# Patient Record
Sex: MALE | Race: WHITE | Employment: OTHER | URBAN - METROPOLITAN AREA
[De-identification: names, ages, dates, MRNs, and addresses within clinical notes are randomized per-mention and may not be internally consistent; named-entity substitution may affect disease eponyms.]

---

## 2017-01-04 RX ORDER — TAMSULOSIN HYDROCHLORIDE 0.4 MG/1
CAPSULE ORAL
Qty: 90 CAPSULE | Refills: 1 | Status: SHIPPED | OUTPATIENT
Start: 2017-01-04 | End: 2017-06-28 | Stop reason: SDUPTHER

## 2017-01-17 DIAGNOSIS — I10 ESSENTIAL HYPERTENSION: ICD-10-CM

## 2017-01-18 ENCOUNTER — OFFICE VISIT (OUTPATIENT)
Dept: FAMILY MEDICINE CLINIC | Age: 82
End: 2017-01-18

## 2017-01-18 VITALS
DIASTOLIC BLOOD PRESSURE: 70 MMHG | HEIGHT: 67 IN | RESPIRATION RATE: 14 BRPM | WEIGHT: 150 LBS | HEART RATE: 68 BPM | BODY MASS INDEX: 23.54 KG/M2 | SYSTOLIC BLOOD PRESSURE: 136 MMHG

## 2017-01-18 DIAGNOSIS — I10 ESSENTIAL HYPERTENSION: Primary | ICD-10-CM

## 2017-01-18 DIAGNOSIS — E78.00 PURE HYPERCHOLESTEROLEMIA: ICD-10-CM

## 2017-01-18 DIAGNOSIS — M25.512 CHRONIC LEFT SHOULDER PAIN: ICD-10-CM

## 2017-01-18 DIAGNOSIS — G89.29 CHRONIC LEFT SHOULDER PAIN: ICD-10-CM

## 2017-01-18 DIAGNOSIS — L30.9 ECZEMA, UNSPECIFIED TYPE: ICD-10-CM

## 2017-01-18 DIAGNOSIS — I25.810 CORONARY ARTERY DISEASE INVOLVING AUTOLOGOUS VEIN CORONARY BYPASS GRAFT WITHOUT ANGINA PECTORIS: ICD-10-CM

## 2017-01-18 DIAGNOSIS — N40.1 BENIGN NON-NODULAR PROSTATIC HYPERPLASIA WITH LOWER URINARY TRACT SYMPTOMS: ICD-10-CM

## 2017-01-18 PROCEDURE — 99213 OFFICE O/P EST LOW 20 MIN: CPT | Performed by: FAMILY MEDICINE

## 2017-01-18 RX ORDER — TRIAMCINOLONE ACETONIDE 1 MG/G
CREAM TOPICAL
Qty: 45 G | Refills: 2 | Status: SHIPPED | OUTPATIENT
Start: 2017-01-18 | End: 2017-11-21 | Stop reason: ALTCHOICE

## 2017-01-18 RX ORDER — LISINOPRIL AND HYDROCHLOROTHIAZIDE 20; 12.5 MG/1; MG/1
1 TABLET ORAL DAILY
Qty: 30 TABLET | Refills: 3 | Status: SHIPPED | OUTPATIENT
Start: 2017-01-18 | End: 2017-05-19 | Stop reason: SDUPTHER

## 2017-01-18 ASSESSMENT — ENCOUNTER SYMPTOMS
BLOOD IN STOOL: 0
COUGH: 0
ABDOMINAL PAIN: 0
TROUBLE SWALLOWING: 0
NAUSEA: 0
ORTHOPNEA: 0
EYE PAIN: 0
BACK PAIN: 0
CONSTIPATION: 0
CHEST TIGHTNESS: 0
SHORTNESS OF BREATH: 0
SORE THROAT: 0

## 2017-01-26 ENCOUNTER — TELEPHONE (OUTPATIENT)
Dept: FAMILY MEDICINE CLINIC | Age: 82
End: 2017-01-26

## 2017-02-21 RX ORDER — SIMVASTATIN 40 MG
TABLET ORAL
Qty: 90 TABLET | Refills: 1 | Status: SHIPPED | OUTPATIENT
Start: 2017-02-21 | End: 2017-08-16 | Stop reason: SDUPTHER

## 2017-03-03 DIAGNOSIS — I10 ESSENTIAL HYPERTENSION: ICD-10-CM

## 2017-03-03 DIAGNOSIS — E78.00 PURE HYPERCHOLESTEROLEMIA: ICD-10-CM

## 2017-03-03 LAB
CHOLESTEROL, TOTAL: NORMAL MG/DL
CHOLESTEROL/HDL RATIO: NORMAL
HDLC SERPL-MCNC: NORMAL MG/DL (ref 35–70)
LDL CHOLESTEROL CALCULATED: 33 MG/DL (ref 0–160)
TRIGL SERPL-MCNC: NORMAL MG/DL
VLDLC SERPL CALC-MCNC: NORMAL MG/DL

## 2017-03-06 ENCOUNTER — TELEPHONE (OUTPATIENT)
Dept: FAMILY MEDICINE CLINIC | Age: 82
End: 2017-03-06

## 2017-03-07 ENCOUNTER — NURSE ONLY (OUTPATIENT)
Dept: FAMILY MEDICINE CLINIC | Age: 82
End: 2017-03-07

## 2017-03-07 DIAGNOSIS — R73.9 HYPERGLYCEMIA: Primary | ICD-10-CM

## 2017-03-07 LAB
GLUCOSE BLD-MCNC: 138 MG/DL
HBA1C MFR BLD: 5.9 %

## 2017-03-07 PROCEDURE — 83036 HEMOGLOBIN GLYCOSYLATED A1C: CPT | Performed by: FAMILY MEDICINE

## 2017-03-07 PROCEDURE — 82962 GLUCOSE BLOOD TEST: CPT | Performed by: FAMILY MEDICINE

## 2017-03-07 RX ORDER — CARVEDILOL 12.5 MG/1
TABLET ORAL
Qty: 180 TABLET | Refills: 1 | Status: SHIPPED | OUTPATIENT
Start: 2017-03-07 | End: 2017-09-01 | Stop reason: SDUPTHER

## 2017-05-19 DIAGNOSIS — I10 ESSENTIAL HYPERTENSION: ICD-10-CM

## 2017-05-19 RX ORDER — LISINOPRIL AND HYDROCHLOROTHIAZIDE 20; 12.5 MG/1; MG/1
1 TABLET ORAL DAILY
Qty: 90 TABLET | Refills: 1 | Status: SHIPPED | OUTPATIENT
Start: 2017-05-19 | End: 2017-11-16 | Stop reason: SDUPTHER

## 2017-06-28 ENCOUNTER — OFFICE VISIT (OUTPATIENT)
Dept: FAMILY MEDICINE CLINIC | Age: 82
End: 2017-06-28

## 2017-06-28 VITALS
SYSTOLIC BLOOD PRESSURE: 120 MMHG | DIASTOLIC BLOOD PRESSURE: 60 MMHG | BODY MASS INDEX: 23.07 KG/M2 | RESPIRATION RATE: 12 BRPM | WEIGHT: 147 LBS | HEART RATE: 64 BPM | HEIGHT: 67 IN

## 2017-06-28 DIAGNOSIS — L57.0 ACTINIC KERATOSIS: ICD-10-CM

## 2017-06-28 DIAGNOSIS — I10 ESSENTIAL HYPERTENSION: Primary | ICD-10-CM

## 2017-06-28 DIAGNOSIS — I25.810 CORONARY ARTERY DISEASE INVOLVING AUTOLOGOUS VEIN CORONARY BYPASS GRAFT WITHOUT ANGINA PECTORIS: ICD-10-CM

## 2017-06-28 DIAGNOSIS — E78.00 PURE HYPERCHOLESTEROLEMIA: ICD-10-CM

## 2017-06-28 DIAGNOSIS — I34.0 NON-RHEUMATIC MITRAL REGURGITATION: ICD-10-CM

## 2017-06-28 DIAGNOSIS — N40.1 BENIGN NON-NODULAR PROSTATIC HYPERPLASIA WITH LOWER URINARY TRACT SYMPTOMS: ICD-10-CM

## 2017-06-28 PROCEDURE — 99213 OFFICE O/P EST LOW 20 MIN: CPT | Performed by: FAMILY MEDICINE

## 2017-06-28 PROCEDURE — 17003 DESTRUCT PREMALG LES 2-14: CPT | Performed by: FAMILY MEDICINE

## 2017-06-28 PROCEDURE — 17000 DESTRUCT PREMALG LESION: CPT | Performed by: FAMILY MEDICINE

## 2017-06-28 RX ORDER — TAMSULOSIN HYDROCHLORIDE 0.4 MG/1
CAPSULE ORAL
Qty: 90 CAPSULE | Refills: 1 | Status: SHIPPED | OUTPATIENT
Start: 2017-06-28 | End: 2017-12-26 | Stop reason: SDUPTHER

## 2017-06-28 ASSESSMENT — ENCOUNTER SYMPTOMS
TROUBLE SWALLOWING: 0
EYE PAIN: 0
BACK PAIN: 0
NAUSEA: 0
CHEST TIGHTNESS: 0
ABDOMINAL PAIN: 0
SHORTNESS OF BREATH: 0
SORE THROAT: 0
COUGH: 0
ORTHOPNEA: 0
CONSTIPATION: 0
BLOOD IN STOOL: 0

## 2017-06-28 ASSESSMENT — PATIENT HEALTH QUESTIONNAIRE - PHQ9
1. LITTLE INTEREST OR PLEASURE IN DOING THINGS: 0
2. FEELING DOWN, DEPRESSED OR HOPELESS: 0
SUM OF ALL RESPONSES TO PHQ9 QUESTIONS 1 & 2: 0
SUM OF ALL RESPONSES TO PHQ QUESTIONS 1-9: 0

## 2017-08-17 RX ORDER — SIMVASTATIN 40 MG
TABLET ORAL
Qty: 90 TABLET | Refills: 1 | Status: SHIPPED | OUTPATIENT
Start: 2017-08-17 | End: 2018-02-13 | Stop reason: SDUPTHER

## 2017-09-02 RX ORDER — CARVEDILOL 12.5 MG/1
TABLET ORAL
Qty: 180 TABLET | Refills: 0 | Status: SHIPPED | OUTPATIENT
Start: 2017-09-02 | End: 2017-12-04 | Stop reason: SDUPTHER

## 2017-11-16 DIAGNOSIS — I10 ESSENTIAL HYPERTENSION: ICD-10-CM

## 2017-11-17 RX ORDER — LISINOPRIL AND HYDROCHLOROTHIAZIDE 20; 12.5 MG/1; MG/1
1 TABLET ORAL DAILY
Qty: 90 TABLET | Refills: 0 | Status: SHIPPED | OUTPATIENT
Start: 2017-11-17 | End: 2018-02-08 | Stop reason: SDUPTHER

## 2017-11-21 ENCOUNTER — OFFICE VISIT (OUTPATIENT)
Dept: FAMILY MEDICINE CLINIC | Age: 82
End: 2017-11-21

## 2017-11-21 VITALS
DIASTOLIC BLOOD PRESSURE: 80 MMHG | HEIGHT: 67 IN | HEART RATE: 48 BPM | RESPIRATION RATE: 16 BRPM | SYSTOLIC BLOOD PRESSURE: 130 MMHG | BODY MASS INDEX: 23.01 KG/M2 | WEIGHT: 146.6 LBS

## 2017-11-21 DIAGNOSIS — I10 ESSENTIAL HYPERTENSION: ICD-10-CM

## 2017-11-21 DIAGNOSIS — I25.810 CORONARY ARTERY DISEASE INVOLVING AUTOLOGOUS VEIN CORONARY BYPASS GRAFT WITHOUT ANGINA PECTORIS: ICD-10-CM

## 2017-11-21 DIAGNOSIS — I34.0 MITRAL VALVE INSUFFICIENCY, UNSPECIFIED ETIOLOGY: Primary | ICD-10-CM

## 2017-11-21 DIAGNOSIS — I49.3 ASYMPTOMATIC PVCS: ICD-10-CM

## 2017-11-21 DIAGNOSIS — E78.00 PURE HYPERCHOLESTEROLEMIA: ICD-10-CM

## 2017-11-21 PROCEDURE — 93000 ELECTROCARDIOGRAM COMPLETE: CPT | Performed by: FAMILY MEDICINE

## 2017-11-21 PROCEDURE — 99214 OFFICE O/P EST MOD 30 MIN: CPT | Performed by: FAMILY MEDICINE

## 2017-11-21 ASSESSMENT — ENCOUNTER SYMPTOMS
CHEST TIGHTNESS: 0
ORTHOPNEA: 0
TROUBLE SWALLOWING: 0
COUGH: 0
ABDOMINAL PAIN: 0
SHORTNESS OF BREATH: 0
BACK PAIN: 0
BLOOD IN STOOL: 0
CONSTIPATION: 0
SORE THROAT: 0
EYE PAIN: 0
NAUSEA: 0

## 2017-11-21 NOTE — PROGRESS NOTES
Review of Systems   Constitutional: Negative for fatigue and fever. HENT: Negative for congestion, ear pain, postnasal drip, sore throat and trouble swallowing. Eyes: Negative for pain. Respiratory: Negative for cough, chest tightness and shortness of breath. Cardiovascular: Negative for chest pain, palpitations, orthopnea and leg swelling. Gastrointestinal: Negative for abdominal pain, blood in stool, constipation and nausea. Genitourinary: Negative for difficulty urinating, frequency and urgency. Musculoskeletal: Negative for arthralgias, back pain, joint swelling and neck stiffness. Skin: Negative for rash. Neurological: Negative for dizziness, weakness and headaches. Hematological: Negative for adenopathy. Does not bruise/bleed easily. Psychiatric/Behavioral: Negative for behavioral problems, dysphoric mood and sleep disturbance. /80 (Site: Left Arm, Position: Sitting, Cuff Size: Medium Adult)   Pulse (!) 48   Resp 16   Ht 5' 7\" (1.702 m)   Wt 146 lb 9.6 oz (66.5 kg)   BMI 22.96 kg/m²   Objective:   Physical Exam   Constitutional: He is oriented to person, place, and time. He appears well-developed and well-nourished. HENT:   Head: Normocephalic and atraumatic. Right Ear: External ear normal.   Left Ear: External ear normal.   Nose: Nose normal.   Mouth/Throat: Oropharynx is clear and moist.   Eyes: Conjunctivae and EOM are normal. Pupils are equal, round, and reactive to light. Fundi nl   Neck: Normal range of motion. Neck supple. No thyromegaly present. Cardiovascular: Normal rate, S1 normal, S2 normal and intact distal pulses. A regularly irregular rhythm present. Murmur heard. Systolic murmur is present with a grade of 2/6   Pulmonary/Chest: Effort normal and breath sounds normal. He has no wheezes. He has no rales. Abdominal: Soft. Bowel sounds are normal. He exhibits no mass. There is no tenderness. Musculoskeletal: Normal range of motion. Lymphadenopathy:     He has no cervical adenopathy. Neurological: He is alert and oriented to person, place, and time. He has normal reflexes. No cranial nerve deficit. Skin: Skin is warm and dry. No rash noted. Psychiatric: He has a normal mood and affect. Nursing note and vitals reviewed. ekg  With  pvc and  Otherwise  wnl     Assessment:      1. Mitral valve insufficiency, unspecified etiology  ECHO Complete 2D W Doppler W Color   2. Essential hypertension  EKG 12 Lead    CBC with Differential    Basic Metabolic Panel    Magnesium    TSH with Reflex   3. 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     4. Pure hypercholesterolemia     5. Asymptomatic PVCs  Holter Monitor 24 Hour    TSH with Reflex         Plan:      Current Outpatient Prescriptions   Medication Sig Dispense Refill    lisinopril-hydrochlorothiazide (PRINZIDE;ZESTORETIC) 20-12.5 MG per tablet Take 1 tablet by mouth daily 90 tablet 0    carvedilol (COREG) 12.5 MG tablet TAKE 1 TABLET BY MOUTH TWICE DAILY 180 tablet 0    simvastatin (ZOCOR) 40 MG tablet TAKE 1 TABLET BY MOUTH EVERY NIGHT AT BEDTIME 90 tablet 1    tamsulosin (FLOMAX) 0.4 MG capsule TAKE ONE CAPSULE BY MOUTH DAILY 90 capsule 1    aspirin 81 MG EC tablet Take 81 mg by mouth daily.  acetaminophen (TYLENOL) 500 MG tablet Take 500 mg by mouth every 6 hours as needed.  multivitamin (THERAGRAN) per tablet Take 1 tablet by mouth daily. No current facility-administered medications for this visit.           Orders Placed This Encounter   Procedures    CBC with Differential     Standing Status:   Future     Standing Expiration Date:   11/21/2018    Basic Metabolic Panel     Standing Status:   Future     Standing Expiration Date:   11/21/2018    Magnesium     Standing Status:   Future     Standing Expiration Date:   11/21/2018    TSH with Reflex     Standing Status:   Future     Standing Expiration Date: 11/21/2018    Holter Monitor 24 Hour     Standing Status:   Future     Standing Expiration Date:   11/21/2018     Order Specific Question:   Reason for Exam?     Answer:   Irregular heart rate    EKG 12 Lead     Order Specific Question:   Reason for Exam?     Answer:   Irregular heart rate    ECHO Complete 2D W Doppler W Color     Standing Status:   Future     Standing Expiration Date:   11/21/2018     Order Specific Question:   Reason for exam:     Answer:   mitral  regurgitation   No results found for this visit on 11/21/17.            See in one   Months

## 2017-11-22 NOTE — PROGRESS NOTES
ECHO and Holter Monitor @ [facility] on Thursday 12-7-2017. Patient to arrive at 8:15am to second floor heart center. ECHO at 8:45am and Holter @ 9:45am. Sherryle Crochet informed by Phone.

## 2017-11-23 LAB
HCT VFR BLD CALC: 41.1 % (ref 41–53)
HEMOGLOBIN: 13.5 G/DL (ref 13.5–17.5)
PLATELET # BLD: 194 K/ΜL
WBC # BLD: 7.2 10^3/ML

## 2017-11-24 DIAGNOSIS — I10 ESSENTIAL HYPERTENSION: ICD-10-CM

## 2017-11-24 DIAGNOSIS — I49.3 ASYMPTOMATIC PVCS: ICD-10-CM

## 2017-11-27 ENCOUNTER — TELEPHONE (OUTPATIENT)
Dept: FAMILY MEDICINE CLINIC | Age: 82
End: 2017-11-27

## 2017-12-04 NOTE — TELEPHONE ENCOUNTER
Date of last visit:  11/21/2017  Date of next visit:  12/22/2017    Requested Prescriptions     Pending Prescriptions Disp Refills    carvedilol (COREG) 12.5 MG tablet 180 tablet 1     Sig: TAKE 1 TABLET BY MOUTH TWICE DAILY

## 2017-12-05 ENCOUNTER — NURSE ONLY (OUTPATIENT)
Dept: FAMILY MEDICINE CLINIC | Age: 82
End: 2017-12-05

## 2017-12-05 DIAGNOSIS — R73.9 HYPERGLYCEMIA: Primary | ICD-10-CM

## 2017-12-05 LAB
GLUCOSE BLD-MCNC: 112 MG/DL
HBA1C MFR BLD: 5.5 %

## 2017-12-05 PROCEDURE — 83036 HEMOGLOBIN GLYCOSYLATED A1C: CPT | Performed by: FAMILY MEDICINE

## 2017-12-05 PROCEDURE — 82962 GLUCOSE BLOOD TEST: CPT | Performed by: FAMILY MEDICINE

## 2017-12-05 RX ORDER — CARVEDILOL 12.5 MG/1
TABLET ORAL
Qty: 180 TABLET | Refills: 1 | Status: SHIPPED | OUTPATIENT
Start: 2017-12-05 | End: 2018-05-29 | Stop reason: SDUPTHER

## 2017-12-06 ENCOUNTER — TELEPHONE (OUTPATIENT)
Dept: FAMILY MEDICINE CLINIC | Age: 82
End: 2017-12-06

## 2017-12-07 ENCOUNTER — HOSPITAL ENCOUNTER (OUTPATIENT)
Dept: NON INVASIVE DIAGNOSTICS | Age: 82
Discharge: HOME | End: 2017-12-07
Payer: MEDICARE

## 2017-12-07 DIAGNOSIS — I34.0 MITRAL VALVE INSUFFICIENCY, UNSPECIFIED ETIOLOGY: ICD-10-CM

## 2017-12-07 DIAGNOSIS — I49.3 ASYMPTOMATIC PVCS: ICD-10-CM

## 2017-12-07 LAB
LV EF: 55 %
LVEF MODALITY: NORMAL

## 2017-12-07 PROCEDURE — 93306 TTE W/DOPPLER COMPLETE: CPT

## 2017-12-07 PROCEDURE — 93226 XTRNL ECG REC<48 HR SCAN A/R: CPT

## 2017-12-07 PROCEDURE — 93225 XTRNL ECG REC<48 HRS REC: CPT

## 2017-12-07 NOTE — PROCEDURES
24 hr holter applied with monitor [id number]. On 12/07/17 at 09:10. Instructions given. Patient understands.   Frannie, CCT

## 2017-12-08 ENCOUNTER — TELEPHONE (OUTPATIENT)
Dept: FAMILY MEDICINE CLINIC | Age: 82
End: 2017-12-08

## 2017-12-08 NOTE — TELEPHONE ENCOUNTER
----- Message from Altaf He MD sent at 12/8/2017  6:44 AM EST -----  Keep appt  Echo  Good  lv  fcn  And  Moderate mitral regurg and rx  is  Medically on current meds .   Still awaiting  holter report please  call

## 2017-12-12 NOTE — PROCEDURES
[address] [place] [address]                                  HOLTER MONITOR    PATIENT NAME: Tracy Baker                       :        1933  MED REC NO:   [id number]                           ROOM:  ACCOUNT NO:   [de-identified]                           ADMIT DATE: 2017  PROVIDER:     Leonela Clemons M.D.    Dov Rollinsmayra STUDY:  17    CLINICAL HISTORY AND INDICATION:  This is an 80-year-old patient with  palpitation. HOLTER MONITOR DESCRIPTION:  Holter monitor was attached to the patient for  a total of 24 hours 3 minutes. Total number of beats was 103,898. HOLTER MONITOR ANALYSIS:  Minimum heart rate was 56 beats per minute. Maximum heart rate was 90 beats per minute. Average heart rate was 72  beats per minute. Predominant rhythm was sinus rhythm. There were  frequent number of PVCs, total of 40,795; rare PACs. There was no V-tach,  SVT, or pauses. CONCLUSION:  1. Very frequent number of premature ventricular beats noted on this  Holter monitor, which is worth further evaluating. 2.  Abnormal Holter monitor.         Sherry Glaser M.D.    D: 2017 16:44:14       T: 2017 17:33:21     [id number]  Job#: [id number]     Doc#: [id number]    CC:  Leonela Clemons M.D.

## 2017-12-22 ENCOUNTER — OFFICE VISIT (OUTPATIENT)
Dept: FAMILY MEDICINE CLINIC | Age: 82
End: 2017-12-22

## 2017-12-22 VITALS
HEIGHT: 67 IN | BODY MASS INDEX: 22.98 KG/M2 | DIASTOLIC BLOOD PRESSURE: 68 MMHG | RESPIRATION RATE: 16 BRPM | SYSTOLIC BLOOD PRESSURE: 134 MMHG | WEIGHT: 146.38 LBS | HEART RATE: 68 BPM

## 2017-12-22 DIAGNOSIS — I25.810 CORONARY ARTERY DISEASE INVOLVING AUTOLOGOUS VEIN CORONARY BYPASS GRAFT WITHOUT ANGINA PECTORIS: ICD-10-CM

## 2017-12-22 DIAGNOSIS — I49.3 FREQUENT PVCS: Primary | ICD-10-CM

## 2017-12-22 DIAGNOSIS — I34.0 MITRAL VALVE INSUFFICIENCY, UNSPECIFIED ETIOLOGY: ICD-10-CM

## 2017-12-22 PROCEDURE — 99213 OFFICE O/P EST LOW 20 MIN: CPT | Performed by: FAMILY MEDICINE

## 2017-12-22 ASSESSMENT — ENCOUNTER SYMPTOMS
BLOOD IN STOOL: 0
SORE THROAT: 0
BACK PAIN: 0
CONSTIPATION: 0
EYE PAIN: 0
CHEST TIGHTNESS: 0
ABDOMINAL PAIN: 0
TROUBLE SWALLOWING: 0
NAUSEA: 0
SHORTNESS OF BREATH: 0
COUGH: 0

## 2017-12-22 NOTE — PROGRESS NOTES
Subjective:      Patient ID: Sammi Stephen is a 80 y.o. male. pvc  mnoted        Echo  With  Mitral  Valve   Disease and  regurg   Noted and    holter  withn  pvc 's   Past Medical History:   Diagnosis Date    BPH (benign prostatic hypertrophy)     CAD (coronary artery disease)     Cerebrovascular disease     Hyperlipidemia     Hypertension     Mitral regurgitation     TIA (transient ischemic attack) 2009    post surgery resolved     Review of Systems   Constitutional: Negative for fatigue and fever. HENT: Negative for congestion, ear pain, postnasal drip, sore throat and trouble swallowing. Eyes: Negative for pain. Respiratory: Negative for cough, chest tightness and shortness of breath. Cardiovascular: Negative for chest pain, palpitations and leg swelling. Gastrointestinal: Negative for abdominal pain, blood in stool, constipation and nausea. Genitourinary: Negative for difficulty urinating, frequency and urgency. Musculoskeletal: Negative for arthralgias, back pain, joint swelling and neck stiffness. Skin: Negative for rash. Neurological: Negative for dizziness, weakness and headaches. Hematological: Negative for adenopathy. Does not bruise/bleed easily. Psychiatric/Behavioral: Negative for behavioral problems, dysphoric mood and sleep disturbance. /68 (Site: Right Arm, Position: Sitting, Cuff Size: Medium Adult)   Pulse 68   Resp 16   Ht 5' 7\" (1.702 m)   Wt 146 lb 6 oz (66.4 kg)   BMI 22.93 kg/m²   Objective:   Physical Exam   Constitutional: He is oriented to person, place, and time. He appears well-developed and well-nourished. HENT:   Head: Normocephalic and atraumatic. Right Ear: External ear normal.   Left Ear: External ear normal.   Nose: Nose normal.   Mouth/Throat: Oropharynx is clear and moist.   Eyes: Conjunctivae and EOM are normal. Pupils are equal, round, and reactive to light. Fundi nl   Neck: Normal range of motion. Neck supple.  No

## 2017-12-26 DIAGNOSIS — N40.1 BENIGN NON-NODULAR PROSTATIC HYPERPLASIA WITH LOWER URINARY TRACT SYMPTOMS: ICD-10-CM

## 2017-12-26 NOTE — TELEPHONE ENCOUNTER
[facility] called requesting a refill of the below medication which has been pended for you:     Requested Prescriptions     Pending Prescriptions Disp Refills    tamsulosin (FLOMAX) 0.4 MG capsule 90 capsule 1     Sig: TAKE ONE CAPSULE BY MOUTH DAILY       Last Appointment Date: 12/22/2017  Next Appointment Date: Visit date not found    Allergies   Allergen Reactions    Pcn [Penicillins] Anaphylaxis    Ibuprofen     Percocet [Oxycodone-Acetaminophen] Nausea And Vomiting

## 2017-12-27 RX ORDER — TAMSULOSIN HYDROCHLORIDE 0.4 MG/1
CAPSULE ORAL
Qty: 90 CAPSULE | Refills: 1 | Status: SHIPPED | OUTPATIENT
Start: 2017-12-27 | End: 2018-06-28 | Stop reason: SDUPTHER

## 2018-01-16 ENCOUNTER — OFFICE VISIT (OUTPATIENT)
Dept: CARDIOLOGY CLINIC | Age: 83
End: 2018-01-16
Payer: MEDICARE

## 2018-01-16 VITALS
HEIGHT: 67 IN | BODY MASS INDEX: 23.54 KG/M2 | WEIGHT: 150 LBS | HEART RATE: 80 BPM | SYSTOLIC BLOOD PRESSURE: 178 MMHG | DIASTOLIC BLOOD PRESSURE: 96 MMHG

## 2018-01-16 DIAGNOSIS — I49.3 PVC (PREMATURE VENTRICULAR CONTRACTION): Primary | ICD-10-CM

## 2018-01-16 PROCEDURE — G8420 CALC BMI NORM PARAMETERS: HCPCS | Performed by: INTERNAL MEDICINE

## 2018-01-16 PROCEDURE — 1123F ACP DISCUSS/DSCN MKR DOCD: CPT | Performed by: INTERNAL MEDICINE

## 2018-01-16 PROCEDURE — 1036F TOBACCO NON-USER: CPT | Performed by: INTERNAL MEDICINE

## 2018-01-16 PROCEDURE — G8427 DOCREV CUR MEDS BY ELIG CLIN: HCPCS | Performed by: INTERNAL MEDICINE

## 2018-01-16 PROCEDURE — G8484 FLU IMMUNIZE NO ADMIN: HCPCS | Performed by: INTERNAL MEDICINE

## 2018-01-16 PROCEDURE — 4040F PNEUMOC VAC/ADMIN/RCVD: CPT | Performed by: INTERNAL MEDICINE

## 2018-01-16 PROCEDURE — 93000 ELECTROCARDIOGRAM COMPLETE: CPT | Performed by: INTERNAL MEDICINE

## 2018-01-16 PROCEDURE — G8598 ASA/ANTIPLAT THER USED: HCPCS | Performed by: INTERNAL MEDICINE

## 2018-01-16 PROCEDURE — 99203 OFFICE O/P NEW LOW 30 MIN: CPT | Performed by: INTERNAL MEDICINE

## 2018-01-16 ASSESSMENT — ENCOUNTER SYMPTOMS
DOUBLE VISION: 0
CONSTIPATION: 0
BLURRED VISION: 0
BACK PAIN: 0
SORE THROAT: 0
ABDOMINAL PAIN: 0
RIGHT EYE: 0
NAUSEA: 0
LEFT EYE: 0
SHORTNESS OF BREATH: 0
VOMITING: 0
WHEEZING: 0
DIARRHEA: 0
COUGH: 0

## 2018-01-16 NOTE — PROGRESS NOTES
[facility] [address] [facility], [facility] Bernard Hernandez  Dept: [phone]  Dept Fax: [phone]      CARDIAC ELECTROPHYSIOLOGY  CONSULTATION    1/16/2018      REFERRING PROVIDER:  Dr. James Pal    CHIEF COMPLAINT:  I have an irregular heart beat. 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 He is s/p 5V CABG at the [facility] March 2009. The patient has done very well from a cardiac standpoint since his surgery. He is followed by Dr. James Pal on a regular basis. He was in to see Dr. James Pal for a regular f/u exam in November, when he was found on his vital signs to be bradycardic. The patient had an ECG performed and was found to have ventricular bigeminy. The patient did not have any complaints of palpitations, dizziness, or shortness of breath with exertion. The patient had a 24 hour Holter monitor which showed a significant PVC burden of 40%. The patient had an ECHO performed which fortunately showed a normal LVEF of 55%. He was already on Coreg for his h/o coronary artery disease. The patient was unaware that his heart beat was irregular. The patient is being referred for further evaluation and management of frequent PVC's. PMH:  History obtained from chart review and the patient.   Past Medical History:   Diagnosis Date    BPH (benign prostatic hypertrophy)     CAD (coronary artery disease)     Cerebrovascular disease     Hyperlipidemia     Hypertension     Mitral regurgitation     TIA (transient ischemic attack) 2009    post surgery resolved       PSH:  Past Surgical History:   Procedure Laterality Date    CATARACT REMOVAL Right 7-2015    Dr Almita Graff  2009       FAMILY HISTORY:  Family History   Problem Relation Age of Onset    Cancer Father        SOCIAL HISTORY:  Social History     Social History    Marital He exhibits normal range of motion and no swelling. Right knee: Normal. He exhibits normal range of motion and no swelling. Left knee: Normal. He exhibits normal range of motion and no swelling. Right upper arm: Normal.        Left upper arm: Normal.        Right upper leg: Normal.        Left upper leg: Normal.        Right lower leg: Normal.        Left lower leg: Normal.   Lymphadenopathy:        Head (right side): No submandibular adenopathy present. Head (left side): No submandibular adenopathy present. Neurological: He is alert and oriented to person, place, and time. He has normal strength. He displays normal reflexes. No cranial nerve deficit or sensory deficit. Coordination and gait normal.   Skin: Skin is warm and dry. No lesion and no rash noted. He is not diaphoretic. No cyanosis. Nails show no clubbing. Psychiatric: His speech is normal and behavior is normal. Judgment and thought content normal. His mood appears not anxious. His affect is not angry. Cognition and memory are normal. He does not exhibit a depressed mood. Nursing note and vitals reviewed. DIAGNOSTIC STUDIES & LABORATORY DATA:    I have personally reviewed and interpreted the results of the following diagnostic testing  CARDIAC HISTORY:    ECHO: 12/07/2017  Summary   Normal left ventricle size and systolic function. Ejection fraction was   estimated at 55 %. There were no regional left ventricular wall motion   abnormalities and wall thickness was within normal limits.   The left atrium is Moderately dilated.   Moderate mitral regurgitation is present.   Aortic valve appears tricuspid.  Aortic valve leaflets are Moderately   calcified.   Leaflets exhibited moderately increased thickness and mildly reduced   cuspal separation of the aortic valve.   Mild aortic regurgitation is noted.   Mild aortic stenosis is present.   The maximum aortic valve gradient is 16 mmHg, the mean gradient is 10   mmHg, and develop a PVC induced cardiomyopathy. I reviewed the patient's ECG from November and the most likely location is located in the inferolateral wall of the LV or along the mitral annulus. The QRS is a LBBB morphology in V1 and has a transition in V3/ V4. The axis is superior in the limb leads. The patient understood the information and agrees with the plan of care. PLAN:  1. Continue current medications. 2. Repeat ECHO in one year. 3. I will f/u with the patient in one year or earlier if needed.            Electronically signed by Dinora Ling MD on 1/16/2018 at 2:00 PM

## 2018-02-08 DIAGNOSIS — I10 ESSENTIAL HYPERTENSION: ICD-10-CM

## 2018-02-08 NOTE — TELEPHONE ENCOUNTER
Date of last visit:  12/22/2017  Date of next visit:  Visit date not found    Requested Prescriptions     Pending Prescriptions Disp Refills    lisinopril-hydrochlorothiazide (PRINZIDE;ZESTORETIC) 20-12.5 MG per tablet 90 tablet 1     Sig: Take 1 tablet by mouth daily

## 2018-02-09 RX ORDER — LISINOPRIL AND HYDROCHLOROTHIAZIDE 20; 12.5 MG/1; MG/1
1 TABLET ORAL DAILY
Qty: 90 TABLET | Refills: 0 | Status: SHIPPED | OUTPATIENT
Start: 2018-02-09 | End: 2018-05-14 | Stop reason: SDUPTHER

## 2018-02-14 RX ORDER — SIMVASTATIN 40 MG
TABLET ORAL
Qty: 90 TABLET | Refills: 1 | Status: SHIPPED | OUTPATIENT
Start: 2018-02-14 | End: 2018-08-08 | Stop reason: SDUPTHER

## 2018-04-30 ENCOUNTER — OFFICE VISIT (OUTPATIENT)
Dept: FAMILY MEDICINE CLINIC | Age: 83
End: 2018-04-30

## 2018-04-30 VITALS
WEIGHT: 148.5 LBS | HEIGHT: 67 IN | SYSTOLIC BLOOD PRESSURE: 124 MMHG | HEART RATE: 64 BPM | BODY MASS INDEX: 23.31 KG/M2 | RESPIRATION RATE: 16 BRPM | DIASTOLIC BLOOD PRESSURE: 68 MMHG

## 2018-04-30 DIAGNOSIS — I25.810 CORONARY ARTERY DISEASE INVOLVING AUTOLOGOUS VEIN CORONARY BYPASS GRAFT WITHOUT ANGINA PECTORIS: ICD-10-CM

## 2018-04-30 DIAGNOSIS — I10 ESSENTIAL HYPERTENSION: Primary | ICD-10-CM

## 2018-04-30 DIAGNOSIS — I34.0 MITRAL VALVE INSUFFICIENCY, UNSPECIFIED ETIOLOGY: ICD-10-CM

## 2018-04-30 DIAGNOSIS — E78.00 PURE HYPERCHOLESTEROLEMIA: ICD-10-CM

## 2018-04-30 PROCEDURE — 99213 OFFICE O/P EST LOW 20 MIN: CPT | Performed by: FAMILY MEDICINE

## 2018-05-14 DIAGNOSIS — I10 ESSENTIAL HYPERTENSION: ICD-10-CM

## 2018-05-15 RX ORDER — LISINOPRIL AND HYDROCHLOROTHIAZIDE 20; 12.5 MG/1; MG/1
1 TABLET ORAL DAILY
Qty: 90 TABLET | Refills: 0 | Status: SHIPPED | OUTPATIENT
Start: 2018-05-15 | End: 2018-08-08 | Stop reason: SDUPTHER

## 2018-05-30 RX ORDER — CARVEDILOL 12.5 MG/1
TABLET ORAL
Qty: 180 TABLET | Refills: 1 | Status: SHIPPED | OUTPATIENT
Start: 2018-05-30 | End: 2019-11-22

## 2018-06-28 DIAGNOSIS — N40.1 BENIGN NON-NODULAR PROSTATIC HYPERPLASIA WITH LOWER URINARY TRACT SYMPTOMS: ICD-10-CM

## 2018-06-29 RX ORDER — TAMSULOSIN HYDROCHLORIDE 0.4 MG/1
CAPSULE ORAL
Qty: 90 CAPSULE | Refills: 1 | Status: SHIPPED | OUTPATIENT
Start: 2018-06-29 | End: 2018-12-28 | Stop reason: SDUPTHER

## 2018-08-08 DIAGNOSIS — I10 ESSENTIAL HYPERTENSION: ICD-10-CM

## 2018-08-09 RX ORDER — SIMVASTATIN 40 MG
TABLET ORAL
Qty: 90 TABLET | Refills: 1 | Status: SHIPPED | OUTPATIENT
Start: 2018-08-09 | End: 2019-02-05 | Stop reason: SDUPTHER

## 2018-08-09 RX ORDER — LISINOPRIL AND HYDROCHLOROTHIAZIDE 20; 12.5 MG/1; MG/1
1 TABLET ORAL DAILY
Qty: 90 TABLET | Refills: 1 | Status: SHIPPED | OUTPATIENT
Start: 2018-08-09 | End: 2019-02-05 | Stop reason: SDUPTHER

## 2018-10-15 ENCOUNTER — OFFICE VISIT (OUTPATIENT)
Dept: FAMILY MEDICINE CLINIC | Age: 83
End: 2018-10-15

## 2018-10-15 VITALS
DIASTOLIC BLOOD PRESSURE: 64 MMHG | BODY MASS INDEX: 23.56 KG/M2 | HEART RATE: 60 BPM | RESPIRATION RATE: 12 BRPM | WEIGHT: 150.13 LBS | HEIGHT: 67 IN | SYSTOLIC BLOOD PRESSURE: 110 MMHG

## 2018-10-15 DIAGNOSIS — I34.0 MITRAL VALVE INSUFFICIENCY, UNSPECIFIED ETIOLOGY: ICD-10-CM

## 2018-10-15 DIAGNOSIS — E78.00 PURE HYPERCHOLESTEROLEMIA: ICD-10-CM

## 2018-10-15 DIAGNOSIS — Z91.89 STREPTOCOCCUS PNEUMONIAE VACCINATION INDICATED: ICD-10-CM

## 2018-10-15 DIAGNOSIS — I10 ESSENTIAL HYPERTENSION: ICD-10-CM

## 2018-10-15 DIAGNOSIS — I25.810 CORONARY ARTERY DISEASE INVOLVING AUTOLOGOUS VEIN CORONARY BYPASS GRAFT WITHOUT ANGINA PECTORIS: Primary | ICD-10-CM

## 2018-10-15 DIAGNOSIS — Z23 NEED FOR INFLUENZA VACCINATION: ICD-10-CM

## 2018-10-15 PROCEDURE — 90732 PPSV23 VACC 2 YRS+ SUBQ/IM: CPT | Performed by: FAMILY MEDICINE

## 2018-10-15 PROCEDURE — G0008 ADMIN INFLUENZA VIRUS VAC: HCPCS | Performed by: FAMILY MEDICINE

## 2018-10-15 PROCEDURE — G0009 ADMIN PNEUMOCOCCAL VACCINE: HCPCS | Performed by: FAMILY MEDICINE

## 2018-10-15 PROCEDURE — 99213 OFFICE O/P EST LOW 20 MIN: CPT | Performed by: FAMILY MEDICINE

## 2018-10-15 PROCEDURE — 1101F PT FALLS ASSESS-DOCD LE1/YR: CPT | Performed by: FAMILY MEDICINE

## 2018-10-15 PROCEDURE — 90688 IIV4 VACCINE SPLT 0.5 ML IM: CPT | Performed by: FAMILY MEDICINE

## 2018-10-15 ASSESSMENT — ENCOUNTER SYMPTOMS
BLOOD IN STOOL: 0
EYE PAIN: 0
SORE THROAT: 0
CHEST TIGHTNESS: 0
NAUSEA: 0
TROUBLE SWALLOWING: 0
BACK PAIN: 0
CONSTIPATION: 0
SHORTNESS OF BREATH: 0
COUGH: 0
ABDOMINAL PAIN: 0

## 2018-10-15 ASSESSMENT — PATIENT HEALTH QUESTIONNAIRE - PHQ9
2. FEELING DOWN, DEPRESSED OR HOPELESS: 0
1. LITTLE INTEREST OR PLEASURE IN DOING THINGS: 0
SUM OF ALL RESPONSES TO PHQ QUESTIONS 1-9: 0
SUM OF ALL RESPONSES TO PHQ QUESTIONS 1-9: 0
SUM OF ALL RESPONSES TO PHQ9 QUESTIONS 1 & 2: 0

## 2018-10-15 NOTE — PROGRESS NOTES
Immunizations     Name Date Dose Route    Influenza, Sonny Soliman, 6 mo and older, IM (Flulaval) 10/15/2018 0.5 mL Intramuscular    Site: Deltoid- Right    Lot: [id number]    NDC: 85609-695-25    Pneumococcal Polysaccharide (Mardmstqa87) 10/15/2018 0.5 mL Intramuscular    Site: Deltoid- Left    Lot: [id number]    NDC: 8210-6396-21

## 2018-12-04 RX ORDER — CARVEDILOL 12.5 MG/1
TABLET ORAL
Qty: 180 TABLET | Refills: 1 | Status: SHIPPED | OUTPATIENT
Start: 2018-12-04 | End: 2019-01-22 | Stop reason: SDUPTHER

## 2018-12-28 DIAGNOSIS — N40.1 BENIGN NON-NODULAR PROSTATIC HYPERPLASIA WITH LOWER URINARY TRACT SYMPTOMS: ICD-10-CM

## 2018-12-28 NOTE — TELEPHONE ENCOUNTER
Date of last visit:  10/15/2018  Date of next visit:  Visit date not found    Requested Prescriptions     Pending Prescriptions Disp Refills    tamsulosin (FLOMAX) 0.4 MG capsule 90 capsule 1     Sig: TAKE ONE CAPSULE BY MOUTH DAILY

## 2018-12-28 NOTE — TELEPHONE ENCOUNTER
Pt called for a refill of:    tamsulosin (FLOMAX) 0.4 MG capsule qd    Send 90 day supply to [facility] on [address].

## 2018-12-29 RX ORDER — TAMSULOSIN HYDROCHLORIDE 0.4 MG/1
CAPSULE ORAL
Qty: 90 CAPSULE | Refills: 1 | Status: SHIPPED | OUTPATIENT
Start: 2018-12-29 | End: 2019-06-26 | Stop reason: SDUPTHER

## 2019-01-15 ENCOUNTER — HOSPITAL ENCOUNTER (OUTPATIENT)
Dept: NON INVASIVE DIAGNOSTICS | Age: 84
Discharge: HOME | End: 2019-01-15
Payer: MEDICARE

## 2019-01-15 DIAGNOSIS — I49.3 PVC (PREMATURE VENTRICULAR CONTRACTION): ICD-10-CM

## 2019-01-15 LAB
LV EF: 55 %
LVEF MODALITY: NORMAL

## 2019-01-15 PROCEDURE — 93306 TTE W/DOPPLER COMPLETE: CPT

## 2019-01-16 ENCOUNTER — TELEPHONE (OUTPATIENT)
Dept: CARDIOLOGY CLINIC | Age: 84
End: 2019-01-16

## 2019-01-22 ENCOUNTER — OFFICE VISIT (OUTPATIENT)
Dept: CARDIOLOGY CLINIC | Age: 84
End: 2019-01-22
Payer: MEDICARE

## 2019-01-22 VITALS
BODY MASS INDEX: 23.54 KG/M2 | WEIGHT: 150 LBS | SYSTOLIC BLOOD PRESSURE: 162 MMHG | HEIGHT: 67 IN | DIASTOLIC BLOOD PRESSURE: 84 MMHG

## 2019-01-22 DIAGNOSIS — I49.3 PVC (PREMATURE VENTRICULAR CONTRACTION): ICD-10-CM

## 2019-01-22 PROBLEM — I49.9 ARRHYTHMIA: Status: ACTIVE | Noted: 2019-01-22

## 2019-01-22 PROCEDURE — 1036F TOBACCO NON-USER: CPT | Performed by: INTERNAL MEDICINE

## 2019-01-22 PROCEDURE — 99213 OFFICE O/P EST LOW 20 MIN: CPT | Performed by: INTERNAL MEDICINE

## 2019-01-22 PROCEDURE — 1101F PT FALLS ASSESS-DOCD LE1/YR: CPT | Performed by: INTERNAL MEDICINE

## 2019-01-22 PROCEDURE — 1123F ACP DISCUSS/DSCN MKR DOCD: CPT | Performed by: INTERNAL MEDICINE

## 2019-01-22 PROCEDURE — G8420 CALC BMI NORM PARAMETERS: HCPCS | Performed by: INTERNAL MEDICINE

## 2019-01-22 PROCEDURE — 93000 ELECTROCARDIOGRAM COMPLETE: CPT | Performed by: INTERNAL MEDICINE

## 2019-01-22 PROCEDURE — G8427 DOCREV CUR MEDS BY ELIG CLIN: HCPCS | Performed by: INTERNAL MEDICINE

## 2019-01-22 PROCEDURE — G8598 ASA/ANTIPLAT THER USED: HCPCS | Performed by: INTERNAL MEDICINE

## 2019-01-22 PROCEDURE — G8482 FLU IMMUNIZE ORDER/ADMIN: HCPCS | Performed by: INTERNAL MEDICINE

## 2019-01-22 PROCEDURE — 4040F PNEUMOC VAC/ADMIN/RCVD: CPT | Performed by: INTERNAL MEDICINE

## 2019-01-22 ASSESSMENT — ENCOUNTER SYMPTOMS
CONSTIPATION: 0
DIARRHEA: 0
BLURRED VISION: 0
WHEEZING: 0
VOMITING: 0
SORE THROAT: 0
BACK PAIN: 0
DOUBLE VISION: 0
ABDOMINAL PAIN: 0
RIGHT EYE: 0
COUGH: 0
SHORTNESS OF BREATH: 0
NAUSEA: 0
LEFT EYE: 0

## 2019-02-05 DIAGNOSIS — I10 ESSENTIAL HYPERTENSION: ICD-10-CM

## 2019-02-06 RX ORDER — SIMVASTATIN 40 MG
TABLET ORAL
Qty: 90 TABLET | Refills: 1 | Status: SHIPPED | OUTPATIENT
Start: 2019-02-06 | End: 2019-08-05 | Stop reason: SDUPTHER

## 2019-02-06 RX ORDER — LISINOPRIL AND HYDROCHLOROTHIAZIDE 20; 12.5 MG/1; MG/1
1 TABLET ORAL DAILY
Qty: 90 TABLET | Refills: 1 | Status: SHIPPED | OUTPATIENT
Start: 2019-02-06 | End: 2019-08-05 | Stop reason: SDUPTHER

## 2019-05-06 ENCOUNTER — OFFICE VISIT (OUTPATIENT)
Dept: FAMILY MEDICINE CLINIC | Age: 84
End: 2019-05-06

## 2019-05-06 VITALS
WEIGHT: 148.25 LBS | DIASTOLIC BLOOD PRESSURE: 78 MMHG | RESPIRATION RATE: 14 BRPM | HEART RATE: 74 BPM | BODY MASS INDEX: 23.27 KG/M2 | SYSTOLIC BLOOD PRESSURE: 138 MMHG | HEIGHT: 67 IN

## 2019-05-06 DIAGNOSIS — E78.00 PURE HYPERCHOLESTEROLEMIA: ICD-10-CM

## 2019-05-06 DIAGNOSIS — I49.3 PVC (PREMATURE VENTRICULAR CONTRACTION): ICD-10-CM

## 2019-05-06 DIAGNOSIS — I34.0 MITRAL VALVE INSUFFICIENCY, UNSPECIFIED ETIOLOGY: ICD-10-CM

## 2019-05-06 DIAGNOSIS — I25.810 CORONARY ARTERY DISEASE INVOLVING AUTOLOGOUS VEIN CORONARY BYPASS GRAFT WITHOUT ANGINA PECTORIS: ICD-10-CM

## 2019-05-06 DIAGNOSIS — I10 ESSENTIAL HYPERTENSION: Primary | ICD-10-CM

## 2019-05-06 PROCEDURE — 1123F ACP DISCUSS/DSCN MKR DOCD: CPT | Performed by: FAMILY MEDICINE

## 2019-05-06 PROCEDURE — 4040F PNEUMOC VAC/ADMIN/RCVD: CPT | Performed by: FAMILY MEDICINE

## 2019-05-06 PROCEDURE — G8427 DOCREV CUR MEDS BY ELIG CLIN: HCPCS | Performed by: FAMILY MEDICINE

## 2019-05-06 PROCEDURE — G8420 CALC BMI NORM PARAMETERS: HCPCS | Performed by: FAMILY MEDICINE

## 2019-05-06 PROCEDURE — 99213 OFFICE O/P EST LOW 20 MIN: CPT | Performed by: FAMILY MEDICINE

## 2019-05-06 PROCEDURE — 1036F TOBACCO NON-USER: CPT | Performed by: FAMILY MEDICINE

## 2019-05-06 PROCEDURE — G8598 ASA/ANTIPLAT THER USED: HCPCS | Performed by: FAMILY MEDICINE

## 2019-05-06 ASSESSMENT — ENCOUNTER SYMPTOMS
CONSTIPATION: 0
TROUBLE SWALLOWING: 0
BACK PAIN: 0
SHORTNESS OF BREATH: 0
ORTHOPNEA: 0
SORE THROAT: 0
EYE PAIN: 0
COUGH: 0
BLOOD IN STOOL: 0
ABDOMINAL PAIN: 0
NAUSEA: 0
CHEST TIGHTNESS: 0

## 2019-05-09 DIAGNOSIS — E78.00 PURE HYPERCHOLESTEROLEMIA: ICD-10-CM

## 2019-05-09 DIAGNOSIS — I10 ESSENTIAL HYPERTENSION: ICD-10-CM

## 2019-05-10 ENCOUNTER — TELEPHONE (OUTPATIENT)
Dept: FAMILY MEDICINE CLINIC | Age: 84
End: 2019-05-10

## 2019-05-10 NOTE — TELEPHONE ENCOUNTER
----- Message from Edouard Gomez MD sent at 5/10/2019  5:43 AM EDT -----  Call labs  Khushboo Garay  But glu up slight  But was ok on hgba1c in December so next visit do hgba1c again

## 2019-05-31 NOTE — TELEPHONE ENCOUNTER
Date of last visit:  5/6/2019  Date of next visit:  Visit date not found    Requested Prescriptions     Pending Prescriptions Disp Refills    carvedilol (COREG) 12.5 MG tablet [Pharmacy Med Name: CARVEDILOL 12.5MG TABLETS] 180 tablet 0     Sig: TAKE 1 TABLET BY MOUTH TWICE DAILY

## 2019-06-01 RX ORDER — CARVEDILOL 12.5 MG/1
TABLET ORAL
Qty: 180 TABLET | Refills: 0 | Status: SHIPPED | OUTPATIENT
Start: 2019-06-01 | End: 2019-08-28 | Stop reason: SDUPTHER

## 2019-06-26 DIAGNOSIS — N40.1 BENIGN NON-NODULAR PROSTATIC HYPERPLASIA WITH LOWER URINARY TRACT SYMPTOMS: ICD-10-CM

## 2019-06-26 NOTE — TELEPHONE ENCOUNTER
Date of last visit:  5/6/2019  Date of next visit:  Visit date not found    Requested Prescriptions     Pending Prescriptions Disp Refills    tamsulosin (FLOMAX) 0.4 MG capsule [Pharmacy Med Name: TAMSULOSIN 0.4MG CAPSULES] 90 capsule 0     Sig: TAKE 1 CAPSULE BY MOUTH DAILY

## 2019-06-27 RX ORDER — TAMSULOSIN HYDROCHLORIDE 0.4 MG/1
CAPSULE ORAL
Qty: 90 CAPSULE | Refills: 0 | Status: SHIPPED | OUTPATIENT
Start: 2019-06-27 | End: 2019-09-24 | Stop reason: SDUPTHER

## 2019-08-05 DIAGNOSIS — I10 ESSENTIAL HYPERTENSION: ICD-10-CM

## 2019-08-06 RX ORDER — SIMVASTATIN 40 MG
TABLET ORAL
Qty: 90 TABLET | Refills: 0 | Status: SHIPPED | OUTPATIENT
Start: 2019-08-06 | End: 2019-11-06 | Stop reason: SDUPTHER

## 2019-08-06 RX ORDER — LISINOPRIL AND HYDROCHLOROTHIAZIDE 20; 12.5 MG/1; MG/1
1 TABLET ORAL DAILY
Qty: 90 TABLET | Refills: 0 | Status: SHIPPED | OUTPATIENT
Start: 2019-08-06 | End: 2019-11-06 | Stop reason: SDUPTHER

## 2019-08-29 RX ORDER — CARVEDILOL 12.5 MG/1
TABLET ORAL
Qty: 180 TABLET | Refills: 1 | Status: SHIPPED | OUTPATIENT
Start: 2019-08-29 | End: 2020-02-28

## 2019-09-24 DIAGNOSIS — N40.1 BENIGN NON-NODULAR PROSTATIC HYPERPLASIA WITH LOWER URINARY TRACT SYMPTOMS: ICD-10-CM

## 2019-09-25 RX ORDER — TAMSULOSIN HYDROCHLORIDE 0.4 MG/1
CAPSULE ORAL
Qty: 90 CAPSULE | Refills: 0 | Status: SHIPPED | OUTPATIENT
Start: 2019-09-25 | End: 2019-12-27

## 2019-11-06 DIAGNOSIS — I10 ESSENTIAL HYPERTENSION: ICD-10-CM

## 2019-11-06 RX ORDER — SIMVASTATIN 40 MG
TABLET ORAL
Qty: 90 TABLET | Refills: 0 | Status: SHIPPED | OUTPATIENT
Start: 2019-11-06 | End: 2020-02-04

## 2019-11-06 RX ORDER — LISINOPRIL AND HYDROCHLOROTHIAZIDE 20; 12.5 MG/1; MG/1
1 TABLET ORAL DAILY
Qty: 90 TABLET | Refills: 0 | Status: SHIPPED | OUTPATIENT
Start: 2019-11-06 | End: 2020-02-04

## 2019-11-22 ENCOUNTER — OFFICE VISIT (OUTPATIENT)
Dept: FAMILY MEDICINE CLINIC | Age: 84
End: 2019-11-22

## 2019-11-22 VITALS
DIASTOLIC BLOOD PRESSURE: 66 MMHG | BODY MASS INDEX: 23.31 KG/M2 | RESPIRATION RATE: 14 BRPM | SYSTOLIC BLOOD PRESSURE: 124 MMHG | HEIGHT: 67 IN | HEART RATE: 64 BPM | WEIGHT: 148.5 LBS

## 2019-11-22 DIAGNOSIS — I10 ESSENTIAL HYPERTENSION: Primary | ICD-10-CM

## 2019-11-22 DIAGNOSIS — E78.00 PURE HYPERCHOLESTEROLEMIA: ICD-10-CM

## 2019-11-22 DIAGNOSIS — I34.0 MITRAL VALVE INSUFFICIENCY, UNSPECIFIED ETIOLOGY: ICD-10-CM

## 2019-11-22 DIAGNOSIS — I25.810 CORONARY ARTERY DISEASE INVOLVING AUTOLOGOUS VEIN CORONARY BYPASS GRAFT WITHOUT ANGINA PECTORIS: ICD-10-CM

## 2019-11-22 DIAGNOSIS — Z23 NEED FOR INFLUENZA VACCINATION: ICD-10-CM

## 2019-11-22 PROCEDURE — G8427 DOCREV CUR MEDS BY ELIG CLIN: HCPCS | Performed by: FAMILY MEDICINE

## 2019-11-22 PROCEDURE — 99213 OFFICE O/P EST LOW 20 MIN: CPT | Performed by: FAMILY MEDICINE

## 2019-11-22 PROCEDURE — 90756 CCIIV4 VACC ABX FREE IM: CPT | Performed by: FAMILY MEDICINE

## 2019-11-22 PROCEDURE — G8420 CALC BMI NORM PARAMETERS: HCPCS | Performed by: FAMILY MEDICINE

## 2019-11-22 PROCEDURE — 1123F ACP DISCUSS/DSCN MKR DOCD: CPT | Performed by: FAMILY MEDICINE

## 2019-11-22 PROCEDURE — 4040F PNEUMOC VAC/ADMIN/RCVD: CPT | Performed by: FAMILY MEDICINE

## 2019-11-22 PROCEDURE — G8598 ASA/ANTIPLAT THER USED: HCPCS | Performed by: FAMILY MEDICINE

## 2019-11-22 PROCEDURE — 1036F TOBACCO NON-USER: CPT | Performed by: FAMILY MEDICINE

## 2019-11-22 PROCEDURE — G0008 ADMIN INFLUENZA VIRUS VAC: HCPCS | Performed by: FAMILY MEDICINE

## 2019-11-22 ASSESSMENT — ENCOUNTER SYMPTOMS
BACK PAIN: 0
NAUSEA: 0
BLOOD IN STOOL: 0
ABDOMINAL PAIN: 0
EYE PAIN: 0
SORE THROAT: 0
CHEST TIGHTNESS: 0
SHORTNESS OF BREATH: 0
COUGH: 0
TROUBLE SWALLOWING: 0
CONSTIPATION: 0

## 2019-11-22 ASSESSMENT — PATIENT HEALTH QUESTIONNAIRE - PHQ9
SUM OF ALL RESPONSES TO PHQ QUESTIONS 1-9: 0
SUM OF ALL RESPONSES TO PHQ9 QUESTIONS 1 & 2: 0
2. FEELING DOWN, DEPRESSED OR HOPELESS: 0
1. LITTLE INTEREST OR PLEASURE IN DOING THINGS: 0
SUM OF ALL RESPONSES TO PHQ QUESTIONS 1-9: 0

## 2019-12-26 DIAGNOSIS — N40.1 BENIGN NON-NODULAR PROSTATIC HYPERPLASIA WITH LOWER URINARY TRACT SYMPTOMS: ICD-10-CM

## 2019-12-27 RX ORDER — TAMSULOSIN HYDROCHLORIDE 0.4 MG/1
CAPSULE ORAL
Qty: 90 CAPSULE | Refills: 0 | Status: SHIPPED | OUTPATIENT
Start: 2019-12-27 | End: 2020-03-24

## 2020-01-15 ENCOUNTER — HOSPITAL ENCOUNTER (OUTPATIENT)
Dept: NON INVASIVE DIAGNOSTICS | Age: 85
Discharge: HOME | End: 2020-01-15
Payer: MEDICARE

## 2020-01-15 LAB
LV EF: 60 %
LVEF MODALITY: NORMAL

## 2020-01-15 PROCEDURE — 93306 TTE W/DOPPLER COMPLETE: CPT

## 2020-01-22 ENCOUNTER — OFFICE VISIT (OUTPATIENT)
Dept: CARDIOLOGY CLINIC | Age: 85
End: 2020-01-22
Payer: MEDICARE

## 2020-01-22 VITALS
HEIGHT: 67 IN | SYSTOLIC BLOOD PRESSURE: 188 MMHG | DIASTOLIC BLOOD PRESSURE: 90 MMHG | BODY MASS INDEX: 22.73 KG/M2 | WEIGHT: 144.8 LBS

## 2020-01-22 PROCEDURE — G8482 FLU IMMUNIZE ORDER/ADMIN: HCPCS | Performed by: INTERNAL MEDICINE

## 2020-01-22 PROCEDURE — 93000 ELECTROCARDIOGRAM COMPLETE: CPT | Performed by: INTERNAL MEDICINE

## 2020-01-22 PROCEDURE — 99213 OFFICE O/P EST LOW 20 MIN: CPT | Performed by: INTERNAL MEDICINE

## 2020-01-22 PROCEDURE — 1036F TOBACCO NON-USER: CPT | Performed by: INTERNAL MEDICINE

## 2020-01-22 PROCEDURE — 1123F ACP DISCUSS/DSCN MKR DOCD: CPT | Performed by: INTERNAL MEDICINE

## 2020-01-22 PROCEDURE — 4040F PNEUMOC VAC/ADMIN/RCVD: CPT | Performed by: INTERNAL MEDICINE

## 2020-01-22 PROCEDURE — G8420 CALC BMI NORM PARAMETERS: HCPCS | Performed by: INTERNAL MEDICINE

## 2020-01-22 PROCEDURE — G8427 DOCREV CUR MEDS BY ELIG CLIN: HCPCS | Performed by: INTERNAL MEDICINE

## 2020-01-22 ASSESSMENT — ENCOUNTER SYMPTOMS
CONSTIPATION: 0
SHORTNESS OF BREATH: 0
VOMITING: 0
NAUSEA: 0
SORE THROAT: 0
ABDOMINAL PAIN: 0
BACK PAIN: 0
LEFT EYE: 0
COUGH: 0
DOUBLE VISION: 0
DIARRHEA: 0
RIGHT EYE: 0
BLURRED VISION: 0
WHEEZING: 0

## 2020-01-22 NOTE — PROGRESS NOTES
CARDIOLOGY - ELECTROPHYSIOLOGY  PROGRESS NOTE    Date:   1/22/2020  Patient name: Gogo De  Date of admission:   No admission date for patient encounter. MRN:   [id number]  YOB: 1933  PCP: Bonilla Del Angel MD    Subjective:  I feel good. 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 Clemetine Fails is s/p 5V CABG at the [facility] March 2009.  The patient has done very well from a cardiac standpoint since his surgery. Devendra Parul is followed by Dr. Mervat Montanez on a regular basis. Saiblas Parul was in to see Dr. Mervat Montanez for a regular f/u exam in November, when he was found on his vital signs to be bradycardic.  The patient had an ECG performed and was found to have ventricular bigeminy.  The patient did not have any complaints of palpitations, dizziness, or shortness of breath with exertion.  The patient had a 24 hour Holter monitor which showed a significant PVC burden of 40%.  The patient had an ECHO performed which fortunately showed a normal LVEF of 55%.  He was already on Coreg for his h/o coronary artery disease.  The patient was unaware that his heart beat was irregular.  The patient is being referred for further evaluation and management of frequent PVC's.     01/22/2019:  The patient has been doing very well.    01/22/2020: The patient states he is doing very well. He does report having sinus congestion and suffers from allergies. He also reports being allergic to the new cat that they adopted.       Past Medical History:      Diagnosis Date    Arrhythmia 1/22/2019    BPH (benign prostatic hypertrophy)     CAD (coronary artery disease)     Cerebrovascular disease     Hyperlipidemia     Hypertension     Mitral regurgitation     PVC (premature ventricular contraction) 1/22/2019    TIA (transient ischemic attack) 2009    post surgery resolved     Past Surgical History:      Procedure Laterality Date    CATARACT REMOVAL Right     Dr Anton Dean       Past Family History:       Problem Relation Age of Onset    Cancer Father       Past Social History:     Social History     Socioeconomic History    Marital status:      Spouse name: Not on file    Number of children: Not on file    Years of education: Not on file    Highest education level: Not on file   Occupational History    Not on file   Social Needs    Financial resource strain: Not on file    Food insecurity:     Worry: Not on file     Inability: Not on file    Transportation needs:     Medical: Not on file     Non-medical: Not on file   Tobacco Use    Smoking status: Former Smoker     Packs/day: 3.00     Years: 20.00     Pack years: 60.00     Types: Cigarettes     Last attempt to quit: 1970     Years since quittin.5    Smokeless tobacco: Never Used   Substance and Sexual Activity    Alcohol use: Yes     Comment: occas beer    Drug use: No    Sexual activity: Yes     Partners: Female   Lifestyle    Physical activity:     Days per week: Not on file     Minutes per session: Not on file    Stress: Not on file   Relationships    Social connections:     Talks on phone: Not on file     Gets together: Not on file     Attends Jewish service: Not on file     Active member of club or organization: Not on file     Attends meetings of clubs or organizations: Not on file     Relationship status: Not on file    Intimate partner violence:     Fear of current or ex partner: Not on file     Emotionally abused: Not on file     Physically abused: Not on file     Forced sexual activity: Not on file   Other Topics Concern    Not on file   Social History Narrative    Not on file       Medications:   Scheduled Meds:  Continuous Infusions:   CBC: No results for input(s): WBC, HGB, PLT in the last 72 hours. BMP:  No results for input(s): NA, K, CL, CO2, BUN, CREATININE, GLUCOSE in the last 72 hours.   Hepatic: No results for Findings: No lesion or rash. Nails: There is no clubbing. Neurological:      Mental Status: He is alert and oriented to person, place, and time. Cranial Nerves: No cranial nerve deficit. Sensory: No sensory deficit. Coordination: Coordination normal.      Gait: Gait normal.      Deep Tendon Reflexes: Reflexes normal.   Psychiatric:         Mood and Affect: Mood is not anxious or depressed. Affect is not angry. Speech: Speech normal.         Behavior: Behavior normal.         Thought Content: Thought content normal.         Judgment: Judgment normal.         DIAGNOSTIC STUDIES & LABORATORY DATA:     I have personally reviewed and interpreted the results of the following diagnostic testing  CARDIAC HISTORY:     ECHO:   01/22/2020   Summary   Normal left ventricle size and systolic function. Ejection fraction was   estimated at 60 %. There were no regional left ventricular wall motion   abnormalities and wall thickness was within normal limits. The left atrium is Mildly dilated. There is mild aortic stenosis with valve area of 1.6 sq cm.    01/15/2019  Summary   Technically difficult examination.   Normal left ventricle size and systolic function. Ejection fraction was   estimated at 55 %. There were no regional left ventricular wall motion   abnormalities and wall thickness was within normal limits.   Doppler parameters were consistent with abnormal left ventricular   relaxation (grade 1 diastolic dysfunction).   The left atrium is Mildly dilated.   There is mild aortic stenosis with valve area of 1.3 sq cm        12/07/2017  Summary   Normal left ventricle size and systolic function. Ejection fraction was   estimated at 55 %. There were no regional left ventricular wall motion   abnormalities and wall thickness was within normal limits.   The left atrium is Moderately dilated.   Moderate mitral regurgitation is present.   Aortic valve appears tricuspid.  Aortic valve leaflets are Moderately   calcified.   Leaflets exhibited moderately increased thickness and mildly reduced   cuspal separation of the aortic valve.   Mild aortic regurgitation is noted.   Mild aortic stenosis is present.   The maximum aortic valve gradient is 16 mmHg, the mean gradient is 10   mmHg, and the peak velocity is 2 m/s.    EC2020 NSR   2019  NSR  2018  NSR, PVC's     CARDIAC MONITOR: 2017  CLINICAL HISTORY AND INDICATION:  This is an 80-year-old patient with  palpitation.     HOLTER MONITOR DESCRIPTION:  Holter monitor was attached to the patient for  a total of 24 hours 3 minutes.  Total number of beats was 103,898.     HOLTER MONITOR ANALYSIS:  Minimum heart rate was 56 beats per minute. Maximum heart rate was 90 beats per minute.  Average heart rate was 72  beats per minute.  Predominant rhythm was sinus rhythm.  There were  frequent number of PVCs, total of 40,795; rare PACs.  There was no V-tach,  SVT, or pauses.     CONCLUSION:  1.  Very frequent number of premature ventricular beats noted on this  Holter monitor, which is worth further evaluating. 2.  Abnormal Holter monitor.     Assessment / Acute Cardiac Problems:   The patient is an 79 y/o male with a h/o CAD and is s/p 5V CABG in 2009.  The patient does not have a h/o MI. Krissy Blair was found during a scheduled exam in November to have ventricular bigeminy.  The patient had a 24 hour Holter monitor which showed a very significant PVC burden of 40%.  The patient does not have any symptoms secondary to the palpitations.  Equally important, the patient's LVEF is normal.  I explained to the patient that there are two indications to intervene when a patient has this many PVC's.  The first is the presence of symptoms.  The second is the development of PVC induced cardiomyopathy that can occur even if the patient is asymptomatic.  I explained to the patient that he does not have either.  Therefore the patient has benign

## 2020-02-04 RX ORDER — LISINOPRIL AND HYDROCHLOROTHIAZIDE 20; 12.5 MG/1; MG/1
1 TABLET ORAL DAILY
Qty: 90 TABLET | Refills: 0 | Status: SHIPPED | OUTPATIENT
Start: 2020-02-04 | End: 2020-05-01

## 2020-02-04 RX ORDER — SIMVASTATIN 40 MG
TABLET ORAL
Qty: 90 TABLET | Refills: 0 | Status: SHIPPED | OUTPATIENT
Start: 2020-02-04 | End: 2020-05-01

## 2020-02-27 NOTE — TELEPHONE ENCOUNTER
Date of last visit:  11/22/2019  Date of next visit:  Visit date not found    Requested Prescriptions     Pending Prescriptions Disp Refills    carvedilol (COREG) 12.5 MG tablet [Pharmacy Med Name: CARVEDILOL 12.5MG TABLETS] 180 tablet 1     Sig: TAKE 1 TABLET BY MOUTH TWICE DAILY

## 2020-02-28 RX ORDER — CARVEDILOL 12.5 MG/1
TABLET ORAL
Qty: 180 TABLET | Refills: 1 | Status: SHIPPED | OUTPATIENT
Start: 2020-02-28 | End: 2020-08-28

## 2020-03-23 NOTE — TELEPHONE ENCOUNTER
Date of last visit:  11/22/2019  Date of next visit:  Visit date not found    Requested Prescriptions     Pending Prescriptions Disp Refills    tamsulosin (FLOMAX) 0.4 MG capsule [Pharmacy Med Name: TAMSULOSIN 0.4MG CAPSULES] 90 capsule 0     Sig: TAKE 1 CAPSULE BY MOUTH DAILY

## 2020-03-24 RX ORDER — TAMSULOSIN HYDROCHLORIDE 0.4 MG/1
CAPSULE ORAL
Qty: 90 CAPSULE | Refills: 0 | Status: SHIPPED | OUTPATIENT
Start: 2020-03-24 | End: 2020-06-22

## 2020-03-25 PROBLEM — I34.0 MITRAL REGURGITATION: Status: RESOLVED | Noted: 2020-03-25 | Resolved: 2020-03-24

## 2020-04-30 NOTE — TELEPHONE ENCOUNTER
Date of last visit:  11/22/2019  Date of next visit:  Visit date not found    Requested Prescriptions     Pending Prescriptions Disp Refills    simvastatin (ZOCOR) 40 MG tablet [Pharmacy Med Name: SIMVASTATIN 40MG TABLETS] 90 tablet 0     Sig: TAKE 1 TABLET BY MOUTH EVERY NIGHT AT BEDTIME    lisinopril-hydroCHLOROthiazide (PRINZIDE;ZESTORETIC) 20-12.5 MG per tablet [Pharmacy Med Name: LISINOPRIL-HCTZ 20/12.5MG TABLETS] 90 tablet 0     Sig: TAKE 1 TABLET BY MOUTH DAILY

## 2020-05-01 RX ORDER — LISINOPRIL AND HYDROCHLOROTHIAZIDE 20; 12.5 MG/1; MG/1
1 TABLET ORAL DAILY
Qty: 90 TABLET | Refills: 0 | Status: SHIPPED | OUTPATIENT
Start: 2020-05-01 | End: 2020-07-29

## 2020-05-01 RX ORDER — SIMVASTATIN 40 MG
TABLET ORAL
Qty: 90 TABLET | Refills: 0 | Status: SHIPPED | OUTPATIENT
Start: 2020-05-01 | End: 2020-07-29

## 2020-06-10 ENCOUNTER — VIRTUAL VISIT (OUTPATIENT)
Dept: FAMILY MEDICINE CLINIC | Age: 85
End: 2020-06-10

## 2020-06-10 PROCEDURE — 1123F ACP DISCUSS/DSCN MKR DOCD: CPT | Performed by: FAMILY MEDICINE

## 2020-06-10 PROCEDURE — G8420 CALC BMI NORM PARAMETERS: HCPCS | Performed by: FAMILY MEDICINE

## 2020-06-10 PROCEDURE — 4040F PNEUMOC VAC/ADMIN/RCVD: CPT | Performed by: FAMILY MEDICINE

## 2020-06-10 PROCEDURE — G8427 DOCREV CUR MEDS BY ELIG CLIN: HCPCS | Performed by: FAMILY MEDICINE

## 2020-06-10 PROCEDURE — 99213 OFFICE O/P EST LOW 20 MIN: CPT | Performed by: FAMILY MEDICINE

## 2020-06-10 PROCEDURE — 1036F TOBACCO NON-USER: CPT | Performed by: FAMILY MEDICINE

## 2020-06-10 ASSESSMENT — ENCOUNTER SYMPTOMS
CHEST TIGHTNESS: 0
ABDOMINAL PAIN: 0
BACK PAIN: 0
CONSTIPATION: 0
NAUSEA: 0
EYE PAIN: 0
TROUBLE SWALLOWING: 0
COUGH: 0
BLOOD IN STOOL: 0
SHORTNESS OF BREATH: 0
SORE THROAT: 0

## 2020-06-10 ASSESSMENT — PATIENT HEALTH QUESTIONNAIRE - PHQ9
SUM OF ALL RESPONSES TO PHQ QUESTIONS 1-9: 0
1. LITTLE INTEREST OR PLEASURE IN DOING THINGS: 0
SUM OF ALL RESPONSES TO PHQ9 QUESTIONS 1 & 2: 0
SUM OF ALL RESPONSES TO PHQ QUESTIONS 1-9: 0
2. FEELING DOWN, DEPRESSED OR HOPELESS: 0

## 2020-06-10 NOTE — PROGRESS NOTES
Rosalia Padron agreed to Video Chat/Exam in presence of Dr Russell Schumacher and myself. Verified who was present in room with Rosalia Padron. Rosalia Padron informed the e-mail address used to Face Time cannot be used to contact the Provider, if they are any questions or concerns they need to call the office directly. Rosalia Padron stated understanding.

## 2020-06-10 NOTE — PROGRESS NOTES
Subjective:      Patient ID: Maritza Guaman is a 80 y.o. male. Patient verified Video Chat was not encrypted, and agrees to the Video Exam in presence of nurse. ashd  Stable     Hypertension   This is a chronic problem. The current episode started more than 1 year ago. The problem has been resolved since onset. The problem is controlled. Pertinent negatives include no chest pain, headaches, palpitations, peripheral edema, PND or shortness of breath. The current treatment provides significant improvement. There are no compliance problems. Hyperlipidemia   This is a chronic problem. The current episode started more than 1 year ago. The problem is controlled. Pertinent negatives include no chest pain or shortness of breath. Current antihyperlipidemic treatment includes statins. The current treatment provides significant improvement of lipids. There are no compliance problems. Past Medical History:   Diagnosis Date    Arrhythmia 1/22/2019    BPH (benign prostatic hypertrophy)     CAD (coronary artery disease)     Cerebrovascular disease     Hyperlipidemia     Hypertension     Mitral regurgitation     PVC (premature ventricular contraction) 1/22/2019    TIA (transient ischemic attack) 2009    post surgery resolved       Review of Systems   Constitutional: Negative for fatigue and fever. HENT: Negative for congestion, ear pain, postnasal drip, sore throat and trouble swallowing. Eyes: Negative for pain. Respiratory: Negative for cough, chest tightness and shortness of breath. Cardiovascular: Negative for chest pain, palpitations, leg swelling and PND. Gastrointestinal: Negative for abdominal pain, blood in stool, constipation and nausea. Genitourinary: Negative for difficulty urinating, frequency and urgency. Musculoskeletal: Negative for arthralgias, back pain, joint swelling and neck stiffness. Skin: Negative for rash.    Neurological: Negative for dizziness, weakness and headaches. Hematological: Negative for adenopathy. Does not bruise/bleed easily. Psychiatric/Behavioral: Negative for behavioral problems, dysphoric mood and sleep disturbance. 132/61  -  66  Objective:   Physical Exam  Vitals signs and nursing note reviewed. Constitutional:       Appearance: Normal appearance. He is well-developed. Eyes:      Comments: Fundi nl   Neck:      Musculoskeletal: Normal range of motion and neck supple. Thyroid: No thyromegaly. Musculoskeletal: Normal range of motion. Right lower leg: No edema. Left lower leg: No edema. Lymphadenopathy:      Cervical: No cervical adenopathy. Skin:     Findings: No rash. Neurological:      General: No focal deficit present. Mental Status: He is alert and oriented to person, place, and time. Cranial Nerves: No cranial nerve deficit. Sensory: No sensory deficit. Motor: No weakness. Deep Tendon Reflexes: Reflexes are normal and symmetric. Assessment:             Diagnosis Orders   1. Essential hypertension  Comprehensive Metabolic Panel    TSH with Reflex    CBC Auto Differential   2. Benign non-nodular prostatic hyperplasia with lower urinary tract symptoms     3. Mitral valve insufficiency, unspecified etiology     4. 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     5. Pure hypercholesterolemia  Lipid Panel      Plan:      Current Outpatient Medications   Medication Sig Dispense Refill    simvastatin (ZOCOR) 40 MG tablet TAKE 1 TABLET BY MOUTH EVERY NIGHT AT BEDTIME 90 tablet 0    lisinopril-hydroCHLOROthiazide (PRINZIDE;ZESTORETIC) 20-12.5 MG per tablet TAKE 1 TABLET BY MOUTH DAILY 90 tablet 0    tamsulosin (FLOMAX) 0.4 MG capsule TAKE 1 CAPSULE BY MOUTH DAILY 90 capsule 0    carvedilol (COREG) 12.5 MG tablet TAKE 1 TABLET BY MOUTH TWICE DAILY 180 tablet 1    aspirin 81 MG EC tablet Take 81 mg by mouth daily.         acetaminophen (TYLENOL) 500 MG tablet Take 500 mg by mouth every 6 hours as needed.  multivitamin (THERAGRAN) per tablet Take 1 tablet by mouth daily. No current facility-administered medications for this visit. Orders Placed This Encounter   Procedures    Lipid Panel     Standing Status:   Future     Standing Expiration Date:   6/10/2021     Order Specific Question:   Is Patient Fasting?/# of Hours     Answer:   YES 12 HOURS    Comprehensive Metabolic Panel     Standing Status:   Future     Standing Expiration Date:   6/10/2021    TSH with Reflex     Standing Status:   Future     Standing Expiration Date:   6/10/2021    CBC Auto Differential     Standing Status:   Future     Standing Expiration Date:   6/10/2021   .    covid  Precautions       Video  15  mins     See in  Nov   Estevan Wheeler MD

## 2020-07-22 NOTE — TELEPHONE ENCOUNTER
Date of last visit:  11/22/2019  Date of next visit:  Visit date not found    Requested Prescriptions     Pending Prescriptions Disp Refills    tamsulosin (FLOMAX) 0.4 MG capsule [Pharmacy Med Name: TAMSULOSIN 0.4MG CAPSULES] 30 capsule 0     Sig: TAKE 1 CAPSULE BY MOUTH EVERY DAY

## 2020-07-24 RX ORDER — TAMSULOSIN HYDROCHLORIDE 0.4 MG/1
CAPSULE ORAL
Qty: 30 CAPSULE | Refills: 3 | Status: SHIPPED | OUTPATIENT
Start: 2020-07-24 | End: 2020-12-04

## 2020-07-29 NOTE — TELEPHONE ENCOUNTER
Date of last visit:  11/22/2019        Virtual visit 6-10-20      Date of next visit:  Visit date not found    Requested Prescriptions     Pending Prescriptions Disp Refills    lisinopril-hydroCHLOROthiazide (PRINZIDE;ZESTORETIC) 20-12.5 MG per tablet [Pharmacy Med Name: LISINOPRIL-HCTZ 20/12.5MG TABLETS] 90 tablet 1     Sig: TAKE 1 TABLET BY MOUTH ONCE DAILY    simvastatin (ZOCOR) 40 MG tablet [Pharmacy Med Name: SIMVASTATIN 40MG TABLETS] 90 tablet 1     Sig: TAKE 1 TABLET BY MOUTH EVERY NIGHT AT BEDTIME

## 2020-07-30 RX ORDER — SIMVASTATIN 40 MG
TABLET ORAL
Qty: 90 TABLET | Refills: 1 | Status: SHIPPED | OUTPATIENT
Start: 2020-07-30 | End: 2021-02-15 | Stop reason: SDUPTHER

## 2020-07-30 RX ORDER — LISINOPRIL AND HYDROCHLOROTHIAZIDE 20; 12.5 MG/1; MG/1
TABLET ORAL
Qty: 90 TABLET | Refills: 0 | Status: SHIPPED | OUTPATIENT
Start: 2020-07-30 | End: 2020-11-13

## 2020-08-06 ENCOUNTER — APPOINTMENT (OUTPATIENT)
Dept: CT IMAGING | Age: 85
End: 2020-08-06
Payer: MEDICARE

## 2020-08-06 ENCOUNTER — HOSPITAL ENCOUNTER (OUTPATIENT)
Age: 85
Setting detail: OBSERVATION
Discharge: HOME | End: 2020-08-07
Attending: EMERGENCY MEDICINE | Admitting: FAMILY MEDICINE
Payer: MEDICARE

## 2020-08-06 PROBLEM — R11.10 EMESIS: Status: ACTIVE | Noted: 2020-08-06

## 2020-08-06 LAB
ALBUMIN SERPL-MCNC: 4.4 G/DL (ref 3.5–5.1)
ALP BLD-CCNC: 88 U/L (ref 38–126)
ALT SERPL-CCNC: 21 U/L (ref 11–66)
ANION GAP SERPL CALCULATED.3IONS-SCNC: 12 MEQ/L (ref 8–16)
AST SERPL-CCNC: 27 U/L (ref 5–40)
BASOPHILS # BLD: 0.3 %
BASOPHILS ABSOLUTE: 0 THOU/MM3 (ref 0–0.1)
BILIRUB SERPL-MCNC: 0.6 MG/DL (ref 0.3–1.2)
BILIRUBIN DIRECT: < 0.2 MG/DL (ref 0–0.3)
BUN BLDV-MCNC: 30 MG/DL (ref 7–22)
CALCIUM SERPL-MCNC: 9.1 MG/DL (ref 8.5–10.5)
CHLORIDE BLD-SCNC: 100 MEQ/L (ref 98–111)
CO2: 23 MEQ/L (ref 23–33)
CREAT SERPL-MCNC: 1.3 MG/DL (ref 0.4–1.2)
EKG ATRIAL RATE: 72 BPM
EKG P AXIS: 56 DEGREES
EKG P-R INTERVAL: 204 MS
EKG Q-T INTERVAL: 418 MS
EKG QRS DURATION: 90 MS
EKG QTC CALCULATION (BAZETT): 457 MS
EKG R AXIS: 53 DEGREES
EKG T AXIS: 25 DEGREES
EKG VENTRICULAR RATE: 72 BPM
EOSINOPHIL # BLD: 4.7 %
EOSINOPHILS ABSOLUTE: 0.6 THOU/MM3 (ref 0–0.4)
ERYTHROCYTE [DISTWIDTH] IN BLOOD BY AUTOMATED COUNT: 13 % (ref 11.5–14.5)
ERYTHROCYTE [DISTWIDTH] IN BLOOD BY AUTOMATED COUNT: 44.2 FL (ref 35–45)
GFR SERPL CREATININE-BSD FRML MDRD: 52 ML/MIN/1.73M2
GLUCOSE BLD-MCNC: 134 MG/DL (ref 70–108)
HCT VFR BLD CALC: 42.6 % (ref 42–52)
HEMOGLOBIN: 14 GM/DL (ref 14–18)
IMMATURE GRANS (ABS): 0.09 THOU/MM3 (ref 0–0.07)
IMMATURE GRANULOCYTES: 0.8 %
LIPASE: 111.9 U/L (ref 5.6–51.3)
LYMPHOCYTES # BLD: 4.6 %
LYMPHOCYTES ABSOLUTE: 0.5 THOU/MM3 (ref 1–4.8)
MCH RBC QN AUTO: 30.6 PG (ref 26–33)
MCHC RBC AUTO-ENTMCNC: 32.9 GM/DL (ref 32.2–35.5)
MCV RBC AUTO: 93.2 FL (ref 80–94)
MONOCYTES # BLD: 3.8 %
MONOCYTES ABSOLUTE: 0.4 THOU/MM3 (ref 0.4–1.3)
NUCLEATED RED BLOOD CELLS: 0 /100 WBC
OSMOLALITY CALCULATION: 278.3 MOSMOL/KG (ref 275–300)
PLATELET # BLD: 204 THOU/MM3 (ref 130–400)
PMV BLD AUTO: 10.7 FL (ref 9.4–12.4)
POTASSIUM SERPL-SCNC: 4.5 MEQ/L (ref 3.5–5.2)
RBC # BLD: 4.57 MILL/MM3 (ref 4.7–6.1)
SEG NEUTROPHILS: 85.8 %
SEGMENTED NEUTROPHILS ABSOLUTE COUNT: 10.1 THOU/MM3 (ref 1.8–7.7)
SODIUM BLD-SCNC: 135 MEQ/L (ref 135–145)
TOTAL PROTEIN: 7.6 G/DL (ref 6.1–8)
TROPONIN T: < 0.01 NG/ML
WBC # BLD: 11.8 THOU/MM3 (ref 4.8–10.8)

## 2020-08-06 PROCEDURE — 6360000002 HC RX W HCPCS: Performed by: EMERGENCY MEDICINE

## 2020-08-06 PROCEDURE — G0378 HOSPITAL OBSERVATION PER HR: HCPCS

## 2020-08-06 PROCEDURE — 2500000003 HC RX 250 WO HCPCS: Performed by: FAMILY MEDICINE

## 2020-08-06 PROCEDURE — 74177 CT ABD & PELVIS W/CONTRAST: CPT

## 2020-08-06 PROCEDURE — 84484 ASSAY OF TROPONIN QUANT: CPT

## 2020-08-06 PROCEDURE — 93005 ELECTROCARDIOGRAM TRACING: CPT | Performed by: EMERGENCY MEDICINE

## 2020-08-06 PROCEDURE — 85025 COMPLETE CBC W/AUTO DIFF WBC: CPT

## 2020-08-06 PROCEDURE — 99283 EMERGENCY DEPT VISIT LOW MDM: CPT

## 2020-08-06 PROCEDURE — 6360000004 HC RX CONTRAST MEDICATION: Performed by: EMERGENCY MEDICINE

## 2020-08-06 PROCEDURE — 96374 THER/PROPH/DIAG INJ IV PUSH: CPT

## 2020-08-06 PROCEDURE — 80053 COMPREHEN METABOLIC PANEL: CPT

## 2020-08-06 PROCEDURE — 83690 ASSAY OF LIPASE: CPT

## 2020-08-06 PROCEDURE — 99284 EMERGENCY DEPT VISIT MOD MDM: CPT

## 2020-08-06 PROCEDURE — 82248 BILIRUBIN DIRECT: CPT

## 2020-08-06 PROCEDURE — 36415 COLL VENOUS BLD VENIPUNCTURE: CPT

## 2020-08-06 PROCEDURE — 93010 ELECTROCARDIOGRAM REPORT: CPT | Performed by: INTERNAL MEDICINE

## 2020-08-06 RX ORDER — DEXTROSE, SODIUM CHLORIDE, AND POTASSIUM CHLORIDE 5; .45; .15 G/100ML; G/100ML; G/100ML
INJECTION INTRAVENOUS CONTINUOUS
Status: DISCONTINUED | OUTPATIENT
Start: 2020-08-06 | End: 2020-08-07 | Stop reason: HOSPADM

## 2020-08-06 RX ORDER — ONDANSETRON 2 MG/ML
4 INJECTION INTRAMUSCULAR; INTRAVENOUS EVERY 6 HOURS PRN
Status: DISCONTINUED | OUTPATIENT
Start: 2020-08-06 | End: 2020-08-07 | Stop reason: HOSPADM

## 2020-08-06 RX ORDER — ONDANSETRON 2 MG/ML
4 INJECTION INTRAMUSCULAR; INTRAVENOUS ONCE
Status: COMPLETED | OUTPATIENT
Start: 2020-08-06 | End: 2020-08-06

## 2020-08-06 RX ORDER — ACETAMINOPHEN 325 MG/1
650 TABLET ORAL EVERY 4 HOURS PRN
Status: DISCONTINUED | OUTPATIENT
Start: 2020-08-06 | End: 2020-08-07 | Stop reason: HOSPADM

## 2020-08-06 RX ADMIN — IOPAMIDOL 80 ML: 755 INJECTION, SOLUTION INTRAVENOUS at 19:52

## 2020-08-06 RX ADMIN — POTASSIUM CHLORIDE, DEXTROSE MONOHYDRATE AND SODIUM CHLORIDE: 150; 5; 450 INJECTION, SOLUTION INTRAVENOUS at 23:55

## 2020-08-06 RX ADMIN — ONDANSETRON 4 MG: 2 INJECTION INTRAMUSCULAR; INTRAVENOUS at 19:18

## 2020-08-06 ASSESSMENT — PAIN DESCRIPTION - LOCATION
LOCATION: ABDOMEN
LOCATION: ABDOMEN

## 2020-08-06 ASSESSMENT — PAIN DESCRIPTION - FREQUENCY: FREQUENCY: CONTINUOUS

## 2020-08-06 ASSESSMENT — PAIN DESCRIPTION - PAIN TYPE
TYPE: ACUTE PAIN
TYPE: ACUTE PAIN

## 2020-08-06 ASSESSMENT — PAIN SCALES - GENERAL
PAINLEVEL_OUTOF10: 0
PAINLEVEL_OUTOF10: 0

## 2020-08-06 ASSESSMENT — PAIN DESCRIPTION - ORIENTATION
ORIENTATION: UPPER
ORIENTATION: UPPER

## 2020-08-06 NOTE — ED NOTES
First presentation to pt. Report received from [facility]. Pt had another episode of emesis with a small amount of vomit in bag. He was medicated per order.       Mamadou Kat RN  08/06/20 5523

## 2020-08-06 NOTE — ED NOTES
ED nurse-to-nurse bedside report    Chief Complaint   Patient presents with    Emesis    Abdominal Pain      LOC: alert and orientated to name, place, date  Vital signs   Vitals:    08/06/20 1816 08/06/20 1818   BP: (!) 176/73    Pulse: 70    Resp: 18    Temp:  97.8 °F (36.6 °C)   TempSrc:  Oral   SpO2: 96%    Weight: 140 lb (63.5 kg)    Height: 5' 7\" (1.702 m)       Pain:    Pain Interventions: none at this time. Pain Goal:   Oxygen: No    Current needs required zofran   Telemetry: Yes  LDAs:    Continuous Infusions:   Mobility: Requires assistance * 1  Painting Fall Risk Score: Fall Risk 11/22/2019 10/15/2018 6/28/2017   2 or more falls in past year? no no no   Fall with injury in past year? no no no     Fall Interventions: call light within reach, wife at bedside, bed rails up. Report given to:  TRINIDAD De Los Santos     Isaak TRINIDAD Aguilar  08/06/20 0091

## 2020-08-06 NOTE — ED NOTES
Patient presents to ED from home for emesis and abdominal pain that began a couple hours ago. Denies any chest pain. States he has had 4-5 episodes of emesis. Patient reports while at home his systolic BP was 990'V. Rates pain 5/10. Shows no signs of distress at this time. Skin warm and dry. Respirations easy and unlabored.        Pamela Hill RN  08/06/20 3490

## 2020-08-07 VITALS
OXYGEN SATURATION: 97 % | HEART RATE: 58 BPM | WEIGHT: 142.2 LBS | TEMPERATURE: 98 F | SYSTOLIC BLOOD PRESSURE: 154 MMHG | DIASTOLIC BLOOD PRESSURE: 72 MMHG | BODY MASS INDEX: 22.32 KG/M2 | HEIGHT: 67 IN | RESPIRATION RATE: 16 BRPM

## 2020-08-07 LAB
ANION GAP SERPL CALCULATED.3IONS-SCNC: 7 MEQ/L (ref 8–16)
BASOPHILS # BLD: 0.4 %
BASOPHILS ABSOLUTE: 0 THOU/MM3 (ref 0–0.1)
BILIRUBIN URINE: NEGATIVE
BLOOD, URINE: NEGATIVE
BUN BLDV-MCNC: 27 MG/DL (ref 7–22)
CALCIUM SERPL-MCNC: 8.3 MG/DL (ref 8.5–10.5)
CHARACTER, URINE: CLEAR
CHLORIDE BLD-SCNC: 106 MEQ/L (ref 98–111)
CO2: 25 MEQ/L (ref 23–33)
COLOR: YELLOW
CREAT SERPL-MCNC: 1.3 MG/DL (ref 0.4–1.2)
EOSINOPHIL # BLD: 4.6 %
EOSINOPHILS ABSOLUTE: 0.3 THOU/MM3 (ref 0–0.4)
ERYTHROCYTE [DISTWIDTH] IN BLOOD BY AUTOMATED COUNT: 12.9 % (ref 11.5–14.5)
ERYTHROCYTE [DISTWIDTH] IN BLOOD BY AUTOMATED COUNT: 44.2 FL (ref 35–45)
GFR SERPL CREATININE-BSD FRML MDRD: 52 ML/MIN/1.73M2
GLUCOSE BLD-MCNC: 125 MG/DL (ref 70–108)
GLUCOSE URINE: NEGATIVE MG/DL
HCT VFR BLD CALC: 35.3 % (ref 42–52)
HEMOGLOBIN: 11.6 GM/DL (ref 14–18)
IMMATURE GRANS (ABS): 0.03 THOU/MM3 (ref 0–0.07)
IMMATURE GRANULOCYTES: 0.4 %
KETONES, URINE: NEGATIVE
LEUKOCYTE ESTERASE, URINE: NEGATIVE
LIPASE: 17.5 U/L (ref 5.6–51.3)
LYMPHOCYTES # BLD: 8.3 %
LYMPHOCYTES ABSOLUTE: 0.6 THOU/MM3 (ref 1–4.8)
MCH RBC QN AUTO: 30.8 PG (ref 26–33)
MCHC RBC AUTO-ENTMCNC: 32.9 GM/DL (ref 32.2–35.5)
MCV RBC AUTO: 93.6 FL (ref 80–94)
MONOCYTES # BLD: 6.3 %
MONOCYTES ABSOLUTE: 0.4 THOU/MM3 (ref 0.4–1.3)
NITRITE, URINE: NEGATIVE
NUCLEATED RED BLOOD CELLS: 0 /100 WBC
PH UA: 5 (ref 5–9)
PLATELET # BLD: 148 THOU/MM3 (ref 130–400)
PMV BLD AUTO: 10.4 FL (ref 9.4–12.4)
POTASSIUM SERPL-SCNC: 4.1 MEQ/L (ref 3.5–5.2)
PROTEIN UA: NEGATIVE
RBC # BLD: 3.77 MILL/MM3 (ref 4.7–6.1)
SARS-COV-2, NAAT: NOT DETECTED
SEG NEUTROPHILS: 80 %
SEGMENTED NEUTROPHILS ABSOLUTE COUNT: 5.6 THOU/MM3 (ref 1.8–7.7)
SODIUM BLD-SCNC: 138 MEQ/L (ref 135–145)
SPECIFIC GRAVITY, URINE: > 1.03 (ref 1–1.03)
UROBILINOGEN, URINE: 1 EU/DL (ref 0–1)
WBC # BLD: 7 THOU/MM3 (ref 4.8–10.8)

## 2020-08-07 PROCEDURE — 83690 ASSAY OF LIPASE: CPT

## 2020-08-07 PROCEDURE — 81003 URINALYSIS AUTO W/O SCOPE: CPT

## 2020-08-07 PROCEDURE — 36415 COLL VENOUS BLD VENIPUNCTURE: CPT

## 2020-08-07 PROCEDURE — 2500000003 HC RX 250 WO HCPCS: Performed by: FAMILY MEDICINE

## 2020-08-07 PROCEDURE — G0378 HOSPITAL OBSERVATION PER HR: HCPCS

## 2020-08-07 PROCEDURE — 80048 BASIC METABOLIC PNL TOTAL CA: CPT

## 2020-08-07 PROCEDURE — U0002 COVID-19 LAB TEST NON-CDC: HCPCS

## 2020-08-07 PROCEDURE — 85025 COMPLETE CBC W/AUTO DIFF WBC: CPT

## 2020-08-07 PROCEDURE — 6370000000 HC RX 637 (ALT 250 FOR IP): Performed by: FAMILY MEDICINE

## 2020-08-07 RX ORDER — LISINOPRIL AND HYDROCHLOROTHIAZIDE 20; 12.5 MG/1; MG/1
1 TABLET ORAL DAILY
Status: DISCONTINUED | OUTPATIENT
Start: 2020-08-07 | End: 2020-08-07

## 2020-08-07 RX ORDER — ONDANSETRON 4 MG/1
4 TABLET, FILM COATED ORAL 3 TIMES DAILY PRN
Qty: 15 TABLET | Refills: 0 | Status: SHIPPED | OUTPATIENT
Start: 2020-08-07 | End: 2020-09-17 | Stop reason: SDUPTHER

## 2020-08-07 RX ORDER — HYDROCHLOROTHIAZIDE 25 MG/1
12.5 TABLET ORAL DAILY
Status: DISCONTINUED | OUTPATIENT
Start: 2020-08-07 | End: 2020-08-07 | Stop reason: HOSPADM

## 2020-08-07 RX ORDER — ATORVASTATIN CALCIUM 20 MG/1
20 TABLET, FILM COATED ORAL DAILY
Status: DISCONTINUED | OUTPATIENT
Start: 2020-08-07 | End: 2020-08-07 | Stop reason: HOSPADM

## 2020-08-07 RX ORDER — MULTIVITAMIN WITH IRON
1 TABLET ORAL DAILY
Status: DISCONTINUED | OUTPATIENT
Start: 2020-08-07 | End: 2020-08-07 | Stop reason: HOSPADM

## 2020-08-07 RX ORDER — ACETAMINOPHEN 500 MG
500 TABLET ORAL EVERY 6 HOURS PRN
Status: DISCONTINUED | OUTPATIENT
Start: 2020-08-07 | End: 2020-08-07 | Stop reason: SDUPTHER

## 2020-08-07 RX ORDER — LISINOPRIL 20 MG/1
20 TABLET ORAL DAILY
Status: DISCONTINUED | OUTPATIENT
Start: 2020-08-07 | End: 2020-08-07 | Stop reason: HOSPADM

## 2020-08-07 RX ORDER — TAMSULOSIN HYDROCHLORIDE 0.4 MG/1
0.4 CAPSULE ORAL NIGHTLY
Status: DISCONTINUED | OUTPATIENT
Start: 2020-08-07 | End: 2020-08-07 | Stop reason: HOSPADM

## 2020-08-07 RX ORDER — CARVEDILOL 6.25 MG/1
12.5 TABLET ORAL 2 TIMES DAILY
Status: DISCONTINUED | OUTPATIENT
Start: 2020-08-07 | End: 2020-08-07 | Stop reason: HOSPADM

## 2020-08-07 RX ADMIN — CARVEDILOL 12.5 MG: 6.25 TABLET, FILM COATED ORAL at 11:56

## 2020-08-07 RX ADMIN — CARVEDILOL 12.5 MG: 6.25 TABLET, FILM COATED ORAL at 01:55

## 2020-08-07 RX ADMIN — ATORVASTATIN CALCIUM 20 MG: 20 TABLET, FILM COATED ORAL at 01:55

## 2020-08-07 RX ADMIN — THERA TABS 1 TABLET: TAB at 11:56

## 2020-08-07 RX ADMIN — LISINOPRIL 20 MG: 20 TABLET ORAL at 11:56

## 2020-08-07 RX ADMIN — TAMSULOSIN HYDROCHLORIDE 0.4 MG: 0.4 CAPSULE ORAL at 01:56

## 2020-08-07 RX ADMIN — HYDROCHLOROTHIAZIDE 12.5 MG: 25 TABLET ORAL at 11:56

## 2020-08-07 RX ADMIN — POTASSIUM CHLORIDE, DEXTROSE MONOHYDRATE AND SODIUM CHLORIDE: 150; 5; 450 INJECTION, SOLUTION INTRAVENOUS at 09:35

## 2020-08-07 ASSESSMENT — PAIN SCALES - GENERAL: PAINLEVEL_OUTOF10: 0

## 2020-08-07 NOTE — PROGRESS NOTES
please see  h and p     impression     nausea and vomiting     acute gastroenteritis and ileus and doubt  Partial small bowel obstruction     ashd      htn    PLAN   Stable and  Advance  diet      surgery to see and if they  Feel stable and diet  Advanced home later  today

## 2020-08-07 NOTE — ED NOTES
Pt states nausea is much improved and he has not vomited since the dose of zofran. Updated on status.       Crow Gong, TRINIDAD  08/06/20 2100

## 2020-08-07 NOTE — PLAN OF CARE
Problem: SAFETY  Goal: Free from accidental physical injury  Outcome: Ongoing  Note: Pt up with staff. Call light, non skid footwear, and bed alarm in use. Hourly rounding provided. Problem: PAIN  Goal: Patient's pain/discomfort is manageable  Outcome: Ongoing  Note: Pt denies pain needs at this time. Will continue ongoing assessment     Problem: Discharge Planning:  Goal: Discharged to appropriate level of care  Description: Discharged to appropriate level of care  Outcome: Ongoing  Note: Discharge plans to home with wife. Discussed with pt. Problem: Skin Integrity:  Goal: Absence of new skin breakdown  Description: Absence of new skin breakdown  Outcome: Ongoing  Note: Two nurse skin assessment done. No signs of skin breakdown. Repositioning Q2H provided to prevent new skin breakdown. Problem: Nausea/Vomiting:  Goal: Absence of nausea/vomiting  Description: Absence of nausea/vomiting  Outcome: Ongoing  Note: No nausea or vomiting noted this shift. Will continue ongoing assessment. Problem: Bowel/Gastric:  Goal: Control of bowel function will improve  Description: Control of bowel function will improve  Outcome: Ongoing  Note: Bowel sounds active in all four quadrants. No pain or tenderness expressed by pt. Care plan reviewed with patient. Patient verbalize understanding of the plan of care and contribute to goal setting.

## 2020-08-07 NOTE — CARE COORDINATION
8/7/20, 9:31 AM EDT  DISCHARGE PLANNING EVALUATION:    Pedro Pablo Dsouza       Admitted from: ER, patient presented with abdominal pain accompanied by nausea and vomiting. 8/6/2020/ 3131 [facility] day: 0   Location: 5K-03/003-A Reason for admit: Emesis [R11.10] Status: Obs. Admit order signed?: yes  PMH:  has a past medical history of Arrhythmia, BPH (benign prostatic hypertrophy), CAD (coronary artery disease), Cerebrovascular disease, Hyperlipidemia, Hypertension, Mitral regurgitation, PVC (premature ventricular contraction), and TIA (transient ischemic attack). Procedure: N/A  Pertinent abnormal Imaging:CT of abdomen: Worsened small bowel obstruction though a site is not definitely visualized. Medications:  Scheduled Meds:   carvedilol  12.5 mg Oral BID    multivitamin  1 tablet Oral Daily    atorvastatin  20 mg Oral Daily    tamsulosin  0.4 mg Oral Nightly    lisinopril  20 mg Oral Daily    And    hydrochlorothiazide  12.5 mg Oral Daily     Continuous Infusions:   dextrose 5% and 0.45% NaCl with KCl 20 mEq Stopped (08/07/20 0926)      Pertinent Info/Orders/Treatment Plan:  General Surgery consult, IV fluids (D5.45% with 20 meq Kcl), prn Tylenol and Zofran, Covid test, full liquid diet, telemetry, activity as tolerated. Diet: DIET FULL LIQUID;   Smoking status:  reports that he quit smoking about 50 years ago. His smoking use included cigarettes. He has a 60.00 pack-year smoking history.  He has never used smokeless tobacco.   PCP: Roswell Bence, MD  Readmission 30 days or less: No   %    Discharge Planning Evaluation  Current Residence:  Private Residence  Living Arrangements:  Spouse/Significant Other   Support Systems:  Spouse/Significant Other, Children, Family Members, Friends/Neighbors  Current Services PTA:     Potential Assistance Needed:  N/A  Potential Assistance Purchasing Medications:  No  Does patient want to participate in local refill/ meds to beds program?  No  Type of Home Care Services:  None  Patient expects to be discharged to:  home  Expected Discharge date:  08/07/20  Follow Up Appointment: Best Day/ Time: Monday AM(anyday)    Patient Goals/Plan/Treatment Preferences: Met with Leonila Pradhan. He is from home with his wife. Leonila Pradhan verifies he has insurance, a PCP, can afford his medications, will have transportation to home, and his ADL's and nutritional needs are met. Leonila Pradhan denies a need for DME and declines HH. Leonila Pradhan states he has six children with 4 residing in [place]. His youngest son is here from [facility] and will be staying with him for awhile at discharge. Transportation/Food Security/Housekeeping Addressed:  No issues identified.     Evaluation: no

## 2020-08-07 NOTE — ED NOTES
ED to inpatient nurses report    Chief Complaint   Patient presents with    Emesis    Abdominal Pain      Present to ED from home  LOC: alert and orientated to name, place, date  Vital signs   Vitals:    08/06/20 1816 08/06/20 1818 08/06/20 1915 08/06/20 2059   BP: (!) 176/73  (!) 181/66 (!) 145/66   Pulse: 70  71 69   Resp: 18  21 19   Temp:  97.8 °F (36.6 °C)     TempSrc:  Oral     SpO2: 96%  96% 97%   Weight: 140 lb (63.5 kg)      Height: 5' 7\" (1.702 m)         Oxygen Baseline room air    Current needs required room air   LDAs:  18 g left forearm  Mobility: Requires assistance * 1  Pending ED orders: none  Present condition: Stable. Nausea has improved.      Electronically signed by Vianney Wilson RN on 8/6/2020 at 10:57 PM       Vianney Wilson RN  08/06/20 0139

## 2020-08-07 NOTE — ED PROVIDER NOTES
[facility] COMPLAINT       Chief Complaint   Patient presents with    Emesis    Abdominal Pain       Nurses Notes reviewed and I agree except as noted in the HPI. HISTORY OF PRESENT ILLNESS    Sahara Nice is a 80 y.o. male. This patient presents the emergency department with abdominal pain going on for the last 2 days or so. He reportedly had a lot of vomiting earlier and trouble with eating. He did not have a fever. He has had no previous abdominal surgeries. He did not report any constipation or diarrhea. REVIEW OF SYSTEMS         No fever, no chest pain, no shortness of breath. Remainder of review of systems is otherwise reviewed as negative. PAST MEDICAL HISTORY    has a past medical history of Arrhythmia, BPH (benign prostatic hypertrophy), CAD (coronary artery disease), Cerebrovascular disease, Hyperlipidemia, Hypertension, Mitral regurgitation, PVC (premature ventricular contraction), and TIA (transient ischemic attack). SURGICAL HISTORY      has a past surgical history that includes Coronary artery bypass graft (2009) and Cataract removal (Right, 7-2015).     CURRENT MEDICATIONS       Current Discharge Medication List      CONTINUE these medications which have NOT CHANGED    Details   lisinopril-hydroCHLOROthiazide (PRINZIDE;ZESTORETIC) 20-12.5 MG per tablet TAKE 1 TABLET BY MOUTH ONCE DAILY  Qty: 90 tablet, Refills: 0    Associated Diagnoses: Essential hypertension      simvastatin (ZOCOR) 40 MG tablet TAKE 1 TABLET BY MOUTH EVERY NIGHT AT BEDTIME  Qty: 90 tablet, Refills: 1      tamsulosin (FLOMAX) 0.4 MG capsule TAKE 1 CAPSULE BY MOUTH EVERY DAY  Qty: 30 capsule, Refills: 3    Associated Diagnoses: Benign non-nodular prostatic hyperplasia with lower urinary tract symptoms      carvedilol (COREG) 12.5 MG tablet TAKE 1 TABLET BY MOUTH TWICE DAILY  Qty: 180 tablet, Refills: 1      aspirin 81 MG EC tablet Take 81 mg by mouth daily. acetaminophen (TYLENOL) 500 MG tablet Take 500 mg by mouth every 6 hours as needed. multivitamin (THERAGRAN) per tablet Take 1 tablet by mouth daily. ALLERGIES     is allergic to pcn [penicillins]; ibuprofen; and percocet [oxycodone-acetaminophen]. FAMILY HISTORY     He indicated that his mother is . He indicated that his father is . family history includes Cancer in his father. SOCIAL HISTORY      reports that he quit smoking about 50 years ago. His smoking use included cigarettes. He has a 60.00 pack-year smoking history. He has never used smokeless tobacco. He reports current alcohol use. He reports that he does not use drugs. PHYSICAL EXAM     INITIAL VITALS:  height is 5' 7\" (1.702 m) and weight is 142 lb 3.2 oz (64.5 kg). His oral temperature is 98.1 °F (36.7 °C). His blood pressure is 190/86 (abnormal) and his pulse is 77. His respiration is 17 and oxygen saturation is 97%. Constitutional:  he is relatively well-appearing for his age. Eyes:  Pupils are equal and reactive, extraocular muscles intact   HENT:  Atraumatic appearing  oropharynx moist  Neck- normal range of motion, supple   Respiratory:  No wheezing, rhonchi or rales  Cardiovascular: regular  GI:  no rigidity, rebound or guarding  Musculoskeletal:  2/4 distal pulses, no pitting edema  Integument: warm and dry  Neurologic:  Alert & oriented x 3  Psychiatric:  Speech and behavior appropriate       DIAGNOSTIC RESULTS     EKG: All EKG's are interpreted by the Emergency Department Physician who either signs or Co-signs this chart in the absence of a cardiologist.  EKG interpreted by me showing sinus rhythm at a rate of 72, QRS of 90, QTc of 4-57, axis of 53. PVCs noted.     RADIOLOGY: non-plain film images(s) such as CT, Ultrasound and MRI are read by the radiologist.  CT the abdomen pelvis was interpreted by the radiologist with possible evidence of a small bowel obstruction but no definite transition point reported. LABS:   Labs Reviewed   CBC WITH AUTO DIFFERENTIAL - Abnormal; Notable for the following components:       Result Value    WBC 11.8 (*)     RBC 4.57 (*)     Segs Absolute 10.1 (*)     Lymphocytes Absolute 0.5 (*)     Eosinophils Absolute 0.6 (*)     Immature Grans (Abs) 0.09 (*)     All other components within normal limits   BASIC METABOLIC PANEL - Abnormal; Notable for the following components:    Glucose 134 (*)     BUN 30 (*)     CREATININE 1.3 (*)     All other components within normal limits   LIPASE - Abnormal; Notable for the following components:    Lipase 111.9 (*)     All other components within normal limits   GLOMERULAR FILTRATION RATE, ESTIMATED - Abnormal; Notable for the following components:    Est, Glom Filt Rate 52 (*)     All other components within normal limits   TROPONIN   HEPATIC FUNCTION PANEL   ANION GAP   OSMOLALITY   URINE RT REFLEX TO CULTURE   BASIC METABOLIC PANEL   CBC WITH AUTO DIFFERENTIAL   LIPASE       EMERGENCY DEPARTMENT COURSE:   Vitals:    Vitals:    08/06/20 2245 08/06/20 2300 08/06/20 2305 08/06/20 2309   BP: (!) 151/69 (!) 197/88 (!) 190/86 (!) 190/86   Pulse: 74 77  77   Resp: 17 17     Temp:  98.1 °F (36.7 °C)     TempSrc:  Oral     SpO2: 97% 97%     Weight:  142 lb 3.2 oz (64.5 kg)     Height:  5' 7\" (1.702 m)       The radiologist is reporting that there is evidence of a small bowel obstruction on the CT scan but this patient has no previous abdominal surgeries to cause adhesions. There is no history or evidence of a mass in his abdomen. Seem to have any obvious reason to cause an obstruction. But he did have a lot of vomiting earlier. Strangely seems to be feeling quite a bit better now. I discussed the case with Dr. Jeanette Scott from general surgery. He feels that is somewhat less likely to be a bowel obstruction at this point in light especially in light that his symptoms seem to be doing better.   He is requesting that we admit the patient to the hospitalist service. However turns out that this is actually a Dr Montalvo Render patient so I discussed the case with Dr. Flora Chua told to arrange for admission.       CRITICAL CARE:   none    CONSULTS:  Dr Meir Turner, Dr Danika Dorsey:  None    FINAL IMPRESSION      Abdominal pain, rule out small bowel obstruction    DISPOSITION/PLAN   Admitted    DISCHARGE MEDICATIONS:  Current Discharge Medication List          (Please note that portions of this note were completed with a voice recognition program.  Efforts were made to edit the dictations but occasionally words are mis-transcribed.)    Marla Morris, [address] [place] [address],   08/07/20 8201

## 2020-08-07 NOTE — DISCHARGE INSTR - DIET

## 2020-08-07 NOTE — PROGRESS NOTES
Pt admitted to  5 via from ED. Complaints: emesis. IV none infusing into the forearm left, condition patent and no redness. IV site free of s/s of infection or infiltration. Vital signs obtained. Assessment and data collection initiated. Two nurse skin assessment performed by Renata Rincon RN and Kenan Devine. Oriented to room. Policies and procedures for  explained. All questions answered with no further questions at this time. Fall prevention and safety brochure discussed with patient. Bed alarm on. Call light in reach. Oriented to room. Angelo Gaviria RN 8/6/2020 11:52 PM     Explained patients right to have family, representative or physician notified of their admission. Patient has Declined for physician to be notified. Patient has Declined for family/representative to be notified.

## 2020-08-08 ENCOUNTER — SURGICAL CONSULT (OUTPATIENT)
Dept: SURGERY | Age: 85
End: 2020-08-08

## 2020-08-08 NOTE — H&P
[address] [place] [address]                              HISTORY AND PHYSICAL    PATIENT NAME: Reece Espinal                       :        1933  MED REC NO:   [id number]                           ROOM:       0003  ACCOUNT NO:   [de-identified]                           ADMIT DATE: 2020  PROVIDER:     Glynn Rey M.D. The patient was admitted to observation on 2020. HISTORY OF PRESENT ILLNESS:  This is an 66-year-old male overall in  fairly good health with underlying history of atherosclerotic heart  disease along with BPH, hypertension, hyperlipidemia who apparently  yesterday developed some increased symptoms of abdominal bloating and  vomiting. He states that he ate his normal smaller amount for lunch. When he awoke in the morning, he did not have any symptoms of any nausea  or abdominal discomfort being present at all. He states that he did  have a new box of some ice cream that he ate around 4 o'clock in the  afternoon. About two hours later, he started to develop significant  symptoms of bloating and discomfort being present. He had a little bit  for dinner, but felt increased nausea and emesis being present. He then  had some increased vomiting being present and he became somewhat cold  and clammy. He did have a blood pressure cuff at home and noted that  his top blood pressure number is 170 to 180. His bottom number was in  the 90s. He states that he felt, as stated, cold and clammy, but did  not have any type of chest pressure or discomfort. He continued to have  abdominal discomfort being present. Because of the above, he presented  to the emergency room. Once in the emergency room, CT scan of the  abdomen was obtained. There were concerns of possibility of a partial  small bowel obstruction being present.   Actually, he had some further  emesis in the emergency room along with IV fluids and Zofran, and the  patient actually felt somewhat improved at that time; however, because  of the amount of vomiting and because of the change on the CT scan, it  was felt best to admit for further evaluation with consultation with  General Surgery. Concerns of partial small bowel obstruction were  present. PAST MEDICAL HISTORY:  Notes:  MEDICATIONS:  Prior to admission include lisinopril 20/12.5, Zocor,  Flomax, Coreg, aspirin, Tylenol, and a multivitamin. ALLERGIES:  He has problems with PENICILLIN, IBUPROFEN, and PERCOCET. SURGERIES:  Note he had coronary artery bypass graft in 2009, cataracts  removed in 2015. HOSPITALIZATIONS:  He has had no recent hospitalizations being present. His last hospitalization besides his bypass surgery, he has had no  recent other hospitalization being present. ILLNESSES:  He has history of some PVCs, mild mitral regurgitation,  hypertension, hyperlipidemia. Did have a small TIA at one time, remote  past, 2009, that was postsurgical and resolved. Has not had any further  problems. He has some BPH and some mild underlying history of some PVCs  being present. SOCIAL HISTORY:  Notes that he is . He is a nonsmoker. Does not  use alcohol products. Otherwise notes he has been retired for some  time. FAMILY HISTORY:  Notes there is some cancer in his father. Unsure the  cause of death of his mother. REVIEW OF SYSTEMS:  CONSTITUTIONAL:  He has had no significant fever or chills being  present. He felt increased nausea and clamminess with this all sudden  coming on yesterday afternoon. EYES:  He has had no eye symptoms of any double vision being present. EARS, NOSE, AND THROAT:  Has had some slight congestion. CARDIAC:  No chest pressure, discomfort, or palpitation being present. PULMONARY:  No shortness of breath or cough. GI:  With increased abdominal bloating, discomfort.   He states now he  feels better without significant discomfort or pain being present. He  has had some passage of stool along with some flatus also being present. RENAL:  Denies any voiding problems or discomfort being present. MUSCULOSKELETAL:  Some generalized muscle aches, pains, and tiredness  being present. SKIN:  Without lesions noted. NEUROLOGIC:  No other numbness, dizziness, lightheadedness, or other  symptoms being noted this time. PHYSICAL EXAMINATION:  Notes:  VITAL SIGNS:  At this time note, temperature is 97.8, pulse 77,  respiratory rate 17, blood pressure 140/65, pulse oximetry 98%. GENERAL:  Notes elderly male in no apparent distress. HEENT:  Head:  Atraumatic, normocephalic. Eyes:  PERRL with normal  sclerae. Ears:  Normal TMs. Nose:  Clear. Throat: Without exudate. NECK:  Supple without adenopathy or increased thyroid. No JVD noted. LUNGS:  Completely clear with normal respiratory rate and effort. CARDIAC:  Regular rate and rhythm. Normal S1, S2.  Grade 1 to 2 over 6  systolic murmur present. No carotid bruits. No peripheral edema. ABDOMEN:  Soft. Positive bowel sounds. It is not distended. It is  nontender. No hepatosplenomegaly. No other mass noted. EXTREMITIES:  Full range of motion. No CVA tenderness noted. NEUROLOGIC:  He is alert and oriented x3. Cranial nerves II through XII  intact. Motor and sensory intact. SKIN:  Without lesions. LABORATORY DATA:  Notes the initial sodium 135, potassium 4.5, chloride  100, CO2 23, BUN 30, creatinine of 1.3. Protein was 7.6. Troponin  level less than 0.010. Liver panel normal.  Glucose 134. White count  11,800. Hemoglobin 14, hematocrit of 42.6.  204,000 platelets. Normal  differential.  Urinalysis was noted to be normal.    CT abdomen and pelvis notes some ileus and questionable small bowel  obstruction being present, but otherwise, there are no significant  abnormalities being present.   Lab data this morning noted stable  hemoglobin of 11.6, so slight drop with hydration from 14.  White count  was normal at 7000. Electrolytes remain stable at this time with BUN  dropping from 30 to 27 with the potassium remaining stable at 4.1. IMPRESSION:  1. Acute nausea and vomiting. Questionable ileus versus  gastroenteritis versus partial small bowel obstruction. CT scan  discussed possibly a partial small bowel obstruction. 2.  Atherosclerotic heart disease, status post bypass. 3.  Acute nausea, vomiting, now resolved. 4.  Hypertension. PLAN:  At this time, appeared to be somewhat viral.  He seems much  better this a.m. with no further vomiting. We will start him on some  full liquids and advance diet. We will wait for General Surgery to see  the patient because of questionable bowel obstruction to make sure that  this was not present. On examination, he appears not to have such  symptoms. If General Surgery feels he is stable, we will increase diet  and have the patient discharged later today. We will ambulate. Repeat  blood work is pending at this time in addition. Again feels, that it is  more gastroenteritis viral versus bowel obstruction as he felt much  better following emesis. We will do a COVID test in addition because of  presentation of some of the COVID symptoms being with nausea and  vomiting. Tony Sharpe M.D.    D: 08/07/2020 17:42:16       T: 08/07/2020 19:14:39     [email or web address]  Job#: [id number]     Doc#: [id number]    CC:  Woodrow Lindsey.  Meir Turner MD

## 2020-08-09 NOTE — CONSULTS
[address] [place] [address]                                  CONSULTATION    PATIENT NAME: Dipesh Lambert                       :        1933  MED REC NO:   [id number]                           ROOM:       0003  ACCOUNT NO:   [de-identified]                           ADMIT DATE: 2020  PROVIDER:     Román Murphy. Sonny Smith MD    CONSULT DATE:  2020    CHIEF COMPLAINT:  Possible small bowel obstruction. HISTORY OF PRESENT ILLNESS:  The patient is an 80-year-old white male  who appears much younger than his stated age, does have a history of  atherosclerotic heart disease. He has never had any prior abdominal  surgeries, it started a day before admission with abdominal bloating and  vomiting. He denies any prior symptoms such as this. The pain, however  increased in severity and he presented to the emergency room for  evaluation. He felt clammy at home. He has had no recent weight loss  and he noted that his blood pressure was 170 to 180 at home. He  continued to have abdominal discomfort. He presented to the emergency  room where a CT scan of the abdomen was obtained. It was read as a  possible small bowel obstruction, although no definitive transition  point was seen. The patient was admitted to Dr. Eder Ramos and general  surgical consultation was requested. He was admitted to the hospital  last evening and was being seen on Saturday the . He states he is  feeling much better, in fact he ate some food. PAST MEDICAL HISTORY:  Positive for again coronary artery disease,  hypertension, hypercholesterolemia, benign prostatic hypertrophy. PAST SURGICAL HISTORY:  Includes coronary bypass grafting in , he  has had cataracts. ALLERGIES:  PENICILLIN, IBUPROFEN, and PERCOCET. MEDICATIONS:  Lisinopril, Zocor, Flomax, Coreg, aspirin. SOCIAL HISTORY:  He is , wife at 76 years.   He is a

## 2020-08-10 NOTE — CARE COORDINATION
Late entry, patient discharged to home with no services added/requested. 8/10/20, 7:40 AM EDT    Patient goals/plan/ treatment preferences discussed by  and . Patient goals/plan/ treatment preferences reviewed with patient/ family. Patient/ family verbalize understanding of discharge plan and are in agreement with goal/plan/treatment preferences. Understanding was demonstrated using the teach back method. AVS provided by RN at time of discharge, which includes all necessary medical information pertaining to the patients current course of illness, treatment, post-discharge goals of care, and treatment preferences.         IMM Letter  Observation Status Letter date given[de-identified] 08/06/20  Observation Status Letter time given[de-identified] 0892  Observation Status Letter given to Patient/Family/Significant other/Guardian/POA/by[de-identified] staff

## 2020-08-17 ENCOUNTER — VIRTUAL VISIT (OUTPATIENT)
Dept: FAMILY MEDICINE CLINIC | Age: 85
End: 2020-08-17

## 2020-08-17 PROCEDURE — 99214 OFFICE O/P EST MOD 30 MIN: CPT | Performed by: FAMILY MEDICINE

## 2020-08-17 PROCEDURE — 1111F DSCHRG MED/CURRENT MED MERGE: CPT | Performed by: FAMILY MEDICINE

## 2020-08-17 PROCEDURE — G8427 DOCREV CUR MEDS BY ELIG CLIN: HCPCS | Performed by: FAMILY MEDICINE

## 2020-08-17 PROCEDURE — 1036F TOBACCO NON-USER: CPT | Performed by: FAMILY MEDICINE

## 2020-08-17 PROCEDURE — 4040F PNEUMOC VAC/ADMIN/RCVD: CPT | Performed by: FAMILY MEDICINE

## 2020-08-17 PROCEDURE — 1123F ACP DISCUSS/DSCN MKR DOCD: CPT | Performed by: FAMILY MEDICINE

## 2020-08-17 PROCEDURE — G8420 CALC BMI NORM PARAMETERS: HCPCS | Performed by: FAMILY MEDICINE

## 2020-08-17 NOTE — PROGRESS NOTES
Valeri Nuñez agreed to Video Chat/Exam in presence of Dr Bailey Head and myself. Verified who was present in room with Valeri Nuñez. Valeri Nuñez informed the e-mail address used to Face Time cannot be used to contact the Provider, if they are any questions or concerns they need to call the office directly. Valeri Nuñez stated understanding.

## 2020-08-28 RX ORDER — CARVEDILOL 12.5 MG/1
TABLET ORAL
Qty: 180 TABLET | Refills: 1 | Status: SHIPPED | OUTPATIENT
Start: 2020-08-28 | End: 2021-03-17

## 2020-08-30 ENCOUNTER — APPOINTMENT (OUTPATIENT)
Dept: CT IMAGING | Age: 85
DRG: 329 | End: 2020-08-30
Payer: MEDICARE

## 2020-08-30 ENCOUNTER — APPOINTMENT (OUTPATIENT)
Dept: GENERAL RADIOLOGY | Age: 85
DRG: 329 | End: 2020-08-30
Payer: MEDICARE

## 2020-08-30 ENCOUNTER — HOSPITAL ENCOUNTER (INPATIENT)
Age: 85
LOS: 11 days | Discharge: HOME | DRG: 329 | End: 2020-09-10
Attending: STUDENT IN AN ORGANIZED HEALTH CARE EDUCATION/TRAINING PROGRAM | Admitting: FAMILY MEDICINE
Payer: MEDICARE

## 2020-08-30 PROBLEM — K56.600 SMALL BOWEL OBSTRUCTION, PARTIAL (HCC): Status: ACTIVE | Noted: 2020-08-30

## 2020-08-30 PROBLEM — K56.609 SMALL BOWEL OBSTRUCTION (HCC): Status: ACTIVE | Noted: 2020-08-30

## 2020-08-30 LAB
ALBUMIN SERPL-MCNC: 4.4 G/DL (ref 3.5–5.1)
ALP BLD-CCNC: 97 U/L (ref 38–126)
ALT SERPL-CCNC: 16 U/L (ref 11–66)
ANION GAP SERPL CALCULATED.3IONS-SCNC: 14 MEQ/L (ref 8–16)
AST SERPL-CCNC: 21 U/L (ref 5–40)
BASOPHILS # BLD: 0.2 %
BASOPHILS ABSOLUTE: 0 THOU/MM3 (ref 0–0.1)
BILIRUB SERPL-MCNC: 0.9 MG/DL (ref 0.3–1.2)
BUN BLDV-MCNC: 44 MG/DL (ref 7–22)
CALCIUM SERPL-MCNC: 9.7 MG/DL (ref 8.5–10.5)
CHLORIDE BLD-SCNC: 96 MEQ/L (ref 98–111)
CO2: 26 MEQ/L (ref 23–33)
CREAT SERPL-MCNC: 2.3 MG/DL (ref 0.4–1.2)
EOSINOPHIL # BLD: 0.7 %
EOSINOPHILS ABSOLUTE: 0.1 THOU/MM3 (ref 0–0.4)
ERYTHROCYTE [DISTWIDTH] IN BLOOD BY AUTOMATED COUNT: 12.7 % (ref 11.5–14.5)
ERYTHROCYTE [DISTWIDTH] IN BLOOD BY AUTOMATED COUNT: 41.9 FL (ref 35–45)
GFR SERPL CREATININE-BSD FRML MDRD: 27 ML/MIN/1.73M2
GLUCOSE BLD-MCNC: 159 MG/DL (ref 70–108)
HCT VFR BLD CALC: 41.3 % (ref 42–52)
HEMOGLOBIN: 13.8 GM/DL (ref 14–18)
IMMATURE GRANS (ABS): 0.05 THOU/MM3 (ref 0–0.07)
IMMATURE GRANULOCYTES: 0.5 %
LIPASE: 23.1 U/L (ref 5.6–51.3)
LYMPHOCYTES # BLD: 5.2 %
LYMPHOCYTES ABSOLUTE: 0.5 THOU/MM3 (ref 1–4.8)
MAGNESIUM: 1.9 MG/DL (ref 1.6–2.4)
MCH RBC QN AUTO: 30.7 PG (ref 26–33)
MCHC RBC AUTO-ENTMCNC: 33.4 GM/DL (ref 32.2–35.5)
MCV RBC AUTO: 91.8 FL (ref 80–94)
MONOCYTES # BLD: 5.1 %
MONOCYTES ABSOLUTE: 0.5 THOU/MM3 (ref 0.4–1.3)
NUCLEATED RED BLOOD CELLS: 0 /100 WBC
OSMOLALITY CALCULATION: 286.5 MOSMOL/KG (ref 275–300)
PHOSPHORUS: 4.5 MG/DL (ref 2.4–4.7)
PLATELET # BLD: 298 THOU/MM3 (ref 130–400)
PMV BLD AUTO: 10.7 FL (ref 9.4–12.4)
POTASSIUM SERPL-SCNC: 4.2 MEQ/L (ref 3.5–5.2)
RBC # BLD: 4.5 MILL/MM3 (ref 4.7–6.1)
SEG NEUTROPHILS: 88.3 %
SEGMENTED NEUTROPHILS ABSOLUTE COUNT: 8.8 THOU/MM3 (ref 1.8–7.7)
SODIUM BLD-SCNC: 136 MEQ/L (ref 135–145)
TOTAL PROTEIN: 8.1 G/DL (ref 6.1–8)
WBC # BLD: 10 THOU/MM3 (ref 4.8–10.8)

## 2020-08-30 PROCEDURE — 84100 ASSAY OF PHOSPHORUS: CPT

## 2020-08-30 PROCEDURE — 1200000003 HC TELEMETRY R&B

## 2020-08-30 PROCEDURE — 2580000003 HC RX 258: Performed by: STUDENT IN AN ORGANIZED HEALTH CARE EDUCATION/TRAINING PROGRAM

## 2020-08-30 PROCEDURE — 85025 COMPLETE CBC W/AUTO DIFF WBC: CPT

## 2020-08-30 PROCEDURE — 83690 ASSAY OF LIPASE: CPT

## 2020-08-30 PROCEDURE — 6370000000 HC RX 637 (ALT 250 FOR IP): Performed by: STUDENT IN AN ORGANIZED HEALTH CARE EDUCATION/TRAINING PROGRAM

## 2020-08-30 PROCEDURE — 74176 CT ABD & PELVIS W/O CONTRAST: CPT

## 2020-08-30 PROCEDURE — 94760 N-INVAS EAR/PLS OXIMETRY 1: CPT

## 2020-08-30 PROCEDURE — 6360000002 HC RX W HCPCS: Performed by: STUDENT IN AN ORGANIZED HEALTH CARE EDUCATION/TRAINING PROGRAM

## 2020-08-30 PROCEDURE — 36415 COLL VENOUS BLD VENIPUNCTURE: CPT

## 2020-08-30 PROCEDURE — 96374 THER/PROPH/DIAG INJ IV PUSH: CPT

## 2020-08-30 PROCEDURE — 99222 1ST HOSP IP/OBS MODERATE 55: CPT | Performed by: SURGERY

## 2020-08-30 PROCEDURE — 99283 EMERGENCY DEPT VISIT LOW MDM: CPT

## 2020-08-30 PROCEDURE — 6370000000 HC RX 637 (ALT 250 FOR IP): Performed by: PHYSICIAN ASSISTANT

## 2020-08-30 PROCEDURE — 80053 COMPREHEN METABOLIC PANEL: CPT

## 2020-08-30 PROCEDURE — APPSS180 APP SPLIT SHARED TIME > 60 MINUTES: Performed by: PHYSICIAN ASSISTANT

## 2020-08-30 PROCEDURE — 99223 1ST HOSP IP/OBS HIGH 75: CPT | Performed by: FAMILY MEDICINE

## 2020-08-30 PROCEDURE — 83735 ASSAY OF MAGNESIUM: CPT

## 2020-08-30 PROCEDURE — 74018 RADEX ABDOMEN 1 VIEW: CPT

## 2020-08-30 RX ORDER — ATORVASTATIN CALCIUM 20 MG/1
20 TABLET, FILM COATED ORAL DAILY
Status: DISCONTINUED | OUTPATIENT
Start: 2020-08-30 | End: 2020-09-10 | Stop reason: HOSPADM

## 2020-08-30 RX ORDER — ACETAMINOPHEN 325 MG/1
650 TABLET ORAL EVERY 6 HOURS PRN
Status: DISCONTINUED | OUTPATIENT
Start: 2020-08-30 | End: 2020-09-10 | Stop reason: HOSPADM

## 2020-08-30 RX ORDER — POLYETHYLENE GLYCOL 3350 17 G/17G
17 POWDER, FOR SOLUTION ORAL DAILY PRN
Status: DISCONTINUED | OUTPATIENT
Start: 2020-08-30 | End: 2020-09-02

## 2020-08-30 RX ORDER — ONDANSETRON 2 MG/ML
4 INJECTION INTRAMUSCULAR; INTRAVENOUS EVERY 6 HOURS PRN
Status: DISCONTINUED | OUTPATIENT
Start: 2020-08-30 | End: 2020-09-10 | Stop reason: HOSPADM

## 2020-08-30 RX ORDER — 0.9 % SODIUM CHLORIDE 0.9 %
1000 INTRAVENOUS SOLUTION INTRAVENOUS ONCE
Status: COMPLETED | OUTPATIENT
Start: 2020-08-30 | End: 2020-08-30

## 2020-08-30 RX ORDER — CARVEDILOL 25 MG/1
12.5 TABLET ORAL 2 TIMES DAILY WITH MEALS
Status: DISCONTINUED | OUTPATIENT
Start: 2020-08-30 | End: 2020-09-10 | Stop reason: HOSPADM

## 2020-08-30 RX ORDER — HYDRALAZINE HYDROCHLORIDE 25 MG/1
25 TABLET, FILM COATED ORAL EVERY 6 HOURS PRN
Status: DISCONTINUED | OUTPATIENT
Start: 2020-08-30 | End: 2020-09-10 | Stop reason: HOSPADM

## 2020-08-30 RX ORDER — PROMETHAZINE HYDROCHLORIDE 25 MG/1
12.5 TABLET ORAL EVERY 6 HOURS PRN
Status: DISCONTINUED | OUTPATIENT
Start: 2020-08-30 | End: 2020-09-10 | Stop reason: HOSPADM

## 2020-08-30 RX ORDER — ONDANSETRON 2 MG/ML
4 INJECTION INTRAMUSCULAR; INTRAVENOUS ONCE
Status: COMPLETED | OUTPATIENT
Start: 2020-08-30 | End: 2020-08-30

## 2020-08-30 RX ORDER — SODIUM CHLORIDE 0.9 % (FLUSH) 0.9 %
10 SYRINGE (ML) INJECTION EVERY 12 HOURS SCHEDULED
Status: DISCONTINUED | OUTPATIENT
Start: 2020-08-30 | End: 2020-09-10 | Stop reason: HOSPADM

## 2020-08-30 RX ORDER — ACETAMINOPHEN 650 MG/1
650 SUPPOSITORY RECTAL EVERY 6 HOURS PRN
Status: DISCONTINUED | OUTPATIENT
Start: 2020-08-30 | End: 2020-09-10 | Stop reason: HOSPADM

## 2020-08-30 RX ORDER — HEPARIN SODIUM 5000 [USP'U]/ML
5000 INJECTION, SOLUTION INTRAVENOUS; SUBCUTANEOUS 2 TIMES DAILY
Status: DISCONTINUED | OUTPATIENT
Start: 2020-08-31 | End: 2020-09-02

## 2020-08-30 RX ORDER — LISINOPRIL 20 MG/1
20 TABLET ORAL DAILY
Status: DISCONTINUED | OUTPATIENT
Start: 2020-08-30 | End: 2020-09-04

## 2020-08-30 RX ORDER — SODIUM CHLORIDE 0.9 % (FLUSH) 0.9 %
10 SYRINGE (ML) INJECTION PRN
Status: DISCONTINUED | OUTPATIENT
Start: 2020-08-30 | End: 2020-09-10 | Stop reason: HOSPADM

## 2020-08-30 RX ORDER — LISINOPRIL AND HYDROCHLOROTHIAZIDE 20; 12.5 MG/1; MG/1
1 TABLET ORAL DAILY
Status: DISCONTINUED | OUTPATIENT
Start: 2020-08-30 | End: 2020-08-30

## 2020-08-30 RX ORDER — HYDROCHLOROTHIAZIDE 25 MG/1
12.5 TABLET ORAL DAILY
Status: DISCONTINUED | OUTPATIENT
Start: 2020-08-30 | End: 2020-09-04

## 2020-08-30 RX ORDER — LANOLIN ALCOHOL/MO/W.PET/CERES
3 CREAM (GRAM) TOPICAL NIGHTLY PRN
Status: DISCONTINUED | OUTPATIENT
Start: 2020-08-30 | End: 2020-09-10 | Stop reason: HOSPADM

## 2020-08-30 RX ORDER — SODIUM CHLORIDE 9 MG/ML
INJECTION, SOLUTION INTRAVENOUS CONTINUOUS
Status: DISCONTINUED | OUTPATIENT
Start: 2020-08-30 | End: 2020-09-02

## 2020-08-30 RX ORDER — ASPIRIN 81 MG/1
81 TABLET ORAL DAILY
Status: DISCONTINUED | OUTPATIENT
Start: 2020-08-30 | End: 2020-09-02

## 2020-08-30 RX ORDER — TAMSULOSIN HYDROCHLORIDE 0.4 MG/1
0.4 CAPSULE ORAL DAILY
Status: DISCONTINUED | OUTPATIENT
Start: 2020-08-30 | End: 2020-09-10 | Stop reason: HOSPADM

## 2020-08-30 RX ADMIN — Medication 3 MG: at 21:02

## 2020-08-30 RX ADMIN — SODIUM CHLORIDE: 9 INJECTION, SOLUTION INTRAVENOUS at 16:16

## 2020-08-30 RX ADMIN — ONDANSETRON 4 MG: 2 INJECTION INTRAMUSCULAR; INTRAVENOUS at 11:06

## 2020-08-30 RX ADMIN — ATORVASTATIN CALCIUM 20 MG: 20 TABLET, FILM COATED ORAL at 21:02

## 2020-08-30 RX ADMIN — SODIUM CHLORIDE 1000 ML: 9 INJECTION, SOLUTION INTRAVENOUS at 10:45

## 2020-08-30 RX ADMIN — CARVEDILOL 12.5 MG: 25 TABLET, FILM COATED ORAL at 21:02

## 2020-08-30 RX ADMIN — ONDANSETRON 4 MG: 2 INJECTION INTRAMUSCULAR; INTRAVENOUS at 14:12

## 2020-08-30 ASSESSMENT — ENCOUNTER SYMPTOMS
COUGH: 0
SORE THROAT: 0
SHORTNESS OF BREATH: 0
BACK PAIN: 0
DIARRHEA: 0
PHOTOPHOBIA: 0
RESPIRATORY NEGATIVE: 1
CONSTIPATION: 0
RHINORRHEA: 0
NAUSEA: 0
ABDOMINAL PAIN: 0
VOMITING: 0
EYES NEGATIVE: 1
GASTROINTESTINAL NEGATIVE: 1
ALLERGIC/IMMUNOLOGIC NEGATIVE: 1

## 2020-08-30 ASSESSMENT — PAIN SCALES - GENERAL
PAINLEVEL_OUTOF10: 0

## 2020-08-30 NOTE — ED NOTES
ED to inpatient nurses report    Chief Complaint   Patient presents with    Emesis      Present to ED from home  LOC: alert and orientated to name, place, date  Vital signs   Vitals:    08/30/20 1014 08/30/20 1141 08/30/20 1255   BP: 136/68 112/69 (!) 147/72   Pulse: 83 84 79   Resp: 18 18 18   Temp: 98.2 °F (36.8 °C)     TempSrc: Oral     SpO2: 95% 98% 98%   Weight: 140 lb (63.5 kg)        Oxygen Baseline RA    Current needs required RA Bipap/Cpap No    LDAs:   Peripheral IV 08/30/20 Right Forearm (Active)     Mobility: Independent  Pending ED orders: NG  Present condition: stable    Electronically signed by Kami Lozano RN on 8/30/2020 at 2:21 PM     Kami Lozano RN  08/30/20 1426

## 2020-08-30 NOTE — H&P
HISTORY & PHYSICAL       Patient:  Nancy Age  YOB: 1933    MRN: [id number]     Acct: [de-identified]    PCP: César Webster MD    Date of Admission: 8/30/2020    Date of Service: Pt seen/examined on 8/30/20 and Admitted to Inpatient with expected LOS greater than two midnights due to medical therapy. ASSESSMENT/PLAN:    Intractable nausea/vomiting secondary to partial SBO:  - CT abdomen imaging as noted below. Patient with ongoing nausea, not passing flatus. - Decompression of small bowel with NGT to LIS  - NPO effective now  - Monitor electrolytes and replace as needed  - Anti-emetics PRN  - General Surgery consulted and will follow  - Serial KUBs    ANJANA likely prerenal due to volume loss:  - Cr 2.3 on admission, baseline 1.3  - Likely due to vomiting as noted above  - IVF hydration at 100cc/hr  - BMP in the AM    s/p CABG:  - Continue with ASA    Essential HTN:  - BP stable. - Continue with Coreg, Lisinopril-HCTZ  - Monitor VS as per protocol    HLD:  - Continue with Zocor 40mg  - Pharmacy to substitute    BPH:  - Continue Tamsulosin    DVT prophylaxis: Lovenox  Code Status: Full Code  Disposition: Will admit for possible early or partial SBO        Chief Complaint:  Nausea, vomiting       History Of Present Illness:   80 y.o. male who presented to [address] [place] [address] with complaints of vomiting x 2 days. The patient reports that he woke up yesterday morning and had to vomit. Reports that he had about 4-5 episodes of vomiting with brown colored vomitus. States that his abdomen is tight and uncomfortable but denies any pain. States his abdomen will get hard at times. Has not passed flatus from before since yesterday. States he may have had a BM yesterday but cannot recall when. Has not been able to tolerate PO since yesterday. Patient vomited today for the first time while in the ED. Otherwise, he denies any dizziness, lightheadedness, fever, chills,  SOB, chest pain, LE edema.   Of note, the patient was recently admitted to [facility] under Observation on 8/6/2020 with similar complaints and diagnoses with gastroenteritis. General Surgery was consulted on that admission and likely patient had a possible ileus verus possible gastroenteritis. The patient was discharged home and had been well over the past 2 weeks until yesterday when the vomiting started again suddenly. In the ED, the patient was afebrile and hemodynamically stable. Labs revealved BUN/Cr 44/2.3. Lipase 23.1 CT abdomen pelvis showed multiple dilated loops of small with air-fluid levels demonstrated consistent with early or partial small bowel obstruction. Overall degree of small bowel dilation appears progressed from previous exam. ED physician consulted General Surgery in the ED and Dr. Marcy Bonner recommended admission with Surgery to follow tomorrow. An NGT was also placed due to the ongoing emesis. The patient was admitted for further evaluation and management. Past Medical History:          Diagnosis Date    Arrhythmia 1/22/2019    BPH (benign prostatic hypertrophy)     CAD (coronary artery disease)     Cerebrovascular disease     Hyperlipidemia     Hypertension     Mitral regurgitation     PVC (premature ventricular contraction) 1/22/2019    TIA (transient ischemic attack) 2009    post surgery resolved       Past Surgical History:          Procedure Laterality Date    CATARACT REMOVAL Right 7-2015    Dr Rosio Oconnor  2009       Medications Prior to Admission:      Prior to Admission medications    Medication Sig Start Date End Date Taking?  Authorizing Provider   carvedilol (COREG) 12.5 MG tablet TAKE 1 TABLET BY MOUTH TWICE DAILY 8/28/20   Stef Leblanc MD   ondansetron (ZOFRAN) 4 MG tablet Take 1 tablet by mouth 3 times daily as needed for Nausea or Vomiting 8/7/20   Stef Leblanc MD   lisinopril-hydroCHLOROthiazide (PRINZIDE;ZESTORETIC) 20-12.5 MG per tablet TAKE 1 TABLET BY MOUTH ONCE DAILY 7/30/20   Shirley Marinelli MD   simvastatin (ZOCOR) 40 MG tablet TAKE 1 TABLET BY MOUTH EVERY NIGHT AT BEDTIME 7/30/20   Shirley Marinelli MD   tamsulosin (FLOMAX) 0.4 MG capsule TAKE 1 CAPSULE BY MOUTH EVERY DAY  Patient taking differently: TAKE 1 CAPSULE BY MOUTH EVERY DAY nightly 7/24/20   Shirley Marinelli MD   aspirin 81 MG EC tablet Take 81 mg by mouth daily. Historical Provider, MD   acetaminophen (TYLENOL) 500 MG tablet Take 500 mg by mouth every 6 hours as needed. Historical Provider, MD   multivitamin [facility]) per tablet Take 1 tablet by mouth daily. Historical Provider, MD       Allergies:  Pcn [penicillins]; Ibuprofen; and Percocet [oxycodone-acetaminophen]    Social History:      The patient currently lives at home with his wife. TOBACCO:   reports that he quit smoking about 50 years ago. His smoking use included cigarettes. He has a 60.00 pack-year smoking history. He has never used smokeless tobacco.  ETOH:   reports current alcohol use. Family History:    Positive as follows:        Problem Relation Age of Onset    Cancer Father        Diet:  Diet NPO Effective Now    REVIEW OF SYSTEMS:   Pertinent positives as noted in the HPI. All other systems reviewed and negative. PHYSICAL EXAM:    BP (!) 147/72   Pulse 79   Temp 98.2 °F (36.8 °C) (Oral)   Resp 18   Wt 140 lb (63.5 kg)   SpO2 98%   BMI 21.93 kg/m²     General appearance: elderly male, in distress (vomited), appears stated age and cooperative. HEENT:  Normal cephalic, atraumatic without obvious deformity. Extra ocular muscles intact. Conjunctivae/corneas clear. Neck: Supple, with full range of motion. No jugular venous distention. Trachea midline. Respiratory:  Normal respiratory effort. Clear to auscultation, bilaterally without Rales/Wheezes/Rhonchi. Cardiovascular:  Regular rate and rhythm with normal S1/S2 without murmurs, rubs or gallops.   Abdomen: soft, non-tender, non-distended, hypoactive bowel sounds. Musculoskeletal:  No clubbing, cyanosis or edema bilaterally. Full range of motion without deformity. Skin: Skin color, texture, turgor normal.  No rashes or lesions. Neurologic:  Neurovascularly intact without any focal sensory/motor deficits. Cranial nerves: II-XII intact, grossly non-focal.  Psychiatric:  Alert and oriented, thought content appropriate, normal insight  Capillary Refill: Brisk,< 3 seconds   Peripheral Pulses: +2 palpable, equal bilaterally       Labs:     Recent Labs     08/30/20  1040   WBC 10.0   HGB 13.8*   HCT 41.3*        Recent Labs     08/30/20  1040      K 4.2   CL 96*   CO2 26   BUN 44*   CREATININE 2.3*   CALCIUM 9.7     Recent Labs     08/30/20  1040   AST 21   ALT 16   BILITOT 0.9   ALKPHOS 97     No results for input(s): INR in the last 72 hours. No results for input(s): Lindia Dar in the last 72 hours. Urinalysis:      Lab Results   Component Value Date    NITRU NEGATIVE 08/07/2020    BLOODU NEGATIVE 08/07/2020    GLUCOSEU NEGATIVE 08/07/2020       Intake & Output:  No intake/output data recorded. No intake/output data recorded. Radiology:     CXR: I have reviewed the CXR with the following interpretation:   EKG:  I have reviewed the EKG with the following interpretation:     CT ABDOMEN PELVIS WO CONTRAST Additional Contrast? None   Final Result   1. Multiple dilated loops of small bowel with air-fluid levels are demonstrated consistent with early or partial small bowel obstruction. A discrete transition zone is not definitely seen. However, there are relatively collapsed distal ileal small bowel    loops within the right lower quadrant and pelvis. Therefore, the approximate location of the transition zone is likely the ileojejunal junction. The overall degree small bowel dilation appears progressed from previous examination.       2. 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 No evidence of diverticulitis is seen. 3. There is a left inguinal hernia containing a short segment of sigmoid colon. This is demonstrated on coronal series image 21. **This report has been created using voice recognition software. It may contain minor errors which are inherent in voice recognition technology. **      Final report electronically signed by Dr. Lu Jerez on 8/30/2020 12:31 PM      XR ABDOMEN FOR NG/OG/NE TUBE PLACEMENT    (Results Pending)           Thank you Truong Camarena MD for the opportunity to be involved in this patient's care.     Electronically signed by Kristofer Colon MD on 8/30/2020 at 2:27 PM

## 2020-08-30 NOTE — ED PROVIDER NOTES
Negative for dizziness, weakness and light-headedness. Hematological: Negative. Psychiatric/Behavioral: Negative. Negative for hallucinations, self-injury and suicidal ideas. All other systems reviewed and are negative. Except as noted above the remainder of the review of systems was reviewed and negative. PAST MEDICAL HISTORY     Past Medical History:   Diagnosis Date    ANJANA (acute kidney injury) ([facility] [address]     Arrhythmia 1/22/2019    Arthritis     BPH (benign prostatic hypertrophy)     CAD (coronary artery disease)     Cerebrovascular disease     Hyperlipidemia     Hypertension     Mitral regurgitation     PVC (premature ventricular contraction) 1/22/2019    TIA (transient ischemic attack) 2009    post surgery resolved         SURGICAL HISTORY       Past Surgical History:   Procedure Laterality Date    CATARACT REMOVAL Right 7-2015    Dr Ankur Burger  2009    HERNIA REPAIR N/A 9/2/2020    ROBOTIC LEFT INGUINAL HERNIA REPAIR WITH MESH,ROBOTIC EXPLORATORY LAPAROSCOPY CONVERT TO OPEN, SMALL BOWEL RESECTION performed by Verenice Chavarria MD at [address] [place] [address] Medication List as of 9/10/2020 10:14 AM      CONTINUE these medications which have NOT CHANGED    Details   carvedilol (COREG) 12.5 MG tablet TAKE 1 TABLET BY MOUTH TWICE DAILY, Disp-180 tablet,R-1Normal      ondansetron (ZOFRAN) 4 MG tablet Take 1 tablet by mouth 3 times daily as needed for Nausea or Vomiting, Disp-15 tablet,R-0Normal      lisinopril-hydroCHLOROthiazide (PRINZIDE;ZESTORETIC) 20-12.5 MG per tablet TAKE 1 TABLET BY MOUTH ONCE DAILY, Disp-90 tablet,R-0Normal      simvastatin (ZOCOR) 40 MG tablet TAKE 1 TABLET BY MOUTH EVERY NIGHT AT BEDTIME, Disp-90 tablet,R-1Normal      tamsulosin (FLOMAX) 0.4 MG capsule TAKE 1 CAPSULE BY MOUTH EVERY DAY, Disp-30 capsule,R-3Normal      multivitamin (THERAGRAN) per tablet Take 1 tablet by mouth daily.         aspirin 81 MG EC tablet Take 81 mg by mouth daily. ALLERGIES     Pcn [penicillins];  Ibuprofen; and Percocet [oxycodone-acetaminophen]    FAMILY HISTORY       Family History   Problem Relation Age of Onset    Other Mother     Cancer Father           SOCIAL HISTORY       Social History     Socioeconomic History    Marital status:      Spouse name: None    Number of children: None    Years of education: None    Highest education level: None   Occupational History    None   Social Needs    Financial resource strain: None    Food insecurity     Worry: None     Inability: None    Transportation needs     Medical: None     Non-medical: None   Tobacco Use    Smoking status: Former Smoker     Packs/day: 3.00     Years: 20.00     Pack years: 60.00     Types: Cigarettes     Last attempt to quit: 1970     Years since quittin.1    Smokeless tobacco: Never Used   Substance and Sexual Activity    Alcohol use: Yes     Comment: occas beer    Drug use: No    Sexual activity: Yes     Partners: Female   Lifestyle    Physical activity     Days per week: None     Minutes per session: None    Stress: None   Relationships    Social connections     Talks on phone: None     Gets together: None     Attends Restorationist service: None     Active member of club or organization: None     Attends meetings of clubs or organizations: None     Relationship status: None    Intimate partner violence     Fear of current or ex partner: None     Emotionally abused: None     Physically abused: None     Forced sexual activity: None   Other Topics Concern    None   Social History Narrative    None       SCREENINGS        [place] Coma Scale  Eye Opening: Spontaneous  Best Verbal Response: Oriented  Best Motor Response: Obeys commands  [place] Coma Scale Score: 15               PHYSICAL EXAM    (up to 7 for level 4, 8 or more for level 5)     ED Triage Vitals [20 1014]   BP Temp Temp Source Pulse Resp SpO2 Height Weight 136/68 98.2 °F (36.8 °C) Oral 83 18 95 % -- 140 lb (63.5 kg)       Physical Exam  Vitals signs and nursing note reviewed. Constitutional:       Appearance: Normal appearance. He is not ill-appearing. HENT:      Head: Normocephalic and atraumatic. Right Ear: Tympanic membrane normal.      Left Ear: Tympanic membrane normal.      Nose: Nose normal.      Mouth/Throat:      Mouth: Mucous membranes are moist.      Pharynx: No oropharyngeal exudate. Eyes:      Extraocular Movements: Extraocular movements intact. Conjunctiva/sclera: Conjunctivae normal.   Neck:      Musculoskeletal: Normal range of motion. No neck rigidity or muscular tenderness. Cardiovascular:      Rate and Rhythm: Normal rate and regular rhythm. Heart sounds: No murmur. No gallop. Pulmonary:      Effort: Pulmonary effort is normal. No respiratory distress. Breath sounds: No stridor. No wheezing, rhonchi or rales. Abdominal:      General: Abdomen is flat. There is no distension. Palpations: There is no mass. Tenderness: There is abdominal tenderness (Epigastric mild). There is no guarding or rebound. Hernia: No hernia is present. Musculoskeletal: Normal range of motion. General: No swelling or tenderness. Skin:     General: Skin is warm. Neurological:      General: No focal deficit present. Mental Status: He is alert and oriented to person, place, and time.    Psychiatric:         Mood and Affect: Mood normal.         Behavior: Behavior normal.         DIAGNOSTIC RESULTS     EKG: All EKG's are interpreted by the Emergency Department Physician who either signs or Co-signs this chart in the absence of a cardiologist.    RADIOLOGY:   Non-plain film images such as CT, Ultrasound and MRI are read by the radiologist. Plain radiographic images are visualized and preliminarily interpreted by the emergency physician with the below findings:    Interpretation per the Radiologist below, if contain minor errors which are inherent in voice recognition technology. **      Final report electronically signed by Dr. Giovanni Russell on 8/30/2020 3:23 PM      CT ABDOMEN PELVIS WO CONTRAST Additional Contrast? None   Final Result   1. Multiple dilated loops of small bowel with air-fluid levels are demonstrated consistent with early or partial small bowel obstruction. A discrete transition zone is not definitely seen. However, there are relatively collapsed distal ileal small bowel    loops within the right lower quadrant and pelvis. Therefore, the approximate location of the transition zone is likely the ileojejunal junction. The overall degree small bowel dilation appears progressed from previous examination. 2. 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 No evidence of diverticulitis is seen. 3. There is a left inguinal hernia containing a short segment of sigmoid colon. This is demonstrated on coronal series image 21. **This report has been created using voice recognition software. It may contain minor errors which are inherent in voice recognition technology. **      Final report electronically signed by Dr. Kuldip Breaux on 8/30/2020 12:31 PM            ED BEDSIDE ULTRASOUND:   Performed by ED Physician - none    LABS:  Labs Reviewed   CBC WITH AUTO DIFFERENTIAL - Abnormal; Notable for the following components:       Result Value    RBC 4.50 (*)     Hemoglobin 13.8 (*)     Hematocrit 41.3 (*)     Segs Absolute 8.8 (*)     Lymphocytes Absolute 0.5 (*)     All other components within normal limits   COMPREHENSIVE METABOLIC PANEL - Abnormal; Notable for the following components:    Glucose 159 (*)     CREATININE 2.3 (*)     BUN 44 (*)     Chloride 96 (*)     Total Protein 8.1 (*)     All other components within normal limits   URINALYSIS WITH MICROSCOPIC - Abnormal; Notable for the following components:    Ketones, Urine TRACE (*)     Leukocyte Esterase, Urine SMALL (*)     All other components within normal limits   GLOMERULAR FILTRATION RATE, ESTIMATED - Abnormal; Notable for the following components:    Est, Glom Filt Rate 27 (*)     All other components within normal limits   CBC WITH AUTO DIFFERENTIAL - Abnormal; Notable for the following components:    RBC 3.82 (*)     Hemoglobin 11.7 (*)     Hematocrit 36.0 (*)     MCV 94.2 (*)     Lymphocytes Absolute 0.4 (*)     All other components within normal limits   COMPREHENSIVE METABOLIC PANEL W/ REFLEX TO MG FOR LOW K - Abnormal; Notable for the following components:    Glucose 113 (*)     CREATININE 2.2 (*)     BUN 47 (*)     All other components within normal limits   GLOMERULAR FILTRATION RATE, ESTIMATED - Abnormal; Notable for the following components:    Est, Glom Filt Rate 28 (*)     All other components within normal limits   CBC WITH AUTO DIFFERENTIAL - Abnormal; Notable for the following components:    RBC 3.72 (*)     Hemoglobin 11.4 (*)     Hematocrit 34.9 (*)     Lymphocytes Absolute 0.3 (*)     All other components within normal limits   BASIC METABOLIC PANEL - Abnormal; Notable for the following components:    BUN 37 (*)     CREATININE 1.6 (*)     Calcium 8.2 (*)     All other components within normal limits   GLOMERULAR FILTRATION RATE, ESTIMATED - Abnormal; Notable for the following components:    Est, Glom Filt Rate 41 (*)     All other components within normal limits   BASIC METABOLIC PANEL - Abnormal; Notable for the following components:    CO2 20 (*)     Glucose 67 (*)     BUN 33 (*)     Calcium 8.3 (*)     All other components within normal limits   ANION GAP - Abnormal; Notable for the following components:    Anion Gap 17.0 (*)     All other components within normal limits   GLOMERULAR FILTRATION RATE, ESTIMATED - Abnormal; Notable for the following components:    Est, Glom Filt Rate 57 (*)     All other components within normal limits   CBC - Abnormal; Notable for the following components:    RBC 3.68 (*)     Hemoglobin 11.5 (*)     Hematocrit 33.9 (*)     All other components within normal limits   BASIC METABOLIC PANEL - Abnormal; Notable for the following components:    Glucose 202 (*)     BUN 26 (*)     Calcium 8.0 (*)     All other components within normal limits   GLOMERULAR FILTRATION RATE, ESTIMATED - Abnormal; Notable for the following components:    Est, Glom Filt Rate 57 (*)     All other components within normal limits   CBC - Abnormal; Notable for the following components:    RBC 3.64 (*)     Hemoglobin 11.3 (*)     Hematocrit 33.8 (*)     All other components within normal limits   BASIC METABOLIC PANEL - Abnormal; Notable for the following components:    Glucose 150 (*)     CREATININE 1.3 (*)     Calcium 8.0 (*)     All other components within normal limits   ANION GAP - Abnormal; Notable for the following components:    Anion Gap 7.0 (*)     All other components within normal limits   GLOMERULAR FILTRATION RATE, ESTIMATED - Abnormal; Notable for the following components:    Est, Glom Filt Rate 52 (*)     All other components within normal limits   BASIC METABOLIC PANEL - Abnormal; Notable for the following components:    Sodium 132 (*)     Glucose 150 (*)     Calcium 7.9 (*)     All other components within normal limits   ANION GAP - Abnormal; Notable for the following components:    Anion Gap 7.0 (*)     All other components within normal limits   GLOMERULAR FILTRATION RATE, ESTIMATED - Abnormal; Notable for the following components:    Est, Glom Filt Rate 57 (*)     All other components within normal limits   PHOSPHORUS - Abnormal; Notable for the following components:    Phosphorus 2.2 (*)     All other components within normal limits   BASIC METABOLIC PANEL - Abnormal; Notable for the following components:    Sodium 133 (*)     CO2 22 (*)     Glucose 140 (*)     Calcium 7.5 (*)     All other components within normal limits   PHOSPHORUS - Abnormal; Notable for the following components: Phosphorus 1.8 (*)     All other components within normal limits   GLOMERULAR FILTRATION RATE, ESTIMATED - Abnormal; Notable for the following components:    Est, Glom Filt Rate 71 (*)     All other components within normal limits   PHOSPHORUS - Abnormal; Notable for the following components:    Phosphorus 1.9 (*)     All other components within normal limits   BASIC METABOLIC PANEL - Abnormal; Notable for the following components:    Sodium 131 (*)     Potassium 3.4 (*)     CO2 21 (*)     Glucose 142 (*)     Calcium 7.5 (*)     All other components within normal limits   GLOMERULAR FILTRATION RATE, ESTIMATED - Abnormal; Notable for the following components:    Est, Glom Filt Rate 63 (*)     All other components within normal limits   BASIC METABOLIC PANEL - Abnormal; Notable for the following components:    Sodium 131 (*)     CO2 21 (*)     Glucose 149 (*)     BUN 25 (*)     Calcium 7.8 (*)     All other components within normal limits   PHOSPHORUS - Abnormal; Notable for the following components:    Phosphorus 1.7 (*)     All other components within normal limits   ANION GAP - Abnormal; Notable for the following components:    Anion Gap 7.0 (*)     All other components within normal limits   GLOMERULAR FILTRATION RATE, ESTIMATED - Abnormal; Notable for the following components:    Est, Glom Filt Rate 63 (*)     All other components within normal limits   BASIC METABOLIC PANEL - Abnormal; Notable for the following components:    Sodium 131 (*)     CO2 19 (*)     Glucose 117 (*)     BUN 25 (*)     Calcium 7.7 (*)     All other components within normal limits   PHOSPHORUS - Abnormal; Notable for the following components:    Phosphorus 1.8 (*)     All other components within normal limits   GLOMERULAR FILTRATION RATE, ESTIMATED - Abnormal; Notable for the following components:    Est, Glom Filt Rate 63 (*)     All other components within normal limits   BASIC METABOLIC PANEL - Abnormal; Notable for the following components:    POC Glucose 144 (*)     All other components within normal limits   POCT GLUCOSE - Abnormal; Notable for the following components:    POC Glucose 123 (*)     All other components within normal limits   POCT GLUCOSE - Abnormal; Notable for the following components:    POC Glucose 144 (*)     All other components within normal limits   POCT GLUCOSE - Abnormal; Notable for the following components:    POC Glucose 171 (*)     All other components within normal limits   POCT GLUCOSE - Abnormal; Notable for the following components:    POC Glucose 113 (*)     All other components within normal limits   LIPASE   ANION GAP   OSMOLALITY   PHOSPHORUS   MAGNESIUM   ANION GAP   ANION GAP   COVID-19   SURGICAL PATHOLOGY    Narrative:     [facility] Pathology      Braden Odor                     [phone]  [facility] [facility], [address]                                                      PROC: 2020  [facility]                                    RECV: 2020  [address]                                    RPTD: 2020  [facility], [address]                      MRN:  [id number]    LOC: 6KS                      ACCT: [de-identified]  SEX: M                      : 1933  AGE: 80 Y                         PATHOLOGY REPORT                      ATTN: MD Jennifer Cordero OLBRUNA      Copies To:   Demetrio Hardin      Clinical Information: SMALL BOWEL OBSTRUCTION    FINAL DIAGNOSIS:  Jejunum, excision:   Small bowel with numerous large diverticula and no significant  inflammation. 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 Please see microscopic description. Specimen:  SOFT TISSUE, JEJUNUM WITH SMALL BOWEL      Gross Examination:  The container is labeled Carolina Pimentel, jejunum, small bowel.   Received in  formalin is a segmental resection of small bowel measuring  approximately 36 cm in length. The serosal surface is congested. There are several large bulging diverticula. The specimen is opened  longitudinally revealing a red-tan mucosal surface. There are no  discrete masses. There are several enlarged lymph nodes. Representative sections are submitted. Cassette #1 - proximal and  distal resection lines; cassettes #2 through #4 - representative  diverticula; cassettes #5 and #6 - representative small bowel; cassette  #7 - tentatively identified lymph nodes. ss.  MTK/[id number]:[id number]    Microscopic Examination:  Sections demonstrate small bowel with multiple diverticula as well as  areas of wall thinning corresponding to an attenuated muscularis  propria. There is no significant inflammation or evidence of rupture. The mucosa is otherwise unremarkable. Numerous lamina propria vessels  contain pale eosinophilic deposits. A [place] red stain is performed,  with appropriate controls. These pale deposits are congophilic and  demonstrate apple green birefringence upon polarization. 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 That said,  correlation with SPEP could be considered, if clinically indicated. [id number]  [id number]                                                    <Sign Out Dr. Baltazar Manrique M.D., F.C. A. P      [facility]  Printed on:  9/4/2020  [address], [facility]  Original print date: 09/04/2020   ANION GAP   MAGNESIUM   CALCIUM, IONIZED   MAGNESIUM   ANION GAP   PHOSPHORUS   CALCIUM, IONIZED   MAGNESIUM   ANION GAP   MAGNESIUM   CALCIUM, IONIZED   MAGNESIUM   CALCIUM, IONIZED   ANION GAP   MAGNESIUM   PHOSPHORUS   CALCIUM, IONIZED   POCT GLUCOSE   POCT GLUCOSE   POCT GLUCOSE       All other labs were within normal range or not returned as of this dictation. EMERGENCY DEPARTMENT COURSE and DIFFERENTIAL DIAGNOSIS/MDM:   Vitals:    Vitals:    09/09/20 2035 09/09/20 2310 09/10/20 0344 09/10/20 0813   BP: (!) 121/59 (!) 104/55 (!) 116/56 (!) 121/58   Pulse: 69 70 63 64   Resp: 20 18 16 18   Temp: 98.2 °F (36.8 °C) 98.2 °F (36.8 °C) 98.3 °F (36.8 °C) 98.6 °F (37 °C)   TempSrc: Oral Oral Oral Oral   SpO2: 97% 96% 94% 95%   Weight:   159 lb 4.8 oz (72.3 kg)    Height:           MDM  Number of Diagnoses or Management Options  Diagnosis management comments: He underwent basic laboratory testing which showed no major abnormalities other than a creatinine which was 2.3. The patient's baseline is approximately 1.3. CT abdomen pelvis showed multiple air-fluid levels without a transition point. Surgery was contacted and they dated that no acute surgical intervention was required. They stated that if the patient was admitted they follow along. The patient did have an ANJANA the patient's elevated creatinine. ,  The patient was given a fluid bolus. The patient was admitted to medicine for further evaluation of the patient's persistent nausea vomiting and likely prerenal ANJANA from dehydration. Amount and/or Complexity of Data Reviewed  Clinical lab tests: ordered and reviewed  Tests in the radiology section of CPT®: ordered and reviewed  Tests in the medicine section of CPT®: reviewed and ordered  Decide to obtain previous medical records or to obtain history from someone other than the patient: yes  Review and summarize past medical records: yes  Independent visualization of images, tracings, or specimens: yes    Patient Progress  Patient progress: stable        REASSESSMENT    Stable      CRITICAL CARE TIME   Total Critical Care time was  minutes, excluding separately reportable procedures. There was a high probability of clinically significant/life threatening deterioration in the patient's condition which required my urgent intervention.

## 2020-08-30 NOTE — PROGRESS NOTES
Pharmacy Renal Adjustment    Pedro Pablo Dsouza is a 80 y.o. male. Pharmacy renally adjust the following medications per P&T approved policy: lovenox    Recent Labs     08/30/20  1040   BUN 44*       Recent Labs     08/30/20  1040   CREATININE 2.3*       Estimated Creatinine Clearance: 20 mL/min (A) (based on SCr of 2.3 mg/dL (H)).   Calculated CrCl:    Height:   Ht Readings from Last 1 Encounters:   08/30/20 5' 7\" (1.702 m)     Weight:  Wt Readings from Last 1 Encounters:   08/30/20 135 lb 9.6 oz (61.5 kg)         Plan: Adjustments based on renal function:          Decrease lovenox 40 mg daily to 30 mg daily    Farhat Reynaga, PharmD, BCPS  8/30/2020  2:44 PM

## 2020-08-30 NOTE — CONSULTS
[address] [facility] Consultation - Julio Denis PA-C  On behalf of Dr. Joaquin Amin    Pt Name: Nancy Pal  MRN: [id number]  YOB: 1933  Date of evaluation: 8/30/2020  Primary Care Physician: César Webster MD  Patient evaluated at the request of  Dr. Meli Fry  Reason for evaluation: Partial SBO  IMPRESSIONS:   1. Partial small bowel obstruction  2. Nausea  3. Emesis  4. Left inguinal hernia  5. ANJANA  6.  has a past medical history of ANJANA (acute kidney injury) ([address] Arrhythmia, Arthritis, BPH (benign prostatic hypertrophy), CAD (coronary artery disease), Cerebrovascular disease, Hyperlipidemia, Hypertension, Mitral regurgitation, PVC (premature ventricular contraction), and TIA (transient ischemic attack). RECOMMENDATIONS:   1. Continue NPO, IV fluids, NG tube  2. Antiemetics as needed  3. KUB tomorrow AM  4. Repeat labs in morning  5. DVT prophylaxis  SUBJECTIVE:   History of Chief Complaint:    Amy Arevalo is a 80 y. o.male who presents with nausea and vomiting. Patient stated he was admitted to the hospital around 3 weeks ago with similar symptoms and was subsequently discharged. At that time patient was seen by general surgeon, Dr. María Soriano, who noted probable ileus or possible gastroenteritis. Patient stated he did well following discharge but last night he started getting nauseous and vomited. Patient endorsed vomiting 3 times since last evening. Patient also endorsed intermittent abdominal discomfort but denies any acute abdominal pain. Patient denied any other pain or complaints. Patient denies any fever/chills, headache, lightheadedness, dizziness, chest pain, shortness breath, dysuria, urinary complaints, and paresthesias. Patient denies any past abdominal surgeries. Patient endorsed last bowel movement was yesterday without any complications.  Patient was seen in the emergency department today where CT of his abdomen revealed multiple dilated loops of small bowel consistent with early or partial small bowel obstruction. Discrete transition point was not definitely seen but approximate location of transition zone is likely in the ileojejunal junction. Degree of small bowel dilation noted to appear progressed from previous CT on 8/6/20. On exams patient abdomen was soft, nontender, nondistended, with normal active bowel sounds. No guarding or rebound tenderness. No obvious inguinal hernia noted on exam. No bulging noting in inguinal region. No overlying erythema. Non tender. No testicular swelling or fullness. Patient admitted to medicine. Plan to continue n.p.o., IV fluids, and NG tube. Patient seen and discussed with general surgeon on call, Dr. Marco A Phillips. Past Medical History   has a past medical history of ANJANA (acute kidney injury) ([facility] [address] Arrhythmia, Arthritis, BPH (benign prostatic hypertrophy), CAD (coronary artery disease), Cerebrovascular disease, Hyperlipidemia, Hypertension, Mitral regurgitation, PVC (premature ventricular contraction), and TIA (transient ischemic attack). Past Surgical History   has a past surgical history that includes Coronary artery bypass graft (2009) and Cataract removal (Right, 7-2015). Medications  Prior to Admission medications    Medication Sig Start Date End Date Taking?  Authorizing Provider   carvedilol (COREG) 12.5 MG tablet TAKE 1 TABLET BY MOUTH TWICE DAILY 8/28/20  Yes Jacqueline Woodruff MD   ondansetron (ZOFRAN) 4 MG tablet Take 1 tablet by mouth 3 times daily as needed for Nausea or Vomiting 8/7/20  Yes Jacqueline Woodruff MD   lisinopril-hydroCHLOROthiazide (PRINZIDE;ZESTORETIC) 20-12.5 MG per tablet TAKE 1 TABLET BY MOUTH ONCE DAILY 7/30/20  Yes Jacqueline Woodruff MD   simvastatin (ZOCOR) 40 MG tablet TAKE 1 TABLET BY MOUTH EVERY NIGHT AT BEDTIME 7/30/20  Yes Jacqueline Wodoruff MD   tamsulosin (FLOMAX) 0.4 MG capsule TAKE 1 CAPSULE BY MOUTH EVERY DAY  Patient taking differently: TAKE 1 CAPSULE BY MOUTH EVERY DAY nightly 7/24/20  Yes Nikita TOM MD Krissy   multivitamin [facility]) per tablet Take 1 tablet by mouth daily. Yes Historical Provider, MD   aspirin 81 MG EC tablet Take 81 mg by mouth daily. Historical Provider, MD   acetaminophen (TYLENOL) 500 MG tablet Take 500 mg by mouth every 6 hours as needed. Historical Provider, MD    Scheduled Meds:   aspirin  81 mg Oral Daily    carvedilol  12.5 mg Oral BID WC    atorvastatin  20 mg Oral Daily    tamsulosin  0.4 mg Oral Daily    sodium chloride flush  10 mL Intravenous 2 times per day    [Held by provider] lisinopril  20 mg Oral Daily    [Held by provider] hydroCHLOROthiazide  12.5 mg Oral Daily    magnesium replacement protocol   Other RX Placeholder    phosphorus replacement protocol   Other RX Placeholder    [START ON 8/31/2020] heparin (porcine)  5,000 Units Subcutaneous BID     Continuous Infusions:   sodium chloride 100 mL/hr at 08/30/20 1616     PRN Meds:.sodium chloride flush, acetaminophen **OR** acetaminophen, polyethylene glycol, promethazine **OR** ondansetron, hydrALAZINE  Allergies  is allergic to pcn [penicillins]; ibuprofen; and percocet [oxycodone-acetaminophen]. Family History  family history includes Cancer in his father; Other in his mother. Social History   reports that he quit smoking about 50 years ago. His smoking use included cigarettes. He has a 60.00 pack-year smoking history. He has never used smokeless tobacco. He reports current alcohol use. He reports that he does not use drugs.   Review of Systems  General Denies any fever or chills  HEENT Denies any headaches, lightheadedness, dizziness  Resp Denies any shortness of breath, cough or wheezing  Cardiac Denies any chest pain or palpitations  GI Admits nausea, vomiting, and intermittent abdominal discomfort but denies abdominal pain   Denies any dysuria or hematuria  Heme Denies bruising or bleeding easily  Endocrine No history of diabetes or thyroid disease  Neuro Denies any focal motor or sensory deficits  SUBJECTIVE:   CURRENT VITALS:  height is 5' 7\" (1.702 m) and weight is 135 lb 9.6 oz (61.5 kg). His oral temperature is 97.7 °F (36.5 °C). His blood pressure is 131/60 and his pulse is 72. His respiration is 18 and oxygen saturation is 94%. Body mass index is 21.24 kg/m². Temperature Range (24h):Temp: 97.7 °F (36.5 °C) Temp  Av.1 °F (36.7 °C)  Min: 97.7 °F (36.5 °C)  Max: 98.3 °F (36.8 °C)  BP Range (42K): Systolic (13XZJ), ISAAC:292 , Min:112 , ADJ:938     Diastolic (50EVZ), XGZ:10, Min:60, Max:79    Pulse Range (24h): Pulse  Av.2  Min: 72  Max: 84  Respiration Range (24h): Resp  Av  Min: 18  Max: 18  Current Pulse Ox (24h):  SpO2: 94 %  Pulse Ox Range (24h):  SpO2  Av %  Min: 94 %  Max: 98 %  Oxygen Amount and Delivery:    CONSTITUTIONAL: Alert and oriented times 3, no acute distress and cooperative to examination with proper mood and affect. SKIN: Skin warm and dry. No rashes or lesions. HEENT: Head is normocephalic, atraumatic. EOMI, PERRL. NECK: Supple, symmetrical, trachea midline, skin normal.  CHEST/LUNGS: chest symmetric with normal A/P diameter, normal respiratory rate and rhythm, lungs clear to auscultation without wheezes, rales or rhonchi. No accessory muscle use. Sternotomy scar  CARDIOVASCULAR: Heart sounds are normal.  Regular rate and rhythm without murmur, gallop or rub. Normal S1 and S2. Carotid and femoral pulses 2+/4 and equal bilaterally. ABDOMEN: Normal shape. No scars noted. Abdomen was soft, nontender, nondistended, with normal active bowel sounds. No guarding or rebound tenderness. No obvious inguinal hernia noted on exam. No bulging noting in inguinal region. No overlying erythema. Non tender. No testicular swelling or fullness. RECTAL: deferred, not clinically indicated  NEUROLOGIC: There are no focalizing motor or sensory deficits. CN II-XII are grossly intact. EXTREMITIES: no cyanosis, no clubbing and no edema.   LABS:     Recent Labs 08/30/20  1040   WBC 10.0   HGB 13.8*   HCT 41.3*         K 4.2   CL 96*   CO2 26   BUN 44*   CREATININE 2.3*   MG 1.9   PHOS 4.5   CALCIUM 9.7   AST 21   ALT 16   BILITOT 0.9   LIPASE 23.1     RADIOLOGY:     XR ABDOMEN FOR NG/OG/NE TUBE PLACEMENT   Final Result   NG tube is in the stomach            **This report has been created using voice recognition software. It may contain minor errors which are inherent in voice recognition technology. **      Final report electronically signed by Dr. Amna Bryson on 8/30/2020 3:23 PM      CT ABDOMEN PELVIS WO CONTRAST Additional Contrast? None   Final Result   1. Multiple dilated loops of small bowel with air-fluid levels are demonstrated consistent with early or partial small bowel obstruction. A discrete transition zone is not definitely seen. However, there are relatively collapsed distal ileal small bowel    loops within the right lower quadrant and pelvis. Therefore, the approximate location of the transition zone is likely the ileojejunal junction. The overall degree small bowel dilation appears progressed from previous examination. 2. 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 No evidence of diverticulitis is seen. 3. There is a left inguinal hernia containing a short segment of sigmoid colon. This is demonstrated on coronal series image 21. **This report has been created using voice recognition software. It may contain minor errors which are inherent in voice recognition technology. **      Final report electronically signed by Dr. Priscilla Robles on 8/30/2020 12:31 PM      XR ABDOMEN (KUB) (SINGLE AP VIEW)    (Results Pending)       Thank you for the interesting evaluation. Further recommendations to follow. Electronically signed by Aníbal Ba PA-C on 8/30/2020 at 4:22 PM    Patient seen and evaluated independently on the floor on 830 at approximately 8:45 PM.  Patient examined labs and imaging reviewed.

## 2020-08-31 ENCOUNTER — APPOINTMENT (OUTPATIENT)
Dept: CT IMAGING | Age: 85
DRG: 329 | End: 2020-08-31
Payer: MEDICARE

## 2020-08-31 ENCOUNTER — APPOINTMENT (OUTPATIENT)
Dept: GENERAL RADIOLOGY | Age: 85
DRG: 329 | End: 2020-08-31
Payer: MEDICARE

## 2020-08-31 LAB
ALBUMIN SERPL-MCNC: 3.5 G/DL (ref 3.5–5.1)
ALP BLD-CCNC: 82 U/L (ref 38–126)
ALT SERPL-CCNC: 11 U/L (ref 11–66)
ANION GAP SERPL CALCULATED.3IONS-SCNC: 15 MEQ/L (ref 8–16)
AST SERPL-CCNC: 18 U/L (ref 5–40)
BACTERIA: ABNORMAL
BASOPHILS # BLD: 0.3 %
BASOPHILS ABSOLUTE: 0 THOU/MM3 (ref 0–0.1)
BILIRUB SERPL-MCNC: 0.6 MG/DL (ref 0.3–1.2)
BILIRUBIN URINE: NEGATIVE
BLOOD, URINE: NEGATIVE
BUN BLDV-MCNC: 47 MG/DL (ref 7–22)
CALCIUM SERPL-MCNC: 8.9 MG/DL (ref 8.5–10.5)
CASTS: ABNORMAL /LPF
CASTS: ABNORMAL /LPF
CHARACTER, URINE: CLEAR
CHLORIDE BLD-SCNC: 103 MEQ/L (ref 98–111)
CO2: 27 MEQ/L (ref 23–33)
COLOR: YELLOW
CREAT SERPL-MCNC: 2.2 MG/DL (ref 0.4–1.2)
CRYSTALS: ABNORMAL
EKG ATRIAL RATE: 70 BPM
EKG P AXIS: 40 DEGREES
EKG P-R INTERVAL: 192 MS
EKG Q-T INTERVAL: 438 MS
EKG QRS DURATION: 90 MS
EKG QTC CALCULATION (BAZETT): 473 MS
EKG R AXIS: 38 DEGREES
EKG T AXIS: 7 DEGREES
EKG VENTRICULAR RATE: 70 BPM
EOSINOPHIL # BLD: 1.2 %
EOSINOPHILS ABSOLUTE: 0.1 THOU/MM3 (ref 0–0.4)
EPITHELIAL CELLS, UA: ABNORMAL /HPF
ERYTHROCYTE [DISTWIDTH] IN BLOOD BY AUTOMATED COUNT: 12.9 % (ref 11.5–14.5)
ERYTHROCYTE [DISTWIDTH] IN BLOOD BY AUTOMATED COUNT: 44 FL (ref 35–45)
GFR SERPL CREATININE-BSD FRML MDRD: 28 ML/MIN/1.73M2
GLUCOSE BLD-MCNC: 113 MG/DL (ref 70–108)
GLUCOSE, URINE: NEGATIVE MG/DL
HCT VFR BLD CALC: 36 % (ref 42–52)
HEMOGLOBIN: 11.7 GM/DL (ref 14–18)
IMMATURE GRANS (ABS): 0.02 THOU/MM3 (ref 0–0.07)
IMMATURE GRANULOCYTES: 0.3 %
KETONES, URINE: ABNORMAL
LEUKOCYTE ESTERASE, URINE: ABNORMAL
LYMPHOCYTES # BLD: 5.4 %
LYMPHOCYTES ABSOLUTE: 0.4 THOU/MM3 (ref 1–4.8)
MCH RBC QN AUTO: 30.6 PG (ref 26–33)
MCHC RBC AUTO-ENTMCNC: 32.5 GM/DL (ref 32.2–35.5)
MCV RBC AUTO: 94.2 FL (ref 80–94)
MISCELLANEOUS LAB TEST RESULT: ABNORMAL
MONOCYTES # BLD: 6.9 %
MONOCYTES ABSOLUTE: 0.5 THOU/MM3 (ref 0.4–1.3)
NITRITE, URINE: NEGATIVE
NUCLEATED RED BLOOD CELLS: 0 /100 WBC
PH UA: 5 (ref 5–9)
PLATELET # BLD: 186 THOU/MM3 (ref 130–400)
PMV BLD AUTO: 10.9 FL (ref 9.4–12.4)
POTASSIUM REFLEX MAGNESIUM: 4 MEQ/L (ref 3.5–5.2)
PROTEIN UA: NEGATIVE MG/DL
RBC # BLD: 3.82 MILL/MM3 (ref 4.7–6.1)
RBC URINE: ABNORMAL /HPF
RENAL EPITHELIAL, UA: ABNORMAL
SEG NEUTROPHILS: 85.9 %
SEGMENTED NEUTROPHILS ABSOLUTE COUNT: 5.9 THOU/MM3 (ref 1.8–7.7)
SODIUM BLD-SCNC: 145 MEQ/L (ref 135–145)
SPECIFIC GRAVITY UA: 1.02 (ref 1–1.03)
TOTAL PROTEIN: 6.2 G/DL (ref 6.1–8)
UROBILINOGEN, URINE: 1 EU/DL (ref 0–1)
WBC # BLD: 6.9 THOU/MM3 (ref 4.8–10.8)
WBC UA: ABNORMAL /HPF
YEAST: ABNORMAL

## 2020-08-31 PROCEDURE — 6360000002 HC RX W HCPCS: Performed by: FAMILY MEDICINE

## 2020-08-31 PROCEDURE — 2580000003 HC RX 258: Performed by: STUDENT IN AN ORGANIZED HEALTH CARE EDUCATION/TRAINING PROGRAM

## 2020-08-31 PROCEDURE — 74018 RADEX ABDOMEN 1 VIEW: CPT

## 2020-08-31 PROCEDURE — 85025 COMPLETE CBC W/AUTO DIFF WBC: CPT

## 2020-08-31 PROCEDURE — 99223 1ST HOSP IP/OBS HIGH 75: CPT | Performed by: INTERNAL MEDICINE

## 2020-08-31 PROCEDURE — 6370000000 HC RX 637 (ALT 250 FOR IP): Performed by: STUDENT IN AN ORGANIZED HEALTH CARE EDUCATION/TRAINING PROGRAM

## 2020-08-31 PROCEDURE — 93010 ELECTROCARDIOGRAM REPORT: CPT | Performed by: INTERNAL MEDICINE

## 2020-08-31 PROCEDURE — 74176 CT ABD & PELVIS W/O CONTRAST: CPT

## 2020-08-31 PROCEDURE — 93005 ELECTROCARDIOGRAM TRACING: CPT | Performed by: FAMILY MEDICINE

## 2020-08-31 PROCEDURE — 6370000000 HC RX 637 (ALT 250 FOR IP): Performed by: PHYSICIAN ASSISTANT

## 2020-08-31 PROCEDURE — 80053 COMPREHEN METABOLIC PANEL: CPT

## 2020-08-31 PROCEDURE — 36415 COLL VENOUS BLD VENIPUNCTURE: CPT

## 2020-08-31 PROCEDURE — 94760 N-INVAS EAR/PLS OXIMETRY 1: CPT

## 2020-08-31 PROCEDURE — 1200000003 HC TELEMETRY R&B

## 2020-08-31 PROCEDURE — 99233 SBSQ HOSP IP/OBS HIGH 50: CPT | Performed by: SURGERY

## 2020-08-31 PROCEDURE — 81001 URINALYSIS AUTO W/SCOPE: CPT

## 2020-08-31 RX ADMIN — TAMSULOSIN HYDROCHLORIDE 0.4 MG: 0.4 CAPSULE ORAL at 08:17

## 2020-08-31 RX ADMIN — CARVEDILOL 12.5 MG: 25 TABLET, FILM COATED ORAL at 17:47

## 2020-08-31 RX ADMIN — ATORVASTATIN CALCIUM 20 MG: 20 TABLET, FILM COATED ORAL at 20:22

## 2020-08-31 RX ADMIN — HEPARIN SODIUM 5000 UNITS: 5000 INJECTION INTRAVENOUS; SUBCUTANEOUS at 20:22

## 2020-08-31 RX ADMIN — ASPIRIN 81 MG: 81 TABLET ORAL at 08:17

## 2020-08-31 RX ADMIN — Medication 3 MG: at 20:22

## 2020-08-31 RX ADMIN — CARVEDILOL 12.5 MG: 25 TABLET, FILM COATED ORAL at 08:17

## 2020-08-31 RX ADMIN — SODIUM CHLORIDE: 9 INJECTION, SOLUTION INTRAVENOUS at 20:27

## 2020-08-31 RX ADMIN — HEPARIN SODIUM 5000 UNITS: 5000 INJECTION INTRAVENOUS; SUBCUTANEOUS at 08:18

## 2020-08-31 RX ADMIN — SODIUM CHLORIDE: 9 INJECTION, SOLUTION INTRAVENOUS at 12:31

## 2020-08-31 ASSESSMENT — PAIN SCALES - GENERAL: PAINLEVEL_OUTOF10: 0

## 2020-08-31 NOTE — CARE COORDINATION
20, 9:52 AM EDT  DISCHARGE PLANNING EVALUATION:    Rhea Bernard       Admitted from: ED 2020/ [facility] day: 1   Location: -/[facility] Reason for admit: Small bowel obstruction, partial ([facility] [address] [K56.600] Status: IP  Admit order signed?: yes  PMH:  has a past medical history of ANJANA (acute kidney injury) ([facility] [address] Arrhythmia, Arthritis, BPH (benign prostatic hypertrophy), CAD (coronary artery disease), Cerebrovascular disease, Hyperlipidemia, Hypertension, Mitral regurgitation, PVC (premature ventricular contraction), and TIA (transient ischemic attack). Procedure: na  Pertinent abnormal Imagin/30  CT abdomen pelvis   1. Multiple dilated loops of small bowel with air-fluid levels are demonstrated consistent with early or partial small bowel obstruction. A discrete transition zone is not definitely seen. However, there are relatively collapsed distal ileal small bowel   loops within the right lower quadrant and pelvis. Therefore, the approximate location of the transition zone is likely the ileojejunal junction. The overall degree small bowel dilation appears progressed from previous examination. 2. 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 No evidence of diverticulitis is seen. 3. There is a left inguinal hernia containing a short segment of sigmoid colon. This is demonstrated on coronal series image 21.        KUB  1. NG tube in the stomach. 2. Findings of moderate paralytic ileus.   CT abdomen/pelvis  1. Mildly dilated small bowel loops in the upper abdomen, likely secondary to improving partial small bowel obstruction. 2. Cholelithiasis. 3. Uncomplicated pancolonic diverticulosis. 4. Stable left inguinal hernia.    Medications:  Scheduled Meds:   aspirin  81 mg Oral Daily    carvedilol  12.5 mg Oral BID WC    atorvastatin  20 mg Oral Daily    tamsulosin  0.4 mg Oral Daily    sodium chloride flush  10 mL Intravenous 2 times per day  [Held by provider] lisinopril  20 mg Oral Daily    [Held by provider] hydroCHLOROthiazide  12.5 mg Oral Daily    magnesium replacement protocol   Other RX Placeholder    phosphorus replacement protocol   Other RX Placeholder    heparin (porcine)  5,000 Units Subcutaneous BID     Continuous Infusions:   sodium chloride 100 mL/hr at 08/30/20 1616      Pertinent Info/Orders/Treatment Plan: To ED with complaints of vomiting x 2 days. Found to have SBO    Telemetry, IVF, gen surgery consulted, NGT LIS, NPO, I/O, follow electrolytes, nausea control, monitor all stools, creatinine 2.2, HCTZ on hold, on room air, afebrile, remains NPO. Diet: Diet NPO Effective Now   Smoking status:  reports that he quit smoking about 50 years ago. His smoking use included cigarettes. He has a 60.00 pack-year smoking history. He has never used smokeless tobacco.   PCP: Stef Leblanc MD  Readmission 30 days or less: yes  Readmission Risk Score: 15%    Discharge Planning Evaluation  Current Residence:  Private Residence  Living Arrangements:  Spouse/Significant Other   Support Systems:  Spouse/Significant Other, Family Members, Children  Current Services PTA:     Potential Assistance Needed:  N/A  Potential Assistance Purchasing Medications:  No  Does patient want to participate in local refill/ meds to beds program?  Yes  Type of Home Care Services:  None  Patient expects to be discharged to:  Home with wife  Expected Discharge date:  09/04/20  Follow Up Appointment: Best Day/ Time:      Patient Goals/Plan/Treatment Preferences: Met with Caron Whaley and his spouse David Jane. They reside home, he was driving pta, uses no DME, has PCP, is able to afford medicines, and declines need for [facility] or in-home services. Transportation/Food Security/Housekeeping Addressed:  No issues identified.     Evaluation: no

## 2020-08-31 NOTE — PROGRESS NOTES
[Penicillins] Anaphylaxis    Ibuprofen     Percocet [Oxycodone-Acetaminophen] Nausea And Vomiting     Active Problems:    Small bowel obstruction (HCC)    Intractable nausea and vomiting    ANJANA (acute kidney injury) ([address]  Resolved Problems:    * No resolved hospital problems. *    Blood pressure 139/63, pulse 79, temperature 98.3 °F (36.8 °C), temperature source Oral, resp. rate 16, height 5' 7\" (1.702 m), weight 132 lb 12.8 oz (60.2 kg), SpO2 94 %. Subjective:  Symptoms:  Stable. Diet:  NPO. Activity level: Impaired due to weakness. Pain:  He reports no pain. Objective:  General Appearance:  Comfortable. Vital signs: (most recent): Blood pressure 139/63, pulse 79, temperature 98.3 °F (36.8 °C), temperature source Oral, resp. rate 16, height 5' 7\" (1.702 m), weight 132 lb 12.8 oz (60.2 kg), SpO2 94 %. Vital signs are normal.    Output: Producing urine and minimal stool output. HEENT: Normal HEENT exam.    Lungs:  Normal effort and normal respiratory rate. Breath sounds clear to auscultation. Heart: Normal rate. Regular rhythm. S1 normal and S2 normal.  Positive for murmur (1/6 korey). Abdomen: Abdomen is soft and non-distended. Hyperactive bowel sounds. There is no abdominal tenderness. Extremities: Normal range of motion. Neurological: Patient is alert and oriented to person, place and time. Patient has normal reflexes and normal muscle tone. Assessment:    Condition: In stable condition. Unchanged. (  Partial  sbo and  With  c scan  Today and  Dr aldridge  Seeing  And hold  Transient  Early in month      anjana  With  Dehydration and hold  Acea dn diuretic     ashd  10  Years  Post cabg and ? Repair  To  Mitral valeve     htn stable      pvc  Noted and multiple and  Saw dr Beltran Pino and watch  In  January and echo wnl     ). Plan:   Consults: general surgery. NPO (ng in place ). CT abdomen and CT pelvis. Administer medications as ordered and give fluids.    (  If

## 2020-08-31 NOTE — PROGRESS NOTES
Kristina Rudolph MD  Daily Progress Note  Pt Name: Sofía Vaughn Record Number: [id number]  Date of Birth 6/28/1933   Today's Date: 8/31/2020  Chief complaint: Feeling better  ASSESSMENT:   1. Hospital day # 1   2. psbo- clinical improvement  3. ANJANA  4.  has a past medical history of ANJANA (acute kidney injury) ([address] Arrhythmia, Arthritis, BPH (benign prostatic hypertrophy), CAD (coronary artery disease), Cerebrovascular disease, Hyperlipidemia, Hypertension, Mitral regurgitation, PVC (premature ventricular contraction), and TIA (transient ischemic attack). RECOMMENDATIONS:   1. IV hydration  2. Analgesics and antiemetics as needed  3. CT enterography- oral contrast only  4. DVT prophylaxis- subcutaneous heparin  5. Increase activity as tolerated  SUBJECTIVE:   [facility] is clinically improved. He passed some flatus through the night. NG output decreased but still has particulate matter. Abdomen benign but bowel sounds hyperactive. He is agreeable to contrast study. He states his wife will be happy. MEDICATIONS   Scheduled Meds:   aspirin  81 mg Oral Daily    carvedilol  12.5 mg Oral BID WC    atorvastatin  20 mg Oral Daily    tamsulosin  0.4 mg Oral Daily    sodium chloride flush  10 mL Intravenous 2 times per day    [Held by provider] lisinopril  20 mg Oral Daily    [Held by provider] hydroCHLOROthiazide  12.5 mg Oral Daily    magnesium replacement protocol   Other RX Placeholder    phosphorus replacement protocol   Other RX Placeholder    heparin (porcine)  5,000 Units Subcutaneous BID     Continuous Infusions:   sodium chloride 100 mL/hr at 08/30/20 1616     PRN Meds:.sodium chloride flush, acetaminophen **OR** acetaminophen, polyethylene glycol, promethazine **OR** ondansetron, hydrALAZINE, melatonin  OBJECTIVE   CURRENT VITALS:  height is 5' 7\" (1.702 m) and weight is 132 lb 12.8 oz (60.2 kg). His oral temperature is 98.3 °F (36.8 °C).  His blood pressure is 139/63 and his pulse is 79. His respiration is 16 and oxygen saturation is 94%. Temperature Range (24h):Temp: 98.3 °F (36.8 °C) Temp  Av.1 °F (36.7 °C)  Min: 97.7 °F (36.5 °C)  Max: 98.5 °F (36.9 °C)  BP Range (92G): Systolic (37GBL), GGQ:125 , Min:112 , YYZ:953     Diastolic (48MKI), VPB:95, Min:60, Max:79    Pulse Range (24h): Pulse  Av.4  Min: 72  Max: 84  Respiration Range (24h): Resp  Av.1  Min: 16  Max: 18  Current Pulse Ox (24h):  SpO2: 94 %  Pulse Ox Range (24h):  SpO2  Av.3 %  Min: 93 %  Max: 98 %  Oxygen Amount and Delivery:    Incentive Spirometry Tx:            GENERAL: alert, cooperative, no distress. NG with feculent material.  LUNGS: clear to ausculation, without wheezes, rales or rhonci  HEART: normal rate and regular rhythm  ABDOMEN: non-distended, soft, bowel sounds present in all 4 quadrants, hyperactive, and no guarding or peritoneal signs. Inguinal hernia not palpable. EXTREMITY: no cyanosis, clubbing or edema  In: 1180.4 [I.V.:1180.4]  Out:  [Urine:250]     Date 20 0000 - 20 2359   Shift 2508-3322 8758-7011 4661-3215 24 Hour Total   INTAKE   P.O.(mL/kg/hr) 0   0   I. V.(mL/kg) 744(15.0)   704(11.7)   Shift Total(mL/kg) 500(61.9)   704(11.7)   OUTPUT   Urine(mL/kg/hr) 250   250   Emesis/NG output(mL/kg) 300(5)   300(5)   Other(mL/kg) 0(0)   0(0)   Stool(mL/kg) 0(0)   0(0)   Blood(mL/kg) 0(0)   0(0)   Shift Total(mL/kg) 550(9.1)   550(9.1)   Weight (kg) 60.2 60.2 60.2 60.2     LABS     Recent Labs     20  1040 20  0646   WBC 10.0 6.9   HGB 13.8* 11.7*   HCT 41.3* 36.0*    186    145   K 4.2 4.0   CL 96* 103   CO2 26 27   BUN 44* 47*   CREATININE 2.3* 2.2*   MG 1.9  --    PHOS 4.5  --    CALCIUM 9.7 8.9      No results for input(s): PTT, INR in the last 72 hours.     Invalid input(s): PT  Recent Labs     20  1040 20  0646   AST 21 18   ALT 16 11   BILITOT 0.9 0.6   LIPASE 23.1  --      No results for input(s):

## 2020-08-31 NOTE — PLAN OF CARE
Problem: Skin Integrity:  Goal: Absence of new skin breakdown  Description: Absence of new skin breakdown  Outcome: Ongoing  Note: No new skin breakdown noted at this time. Will continue to reassess. Patient turns and repositions self in bed frequent       Problem: Bowel/Gastric:  Goal: Control of bowel function will improve  Description: Control of bowel function will improve  Outcome: Ongoing  Note: Patient having diarrhea. Incontinent at times. Loose brown stool. Will continue to reassess. Problem: Nutritional:  Goal: Ability to follow a diet with enough fiber (20 to 30 grams) for normal bowel function will improve  Description: Ability to follow a diet with enough fiber (20 to 30 grams) for normal bowel function will improve  Outcome: Ongoing  Note: Patient NPO at this time. NG tube in place. Problem: Discharge Planning:  Goal: Participates in care planning  Description: Participates in care planning  Outcome: Ongoing  Note: Patient plans to discharge home when medically stable. Problem: Fluid Volume - Deficit:  Goal: Absence of fluid volume deficit signs and symptoms  Description: Absence of fluid volume deficit signs and symptoms  Outcome: Ongoing  Note: I/O last 3 completed shifts: In: 1180.4 [I.V.:1180.4]  Out: 1777 [Urine:250; Emesis/NG output:2425]  I/O this shift: In: 733.3 [I.V.:733.3]  Out: -     Monitoring intake and output. Problem: Pain:  Goal: Pain level will decrease  Description: Pain level will decrease  Outcome: Ongoing  Note: Patient rates pain on 0-10 pain scale. States pain is a 0 on 0-10 scale. States goal is 0. Repositioned for comfort. Will continue to reassess. Problem: Falls - Risk of:  Goal: Will remain free from falls  Description: Will remain free from falls  Outcome: Ongoing  Note: Falling star placed outside of patient's room. 2/4 bed rails up. Non-skid socks provided. Call light and personal items with in reach. Bed alarm on.       Care plan reviewed with patient. No concerns voiced.

## 2020-08-31 NOTE — CONSULTS
[facility]'s  Cardiology Consult      Patient:  Kalpana Terrazas  YOB: 1933    MRN: [id number]   Acct: [de-identified]     Primary Care Physician: Tori Holman MD    REASON FOR CONSULT:       ashd  10 years and pvc in past and possibe  surgery        CHIEF COMPLAINT:    NAUSEA  VOMITING    HISTORY OF PRESENT ILLNESS:    Kalpana Terrazas is a pleasant 80year old male patient with past medical history that includes:   Past Medical History:   Diagnosis Date    ANJANA (acute kidney injury) ([address]     Arrhythmia 1/22/2019    Arthritis     BPH (benign prostatic hypertrophy)     CAD (coronary artery disease)     Cerebrovascular disease     Hyperlipidemia     Hypertension     Mitral regurgitation     PVC (premature ventricular contraction) 1/22/2019    TIA (transient ischemic attack) 2009    post surgery resolved   He is s/p CABG 2009. The patient was admitted to the hospital with nausea, vomiting and abdominal discomfort. The patient has h/o SBO. He is being seen by surgery, currently undergoing conservative management. Cardiology was consulted for PVCs, SIHD and preoperative risk assessment for possible surgery. Echo 01/2020 revealed preserved EF, mild AS. Labs reviewed, the patient has ANJANA, Cr 2.2. EKG revealed SR, PAC, PVC, TWI inferior leads. Patient denies chest pain, shortness of breath, dyspnea on exertion, orthopnea, paroxysmal nocturnal dyspnea, palpitations, dizziness, syncope, recent weight gain or leg swelling.      All labs, EKG's, diagnostic testing and images as well as cardiac cath, stress testing   were reviewed during this encounter    CARDIAC TESTING    Echo:   ECHO:   Results for orders placed during the hospital encounter of 01/15/20   Echo 2D w doppler w color complete    Narrative Transthoracic Echocardiography Report (TTE)     Demographics      Patient Name    Bria Moreno Gender               Male      MR #            [id number]    Race                                    Ethnicity      Account #       [de-identified]    Room Number      Accession       [id number]    Date of Study        01/15/2020   Number      Date of Birth   06/28/1933   Referring Physician  Verena Colvin MD      Age             80 year(s)   Sonographer          Ashlyn Negrete [facility]                                   Interpreting         Echo reader of the week                                Physician            Trish Rabago MD     Procedure    Type of Study      TTE procedure:ECHOCARDIOGRAM COMPLETE 2D W DOPPLER W COLOR. Procedure Date  Date: 01/15/2020 Start: 10:14 AM    Study Location: Echo Lab  Technical Quality: Adequate visualization    Indications:Premature ventricular contractions (PVC's). Additional Medical History: Former smoker, mitral regurgitation, coronary  artery disease, CABG, hypertension, hyperlipidemia, arrhythmia    Patient Status: Routine    Height: 67 inches Weight: 148 pounds BSA: 1.78 m^2 BMI: 23.18 kg/m^2    BP: 124/66 mmHg     Conclusions      Summary   Normal left ventricle size and systolic function. Ejection fraction was   estimated at 60 %. There were no regional left ventricular wall motion   abnormalities and wall thickness was within normal limits. The left atrium is Mildly dilated. There is mild aortic stenosis with valve area of 1.6 sq cm. Signature      ----------------------------------------------------------------   Electronically signed by Trish Rabago MD (Interpreting   physician) on 01/15/2020 at 06:05 PM   ----------------------------------------------------------------      Findings      Mitral Valve   The mitral valve structure was normal with normal leaflet separation. DOPPLER: The transmitral velocity was within the normal range with no   evidence for mitral stenosis. Mild to Moderate mitral regurgitation is present.       Aortic Valve Aortic valve appears tricuspid. Aortic valve leaflets are Moderately   calcified. Leaflets exhibited mild to moderately increased thickness and   mild to moderately reduced cuspal separation of the aortic valve. Mild aortic regurgitation is noted. DOPPLER: Transaortic velocity was within the normal range with no evidence   of aortic stenosis. There was no evidence of aortic regurgitation. There is mild aortic stenosis with valve area of 1.6 sq cm. Tricuspid Valve   The tricuspid valve structure was normal with normal leaflet separation. DOPPLER: There was no evidence of tricuspid stenosis. Mild tricuspid regurgitation visualized. Pulmonic Valve   The pulmonic valve leaflets exhibited normal thickness, no calcification,   and normal cuspal separation. DOPPLER: The transpulmonic velocity was   within the normal range with no evidence for regurgitation. Left Atrium   The left atrium is Mildly dilated. Left Ventricle   Normal left ventricle size and systolic function. Ejection fraction was   estimated at 60 %. There were no regional left ventricular wall motion   abnormalities and wall thickness was within normal limits. Right Atrium   Right atrial size was normal.      Right Ventricle   The right ventricular size was normal with normal systolic function and   wall thickness. Pericardial Effusion   The pericardium was normal in appearance with no evidence of a pericardial   effusion. Pleural Effusion   No evidence of pleural effusion. Aorta / Great Vessels   -Aortic root dimension within normal limits.   -The Pulmonary artery is within normal limits. -IVC size is within normal limits with normal respiratory phasic changes.      M-Mode/2D Measurements & Calculations      LV Diastolic    LV Systolic Dimension:    AV Cusp Separation: 1.5 cmLA   Dimension: 4.3  3.2 cm                    Dimension: 4 cmAO Root   cm              LV Volume Diastolic: 09.7 Dimension: 3.5 cmLA Area: 15.9   LV FS:25.6 %    ml                        cm^2   LV PW           LV Volume Systolic: 41 ml   Diastolic: 1.2  LV EDV/LV EDV Index: 83.1   cm              ml/47 m^2LV ESV/LV ESV   Septum          Index: 41 ml/23 m^2       RV Diastolic Dimension: 3.2 cm   Diastolic: 1.2  EF Calculated: 50.7 %   cm                                        LA/Aorta: 1.14                                                LA volume/Index: 41.7 ml /23m^2                   LVOT: 2 cm     Doppler Measurements & Calculations      MV Peak E-Wave:     AV Peak Velocity: 155    LVOT Peak Velocity: 60.6 cm/s   98.5 cm/s           cm/s                     LVOT Mean Velocity: 49.1 cm/s   MV Peak A-Wave: 124 AV Peak Gradient: 9.61   LVOT Peak Gradient: 1   cm/s                mmHg                     mmHgLVOT Mean Gradient: 1   MV E/A Ratio: 0.79  AV Mean Velocity: 104    mmHg   MV Peak Gradient:   cm/s   3.88 mmHg           AV Mean Gradient: 5 mmHg TV Peak E-Wave: 47.1 cm/s                       AV VTI: 35.7 cm          TV Peak A-Wave: 45.9 cm/s   MV Deceleration     AV Area   Time: 313 msec      (Continuity):1.55 cm^2   TV Peak Gradient: 0.89 mmHg   MV P1/2t: 92 msec                            TR Velocity:167 cm/s   MVA by PHT:2.39     LVOT VTI: 17.6 cm        TR Gradient:11.16 mmHg   cm^2                AV P1/2t: 1289 msec      PV Peak Velocity: 71 cm/s                       IVRT: 137 msec           PV Peak Gradient: 2.02 mmHg   MV E' Septal   Velocity: 5.4 cm/s   MV A' Septal        AV DVI (VTI): 0.49AV DVI   Velocity: 8.7 cm/s  (Vmax):0.39   MV E' Lateral   Velocity: 8.1 cm/s   MV A' Lateral   Velocity: 5.9 cm/s   E/E' septal: 18.24   E/E' lateral: 12.16   MR Velocity: 547   cm/s     [email or web address] [id number]        Past Medical History:    Past Medical History:   Diagnosis Date    [facility]     Arrhythmia 1/22/2019    Arthritis     BPH (benign prostatic hypertrophy)     CAD (coronary artery disease)     Cerebrovascular disease     Hyperlipidemia     Hypertension     Mitral regurgitation     PVC (premature ventricular contraction) 1/22/2019    TIA (transient ischemic attack) 2009    post surgery resolved       Past Surgical History:    Past Surgical History:   Procedure Laterality Date    CATARACT REMOVAL Right 7-2015    Dr Garcia Sanabria  2009       Medications Prior to Admission:    Medications Prior to Admission: carvedilol (COREG) 12.5 MG tablet, TAKE 1 TABLET BY MOUTH TWICE DAILY  ondansetron (ZOFRAN) 4 MG tablet, Take 1 tablet by mouth 3 times daily as needed for Nausea or Vomiting  lisinopril-hydroCHLOROthiazide (PRINZIDE;ZESTORETIC) 20-12.5 MG per tablet, TAKE 1 TABLET BY MOUTH ONCE DAILY  simvastatin (ZOCOR) 40 MG tablet, TAKE 1 TABLET BY MOUTH EVERY NIGHT AT BEDTIME  tamsulosin (FLOMAX) 0.4 MG capsule, TAKE 1 CAPSULE BY MOUTH EVERY DAY (Patient taking differently: TAKE 1 CAPSULE BY MOUTH EVERY DAY nightly)  multivitamin (THERAGRAN) per tablet, Take 1 tablet by mouth daily. aspirin 81 MG EC tablet, Take 81 mg by mouth daily. acetaminophen (TYLENOL) 500 MG tablet, Take 500 mg by mouth every 6 hours as needed. Allergies:    Pcn [penicillins]; Ibuprofen; and Percocet [oxycodone-acetaminophen]    Social History:    reports that he quit smoking about 50 years ago. His smoking use included cigarettes. He has a 60.00 pack-year smoking history. He has never used smokeless tobacco. He reports current alcohol use. He reports that he does not use drugs. Family History:   family history includes Cancer in his father; Other in his mother.       REVIEW OF SYSTEMS:  Constitutional: negative for anorexia, chills and fevers,weight change  Skin: negative for new skin rash per patient  HEENT: negative for head trauma or new visual changes  Respiratory: negative for cough, hemoptysis, wheezing  Cardiovascular: negative for  orthopnea, palpitations and syncope. Gastrointestinal: positive for abdominal pain,nausea , vomiting  Hematologic/lymphatic: negative for bruising,prolonged bleeding,blood clots  Musculoskeletal:negative for muscle weakness, myalgias,wasting  Neurological: negative for coordination problems, dizziness, gait problems and vertigo  Behavioral/Psych:negative for mood/sleep disturbance      PHYSICAL EXAM:   Vitals:  Patient Vitals for the past 24 hrs:   BP Temp Temp src Pulse Resp SpO2 Height Weight   08/31/20 0800 (!) 158/74 98.2 °F (36.8 °C) Oral 79 16 94 % -- --   08/31/20 0416 139/63 98.3 °F (36.8 °C) Oral 79 16 94 % -- 132 lb 12.8 oz (60.2 kg)   08/30/20 2347 (!) 150/68 97.8 °F (36.6 °C) Oral 79 16 93 % -- --   08/30/20 2057 (!) 145/67 98.5 °F (36.9 °C) Oral 76 16 96 % -- --   08/30/20 1621 -- -- -- -- 16 95 % -- --   08/30/20 1602 131/60 97.7 °F (36.5 °C) Oral 72 18 94 % -- --   08/30/20 1430 (!) 177/79 98.3 °F (36.8 °C) Oral 83 18 95 % 5' 7\" (1.702 m) 135 lb 9.6 oz (61.5 kg)   08/30/20 1255 (!) 147/72 -- -- 79 18 98 % -- --   08/30/20 1141 112/69 -- -- 84 18 98 % -- --   08/30/20 1014 136/68 98.2 °F (36.8 °C) Oral 83 18 95 % -- 140 lb (63.5 kg)       Last 3 weights: Wt Readings from Last 3 Encounters:   08/31/20 132 lb 12.8 oz (60.2 kg)   08/06/20 142 lb 3.2 oz (64.5 kg)   01/22/20 144 lb 12.8 oz (65.7 kg)     24 hour intake/output:    Intake/Output Summary (Last 24 hours) at 8/31/2020 0920  Last data filed at 8/31/2020 0416  Gross per 24 hour   Intake 1180.43 ml   Output 2675 ml   Net -1494.57 ml     BMI:Body mass index is 20.8 kg/m².     General Appearance: alert and oriented to person, place and time, well developed and well- nourished, in no acute distress  Skin: warm and dry, no rash or erythema  Eyes: pupils equal, round, and reactive to light, extraocular eye movements intact, conjunctivae normal  Neck: supple and non-tender without mass, no thyromegaly or thyroid nodules, no cervical lymphadenopathy  Pulmonary/Chest: clear to auscultation bilaterally- no wheezes, rales or rhonchi, normal air movement, no respiratory distress  Cardiovascular: normal rate, regular rhythm, normal S1 and S2, ES murmur. No rubs, clicks, or gallops, distal pulses intact, no carotid bruits, Negative JVD  Radial Pulses: intact 2+  Abdomen: soft, non-tender, non-distended, normal bowel sounds, no masses or organomegaly  Extremities: no cyanosis, clubbing . No Edema  Musculoskeletal: normal range of motion, no joint swelling, deformity or tenderness      RADIOLOGY   Ct Abdomen Pelvis Wo Contrast Additional Contrast? None    Result Date: 8/30/2020  PROCEDURE: CT ABDOMEN PELVIS WO CONTRAST CLINICAL INFORMATION: abdominal pain COMPARISON: 8/6/2020. TECHNIQUE:  Axial CT images were obtained through the abdomen and pelvis without contrast. Coronal and sagittal reformatted images were rendered. All CT scans at this facility use dose modulation, iterative reconstruction, and/or weight-based dosing when  appropriate to reduce radiation dose to as low as reasonably achievable. FINDINGS: Multiple dilated loops of small bowel are demonstrated likely representing an early or partial small bowel obstruction. There is gas and stool distally within the colon. There are several loops of small bowel within the right lower abdomen and pelvis which appear smaller in caliber. Therefore, the transition point is likely in the region of the biliary jejunal junction. However, the definite transition point is not clearly identified on the current examination. There is extensive colonic diverticulosis without evidence of diverticulitis. There is evidence of prior median sternotomy. Limited evaluation of the lung bases demonstrates an indeterminate 5 mm pulmonary nodule within the posterior right lower lobe on axial image 13. No pneumatosis is seen. There is cholelithiasis.  Noncontrast evaluation of the liver, spleen, pancreas and adrenal glands appear normal. No hydronephrosis or hydroureter is seen. There is an exophytic low-density lesion arising off of the posterior left kidney which measures fluid density and may represent a renal cyst. The appendix appears within normal limits. There is gas present within the lumen of the appendix. There is arthrosis of the bilateral hip joints and pubic symphysis as well as the SI joints. There is also lower lumbar facet arthrosis and multilevel lumbar degenerative disc disease. There is a left inguinal hernia containing a short segment of sigmoid colon. This is demonstrated on coronal series image 21.     1. Multiple dilated loops of small bowel with air-fluid levels are demonstrated consistent with early or partial small bowel obstruction. A discrete transition zone is not definitely seen. However, there are relatively collapsed distal ileal small bowel loops within the right lower quadrant and pelvis. Therefore, the approximate location of the transition zone is likely the ileojejunal junction. The overall degree small bowel dilation appears progressed from previous examination. 2. 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 No evidence of diverticulitis is seen. 3. There is a left inguinal hernia containing a short segment of sigmoid colon. This is demonstrated on coronal series image 21. **This report has been created using voice recognition software. It may contain minor errors which are inherent in voice recognition technology. ** Final report electronically signed by Dr. Hever Pretty on 8/30/2020 12:31 PM    Xr Abdomen (kub) (single Ap View)    Result Date: 8/31/2020  PROCEDURE: XR ABDOMEN (KUB) (SINGLE AP VIEW) CLINICAL INFORMATION: follow-up partial SBO COMPARISON: 8/30/2020 TECHNIQUE: 2 supine images of the abdomen were obtained. FINDINGS: There is moderate gaseous distention of the transverse colon.  There is also moderate gaseous distention of a few centrally located small bowel loops. An NG tube is again seen with its tip in the stomach. Kidneys are somewhat obscured. . No definite renal  or ureteral calculi are seen. There are moderate degenerative changes lumbar spine and involving both hips. 1. NG tube in the stomach. 2. Findings of moderate paralytic ileus. **This report has been created using voice recognition software. It may contain minor errors which are inherent in voice recognition technology. ** Final report electronically signed by Dr. Giovanny Garcia on 8/31/2020 8:23 AM    Xr Abdomen For Ng/og/ne Tube Placement    Result Date: 8/30/2020  PROCEDURE: XR ABDOMEN FOR NG/OG/NE TUBE PLACEMENT CLINICAL INFORMATION: Confirmation of course of NG/OG/NE tube and location of tip of tube . COMPARISON: No prior study. TECHNIQUE: Portable upright FINDINGS: The upper abdomen is imaged. NG tube is seen extending into the stomach. NG tube is in the stomach **This report has been created using voice recognition software. It may contain minor errors which are inherent in voice recognition technology. ** Final report electronically signed by Dr. Jing Salgado on 8/30/2020 3:23 PM      LABS:  No results for input(s): CKTOTAL, CKMB, CKMBINDEX, TROPONINT in the last 72 hours.   CBC:   Lab Results   Component Value Date    WBC 6.9 08/31/2020    RBC 3.82 08/31/2020    HGB 11.7 08/31/2020    HCT 36.0 08/31/2020    MCV 94.2 08/31/2020    MCH 30.6 08/31/2020    MCHC 32.5 08/31/2020     08/31/2020    MPV 10.9 08/31/2020     BMP:    Lab Results   Component Value Date     08/31/2020    K 4.0 08/31/2020     08/31/2020    CO2 27 08/31/2020    BUN 47 08/31/2020    LABALBU 3.5 08/31/2020    CREATININE 2.2 08/31/2020    CALCIUM 8.9 08/31/2020    LABGLOM 28 08/31/2020    GLUCOSE 113 08/31/2020     Hepatic Function Panel:    Lab Results   Component Value Date    ALKPHOS 82 08/31/2020    ALT 11 08/31/2020    AST 18 08/31/2020    PROT 6.2 08/31/2020    BILITOT 0.6 08/31/2020    BILIDIR <0.2 08/06/2020    LABALBU 3.5 08/31/2020     Magnesium:    Lab Results   Component Value Date    MG 1.9 08/30/2020     Warfarin PT/INR:  No components found for: PTPATWAR, PTINRWAR  HgBA1c:    Lab Results   Component Value Date    LABA1C 5.5 12/05/2017     FLP:    Lab Results   Component Value Date    LDLCALC 33 05/08/2019     TSH:  No results found for: TSH  BNP: No results found for: BNP      ASSESSMENT:  Jun Velez is a pleasant 80year old male patient with past medical history that includes:   Past Medical History:   Diagnosis Date    ANJANA (acute kidney injury) ([address]     Arrhythmia 1/22/2019    Arthritis     BPH (benign prostatic hypertrophy)     CAD (coronary artery disease)     Cerebrovascular disease     Hyperlipidemia     Hypertension     Mitral regurgitation     PVC (premature ventricular contraction) 1/22/2019    TIA (transient ischemic attack) 2009    post surgery resolved   He is s/p CABG 2009. The patient was admitted to the hospital with nausea, vomiting and abdominal discomfort. The patient has h/o SBO. He is being seen by surgery, currently undergoing conservative management. Cardiology was consulted for PVCs, SIHD and preoperative risk assessment for possible surgery. Echo 01/2020 revealed preserved EF, mild AS. Labs reviewed, the patient has ANJANA, Cr 2.2. EKG revealed SR, PAC, PVC, TWI inferior leads. Patient denies chest pain, shortness of breath, dyspnea on exertion, orthopnea, paroxysmal nocturnal dyspnea, palpitations, dizziness, syncope, recent weight gain or leg swelling. 1. CAD  2. 5 vessel CABG 2009  3. Mild AS  4. Known h/o PVCs, Bigeminy in the past   5. SBO  6. Preoperative risk assessment   7. HTN  8. Dyslipidemia   9.  Aortic stenosis, mild    RECOMMENDATIONS:   The patient has known PVCs   Previously followed by Dr Rui MCCABE Nice management was recommended, BB   Has known CAD, CABG   Denies chest pain, exertional symptoms and has no cardiac complaints   EKG reviewed    Recent echo 01/2020 revealed normal EF, Mild AS    No further cardiac studies are indicated at this time   Will have moderate cardiac risk, if abdominal surgery is needed    Continue on Coreg    Continue telemetry   Replace electrolytes as needed    On ASA, Lipitor     Above findings and plan of care were discussed with patient and family, questions were answered, agreeable to plan. Thank you for allowing me to participate in the care of this patient. Please let me know if I can be of any further assistance.       Ai Armenta MD, [place]   9:20 AM  8/31/2020

## 2020-08-31 NOTE — CARE COORDINATION
08/31/20 1120   Readmission Assessment   Number of Days since last admission? 8-30 days   Previous Disposition Home with Family   Who is being Interviewed Patient   What was the patient's/caregiver's perception as to why they think they needed to return back to the hospital?   (came back severe vomiting)   Did you visit your Primary Care Physician after you left the hospital, before you returned this time? Yes   Did you see a specialist, such as Cardiac, Pulmonary, Orthopedic Physician, etc. after you left the hospital? No   Who advised the patient to return to the hospital? Other (Comment)  (self)   Does the patient report anything that got in the way of taking their medications? No   In our efforts to provide the best possible care to you and others like you, can you think of anything that we could have done to help you after you left the hospital the first time, so that you might not have needed to return so soon?  Other (Comment)  (no  nothing at all)

## 2020-09-01 ENCOUNTER — APPOINTMENT (OUTPATIENT)
Dept: GENERAL RADIOLOGY | Age: 85
DRG: 329 | End: 2020-09-01
Payer: MEDICARE

## 2020-09-01 PROBLEM — E43 SEVERE MALNUTRITION (HCC): Status: ACTIVE | Noted: 2020-09-01

## 2020-09-01 LAB
ANION GAP SERPL CALCULATED.3IONS-SCNC: 11 MEQ/L (ref 8–16)
BASOPHILS # BLD: 0.5 %
BASOPHILS ABSOLUTE: 0 THOU/MM3 (ref 0–0.1)
BUN BLDV-MCNC: 37 MG/DL (ref 7–22)
CALCIUM SERPL-MCNC: 8.2 MG/DL (ref 8.5–10.5)
CHLORIDE BLD-SCNC: 103 MEQ/L (ref 98–111)
CO2: 25 MEQ/L (ref 23–33)
CREAT SERPL-MCNC: 1.6 MG/DL (ref 0.4–1.2)
EOSINOPHIL # BLD: 5.2 %
EOSINOPHILS ABSOLUTE: 0.3 THOU/MM3 (ref 0–0.4)
ERYTHROCYTE [DISTWIDTH] IN BLOOD BY AUTOMATED COUNT: 12.7 % (ref 11.5–14.5)
ERYTHROCYTE [DISTWIDTH] IN BLOOD BY AUTOMATED COUNT: 43.6 FL (ref 35–45)
GFR SERPL CREATININE-BSD FRML MDRD: 41 ML/MIN/1.73M2
GLUCOSE BLD-MCNC: 79 MG/DL (ref 70–108)
HCT VFR BLD CALC: 34.9 % (ref 42–52)
HEMOGLOBIN: 11.4 GM/DL (ref 14–18)
IMMATURE GRANS (ABS): 0.02 THOU/MM3 (ref 0–0.07)
IMMATURE GRANULOCYTES: 0.4 %
LYMPHOCYTES # BLD: 5.7 %
LYMPHOCYTES ABSOLUTE: 0.3 THOU/MM3 (ref 1–4.8)
MCH RBC QN AUTO: 30.6 PG (ref 26–33)
MCHC RBC AUTO-ENTMCNC: 32.7 GM/DL (ref 32.2–35.5)
MCV RBC AUTO: 93.8 FL (ref 80–94)
MONOCYTES # BLD: 7.6 %
MONOCYTES ABSOLUTE: 0.4 THOU/MM3 (ref 0.4–1.3)
NUCLEATED RED BLOOD CELLS: 0 /100 WBC
PLATELET # BLD: 170 THOU/MM3 (ref 130–400)
PMV BLD AUTO: 10.8 FL (ref 9.4–12.4)
POTASSIUM SERPL-SCNC: 3.8 MEQ/L (ref 3.5–5.2)
RBC # BLD: 3.72 MILL/MM3 (ref 4.7–6.1)
SARS-COV-2, NAAT: NOT DETECTED
SEG NEUTROPHILS: 80.6 %
SEGMENTED NEUTROPHILS ABSOLUTE COUNT: 4.5 THOU/MM3 (ref 1.8–7.7)
SODIUM BLD-SCNC: 139 MEQ/L (ref 135–145)
WBC # BLD: 5.6 THOU/MM3 (ref 4.8–10.8)

## 2020-09-01 PROCEDURE — 99233 SBSQ HOSP IP/OBS HIGH 50: CPT | Performed by: SURGERY

## 2020-09-01 PROCEDURE — 1200000003 HC TELEMETRY R&B

## 2020-09-01 PROCEDURE — C9113 INJ PANTOPRAZOLE SODIUM, VIA: HCPCS | Performed by: SURGERY

## 2020-09-01 PROCEDURE — 36415 COLL VENOUS BLD VENIPUNCTURE: CPT

## 2020-09-01 PROCEDURE — 85025 COMPLETE CBC W/AUTO DIFF WBC: CPT

## 2020-09-01 PROCEDURE — 6370000000 HC RX 637 (ALT 250 FOR IP): Performed by: PHYSICIAN ASSISTANT

## 2020-09-01 PROCEDURE — U0002 COVID-19 LAB TEST NON-CDC: HCPCS

## 2020-09-01 PROCEDURE — 2580000003 HC RX 258: Performed by: SURGERY

## 2020-09-01 PROCEDURE — 6360000002 HC RX W HCPCS: Performed by: SURGERY

## 2020-09-01 PROCEDURE — 6360000002 HC RX W HCPCS: Performed by: FAMILY MEDICINE

## 2020-09-01 PROCEDURE — 2580000003 HC RX 258: Performed by: STUDENT IN AN ORGANIZED HEALTH CARE EDUCATION/TRAINING PROGRAM

## 2020-09-01 PROCEDURE — 6370000000 HC RX 637 (ALT 250 FOR IP): Performed by: STUDENT IN AN ORGANIZED HEALTH CARE EDUCATION/TRAINING PROGRAM

## 2020-09-01 PROCEDURE — 74019 RADEX ABDOMEN 2 VIEWS: CPT

## 2020-09-01 PROCEDURE — 94760 N-INVAS EAR/PLS OXIMETRY 1: CPT

## 2020-09-01 PROCEDURE — 6370000000 HC RX 637 (ALT 250 FOR IP): Performed by: FAMILY MEDICINE

## 2020-09-01 PROCEDURE — 80048 BASIC METABOLIC PNL TOTAL CA: CPT

## 2020-09-01 RX ORDER — SODIUM CHLORIDE 9 MG/ML
10 INJECTION INTRAVENOUS DAILY
Status: DISCONTINUED | OUTPATIENT
Start: 2020-09-01 | End: 2020-09-10 | Stop reason: HOSPADM

## 2020-09-01 RX ORDER — AMLODIPINE BESYLATE 5 MG/1
5 TABLET ORAL DAILY
Status: DISCONTINUED | OUTPATIENT
Start: 2020-09-01 | End: 2020-09-10 | Stop reason: HOSPADM

## 2020-09-01 RX ORDER — PANTOPRAZOLE SODIUM 40 MG/10ML
40 INJECTION, POWDER, LYOPHILIZED, FOR SOLUTION INTRAVENOUS DAILY
Status: DISCONTINUED | OUTPATIENT
Start: 2020-09-01 | End: 2020-09-10 | Stop reason: HOSPADM

## 2020-09-01 RX ORDER — CLINDAMYCIN PHOSPHATE 900 MG/50ML
900 INJECTION INTRAVENOUS
Status: COMPLETED | OUTPATIENT
Start: 2020-09-02 | End: 2020-09-02

## 2020-09-01 RX ADMIN — Medication 3 MG: at 22:06

## 2020-09-01 RX ADMIN — CARVEDILOL 12.5 MG: 25 TABLET, FILM COATED ORAL at 16:25

## 2020-09-01 RX ADMIN — Medication 10 ML: at 09:05

## 2020-09-01 RX ADMIN — HEPARIN SODIUM 5000 UNITS: 5000 INJECTION INTRAVENOUS; SUBCUTANEOUS at 22:06

## 2020-09-01 RX ADMIN — ATORVASTATIN CALCIUM 20 MG: 20 TABLET, FILM COATED ORAL at 22:06

## 2020-09-01 RX ADMIN — AMLODIPINE BESYLATE 5 MG: 5 TABLET ORAL at 09:04

## 2020-09-01 RX ADMIN — TAMSULOSIN HYDROCHLORIDE 0.4 MG: 0.4 CAPSULE ORAL at 09:04

## 2020-09-01 RX ADMIN — HYDRALAZINE HYDROCHLORIDE 25 MG: 25 TABLET, FILM COATED ORAL at 09:04

## 2020-09-01 RX ADMIN — Medication 10 ML: at 16:19

## 2020-09-01 RX ADMIN — CARVEDILOL 12.5 MG: 25 TABLET, FILM COATED ORAL at 09:04

## 2020-09-01 RX ADMIN — HEPARIN SODIUM 5000 UNITS: 5000 INJECTION INTRAVENOUS; SUBCUTANEOUS at 09:04

## 2020-09-01 RX ADMIN — ASPIRIN 81 MG: 81 TABLET ORAL at 09:09

## 2020-09-01 RX ADMIN — PANTOPRAZOLE SODIUM 40 MG: 40 INJECTION, POWDER, FOR SOLUTION INTRAVENOUS at 16:18

## 2020-09-01 NOTE — CARE COORDINATION
9/1/20, 12:54 PM EDT    DISCHARGE ON GOING Cecilio Kramer day: 2  Location: [facility]17/017-A Reason for admit: Small bowel obstruction, partial ([facility] [address] [K56.600]     Treatment Plan of Care: N/G remains to LIWS, general surgery saw, uncontrolled BMs/strict stool counts, skin care, strict I/O, creat improved 1.6, Protonix IV, cardiology consulted for OR clearance, NPO and consent for surgery. Barriers to Discharge: possible surgery soon  PCP: Jose J Birch MD  Readmission Risk Score: 19%  Patient Goals/Plan/Treatment Preferences: from home with spouse; declines HH or needs. Will follow for post op needs.

## 2020-09-01 NOTE — PLAN OF CARE
Problem: Nutrition  Goal: Optimal nutrition therapy  Outcome: Ongoing   Nutrition Problem #1: Severe malnutrition, In context of acute illness or injury  Intervention: Food and/or Nutrient Delivery: Continue NPO(Plan ONS when diet initiated. If extended gut rest anticipated recommend consider PN start.)  Nutritional Goals: Patient will receive diet initiation vs. PN start in the next 1-4 days.

## 2020-09-01 NOTE — PROGRESS NOTES
Comprehensive Nutrition Assessment    Type and Reason for Visit:  Initial, Positive Nutrition Screen(poor oral intake, unplanned weight loss, nausea/vomiting)    Nutrition Recommendations/Plan:   Diet initiation when GI status allows, plan ONS start then. If extended gut rest anticipated, consider PN initiation, would recommend 60 kg to dose, 10 kcals/kg/day, 20% lipid kcals, and 1 gram protein/kg/day. Nutrition Assessment:    Pt. severely malnourished AEB criteria listed below. At risk for further nutritional compromise r/t NPO status, \"uncomfortable belly\", advanced age, underweight, and underlying medical condition (SBO, recent admit with SBO, history of CAD, ANJANA, HTN, CVA, hyperlipidemia, benign prostatic hypertrophy), and need for nutrition support. Nutrition recommendations/interventions as per above. Malnutrition Assessment:  Malnutrition Status:  Severe malnutrition    Context:  Acute Illness     Findings of the 6 clinical characteristics of malnutrition:  Energy Intake:  7 - 50% or less of estimated energy requirements for 5 or more days  Weight Loss:  1 - 5% over 1 month(7.2%)     Body Fat Loss:  7 - Moderate body fat loss Orbital, Triceps, Fat Overlying Ribs   Muscle Mass Loss:  7 - Moderate muscle mass loss Temples (temporalis)  Fluid Accumulation:  No significant fluid accumulation     Strength:  Not Performed    Estimated Daily Nutrient Needs:  Energy (kcal):  1463-9835 kcals (30-35 kcals/kg/day); Weight Used for Energy Requirements:  (60 kg, 9/1/20)     Protein (g):  60 grams (1 gram/kg/day based) pending renal status; Weight Used for Protein Requirements:    60 kg         Nutrition Related Findings:  Thin. NGT in place, SBO. Not planning surgery tomorrow, exploritory lap. Recent admit a couple weeks ago with same issues. Poor oral intake in the last 2 weeks due to nausea/vomiting, uncomfortable belly. He is agreeable to ONS when diet resumed.   He ha lost ~ 10# in the last 1 month unplanned. BUN 37, Creatinine 1.6, Glucose 79. Gent, cleocin, hydrodiuril, IVF at 100 ml/hr, zofran, phenergan. 1 BM in the last 24 hours. Wounds:  None       Current Nutrition Therapies:    Diet NPO Effective Now    Anthropometric Measures:  · Height: 5' 7\" (170.2 cm)  · Current Body Weight: 132 lb (59.9 kg)(9/1/20 no edema)   · Admission Body Weight: 135 lb 9.6 oz (61.5 kg)(8/30/20 no edema)    · Usual Body Weight: (~ 145# per pt. Per EMR: 11/22/19: 148#8oz, 1/22/20: 144#12.8oz, 8/6/20: 142#3. 2oz)     · Ideal Body Weight: 148 lbs;   · BMI: 20.7  · BMI Categories: Underweight (BMI less than 22) age over 72       Nutrition Diagnosis:   · Severe malnutrition, In context of acute illness or injury related to altered GI function as evidenced by poor intake prior to admission, weight loss greater than or equal to 5% in 1 month, moderate loss of subcutaneous fat, moderate muscle loss      Nutrition Interventions:   Food and/or Nutrient Delivery:  Continue NPO(Plan ONS when diet initiated. If extended gut rest anticipated recommend consider PN start.)  Nutrition Education/Counseling:  Education initiated(Discussed ONS plan when diet resumed. Pt had a lot of questions about soft/low fiber diet which were answered.)   Coordination of Nutrition Care:  Continued Inpatient Monitoring    Goals:  Patient will receive diet initiation vs. PN start in the next 1-4 days. Nutrition Monitoring and Evaluation:   Food/Nutrient Intake Outcomes:  Diet Advancement/Tolerance, IVF Intake  Physical Signs/Symptoms Outcomes:  Biochemical Data, GI Status, Nutrition Focused Physical Findings, Skin, Weight     Discharge Planning:     Too soon to determine     Electronically signed by Melvin Paiz RD, LD on 9/1/20 at 1:18 PM EDT    Contact: [phone]

## 2020-09-01 NOTE — PROGRESS NOTES
Kristina Rudolph MD  Daily Progress Note  Pt Name: Elizabeth Haydenp Record Number: [id number]  Date of Birth 6/28/1933   Today's Date: 9/1/2020  Chief complaint: Feeling better  ASSESSMENT:   1. Hospital day # 2   2. psbo-imaging shows persistent distention of small bowel despite some clinical improvement and improvement of GI function  3. ANJANA improved  4. Reducible left inguinal hernia exam and imaging does not show this to be site of obstruction. has a past medical history of ANJANA (acute kidney injury) ([address] Arrhythmia, Arthritis, BPH (benign prostatic hypertrophy), CAD (coronary artery disease), Cerebrovascular disease, Hyperlipidemia, Hypertension, Mitral regurgitation, PVC (premature ventricular contraction), and TIA (transient ischemic attack). RECOMMENDATIONS:   1. IV hydration  2. Analgesics and antiemetics as needed  3. CT enterography-reviewed again no discrete transition point but patient has proximal dilated bowel and distal decompressed bowel. 4.  DVT prophylaxis- subcutaneous heparin hold in a.m. Prior to surgery  5. As patient has not improved and signs of symptoms of at least high-grade partial obstruction without clear etiology I am offering the patient surgical exploration with a robotic assisted laparoscopy. We can evaluate his left inguinal hernia at the same time. I do not feel the hernia is the site of the obstruction but can evaluate more thoroughly in OR. Risks of procedure were discussed at length and include but not limited to conversion to open procedure and even potential need for bowel resection as well as occult findings not identified on imaging studies. Patient at risk for perioperative cardiac and pulmonary complications. He has been deemed stable from a cardiac standpoint. Patient wishes to proceed. All questions answered. Surgery tentatively scheduled for Wednesday afternoon. 6. 6.  COVID-19 screen per protocol  7.  Add Protonix for GI prophylaxis. SUBJECTIVE:   Kike Mitchell is stable. Denies any cramping abdominal pain and passing some flatus. NG tube outputs remain fairly high. Follow-up abdominal x-rays and CT enterography reviewed. No discrete transition point but definitely markedly dilated proximal bowel with distal decompressed bowel. Left inguinal hernia is reducible. He also has a small umbilical hernia. None of these seem to be the site of obstruction. Given persistent recurring symptoms recommend robotic assisted laparoscopy as above. Patient is agreeable. MEDICATIONS   Scheduled Meds:   amLODIPine  5 mg Oral Daily    [START ON 2020] clindamycin (CLEOCIN) IV  900 mg Intravenous On Call to OR    [START ON 2020] gentamicin  300 mg Intravenous On Call to OR    pantoprazole  40 mg Intravenous Daily    And    sodium chloride (PF)  10 mL Intravenous Daily    aspirin  81 mg Oral Daily    carvedilol  12.5 mg Oral BID WC    atorvastatin  20 mg Oral Daily    tamsulosin  0.4 mg Oral Daily    sodium chloride flush  10 mL Intravenous 2 times per day    [Held by provider] lisinopril  20 mg Oral Daily    [Held by provider] hydroCHLOROthiazide  12.5 mg Oral Daily    magnesium replacement protocol   Other RX Placeholder    phosphorus replacement protocol   Other RX Placeholder    heparin (porcine)  5,000 Units Subcutaneous BID     Continuous Infusions:   sodium chloride 100 mL/hr at 20     PRN Meds:.sodium chloride flush, acetaminophen **OR** acetaminophen, polyethylene glycol, promethazine **OR** ondansetron, hydrALAZINE, melatonin  OBJECTIVE   CURRENT VITALS:  height is 5' 7\" (1.702 m) and weight is 132 lb (59.9 kg). His oral temperature is 98.6 °F (37 °C). His blood pressure is 148/68 (abnormal) and his pulse is 73. His respiration is 16 and oxygen saturation is 96%.    Temperature Range (24h):Temp: 98.6 °F (37 °C) Temp  Av.2 °F (36.8 °C)  Min: 97.6 °F (36.4 °C)  Max: 98.6 °F (37 °C)  BP Range (95H): Systolic (47BUI), RIAN:287 , Min:130 , YLD:567     Diastolic (65RKC), EJL:18, Min:59, Max:75    Pulse Range (24h): Pulse  Av.4  Min: 69  Max: 74  Respiration Range (24h): Resp  Av  Min: 16  Max: 16  Current Pulse Ox (24h):  SpO2: 96 %  Pulse Ox Range (24h):  SpO2  Av.3 %  Min: 94 %  Max: 96 %  Oxygen Amount and Delivery:    Incentive Spirometry Tx:            GENERAL: alert, cooperative, no distress. NG with feculent material.  LUNGS: clear to ausculation, without wheezes, rales or rhonci  HEART: normal rate and regular rhythm  ABDOMEN: non-distended, soft, bowel sounds present in all 4 quadrants active, hyperactive, and no guarding or peritoneal signs. Left inguinal hernia reducible. Small umbilical hernia. EXTREMITY: no cyanosis, clubbing or edema  In: 1605.9 [I.V.:1605.9]  Out: 750      Date 20 0000 - 20 2359   Shift 9394-4264 9788-4507 1371-2336 24 Hour Total   INTAKE   P.O.(mL/kg/hr) 0(0)   0   I. V.(mL/kg) 465(7.8)   465(7.8)   Shift Total(mL/kg) 465(7.8)   465(7.8)   OUTPUT   Shift Total(mL/kg)       Weight (kg) 59.9 59.9 59.9 59.9     LABS     Recent Labs     20  1040 20  0646 20  0611   WBC 10.0 6.9 5.6   HGB 13.8* 11.7* 11.4*   HCT 41.3* 36.0* 34.9*    186 170    145 139   K 4.2 4.0 3.8   CL 96* 103 103   CO2 26 27 25   BUN 44* 47* 37*   CREATININE 2.3* 2.2* 1.6*   MG 1.9  --   --    PHOS 4.5  --   --    CALCIUM 9.7 8.9 8.2*      No results for input(s): PTT, INR in the last 72 hours. Invalid input(s): PT  Recent Labs     20  1040 20  0646   AST 21 18   ALT 16 11   BILITOT 0.9 0.6   LIPASE 23.1  --      No results for input(s): TROPONINT in the last 72 hours. RADIOLOGY     XR ABDOMEN (2 VIEWS)   Final Result   Persistent gaseous distention of numerous small bowel loops. This finding has slightly improved since the previous exam.               **This report has been created using voice recognition software.  It may contain minor errors which are inherent in voice recognition technology. **      Final report electronically signed by Dr Nori Davis on 9/1/2020 6:57 AM      CT ABDOMEN PELVIS WO CONTRAST Additional Contrast? Oral   Final Result   1. Mildly dilated small bowel loops in the upper abdomen, likely secondary to improving partial small bowel obstruction. 2. Cholelithiasis. 3. Uncomplicated pancolonic diverticulosis. 4. Stable left inguinal hernia. Final report electronically signed by Dr. Bladimir Nicole on 8/31/2020 11:05 AM      XR ABDOMEN (KUB) (SINGLE AP VIEW)   Final Result   1. NG tube in the stomach. 2. Findings of moderate paralytic ileus. **This report has been created using voice recognition software. It may contain minor errors which are inherent in voice recognition technology. **         Final report electronically signed by Dr. Jeri Sparks on 8/31/2020 8:23 AM      XR ABDOMEN FOR NG/OG/NE TUBE PLACEMENT   Final Result   NG tube is in the stomach            **This report has been created using voice recognition software. It may contain minor errors which are inherent in voice recognition technology. **      Final report electronically signed by Dr. Ernesto Hoffman on 8/30/2020 3:23 PM      CT ABDOMEN PELVIS WO CONTRAST Additional Contrast? None   Final Result   1. Multiple dilated loops of small bowel with air-fluid levels are demonstrated consistent with early or partial small bowel obstruction. A discrete transition zone is not definitely seen. However, there are relatively collapsed distal ileal small bowel    loops within the right lower quadrant and pelvis. Therefore, the approximate location of the transition zone is likely the ileojejunal junction. The overall degree small bowel dilation appears progressed from previous examination. 2. 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 No evidence of diverticulitis is seen.       3. There is a left inguinal hernia containing a short segment of sigmoid colon. This is demonstrated on coronal series image 21. **This report has been created using voice recognition software. It may contain minor errors which are inherent in voice recognition technology. **      Final report electronically signed by Dr. Kael Amaya on 8/30/2020 12:31 PM          Electronically signed by Aris Ba MD on 9/1/2020 at 9:54 AM

## 2020-09-02 ENCOUNTER — ANESTHESIA EVENT (OUTPATIENT)
Dept: OPERATING ROOM | Age: 85
DRG: 329 | End: 2020-09-02
Payer: MEDICARE

## 2020-09-02 ENCOUNTER — ANESTHESIA (OUTPATIENT)
Dept: OPERATING ROOM | Age: 85
DRG: 329 | End: 2020-09-02
Payer: MEDICARE

## 2020-09-02 VITALS — SYSTOLIC BLOOD PRESSURE: 124 MMHG | OXYGEN SATURATION: 98 % | DIASTOLIC BLOOD PRESSURE: 57 MMHG | TEMPERATURE: 96.1 F

## 2020-09-02 LAB
ANION GAP SERPL CALCULATED.3IONS-SCNC: 17 MEQ/L (ref 8–16)
BUN BLDV-MCNC: 33 MG/DL (ref 7–22)
CALCIUM SERPL-MCNC: 8.3 MG/DL (ref 8.5–10.5)
CHLORIDE BLD-SCNC: 108 MEQ/L (ref 98–111)
CO2: 20 MEQ/L (ref 23–33)
CREAT SERPL-MCNC: 1.2 MG/DL (ref 0.4–1.2)
GFR SERPL CREATININE-BSD FRML MDRD: 57 ML/MIN/1.73M2
GLUCOSE BLD-MCNC: 67 MG/DL (ref 70–108)
POTASSIUM SERPL-SCNC: 3.7 MEQ/L (ref 3.5–5.2)
SODIUM BLD-SCNC: 145 MEQ/L (ref 135–145)

## 2020-09-02 PROCEDURE — 6370000000 HC RX 637 (ALT 250 FOR IP): Performed by: STUDENT IN AN ORGANIZED HEALTH CARE EDUCATION/TRAINING PROGRAM

## 2020-09-02 PROCEDURE — 88313 SPECIAL STAINS GROUP 2: CPT

## 2020-09-02 PROCEDURE — 8E0W4CZ ROBOTIC ASSISTED PROCEDURE OF TRUNK REGION, PERCUTANEOUS ENDOSCOPIC APPROACH: ICD-10-PCS | Performed by: SURGERY

## 2020-09-02 PROCEDURE — 6370000000 HC RX 637 (ALT 250 FOR IP): Performed by: FAMILY MEDICINE

## 2020-09-02 PROCEDURE — 0YU64JZ SUPPLEMENT LEFT INGUINAL REGION WITH SYNTHETIC SUBSTITUTE, PERCUTANEOUS ENDOSCOPIC APPROACH: ICD-10-PCS | Performed by: SURGERY

## 2020-09-02 PROCEDURE — C9113 INJ PANTOPRAZOLE SODIUM, VIA: HCPCS | Performed by: SURGERY

## 2020-09-02 PROCEDURE — 2580000003 HC RX 258: Performed by: SURGERY

## 2020-09-02 PROCEDURE — 2709999900 HC NON-CHARGEABLE SUPPLY: Performed by: SURGERY

## 2020-09-02 PROCEDURE — 7100000001 HC PACU RECOVERY - ADDTL 15 MIN: Performed by: SURGERY

## 2020-09-02 PROCEDURE — 1200000003 HC TELEMETRY R&B

## 2020-09-02 PROCEDURE — 3600000019 HC SURGERY ROBOT ADDTL 15MIN: Performed by: SURGERY

## 2020-09-02 PROCEDURE — 2580000003 HC RX 258: Performed by: REGISTERED NURSE

## 2020-09-02 PROCEDURE — 3600000009 HC SURGERY ROBOT BASE: Performed by: SURGERY

## 2020-09-02 PROCEDURE — 49650 LAP ING HERNIA REPAIR INIT: CPT | Performed by: SURGERY

## 2020-09-02 PROCEDURE — 88307 TISSUE EXAM BY PATHOLOGIST: CPT

## 2020-09-02 PROCEDURE — 44120 REMOVAL OF SMALL INTESTINE: CPT | Performed by: SURGERY

## 2020-09-02 PROCEDURE — C1781 MESH (IMPLANTABLE): HCPCS | Performed by: SURGERY

## 2020-09-02 PROCEDURE — 6360000002 HC RX W HCPCS: Performed by: SURGERY

## 2020-09-02 PROCEDURE — 3700000001 HC ADD 15 MINUTES (ANESTHESIA): Performed by: SURGERY

## 2020-09-02 PROCEDURE — 36415 COLL VENOUS BLD VENIPUNCTURE: CPT

## 2020-09-02 PROCEDURE — 6360000002 HC RX W HCPCS: Performed by: REGISTERED NURSE

## 2020-09-02 PROCEDURE — 2500000003 HC RX 250 WO HCPCS: Performed by: REGISTERED NURSE

## 2020-09-02 PROCEDURE — 0DBA0ZZ EXCISION OF JEJUNUM, OPEN APPROACH: ICD-10-PCS | Performed by: SURGERY

## 2020-09-02 PROCEDURE — 6370000000 HC RX 637 (ALT 250 FOR IP): Performed by: SURGERY

## 2020-09-02 PROCEDURE — S2900 ROBOTIC SURGICAL SYSTEM: HCPCS | Performed by: SURGERY

## 2020-09-02 PROCEDURE — 6360000002 HC RX W HCPCS

## 2020-09-02 PROCEDURE — 2580000003 HC RX 258: Performed by: FAMILY MEDICINE

## 2020-09-02 PROCEDURE — 2720000010 HC SURG SUPPLY STERILE: Performed by: SURGERY

## 2020-09-02 PROCEDURE — 7100000000 HC PACU RECOVERY - FIRST 15 MIN: Performed by: SURGERY

## 2020-09-02 PROCEDURE — 2500000003 HC RX 250 WO HCPCS: Performed by: SURGERY

## 2020-09-02 PROCEDURE — 3700000000 HC ANESTHESIA ATTENDED CARE: Performed by: SURGERY

## 2020-09-02 PROCEDURE — 80048 BASIC METABOLIC PNL TOTAL CA: CPT

## 2020-09-02 DEVICE — MESH HERN W10XL15CM POLY POLYLACTIC ACID 70% CLLGN 30% GLYC: Type: IMPLANTABLE DEVICE | Site: INGUINAL | Status: FUNCTIONAL

## 2020-09-02 RX ORDER — BUPIVACAINE HYDROCHLORIDE 5 MG/ML
INJECTION, SOLUTION EPIDURAL; INTRACAUDAL PRN
Status: DISCONTINUED | OUTPATIENT
Start: 2020-09-02 | End: 2020-09-02 | Stop reason: ALTCHOICE

## 2020-09-02 RX ORDER — HYDRALAZINE HYDROCHLORIDE 20 MG/ML
5 INJECTION INTRAMUSCULAR; INTRAVENOUS EVERY 5 MIN PRN
Status: DISCONTINUED | OUTPATIENT
Start: 2020-09-02 | End: 2020-09-02 | Stop reason: HOSPADM

## 2020-09-02 RX ORDER — HEPARIN SODIUM 5000 [USP'U]/ML
5000 INJECTION, SOLUTION INTRAVENOUS; SUBCUTANEOUS 2 TIMES DAILY
Status: DISCONTINUED | OUTPATIENT
Start: 2020-09-03 | End: 2020-09-10 | Stop reason: HOSPADM

## 2020-09-02 RX ORDER — LIDOCAINE HYDROCHLORIDE 20 MG/ML
INJECTION, SOLUTION INTRAVENOUS PRN
Status: DISCONTINUED | OUTPATIENT
Start: 2020-09-02 | End: 2020-09-02 | Stop reason: SDUPTHER

## 2020-09-02 RX ORDER — PROPOFOL 10 MG/ML
INJECTION, EMULSION INTRAVENOUS PRN
Status: DISCONTINUED | OUTPATIENT
Start: 2020-09-02 | End: 2020-09-02 | Stop reason: SDUPTHER

## 2020-09-02 RX ORDER — HYDRALAZINE HYDROCHLORIDE 20 MG/ML
INJECTION INTRAMUSCULAR; INTRAVENOUS
Status: COMPLETED
Start: 2020-09-02 | End: 2020-09-02

## 2020-09-02 RX ORDER — NEOSTIGMINE METHYLSULFATE 5 MG/5 ML
SYRINGE (ML) INTRAVENOUS PRN
Status: DISCONTINUED | OUTPATIENT
Start: 2020-09-02 | End: 2020-09-02 | Stop reason: SDUPTHER

## 2020-09-02 RX ORDER — CLINDAMYCIN PHOSPHATE 900 MG/50ML
900 INJECTION INTRAVENOUS EVERY 8 HOURS
Status: COMPLETED | OUTPATIENT
Start: 2020-09-02 | End: 2020-09-03

## 2020-09-02 RX ORDER — DEXTROSE AND SODIUM CHLORIDE 5; .45 G/100ML; G/100ML
INJECTION, SOLUTION INTRAVENOUS CONTINUOUS
Status: DISCONTINUED | OUTPATIENT
Start: 2020-09-02 | End: 2020-09-05

## 2020-09-02 RX ORDER — SODIUM CHLORIDE 9 MG/ML
INJECTION, SOLUTION INTRAVENOUS CONTINUOUS PRN
Status: DISCONTINUED | OUTPATIENT
Start: 2020-09-02 | End: 2020-09-02 | Stop reason: SDUPTHER

## 2020-09-02 RX ORDER — HYDROMORPHONE HCL 110MG/55ML
PATIENT CONTROLLED ANALGESIA SYRINGE INTRAVENOUS PRN
Status: DISCONTINUED | OUTPATIENT
Start: 2020-09-02 | End: 2020-09-02 | Stop reason: SDUPTHER

## 2020-09-02 RX ORDER — FENTANYL CITRATE 50 UG/ML
INJECTION, SOLUTION INTRAMUSCULAR; INTRAVENOUS PRN
Status: DISCONTINUED | OUTPATIENT
Start: 2020-09-02 | End: 2020-09-02 | Stop reason: SDUPTHER

## 2020-09-02 RX ORDER — FENTANYL CITRATE 50 UG/ML
25 INJECTION, SOLUTION INTRAMUSCULAR; INTRAVENOUS EVERY 5 MIN PRN
Status: DISCONTINUED | OUTPATIENT
Start: 2020-09-02 | End: 2020-09-02 | Stop reason: HOSPADM

## 2020-09-02 RX ORDER — SUCCINYLCHOLINE/SOD CL,ISO/PF 100 MG/5ML
SYRINGE (ML) INTRAVENOUS PRN
Status: DISCONTINUED | OUTPATIENT
Start: 2020-09-02 | End: 2020-09-02 | Stop reason: SDUPTHER

## 2020-09-02 RX ORDER — ROCURONIUM BROMIDE 10 MG/ML
INJECTION, SOLUTION INTRAVENOUS PRN
Status: DISCONTINUED | OUTPATIENT
Start: 2020-09-02 | End: 2020-09-02 | Stop reason: SDUPTHER

## 2020-09-02 RX ORDER — GENTAMICIN SULFATE 60 MG/50ML
120 INJECTION, SOLUTION INTRAVENOUS EVERY 8 HOURS
Status: COMPLETED | OUTPATIENT
Start: 2020-09-02 | End: 2020-09-03

## 2020-09-02 RX ORDER — FENTANYL CITRATE 50 UG/ML
INJECTION, SOLUTION INTRAMUSCULAR; INTRAVENOUS
Status: COMPLETED
Start: 2020-09-02 | End: 2020-09-02

## 2020-09-02 RX ORDER — GLYCOPYRROLATE 1 MG/5 ML
SYRINGE (ML) INTRAVENOUS PRN
Status: DISCONTINUED | OUTPATIENT
Start: 2020-09-02 | End: 2020-09-02 | Stop reason: SDUPTHER

## 2020-09-02 RX ADMIN — ROCURONIUM BROMIDE 30 MG: 10 INJECTION INTRAVENOUS at 13:57

## 2020-09-02 RX ADMIN — LIDOCAINE HYDROCHLORIDE 100 MG: 20 INJECTION, SOLUTION INTRAVENOUS at 13:55

## 2020-09-02 RX ADMIN — FENTANYL CITRATE 50 MCG: 50 INJECTION, SOLUTION INTRAMUSCULAR; INTRAVENOUS at 13:55

## 2020-09-02 RX ADMIN — FENTANYL CITRATE 25 MCG: 50 INJECTION, SOLUTION INTRAMUSCULAR; INTRAVENOUS at 16:43

## 2020-09-02 RX ADMIN — AMLODIPINE BESYLATE 5 MG: 5 TABLET ORAL at 08:18

## 2020-09-02 RX ADMIN — Medication 0.8 MG: at 15:35

## 2020-09-02 RX ADMIN — CARVEDILOL 12.5 MG: 25 TABLET, FILM COATED ORAL at 08:18

## 2020-09-02 RX ADMIN — GENTAMICIN SULFATE 300 MG: 40 INJECTION, SOLUTION INTRAMUSCULAR; INTRAVENOUS at 14:05

## 2020-09-02 RX ADMIN — ATORVASTATIN CALCIUM 20 MG: 20 TABLET, FILM COATED ORAL at 20:55

## 2020-09-02 RX ADMIN — FENTANYL CITRATE 50 MCG: 50 INJECTION, SOLUTION INTRAMUSCULAR; INTRAVENOUS at 15:01

## 2020-09-02 RX ADMIN — DEXTROSE AND SODIUM CHLORIDE: 5; 450 INJECTION, SOLUTION INTRAVENOUS at 11:28

## 2020-09-02 RX ADMIN — GENTAMICIN SULFATE 120 MG: 60 INJECTION, SOLUTION INTRAVENOUS at 22:00

## 2020-09-02 RX ADMIN — HYDROMORPHONE HYDROCHLORIDE 0.5 MG: 2 INJECTION INTRAMUSCULAR; INTRAVENOUS; SUBCUTANEOUS at 15:40

## 2020-09-02 RX ADMIN — Medication 4 MG: at 15:32

## 2020-09-02 RX ADMIN — PANTOPRAZOLE SODIUM 40 MG: 40 INJECTION, POWDER, FOR SOLUTION INTRAVENOUS at 08:18

## 2020-09-02 RX ADMIN — Medication 10 ML: at 08:19

## 2020-09-02 RX ADMIN — PROPOFOL 150 MG: 10 INJECTION, EMULSION INTRAVENOUS at 13:55

## 2020-09-02 RX ADMIN — HYDRALAZINE HYDROCHLORIDE 5 MG: 20 INJECTION INTRAMUSCULAR; INTRAVENOUS at 16:22

## 2020-09-02 RX ADMIN — Medication 100 MG: at 13:55

## 2020-09-02 RX ADMIN — FENTANYL CITRATE 50 MCG: 50 INJECTION, SOLUTION INTRAMUSCULAR; INTRAVENOUS at 15:28

## 2020-09-02 RX ADMIN — SODIUM CHLORIDE: 9 INJECTION, SOLUTION INTRAVENOUS at 13:49

## 2020-09-02 RX ADMIN — ACETAMINOPHEN 650 MG: 325 TABLET ORAL at 20:55

## 2020-09-02 RX ADMIN — CLINDAMYCIN IN 5 PERCENT DEXTROSE 900 MG: 18 INJECTION, SOLUTION INTRAVENOUS at 14:04

## 2020-09-02 RX ADMIN — ONDANSETRON 4 MG: 2 INJECTION INTRAMUSCULAR; INTRAVENOUS at 21:27

## 2020-09-02 RX ADMIN — FENTANYL CITRATE 50 MCG: 50 INJECTION, SOLUTION INTRAMUSCULAR; INTRAVENOUS at 14:11

## 2020-09-02 RX ADMIN — CLINDAMYCIN IN 5 PERCENT DEXTROSE 900 MG: 18 INJECTION, SOLUTION INTRAVENOUS at 22:10

## 2020-09-02 RX ADMIN — HYDROMORPHONE HYDROCHLORIDE 1.5 MG: 2 INJECTION INTRAMUSCULAR; INTRAVENOUS; SUBCUTANEOUS at 16:06

## 2020-09-02 ASSESSMENT — PULMONARY FUNCTION TESTS
PIF_VALUE: 22
PIF_VALUE: 2
PIF_VALUE: 13
PIF_VALUE: 14
PIF_VALUE: 19
PIF_VALUE: 23
PIF_VALUE: 15
PIF_VALUE: 15
PIF_VALUE: 14
PIF_VALUE: 14
PIF_VALUE: 15
PIF_VALUE: 12
PIF_VALUE: 3
PIF_VALUE: 1
PIF_VALUE: 16
PIF_VALUE: 15
PIF_VALUE: 3
PIF_VALUE: 15
PIF_VALUE: 24
PIF_VALUE: 14
PIF_VALUE: 24
PIF_VALUE: 23
PIF_VALUE: 24
PIF_VALUE: 2
PIF_VALUE: 1
PIF_VALUE: 24
PIF_VALUE: 12
PIF_VALUE: 15
PIF_VALUE: 4
PIF_VALUE: 12
PIF_VALUE: 13
PIF_VALUE: 15
PIF_VALUE: 11
PIF_VALUE: 19
PIF_VALUE: 25
PIF_VALUE: 14
PIF_VALUE: 2
PIF_VALUE: 25
PIF_VALUE: 2
PIF_VALUE: 22
PIF_VALUE: 24
PIF_VALUE: 25
PIF_VALUE: 24
PIF_VALUE: 4
PIF_VALUE: 24
PIF_VALUE: 22
PIF_VALUE: 1
PIF_VALUE: 3
PIF_VALUE: 1
PIF_VALUE: 25
PIF_VALUE: 24
PIF_VALUE: 22
PIF_VALUE: 12
PIF_VALUE: 2
PIF_VALUE: 0
PIF_VALUE: 20
PIF_VALUE: 24
PIF_VALUE: 24
PIF_VALUE: 25
PIF_VALUE: 9
PIF_VALUE: 12
PIF_VALUE: 13
PIF_VALUE: 24
PIF_VALUE: 15
PIF_VALUE: 24
PIF_VALUE: 3
PIF_VALUE: 14
PIF_VALUE: 26
PIF_VALUE: 21
PIF_VALUE: 2
PIF_VALUE: 2
PIF_VALUE: 13
PIF_VALUE: 22
PIF_VALUE: 2
PIF_VALUE: 0
PIF_VALUE: 20
PIF_VALUE: 15
PIF_VALUE: 15
PIF_VALUE: 14
PIF_VALUE: 18
PIF_VALUE: 25
PIF_VALUE: 24
PIF_VALUE: 14
PIF_VALUE: 0
PIF_VALUE: 24
PIF_VALUE: 12
PIF_VALUE: 1
PIF_VALUE: 25
PIF_VALUE: 17
PIF_VALUE: 24
PIF_VALUE: 23
PIF_VALUE: 24
PIF_VALUE: 24
PIF_VALUE: 23
PIF_VALUE: 25
PIF_VALUE: 2
PIF_VALUE: 13
PIF_VALUE: 2
PIF_VALUE: 22
PIF_VALUE: 15
PIF_VALUE: 1
PIF_VALUE: 1
PIF_VALUE: 13
PIF_VALUE: 15
PIF_VALUE: 14
PIF_VALUE: 1
PIF_VALUE: 24
PIF_VALUE: 25
PIF_VALUE: 15
PIF_VALUE: 2
PIF_VALUE: 0
PIF_VALUE: 14
PIF_VALUE: 25
PIF_VALUE: 14
PIF_VALUE: 12
PIF_VALUE: 24
PIF_VALUE: 14
PIF_VALUE: 15
PIF_VALUE: 4
PIF_VALUE: 23
PIF_VALUE: 12
PIF_VALUE: 15
PIF_VALUE: 1
PIF_VALUE: 14
PIF_VALUE: 15
PIF_VALUE: 3
PIF_VALUE: 15
PIF_VALUE: 22
PIF_VALUE: 12
PIF_VALUE: 23
PIF_VALUE: 1
PIF_VALUE: 15
PIF_VALUE: 15

## 2020-09-02 ASSESSMENT — PAIN SCALES - GENERAL
PAINLEVEL_OUTOF10: 4
PAINLEVEL_OUTOF10: 6
PAINLEVEL_OUTOF10: 2

## 2020-09-02 NOTE — ANESTHESIA POSTPROCEDURE EVALUATION
Stable    Hydration:  Adequate    PONV:  Stable    Post-op Pain:  Adequate analgesia    Post-op Assessment:  No apparent anesthetic complications    Additional Follow-Up / Treatment / Comment:  None    Aliya Trujillo MD  September 2, 2020   5:36 PM

## 2020-09-02 NOTE — FLOWSHEET NOTE
Rounded to patient based on AD report.  knows patient's wife as wife was  at [employer] for many years. We talked about Buddhist for a little bit. Patient shared that he was herea couple week ago due to vomiting and it has returned. Doctors believe he has an bowel obstruction and he will be having surgery. He has not had anything to eat or drink since his admission, which may contribute to how the visit played out. Patient consistently \"talks over\" during conversations.  inquired if he has a HCPOA. Patient became irritated, mentioned  \"living will\" and that he has no need for one.  attempted to explain difference between the two documents, but patient kept talking over  and insisted he knows what a living will is and he doesn't needs one as his wife will make his decisions until he dies and he won't sign a document that would make him die sooner.  again attempted to distinguish between the two documents and why the HCPOA is important for all adults. Patient would not allow  to complete a sentence and provide AD education. At this point, patient escalated from irritation to anger and told  to leave the room .  complied and wished patient a pleasant evening.

## 2020-09-02 NOTE — PLAN OF CARE
Problem: Skin Integrity:  Goal: Absence of new skin breakdown  Description: Absence of new skin breakdown  Outcome: Ongoing  Note: Patient has DTI on coccyx. Patient turns self (encouraging patient to turn) and makes frequent positional changes. Will continue to monitor. Problem: Bowel/Gastric:  Goal: Control of bowel function will improve  Description: Control of bowel function will improve  Outcome: Ongoing  Note: Patient admitted for SBO. NG tube in place. Patient had ex lap surgery today. Problem: Discharge Planning:  Goal: Discharged to appropriate level of care  Description: Discharged to appropriate level of care  Outcome: Ongoing  Note: Patient plans to be discharged to home with wife when medically stable. Problem: Fluid Volume - Deficit:  Goal: Absence of fluid volume deficit signs and symptoms  Description: Absence of fluid volume deficit signs and symptoms  Outcome: Ongoing  Note: Patient NPO. IV fluids infusing. Monitoring intake and output every shift. Problem: Pain:  Goal: Pain level will decrease  Description: Pain level will decrease  Outcome: Ongoing  Note: Patient free from pain this shift. Pain rated on 0-10 pain rating scale. Will continue to reassess. Problem: Falls - Risk of:  Goal: Will remain free from falls  Description: Will remain free from falls  Outcome: Ongoing  Note: Call light within reach. Side rails up x2. Bed alarm on. Non skid slippers available. Problem: Nutrition  Goal: Optimal nutrition therapy  Outcome: Ongoing  Note: Patient NPO diet. Receiving IV fluids. Will continue to monitor intake and output. Care plan reviewed with patient and wife. Patient and wife verbalize understanding of the plan of care and contribute to goal setting.

## 2020-09-02 NOTE — BRIEF OP NOTE
Brief Postoperative Note      Patient: Maulik Roldan  YOB: 1933  MRN: [id number]    Date of Procedure: 9/2/2020    Pre-Op Diagnosis: SMALL BOWEL OBSTRUCTION, left inguinal herniaq    Post-Op Diagnosis: Same with small bowel obstruction secondary to multiple proximal jejunal giant diverticula       Procedure(s):  ROBOTIC LEFT INGUINAL HERNIA REPAIR WITH MESH,ROBOTIC EXPLORATORY LAPAROSCOPY CONVERT TO OPEN, SMALL BOWEL RESECTION    Surgeon(s):  Caridad Núñez MD    Assistant:  First Assistant: James Lundy RN    Anesthesia: General    Estimated Blood Loss (mL): less than 50     Complications: None    Specimens:   ID Type Source Tests Collected by Time Destination   A : JEJUNUM, SMALL BOWEL Tissue Jejunum 1 Mt Stella Busch MD 9/2/2020 1518        Implants:  Implant Name Type Inv.  Item Serial No.  Lot No. LRB No. Used Action   MESH PROGRIP LAPSCP Mesh MESH PROGRIP LAPSCP  [employer] [id number] Left 1 Implanted         Drains:   NG/OG/NJ/NE Tube Nasogastric 16 fr Right nostril (Active)   Surrounding Skin Intact 09/01/20 1615   Securement device Yes 09/01/20 1615   Status Suction-low intermittent 09/01/20 1615   Drainage Appearance Mucous shreds;Brown;Green 08/31/20 2357   Output (mL) 250 ml 09/02/20 0444           Electronically signed by Caridad Núñez MD on 9/2/2020 at 3:35 PM

## 2020-09-02 NOTE — ANESTHESIA PRE PROCEDURE
Department of Anesthesiology  Preprocedure Note       Name:  Nan Mcmahon   Age:  80 y.o.  :  1933                                          MRN:  [id number]         Date:  2020      Surgeon: Lory Low):  Abbi Pfeiffer MD    Procedure: Procedure(s):  LAPAROSCOPY ROBOTIC  ROBOTIC LEFT INGUINAL HERNIA REPAIR, POSS BOWEL RESECTION    Medications prior to admission:   Prior to Admission medications    Medication Sig Start Date End Date Taking? Authorizing Provider   carvedilol (COREG) 12.5 MG tablet TAKE 1 TABLET BY MOUTH TWICE DAILY 20  Yes Stef Leblanc MD   ondansetron (ZOFRAN) 4 MG tablet Take 1 tablet by mouth 3 times daily as needed for Nausea or Vomiting 20  Yes Stef Leblanc MD   lisinopril-hydroCHLOROthiazide (PRINZIDE;ZESTORETIC) 20-12.5 MG per tablet TAKE 1 TABLET BY MOUTH ONCE DAILY 20  Yes Stef Leblanc MD   simvastatin (ZOCOR) 40 MG tablet TAKE 1 TABLET BY MOUTH EVERY NIGHT AT BEDTIME 20  Yes Stef Leblanc MD   tamsulosin (FLOMAX) 0.4 MG capsule TAKE 1 CAPSULE BY MOUTH EVERY DAY  Patient taking differently: TAKE 1 CAPSULE BY MOUTH EVERY DAY nightly 20  Yes Stef Leblanc MD   multivitamin [facility]) per tablet Take 1 tablet by mouth daily. Yes Historical Provider, MD   aspirin 81 MG EC tablet Take 81 mg by mouth daily. Historical Provider, MD   acetaminophen (TYLENOL) 500 MG tablet Take 500 mg by mouth every 6 hours as needed.       Historical Provider, MD       Current medications:    Current Facility-Administered Medications   Medication Dose Route Frequency Provider Last Rate Last Dose    dextrose 5 % and 0.45 % sodium chloride infusion   Intravenous Continuous Stef Leblanc  mL/hr at 20 1128      amLODIPine (NORVASC) tablet 5 mg  5 mg Oral Daily Stef Leblanc MD   5 mg at 20 0818    clindamycin (CLEOCIN) 900 mg in dextrose 5 % 50 mL IVPB  900 mg Intravenous On Call to Carla Corey MD       [facility] gentamicin (GARAMYCIN) 300 mg in dextrose 5 % 250 mL IVPB  300 mg Intravenous On Call to Fracisco Collins MD        pantoprazole (PROTONIX) injection 40 mg  40 mg Intravenous Daily James Pino MD   40 mg at 09/02/20 0818    And    sodium chloride (PF) 0.9 % injection 10 mL  10 mL Intravenous Daily James Pino MD   10 mL at 09/02/20 0819    aspirin EC tablet 81 mg  81 mg Oral Daily Cherise Garcia MD   81 mg at 09/01/20 0909    carvedilol (COREG) tablet 12.5 mg  12.5 mg Oral BID WC Cherise Garcia MD   12.5 mg at 09/02/20 0818    atorvastatin (LIPITOR) tablet 20 mg  20 mg Oral Daily Cherise Garcia MD   20 mg at 09/01/20 2206    tamsulosin (FLOMAX) capsule 0.4 mg  0.4 mg Oral Daily Cherise Garcia MD   0.4 mg at 09/01/20 0904    sodium chloride flush 0.9 % injection 10 mL  10 mL Intravenous 2 times per day Cherise Garcia MD        sodium chloride flush 0.9 % injection 10 mL  10 mL Intravenous PRN Cherise Garcia MD   10 mL at 09/01/20 0905    acetaminophen (TYLENOL) tablet 650 mg  650 mg Oral Q6H PRN Cherise Garcia MD        Or   Martini acetaminophen (TYLENOL) suppository 650 mg  650 mg Rectal Q6H PRN Cherise Garcia MD        polyethylene glycol (GLYCOLAX) packet 17 g  17 g Oral Daily PRN Cherise Garcia MD        promethazine (PHENERGAN) tablet 12.5 mg  12.5 mg Oral Q6H PRN Cherise Garcia MD        Or    ondansetron (ZOFRAN) injection 4 mg  4 mg Intravenous Q6H PRN Cherise Garcia MD        [Held by provider] lisinopril (PRINIVIL;ZESTRIL) tablet 20 mg  20 mg Oral Daily Cherise Garcia MD        [Held by provider] hydroCHLOROthiazide (HYDRODIURIL) tablet 12.5 mg  12.5 mg Oral Daily Cherise Garcia MD        magnesium replacement protocol   Other RX Reece Meza MD        phosphorus replacement protocol   Other RX Placeholder Cherise Garcia MD        hydrALAZINE (APRESOLINE) tablet 25 mg  25 mg Oral Q6H PRN Cherise Garcia MD   25 mg at 09/01/20 0904    heparin (porcine) injection 5,000 Units  5,000 Units Pulse: 61 69 67 62   Resp: 16 16 16 16   Temp: 36.5 °C (97.7 °F) 36.7 °C (98.1 °F) 36.6 °C (97.9 °F) 36.8 °C (98.3 °F)   TempSrc: Oral Oral Oral Oral   SpO2: 95% 96% 97% 95%   Weight:  133 lb 1.6 oz (60.4 kg)     Height:                                                  BP Readings from Last 3 Encounters:   09/02/20 (!) 147/70   08/07/20 (!) 154/72   01/22/20 (!) 188/90       NPO Status:                                                                                 BMI:   Wt Readings from Last 3 Encounters:   09/02/20 133 lb 1.6 oz (60.4 kg)   08/06/20 142 lb 3.2 oz (64.5 kg)   01/22/20 144 lb 12.8 oz (65.7 kg)     Body mass index is 20.85 kg/m². CBC:   Lab Results   Component Value Date    WBC 5.6 09/01/2020    RBC 3.72 09/01/2020    HGB 11.4 09/01/2020    HCT 34.9 09/01/2020    MCV 93.8 09/01/2020     09/01/2020       CMP:   Lab Results   Component Value Date     09/02/2020    K 3.7 09/02/2020    K 4.0 08/31/2020     09/02/2020    CO2 20 09/02/2020    BUN 33 09/02/2020    CREATININE 1.2 09/02/2020    LABGLOM 57 09/02/2020    GLUCOSE 67 09/02/2020    PROT 6.2 08/31/2020    CALCIUM 8.3 09/02/2020    BILITOT 0.6 08/31/2020    ALKPHOS 82 08/31/2020    AST 18 08/31/2020    ALT 11 08/31/2020       POC Tests: No results for input(s): POCGLU, POCNA, POCK, POCCL, POCBUN, POCHEMO, POCHCT in the last 72 hours.     Coags: No results found for: PROTIME, INR, APTT    HCG (If Applicable): No results found for: PREGTESTUR, PREGSERUM, HCG, HCGQUANT     ABGs: No results found for: PHART, PO2ART, JKT2SBP, NJR1ZCV, BEART, Z5KCXHPB     Type & Screen (If Applicable):  No results found for: LABABO, LABRH    Drug/Infectious Status (If Applicable):  No results found for: HIV, HEPCAB    COVID-19 Screening (If Applicable):   Lab Results   Component Value Date    COVID19 NOT DETECTED 09/01/2020         Anesthesia Evaluation  Patient summary reviewed and Nursing notes reviewed  Airway: Mallampati: II  TM distance: >3 FB Mouth opening: > = 3 FB Dental:          Pulmonary:Negative Pulmonary ROS and normal exam                               Cardiovascular:  Exercise tolerance: good (>4 METS),   (+) hypertension: no interval change, CAD: no interval change, CABG/stent: no interval change,       ECG reviewed      Echocardiogram reviewed  Stress test reviewed       Beta Blocker:  Not on Beta Blocker         Neuro/Psych:   (+) TIA,             GI/Hepatic/Renal: Neg GI/Hepatic/Renal ROS            Endo/Other: Negative Endo/Other ROS                    Abdominal:           Vascular: negative vascular ROS. Anesthesia Plan      general     ASA 3       Induction: intravenous. MIPS: Postoperative opioids intended and Prophylactic antiemetics administered. Anesthetic plan and risks discussed with patient and spouse. Use of blood products discussed with patient and spouse whom. Plan discussed with attending.                   Lela Aleman, APRN - CRNA   9/2/2020

## 2020-09-02 NOTE — CARE COORDINATION
9/2/20, 9:34 AM EDT    DISCHARGE ON GOING Cecilio Kramer day: 3  Location: [facility]17/017-A Reason for admit: Small bowel obstruction, partial ([facility] [address] [K56.600]   Procedure: planning robotic surgery today with Dr Danni Tam of Care: hold Heparin and ASA, NPO after MN, consent for OR,  NGT conts LIWS, I/O,  nausea control, monitor all stools, IVF, Protonix IV. Barriers to Discharge: for OR today  PCP: Lucero Tirado MD  Readmission Risk Score: 18%  Patient Goals/Plan/Treatment Preferences: from home with spouse; declines HH or needs. Will follow for post op needs.

## 2020-09-02 NOTE — PROGRESS NOTES
1604 Pt transferred to PACU, see flow sheet for assessment. 1622 BP remains elevated. Pt will not state yes or no if he is having pain but has been resting comfortably. 1623 Pt able to awaken on his own, breathing even/unlabored. Pt reoriented to place/situation. After conversation, pt dozed back to sleep. 12 Pt oriented to his name and age, needed reoriented to situation. Denies pain. 1640 Report called to [facility] RN. 5 Pt agrees his abdomen hurts and is restless, see MAR.   1700 Pt sleeping, RR 17.   1704 Pt transferred to , update given to [facility] re: meds given.

## 2020-09-02 NOTE — PROGRESS NOTES
Mayur Le is a 80 y.o. male patient.     Current Facility-Administered Medications   Medication Dose Route Frequency Provider Last Rate Last Dose    amLODIPine (NORVASC) tablet 5 mg  5 mg Oral Daily Ayana Velasco MD   5 mg at 09/01/20 0918    clindamycin (CLEOCIN) 900 mg in dextrose 5 % 50 mL IVPB  900 mg Intravenous On Call to [address], MD        gentamicin (GARAMYCIN) 300 mg in dextrose 5 % 250 mL IVPB  300 mg Intravenous On Call to [address], MD        pantoprazole (PROTONIX) injection 40 mg  40 mg Intravenous Daily Kelsey Crawford MD   40 mg at 09/01/20 1618    And    sodium chloride (PF) 0.9 % injection 10 mL  10 mL Intravenous Daily Kelsey Crawford MD   10 mL at 09/01/20 1619    aspirin EC tablet 81 mg  81 mg Oral Daily Micheline Groves MD   81 mg at 09/01/20 0909    carvedilol (COREG) tablet 12.5 mg  12.5 mg Oral BID WC Micheline Groves MD   12.5 mg at 09/01/20 1625    atorvastatin (LIPITOR) tablet 20 mg  20 mg Oral Daily Micheline Groves MD   20 mg at 09/01/20 2206    tamsulosin (FLOMAX) capsule 0.4 mg  0.4 mg Oral Daily Micheline Groves MD   0.4 mg at 09/01/20 0904    sodium chloride flush 0.9 % injection 10 mL  10 mL Intravenous 2 times per day Micheline Groves MD        sodium chloride flush 0.9 % injection 10 mL  10 mL Intravenous PRN Micheline Groves MD   10 mL at 09/01/20 0905    acetaminophen (TYLENOL) tablet 650 mg  650 mg Oral Q6H PRN Micheline Groves MD        Or   Claudio Lawson acetaminophen (TYLENOL) suppository 650 mg  650 mg Rectal Q6H PRN Micheline Groves MD        polyethylene glycol (GLYCOLAX) packet 17 g  17 g Oral Daily PRN Micheline Groves MD        promethazine (PHENERGAN) tablet 12.5 mg  12.5 mg Oral Q6H PRN Micheline Groves MD        Or    ondansetron (ZOFRAN) injection 4 mg  4 mg Intravenous Q6H PRN Micheline Groves MD        0.9 % sodium chloride infusion   Intravenous Continuous Micheline Groves  mL/hr at 08/31/20 2027      [Held by provider] lisinopril (PRINIVIL;ZESTRIL) tablet 20 mg  20 mg Oral Daily Harris Martinez MD        [Held by provider] hydroCHLOROthiazide (HYDRODIURIL) tablet 12.5 mg  12.5 mg Oral Daily Harris Martinez MD        magnesium replacement protocol   Other RX Jude Herman MD        phosphorus replacement protocol   Other RX Placeholder Harris Martinez MD        hydrALAZINE (APRESOLINE) tablet 25 mg  25 mg Oral Q6H PRN Harris Martinez MD   25 mg at 09/01/20 7963    heparin (porcine) injection 5,000 Units  5,000 Units Subcutaneous BID Sakshi Avitia MD   5,000 Units at 09/01/20 2206    melatonin tablet 3 mg  3 mg Oral Nightly PRN [employer], PA-C   3 mg at 09/01/20 2206     Allergies   Allergen Reactions    Pcn [Penicillins] Anaphylaxis    Ibuprofen     Percocet [Oxycodone-Acetaminophen] Nausea And Vomiting     Active Problems:    Small bowel obstruction (HCC)    Intractable nausea and vomiting    ANJANA (acute kidney injury) ([facility]    Severe malnutrition ([facility]  Resolved Problems:    * No resolved hospital problems. *    Blood pressure (!) 132/58, pulse 69, temperature 98.1 °F (36.7 °C), temperature source Oral, resp. rate 16, height 5' 7\" (1.702 m), weight 133 lb 1.6 oz (60.4 kg), SpO2 96 %. Subjective:  Symptoms:  Stable. Diet:  NPO. Activity level: Returning to normal.    Pain:  He reports no pain. Objective:  General Appearance:  Comfortable (ng in place). Vital signs: (most recent): Blood pressure (!) 132/58, pulse 69, temperature 98.1 °F (36.7 °C), temperature source Oral, resp. rate 16, height 5' 7\" (1.702 m), weight 133 lb 1.6 oz (60.4 kg), SpO2 96 %. Vital signs are normal.    Output: Producing urine and producing stool. HEENT: Normal HEENT exam.    Lungs:  Normal effort and normal respiratory rate. Breath sounds clear to auscultation. Heart: Normal rate. Regular rhythm. S1 normal and S2 normal.  No murmur. Abdomen: Abdomen is soft and non-distended. ( Left lower inguinal area with hernia).   Bowel sounds are normal.   There is no abdominal tenderness. Pulses: Distal pulses are intact. Neurological: Patient is alert and oriented to person, place and time. Patient has normal reflexes and normal muscle tone. Assessment:    Condition: In stable condition. Improving.   ( Partial sbo  Noted and for KUB and ct scan  no definite  Obstruction     surgery following and ct scan no definite evidence     left lower quad small hernia and episode occurred after lifting so question hernia  The issue     ashd  Cardio saw and felt stable      pvc seem stable with meds      ANJANA  Lila with hydration and holding the ace inhibitors      htn stable      bph  Sable ). Plan:   Out of bed and up to chair. Consults: general surgery. NPO. Administer medications as ordered. ( KUB  This  Am      hope to start diet      surgery aware  Of the hernia probable cause of intermittent obstruction     hydration as kidney function almost  normal).        Marina Solorzano MD  9/2/2020

## 2020-09-02 NOTE — OP NOTE
[address] [id number]                                OPERATIVE REPORT    PATIENT NAME: Ade Ellington                       :        1933  MED REC NO:   [id number]                           ROOM:  ACCOUNT NO:   [de-identified]                           ADMIT DATE: 2020  PROVIDER:     Chantale Tuttle M.D.    DATE OF PROCEDURE:  2020    PREOPERATIVE DIAGNOSES:  1. Recurrent small bowel obstruction. 2.  Reducible left inguinal hernia. POSTOPERATIVE DIAGNOSES:  1. 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 2.  Indirect left inguinal hernia with sigmoid colon, reducible. PROCEDURES:  1.  Robotic-assisted laparoscopic repair of left inguinal hernia with  ProGrip mesh. 2.  Laparoscopy with conversion to laparotomy and resection of 20 inches  of proximal small bowel with multiple giant diverticula. SURGEON:  Chantale Tuttle MD    ANESTHESIA:  General.    ESTIMATED BLOOD LOSS:  Less than 100 mL. SPECIMEN:  Small bowel to Pathology    INDICATIONS:  The patient is an 80-year-old male who had no prior  abdominal surgeries, who was admitted for the second time with signs and  symptoms of small bowel obstruction. Imaging studies showed no discrete  transition. He had documented multiple diverticula throughout the  colon. He did not really improve, so laparoscopy and possible  laparotomy were recommended. He also had an inguinal hernia which if  feasible we would fix at that time. The inguinal hernia was not the  site of the obstruction. Risks of procedure were discussed with the  patient and he agreed to proceed with surgery and all questions  answered. DESCRIPTION OF PROCEDURE:  The patient was brought to the operating  room, placed supine on the operating table with pneumatic sequential  compression devices on the lower extremities.   He was given clindamycin  and gentamicin without complication. After induction of general  anesthesia, the abdomen was clipped, prepped and draped. He had small  umbilical fascial defect. Skin incision was made there and the  abdominal cavity was entered with a hemostat. 8-mm Xi port was inserted  and CO2 pneumoperitoneum was introduced followed by the camera. The  patient had a left inguinal hernia evident, it was not incarcerated and  not the site of obstruction. Additional 8-mm ports were placed in the  mid axillary line just above the level of the umbilicus. An assistant  port was placed in the left upper quadrant 5-mm in size. The Xi robot  was then docked and instruments were inserted. The left inguinal hernia  was repaired first.  Peritoneum was opened. Hernia sac was reduced away  from the cord structures. Reggie's ligament was explored medially. Femoral vessels exposed as well. A ProGrip mesh 10 x 15 cm in size was  placed and covered direct, indirect and in femoral spaces. It was  secured to Reggie's ligament, superomedially and superolaterally to the  abdominal wall. Peritoneum was closed using a running V-Loc suture. Small bowel was then run laparoscopically. There was no real transition  point, no obvious initial mechanical obstruction. Upon approaching the  proximal ileum running the proximal jejunum running backwards from the  terminal ileum, began to encounter multiple giant diverticula. This was  likely the etiology for some of his symptoms and would require  conversion to laparotomy which was completed to further evaluate. Instruments were removed. Robot was then undocked. I scrubbed back  into the patient's bedside. The laparotomy in the midline was made. The abdominal cavity was entered. The small bowel again was then run  from distally to proximally.   There was at least six or seven giant  diverticula in the proximal small bowel, which could be causing issues  with intermittent obstruction, causing twisting of the bowel or  impaction of food. This segment was resected. The bowel was divided  between the most proximal and distal diverticulum, divided with two  loads of TLC-75 stapling device. The mesentery was serially clamped,  divided and ligated. The functional end-to-end and side-to-side stapled  anastomosis was completed between the two ends of the jejunum. The  enterotomy was closed with TX 60 stapling device. The defect in the  mesentery was closed with interrupted Vicryl suture. Sponge, sharp, and  instrument counts were correct. The midline fascia was closed with  running looped PDS. All skin incisions were closed with subcuticular  4-0 Vicryl suture followed by application of skin glue. The patient  remained hemodynamically stable, was transported to the recovery area in  stable condition. Jai Ramirez M.D.    D: 09/02/2020 15:48:01       T: 09/02/2020 15:54:52     [email or web address]  Job#: [id number]     Doc#: [id number]    CC:  Dwight Genao. EVETTE Rey M.D.

## 2020-09-03 LAB
ANION GAP SERPL CALCULATED.3IONS-SCNC: 9 MEQ/L (ref 8–16)
BUN BLDV-MCNC: 26 MG/DL (ref 7–22)
CALCIUM SERPL-MCNC: 8 MG/DL (ref 8.5–10.5)
CHLORIDE BLD-SCNC: 108 MEQ/L (ref 98–111)
CO2: 23 MEQ/L (ref 23–33)
CREAT SERPL-MCNC: 1.2 MG/DL (ref 0.4–1.2)
ERYTHROCYTE [DISTWIDTH] IN BLOOD BY AUTOMATED COUNT: 12.8 % (ref 11.5–14.5)
ERYTHROCYTE [DISTWIDTH] IN BLOOD BY AUTOMATED COUNT: 42.5 FL (ref 35–45)
GFR SERPL CREATININE-BSD FRML MDRD: 57 ML/MIN/1.73M2
GLUCOSE BLD-MCNC: 202 MG/DL (ref 70–108)
HCT VFR BLD CALC: 33.9 % (ref 42–52)
HEMOGLOBIN: 11.5 GM/DL (ref 14–18)
MCH RBC QN AUTO: 31.3 PG (ref 26–33)
MCHC RBC AUTO-ENTMCNC: 33.9 GM/DL (ref 32.2–35.5)
MCV RBC AUTO: 92.1 FL (ref 80–94)
PLATELET # BLD: 209 THOU/MM3 (ref 130–400)
PMV BLD AUTO: 10.5 FL (ref 9.4–12.4)
POTASSIUM SERPL-SCNC: 4.2 MEQ/L (ref 3.5–5.2)
RBC # BLD: 3.68 MILL/MM3 (ref 4.7–6.1)
SODIUM BLD-SCNC: 140 MEQ/L (ref 135–145)
WBC # BLD: 8.2 THOU/MM3 (ref 4.8–10.8)

## 2020-09-03 PROCEDURE — 2580000003 HC RX 258: Performed by: SURGERY

## 2020-09-03 PROCEDURE — 6370000000 HC RX 637 (ALT 250 FOR IP): Performed by: SURGERY

## 2020-09-03 PROCEDURE — C9113 INJ PANTOPRAZOLE SODIUM, VIA: HCPCS | Performed by: SURGERY

## 2020-09-03 PROCEDURE — 80048 BASIC METABOLIC PNL TOTAL CA: CPT

## 2020-09-03 PROCEDURE — 99024 POSTOP FOLLOW-UP VISIT: CPT | Performed by: SURGERY

## 2020-09-03 PROCEDURE — 36415 COLL VENOUS BLD VENIPUNCTURE: CPT

## 2020-09-03 PROCEDURE — 6360000002 HC RX W HCPCS: Performed by: SURGERY

## 2020-09-03 PROCEDURE — 2500000003 HC RX 250 WO HCPCS: Performed by: SURGERY

## 2020-09-03 PROCEDURE — 1200000003 HC TELEMETRY R&B

## 2020-09-03 PROCEDURE — 85027 COMPLETE CBC AUTOMATED: CPT

## 2020-09-03 RX ORDER — MORPHINE SULFATE 4 MG/ML
4 INJECTION, SOLUTION INTRAMUSCULAR; INTRAVENOUS
Status: DISCONTINUED | OUTPATIENT
Start: 2020-09-03 | End: 2020-09-03

## 2020-09-03 RX ORDER — MORPHINE SULFATE 2 MG/ML
2 INJECTION, SOLUTION INTRAMUSCULAR; INTRAVENOUS
Status: DISCONTINUED | OUTPATIENT
Start: 2020-09-03 | End: 2020-09-03

## 2020-09-03 RX ADMIN — DEXTROSE AND SODIUM CHLORIDE: 5; 450 INJECTION, SOLUTION INTRAVENOUS at 16:14

## 2020-09-03 RX ADMIN — GENTAMICIN SULFATE 120 MG: 60 INJECTION, SOLUTION INTRAVENOUS at 06:00

## 2020-09-03 RX ADMIN — HEPARIN SODIUM 5000 UNITS: 5000 INJECTION INTRAVENOUS; SUBCUTANEOUS at 09:04

## 2020-09-03 RX ADMIN — CLINDAMYCIN IN 5 PERCENT DEXTROSE 900 MG: 18 INJECTION, SOLUTION INTRAVENOUS at 06:46

## 2020-09-03 RX ADMIN — MORPHINE SULFATE 4 MG: 4 INJECTION, SOLUTION INTRAMUSCULAR; INTRAVENOUS at 12:37

## 2020-09-03 RX ADMIN — ONDANSETRON 4 MG: 2 INJECTION INTRAMUSCULAR; INTRAVENOUS at 16:15

## 2020-09-03 RX ADMIN — AMLODIPINE BESYLATE 5 MG: 5 TABLET ORAL at 09:03

## 2020-09-03 RX ADMIN — MORPHINE SULFATE 2 MG: 2 INJECTION, SOLUTION INTRAMUSCULAR; INTRAVENOUS at 08:30

## 2020-09-03 RX ADMIN — TAMSULOSIN HYDROCHLORIDE 0.4 MG: 0.4 CAPSULE ORAL at 09:03

## 2020-09-03 RX ADMIN — PANTOPRAZOLE SODIUM 40 MG: 40 INJECTION, POWDER, FOR SOLUTION INTRAVENOUS at 09:09

## 2020-09-03 RX ADMIN — CARVEDILOL 12.5 MG: 25 TABLET, FILM COATED ORAL at 09:08

## 2020-09-03 RX ADMIN — HYDROMORPHONE HYDROCHLORIDE 0.25 MG: 1 INJECTION, SOLUTION INTRAMUSCULAR; INTRAVENOUS; SUBCUTANEOUS at 16:14

## 2020-09-03 RX ADMIN — CARVEDILOL 12.5 MG: 25 TABLET, FILM COATED ORAL at 18:07

## 2020-09-03 RX ADMIN — Medication 10 ML: at 09:24

## 2020-09-03 RX ADMIN — MORPHINE SULFATE 2 MG: 2 INJECTION, SOLUTION INTRAMUSCULAR; INTRAVENOUS at 10:32

## 2020-09-03 RX ADMIN — ACETAMINOPHEN 650 MG: 325 TABLET ORAL at 06:19

## 2020-09-03 RX ADMIN — HEPARIN SODIUM 5000 UNITS: 5000 INJECTION INTRAVENOUS; SUBCUTANEOUS at 20:37

## 2020-09-03 RX ADMIN — ATORVASTATIN CALCIUM 20 MG: 20 TABLET, FILM COATED ORAL at 20:37

## 2020-09-03 RX ADMIN — ONDANSETRON 4 MG: 2 INJECTION INTRAMUSCULAR; INTRAVENOUS at 07:11

## 2020-09-03 RX ADMIN — Medication 3 MG: at 20:09

## 2020-09-03 RX ADMIN — HYDROMORPHONE HYDROCHLORIDE 0.5 MG: 1 INJECTION, SOLUTION INTRAMUSCULAR; INTRAVENOUS; SUBCUTANEOUS at 20:07

## 2020-09-03 ASSESSMENT — PAIN DESCRIPTION - ORIENTATION
ORIENTATION: MID

## 2020-09-03 ASSESSMENT — PAIN DESCRIPTION - LOCATION
LOCATION: ABDOMEN
LOCATION: HEAD;ABDOMEN

## 2020-09-03 ASSESSMENT — PAIN SCALES - GENERAL
PAINLEVEL_OUTOF10: 7
PAINLEVEL_OUTOF10: 5
PAINLEVEL_OUTOF10: 10
PAINLEVEL_OUTOF10: 6
PAINLEVEL_OUTOF10: 5
PAINLEVEL_OUTOF10: 6
PAINLEVEL_OUTOF10: 5
PAINLEVEL_OUTOF10: 6
PAINLEVEL_OUTOF10: 4
PAINLEVEL_OUTOF10: 7

## 2020-09-03 ASSESSMENT — PAIN DESCRIPTION - PAIN TYPE: TYPE: ACUTE PAIN;SURGICAL PAIN

## 2020-09-03 NOTE — PROGRESS NOTES
Sherri Styles  Postoperative Progress Note    Pt Name: Rafael Nolasco  Medical Record Number: [id number]  Date of Birth 6/28/1933   Today's Date: 9/3/2020    Erika Oliveira is doing well. Pain is not well controlled at this point he is only taken Tylenol. Morphine ordered. He continues to complain of insomnia. Has melatonin ordered. OBJECTIVE  VITALS: VITALS:  BP (!) 180/76   Pulse 68   Temp 98 °F (36.7 °C) (Oral)   Resp 18   Ht 5' 7\" (1.702 m)   Wt 133 lb 1.6 oz (60.4 kg)   SpO2 96%   BMI 20.85 kg/m²   GENERAL: alert, cooperative, no acute distress  LUNGS: Clear no rhonchi or wheezing  HEART: Normal heart rate  ABDOMEN: Soft incisional tenderness no distention iNCISION: clean, dry and intact  EXTERMITY: no cyanosis or edema  INTAKE/OUTPUT :   INTAKE/OUTPUT:    Intake/Output Summary (Last 24 hours) at 9/3/2020 1046  Last data filed at 9/3/2020 0424  Gross per 24 hour   Intake 3676.24 ml   Output 1875 ml   Net 1801.24 ml        LABS  CBC with Differential:    Lab Results   Component Value Date    WBC 8.2 09/03/2020    RBC 3.68 09/03/2020    HGB 11.5 09/03/2020    HCT 33.9 09/03/2020     09/03/2020    MCV 92.1 09/03/2020    MCH 31.3 09/03/2020    MCHC 33.9 09/03/2020    NRBC 0 09/01/2020    SEGSPCT 80.6 09/01/2020    MONOPCT 7.6 09/01/2020    MONOSABS 0.4 09/01/2020    LYMPHSABS 0.3 09/01/2020    EOSABS 0.3 09/01/2020    BASOSABS 0.0 09/01/2020     BMP:    Lab Results   Component Value Date     09/03/2020    K 4.2 09/03/2020    K 4.0 08/31/2020     09/03/2020    CO2 23 09/03/2020    BUN 26 09/03/2020    LABALBU 3.5 08/31/2020    CREATININE 1.2 09/03/2020    CALCIUM 8.0 09/03/2020    LABGLOM 57 09/03/2020    GLUCOSE 202 09/03/2020         RADIOLOGY:     ASSESSMENT  1. POD #1 robotic assisted laparoscopic left inguinal hernia repair and exploratory laparotomy with small bowel resection for giant multiple ileal diverticula  2.  Acute on chronic renal insufficiency improving    PLAN  1. Encourage activity  2. VTE: heparin  3. Stress ulcer prophylaxis: PPI  4.  Trial clamping NG tube    Alferd Libman, MD  Electronically signed 9/3/2020 at 8:51 AM

## 2020-09-03 NOTE — PLAN OF CARE
Problem: Skin Integrity:  Goal: Will show no infection signs and symptoms  Description: Will show no infection signs and symptoms  Outcome: Ongoing  Note: IV antibiotics continue. Afebrile. Monitoring labs. Goal: Absence of new skin breakdown  Description: Absence of new skin breakdown  9/3/2020 0300 by Rodrigo Valdez RN  Outcome: Ongoing  Note: No skin break down noted at this time. Encouraged patient to reposition self in bed.    9/2/2020 1730 by Carol Oneill RN  Outcome: Ongoing  Note: Patient has DTI on coccyx. Patient turns self (encouraging patient to turn) and makes frequent positional changes. Will continue to monitor. Problem: Bowel/Gastric:  Goal: Control of bowel function will improve  Description: Control of bowel function will improve  9/3/2020 0300 by Rodrigo Valdez RN  Outcome: Ongoing  Note: No BM this shift. Bowel resection 9/2.  9/2/2020 1730 by Carol Oneill RN  Outcome: Ongoing  Note: Patient admitted for SBO. NG tube in place. Patient had ex lap surgery today. Goal: Ability to achieve a regular elimination pattern will improve  Description: Ability to achieve a regular elimination pattern will improve  Outcome: Ongoing  Note: No BM this shift. Problem: Nutritional:  Goal: Ability to follow a diet with enough fiber (20 to 30 grams) for normal bowel function will improve  Description: Ability to follow a diet with enough fiber (20 to 30 grams) for normal bowel function will improve  Outcome: Ongoing  Note: NPO this shift. Problem: Discharge Planning:  Goal: Participates in care planning  Description: Participates in care planning  Outcome: Ongoing  Note: Care plan reviewed with patient.  Patient verbalizes understanding of plan of care and contributes to goal setting.]  Goal: Discharged to appropriate level of care  Description: Discharged to appropriate level of care  9/3/2020 0300 by Rodrigo Valdez RN  Outcome: Ongoing  Note: Plans to return home

## 2020-09-03 NOTE — PROGRESS NOTES
Nataliia Zavala is a 80 y.o. male patient.     Current Facility-Administered Medications   Medication Dose Route Frequency Provider Last Rate Last Dose    dextrose 5 % and 0.45 % sodium chloride infusion   Intravenous Continuous Madeline Seaman  mL/hr at 09/02/20 1128      heparin (porcine) injection 5,000 Units  5,000 Units Subcutaneous BID Madeline Seaman MD        clindamycin (CLEOCIN) 900 mg in dextrose 5 % 50 mL IVPB  900 mg Intravenous Weston Castro MD   Stopped at 09/02/20 2310    gentamicin (GARAMYCIN) IVPB 120 mg  120 mg Intravenous Q8H Madeline Seaman MD   Stopped at 09/02/20 2230    amLODIPine (NORVASC) tablet 5 mg  5 mg Oral Daily Madeline Seaman MD   5 mg at 09/02/20 0818    pantoprazole (PROTONIX) injection 40 mg  40 mg Intravenous Daily Madeline Seaman MD   40 mg at 09/02/20 0818    And    sodium chloride (PF) 0.9 % injection 10 mL  10 mL Intravenous Daily Madeline Seaman MD   10 mL at 09/02/20 0819    carvedilol (COREG) tablet 12.5 mg  12.5 mg Oral BID WC Madeline Seaman MD   12.5 mg at 09/02/20 0818    atorvastatin (LIPITOR) tablet 20 mg  20 mg Oral Daily Madeline Seaman MD   20 mg at 09/02/20 2055    tamsulosin (FLOMAX) capsule 0.4 mg  0.4 mg Oral Daily Madeline Seaman MD   0.4 mg at 09/01/20 6677    sodium chloride flush 0.9 % injection 10 mL  10 mL Intravenous 2 times per day Madeline Seaman MD        sodium chloride flush 0.9 % injection 10 mL  10 mL Intravenous PRN Madeline Seaman MD   10 mL at 09/01/20 7318    acetaminophen (TYLENOL) tablet 650 mg  650 mg Oral Q6H PRN Madeline Seaman MD   650 mg at 09/02/20 2055    Or    acetaminophen (TYLENOL) suppository 650 mg  650 mg Rectal Q6H PRN Madeline Seaman MD        promethazine (PHENERGAN) tablet 12.5 mg  12.5 mg Oral Q6H PRN Madeline Seaman MD        Or    ondansetron [facility]) injection 4 mg  4 mg Intravenous Q6H PRN Madeline Seaman MD   4 mg at 09/02/20 2127    [Held by provider] lisinopril (PRINIVIL;ZESTRIL) tablet 20 mg  20 mg Oral Daily MD Arthur Hogan [Held by provider] hydroCHLOROthiazide (HYDRODIURIL) tablet 12.5 mg  12.5 mg Oral Daily Saba Arnold MD        magnesium replacement protocol   Other RX Jyoti Mehta MD        phosphorus replacement protocol   Other RX Jyoti Mehta MD        hydrALAZINE (APRESOLINE) tablet 25 mg  25 mg Oral Q6H PRN Saba Arnold MD   25 mg at 09/01/20 9215    melatonin tablet 3 mg  3 mg Oral Nightly PRN Saba Arnold MD   3 mg at 09/01/20 2206     Allergies   Allergen Reactions    Pcn [Penicillins] Anaphylaxis    Ibuprofen     Percocet [Oxycodone-Acetaminophen] Nausea And Vomiting     Active Problems:    Small bowel obstruction (HCC)    Intractable nausea and vomiting    ANJANA (acute kidney injury) ([facility] [address]    Severe malnutrition ([facility] [address]  Resolved Problems:    * No resolved hospital problems. *    Blood pressure (!) 152/72, pulse 65, temperature 98.4 °F (36.9 °C), temperature source Oral, resp. rate 18, height 5' 7\" (1.702 m), weight 133 lb 1.6 oz (60.4 kg), SpO2 95 %. Subjective:  Symptoms:  Stable. Diet:  NPO. Activity level: Impaired due to weakness. Pain:  He reports no pain. Objective:  General Appearance:  Comfortable. Vital signs: (most recent): Blood pressure (!) 152/72, pulse 65, temperature 98.4 °F (36.9 °C), temperature source Oral, resp. rate 18, height 5' 7\" (1.702 m), weight 133 lb 1.6 oz (60.4 kg), SpO2 95 %. Vital signs are normal.    Output: Producing urine and minimal stool output. HEENT: Normal HEENT exam.    Lungs:  Normal effort and normal respiratory rate. Breath sounds clear to auscultation. Heart: Normal rate. Regular rhythm. S1 normal and S2 normal.  Positive for murmur (1/6 korey). Abdomen: Abdomen is soft and non-distended. Bowel sounds are normal.   There is no abdominal tenderness. There is no splenomegaly. (Left lower groin hernia reducible). Extremities: Normal range of motion.     Neurological: Patient is alert and oriented to person, place and time.  Patient has normal reflexes and normal muscle tone. Skin:  Warm and dry. Assessment:    Condition: In stable condition. Unchanged. (Recurrent SBO  Noted and felt  Not the left inguinal hernia and requires abdominal exploration     ashd and cardio has seen     htn stable      pvc stable     bph stable). Plan: Activity Plan:  for surgery today. Consults: cardiology and general surgery. NPO. Give fluids. (  For surgery today     stable and give  Coreg      stable and with age and hx  Of ashd some increase risk  And hydrate and telemetry post op).        Marina Solorzano MD  9/3/2020

## 2020-09-03 NOTE — CARE COORDINATION
9/3/20, 11:11 AM EDT    DISCHARGE ON GOING Cecilio Kramer day: 4  Location: -17/017-A Reason for admit: [facility] [K56.600]   Procedure: 09/02 ROBOTIC LEFT INGUINAL HERNIA REPAIR WITH MESH,ROBOTIC EXPLORATORY LAPAROSCOPY CONVERT TO OPEN, SMALL BOWEL RESECTION by Dr Corky Rojas of Care: POD #1, N/G tube to LIWS, Dilaudid for pain IV, Zofran and Phenergan for nausea, creat. same 1.2, dressing care, IS, IVF, strict I/O, mansfield care, sips for meds,  DTI on coccyx, no fevers, +ABS x4. Barriers to Discharge: POD #1 from surgery/NPO  PCP: Boris Jimenez MD  Readmission Risk Score: 17%  Patient Goals/Plan/Treatment Preferences: from home with spouse; spoke with patient re: [facility] and he is unsure if he needs HH. [facility] list left at bedside.

## 2020-09-03 NOTE — PROGRESS NOTES
Pt up in chair awake. visiting with family. Alert and oriented x 4. Respirations easy and unlabored. Pain level 6 out of 1 in abdomen. Administered PRN Dilaudid with no change at 1614. Incisions in abdomen dry, intact. Denies any needs at this time. Chair alarm on and call light within reach.     Dulce Lambert  [facility] Student Nurse

## 2020-09-04 LAB
ANION GAP SERPL CALCULATED.3IONS-SCNC: 7 MEQ/L (ref 8–16)
BUN BLDV-MCNC: 18 MG/DL (ref 7–22)
CALCIUM SERPL-MCNC: 8 MG/DL (ref 8.5–10.5)
CHLORIDE BLD-SCNC: 105 MEQ/L (ref 98–111)
CO2: 24 MEQ/L (ref 23–33)
CREAT SERPL-MCNC: 1.3 MG/DL (ref 0.4–1.2)
ERYTHROCYTE [DISTWIDTH] IN BLOOD BY AUTOMATED COUNT: 13 % (ref 11.5–14.5)
ERYTHROCYTE [DISTWIDTH] IN BLOOD BY AUTOMATED COUNT: 44.2 FL (ref 35–45)
GFR SERPL CREATININE-BSD FRML MDRD: 52 ML/MIN/1.73M2
GLUCOSE BLD-MCNC: 150 MG/DL (ref 70–108)
HCT VFR BLD CALC: 33.8 % (ref 42–52)
HEMOGLOBIN: 11.3 GM/DL (ref 14–18)
MCH RBC QN AUTO: 31 PG (ref 26–33)
MCHC RBC AUTO-ENTMCNC: 33.4 GM/DL (ref 32.2–35.5)
MCV RBC AUTO: 92.9 FL (ref 80–94)
PLATELET # BLD: 171 THOU/MM3 (ref 130–400)
PMV BLD AUTO: 10.6 FL (ref 9.4–12.4)
POTASSIUM SERPL-SCNC: 3.8 MEQ/L (ref 3.5–5.2)
RBC # BLD: 3.64 MILL/MM3 (ref 4.7–6.1)
SODIUM BLD-SCNC: 136 MEQ/L (ref 135–145)
WBC # BLD: 6.9 THOU/MM3 (ref 4.8–10.8)

## 2020-09-04 PROCEDURE — C9113 INJ PANTOPRAZOLE SODIUM, VIA: HCPCS | Performed by: SURGERY

## 2020-09-04 PROCEDURE — 6360000002 HC RX W HCPCS: Performed by: SURGERY

## 2020-09-04 PROCEDURE — 6370000000 HC RX 637 (ALT 250 FOR IP): Performed by: FAMILY MEDICINE

## 2020-09-04 PROCEDURE — 6370000000 HC RX 637 (ALT 250 FOR IP): Performed by: SURGERY

## 2020-09-04 PROCEDURE — 2580000003 HC RX 258: Performed by: SURGERY

## 2020-09-04 PROCEDURE — 36415 COLL VENOUS BLD VENIPUNCTURE: CPT

## 2020-09-04 PROCEDURE — 80048 BASIC METABOLIC PNL TOTAL CA: CPT

## 2020-09-04 PROCEDURE — 99024 POSTOP FOLLOW-UP VISIT: CPT | Performed by: SURGERY

## 2020-09-04 PROCEDURE — 51798 US URINE CAPACITY MEASURE: CPT

## 2020-09-04 PROCEDURE — 85027 COMPLETE CBC AUTOMATED: CPT

## 2020-09-04 PROCEDURE — 1200000003 HC TELEMETRY R&B

## 2020-09-04 RX ORDER — LISINOPRIL 10 MG/1
10 TABLET ORAL 2 TIMES DAILY
Status: DISCONTINUED | OUTPATIENT
Start: 2020-09-04 | End: 2020-09-10 | Stop reason: HOSPADM

## 2020-09-04 RX ORDER — METOCLOPRAMIDE HYDROCHLORIDE 5 MG/ML
10 INJECTION INTRAMUSCULAR; INTRAVENOUS EVERY 8 HOURS
Status: DISCONTINUED | OUTPATIENT
Start: 2020-09-04 | End: 2020-09-10 | Stop reason: HOSPADM

## 2020-09-04 RX ORDER — LISINOPRIL 10 MG/1
10 TABLET ORAL DAILY
Status: DISCONTINUED | OUTPATIENT
Start: 2020-09-04 | End: 2020-09-04

## 2020-09-04 RX ADMIN — LISINOPRIL 10 MG: 10 TABLET ORAL at 09:18

## 2020-09-04 RX ADMIN — HEPARIN SODIUM 5000 UNITS: 5000 INJECTION INTRAVENOUS; SUBCUTANEOUS at 21:05

## 2020-09-04 RX ADMIN — HYDROMORPHONE HYDROCHLORIDE 0.5 MG: 1 INJECTION, SOLUTION INTRAMUSCULAR; INTRAVENOUS; SUBCUTANEOUS at 08:00

## 2020-09-04 RX ADMIN — SODIUM CHLORIDE, PRESERVATIVE FREE 10 ML: 5 INJECTION INTRAVENOUS at 21:05

## 2020-09-04 RX ADMIN — HEPARIN SODIUM 5000 UNITS: 5000 INJECTION INTRAVENOUS; SUBCUTANEOUS at 08:00

## 2020-09-04 RX ADMIN — Medication 10 ML: at 08:07

## 2020-09-04 RX ADMIN — DEXTROSE AND SODIUM CHLORIDE: 5; 450 INJECTION, SOLUTION INTRAVENOUS at 23:24

## 2020-09-04 RX ADMIN — HYDROMORPHONE HYDROCHLORIDE 0.25 MG: 1 INJECTION, SOLUTION INTRAMUSCULAR; INTRAVENOUS; SUBCUTANEOUS at 21:06

## 2020-09-04 RX ADMIN — LISINOPRIL 10 MG: 10 TABLET ORAL at 21:05

## 2020-09-04 RX ADMIN — METOCLOPRAMIDE 10 MG: 5 INJECTION, SOLUTION INTRAMUSCULAR; INTRAVENOUS at 23:25

## 2020-09-04 RX ADMIN — ATORVASTATIN CALCIUM 20 MG: 20 TABLET, FILM COATED ORAL at 21:05

## 2020-09-04 RX ADMIN — TAMSULOSIN HYDROCHLORIDE 0.4 MG: 0.4 CAPSULE ORAL at 07:59

## 2020-09-04 RX ADMIN — HYDROMORPHONE HYDROCHLORIDE 0.5 MG: 1 INJECTION, SOLUTION INTRAMUSCULAR; INTRAVENOUS; SUBCUTANEOUS at 11:21

## 2020-09-04 RX ADMIN — PANTOPRAZOLE SODIUM 40 MG: 40 INJECTION, POWDER, FOR SOLUTION INTRAVENOUS at 08:00

## 2020-09-04 RX ADMIN — DEXTROSE AND SODIUM CHLORIDE: 5; 450 INJECTION, SOLUTION INTRAVENOUS at 13:46

## 2020-09-04 RX ADMIN — ONDANSETRON 4 MG: 2 INJECTION INTRAMUSCULAR; INTRAVENOUS at 11:21

## 2020-09-04 RX ADMIN — CARVEDILOL 12.5 MG: 25 TABLET, FILM COATED ORAL at 15:32

## 2020-09-04 RX ADMIN — AMLODIPINE BESYLATE 5 MG: 5 TABLET ORAL at 08:00

## 2020-09-04 RX ADMIN — METOCLOPRAMIDE 10 MG: 5 INJECTION, SOLUTION INTRAMUSCULAR; INTRAVENOUS at 15:32

## 2020-09-04 RX ADMIN — CARVEDILOL 12.5 MG: 25 TABLET, FILM COATED ORAL at 08:00

## 2020-09-04 ASSESSMENT — PAIN DESCRIPTION - ONSET: ONSET: ON-GOING

## 2020-09-04 ASSESSMENT — PAIN DESCRIPTION - PROGRESSION: CLINICAL_PROGRESSION: NOT CHANGED

## 2020-09-04 ASSESSMENT — PAIN SCALES - GENERAL
PAINLEVEL_OUTOF10: 4
PAINLEVEL_OUTOF10: 7
PAINLEVEL_OUTOF10: 5
PAINLEVEL_OUTOF10: 7
PAINLEVEL_OUTOF10: 7

## 2020-09-04 ASSESSMENT — PAIN DESCRIPTION - LOCATION: LOCATION: ABDOMEN

## 2020-09-04 ASSESSMENT — PAIN DESCRIPTION - PAIN TYPE: TYPE: ACUTE PAIN

## 2020-09-04 ASSESSMENT — PAIN DESCRIPTION - FREQUENCY: FREQUENCY: CONTINUOUS

## 2020-09-04 ASSESSMENT — PAIN - FUNCTIONAL ASSESSMENT: PAIN_FUNCTIONAL_ASSESSMENT: PREVENTS OR INTERFERES SOME ACTIVE ACTIVITIES AND ADLS

## 2020-09-04 ASSESSMENT — PAIN DESCRIPTION - DESCRIPTORS: DESCRIPTORS: CONSTANT;ACHING

## 2020-09-04 ASSESSMENT — PAIN DESCRIPTION - ORIENTATION: ORIENTATION: MID

## 2020-09-04 NOTE — CARE COORDINATION
9/4/20, 1:18 PM EDT  DISCHARGE ON GOING Cecilio Kramer day: 5  Location: -17/017-A Reason for admit: Small bowel obstruction, partial ([address] [K56.600]   Treatment plan of care: POD #2 robotic lap left inguinal hernia repair and exploratory lap with small bowel resection for giant multiple ileal diverticula. Continues with pain management, NG clamped and possible removal later today if tolerates liquids. K+ 3.8, creatinine 1.3, BUN 18. Barriers to Discharge: medical stability. PCP: Brunilda Oreilly MD  Readmission Risk Score: 16%  Patient Goals/Plan/Treatment Preferences: Plan is from home with spouse. Previous CM left a home health list incase patient decided he would want home health. No other discharge needs identified. Patient goals/plan/ treatment preferences discussed by  and . Patient goals/plan/ treatment preferences reviewed with patient/ family. Patient/ family verbalize understanding of discharge plan and are in agreement with goal/plan/treatment preferences. Understanding was demonstrated using the teach back method. AVS provided by RN at time of discharge, which includes all necessary medical information pertaining to the patients current course of illness, treatment, post-discharge goals of care, and treatment preferences.         IMM Letter  IMM Letter given to Patient/Family/Significant other/Guardian/POA/by[de-identified] mio  IMM Letter date given[de-identified] 09/04/20  IMM Letter time given[de-identified] 9809

## 2020-09-04 NOTE — PROGRESS NOTES
Comprehensive Nutrition Assessment    Type and Reason for Visit:  Reassess    Nutrition Recommendations/Plan:   Diet advancement as appropriate. ONS initiated: Ensure Clear TID. Consider MVI. Nutrition Assessment:    Pt improving from a nutritional standpoint AEB clear liquid diet initiated, pt reports tolerance and is receptive to ONS start. Remains at risk for further nutritional compromise r/t clear liquid diet, advanced age, underweight,  and underlying medical condition (sBO, s/p surgery 9/2/20, history of CAD, ANJANA, HTN, CVA, hyperlipidemia, benign prostatic hypertrophy), and need for nutrition support. Nutrition recommendations/interventions as per above. Malnutrition Assessment:  Malnutrition Status:  Severe malnutrition    Context:  Acute Illness     Findings of the 6 clinical characteristics of malnutrition:  Energy Intake:  7 - 50% or less of estimated energy requirements for 5 or more days  Weight Loss:  1 - 5% over 1 month(7.2%)     Body Fat Loss:  7 - Moderate body fat loss Orbital, Triceps, Fat Overlying Ribs   Muscle Mass Loss:  7 - Moderate muscle mass loss Temples (temporalis)  Fluid Accumulation:  No significant fluid accumulation     Strength:  Not Performed    Estimated Daily Nutrient Needs:  Energy (kcal):  7718-4467 kcals (30-35 kcals/kg/day); Weight Used for Energy Requirements:  (60 kg, 9/1/20)     Protein (g):  60 grams (1 gram/kg/day based) pending renal status; Weight Used for Protein Requirements:    60 kg           Nutrition Related Findings:  s/p surgery 9/2/20. NGT in place, clamped at present, pt reports tolerating but having a lot belching. Discussed trying not to use straw to drink. Tolerating some clear liquids, he agreed to ONS. When still no belly pain, pain with any movement. Denies nausea. Creatinine 1.3, Glucose 150. Reglan, D5.45 at 100 ml/hr, protonix.       Wounds:  (coccyx stage I, abdominal surgical wounds 9/2/20-robotic assisted lap, left hernia repair and exploritory lap with SB resection for giant multiple ileal diverticula)       Current Nutrition Therapies:    DIET CLEAR LIQUID;  Dietary Nutrition Supplements: Clear Liquid Oral Supplement    Anthropometric Measures:  · Height: 5' 7\" (170.2 cm)  · Current Body Weight: 133 lb 1.6 oz (60.4 kg)(9/4/20 no edema)   · Admission Body Weight: 135 lb 9.6 oz (61.5 kg)(8/30/20 no edema)    · Usual Body Weight: (~ 145# per pt. Per EMR: 11/22/19: 148#8oz, 1/22/20: 144#12.8oz, 8/6/20: 142#3. 2oz)     · Ideal Body Weight: 148 lbs;   · BMI: 20.8  · BMI Categories: Underweight (BMI less than 22) age over 72       Nutrition Diagnosis:   · Severe malnutrition, In context of acute illness or injury related to altered GI function as evidenced by poor intake prior to admission, weight loss greater than or equal to 5% in 1 month, moderate loss of subcutaneous fat, moderate muscle loss      Nutrition Interventions:   Food and/or Nutrient Delivery:  Continue Current Diet, Start Oral Nutrition Supplement(Diet advancement when able.)  Nutrition Education/Counseling:  Education completed(Low fiber/soft diet reviewed, handout given as well 9/4/20.)   Coordination of Nutrition Care:  Continued Inpatient Monitoring    Goals:  Patient will tolerate clear liquid diet so diet can be advanced in the next 1-4 days. Nutrition Monitoring and Evaluation:   Food/Nutrient Intake Outcomes:  Diet Advancement/Tolerance, Food and Nutrient Intake, Supplement Intake, IVF Intake  Physical Signs/Symptoms Outcomes:  Biochemical Data, GI Status, Nutrition Focused Physical Findings, Skin, Weight     Discharge Planning:     Too soon to determine     Electronically signed by Tacey Hodgkin, RD, LD on 9/4/20 at 3:34 PM EDT    Contact: [phone]

## 2020-09-04 NOTE — PROGRESS NOTES
Brenda Hare  Postoperative Progress Note    Pt Name: Yesenia Mercedes  Medical Record Number: [id number]  Date of Birth 6/28/1933   Today's Date: 9/4/2020    Shaq Avalos is doing well. Pain is  better controlled at this point. Some belching  But NG residual minimal.  OBJECTIVE  VITALS: VITALS:  BP (!) 163/72   Pulse 68   Temp 98.2 °F (36.8 °C) (Oral)   Resp 16   Ht 5' 7\" (1.702 m)   Wt 133 lb 1.6 oz (60.4 kg)   SpO2 95%   BMI 20.85 kg/m²   GENERAL: alert, cooperative, no acute distress  LUNGS: Clear no rhonchi or wheezing  HEART: Normal heart rate  ABDOMEN: Soft incisional tenderness no distention iNCISION: clean, dry and intact  EXTERMITY: no cyanosis or edema  INTAKE/OUTPUT :   INTAKE/OUTPUT:      Intake/Output Summary (Last 24 hours) at 9/4/2020 1210  Last data filed at 9/4/2020 0538  Gross per 24 hour   Intake 1481 ml   Output 800 ml   Net 681 ml        LABS  CBC with Differential:    Lab Results   Component Value Date    WBC 6.9 09/04/2020    RBC 3.64 09/04/2020    HGB 11.3 09/04/2020    HCT 33.8 09/04/2020     09/04/2020    MCV 92.9 09/04/2020    MCH 31.0 09/04/2020    MCHC 33.4 09/04/2020    NRBC 0 09/01/2020    SEGSPCT 80.6 09/01/2020    MONOPCT 7.6 09/01/2020    MONOSABS 0.4 09/01/2020    LYMPHSABS 0.3 09/01/2020    EOSABS 0.3 09/01/2020    BASOSABS 0.0 09/01/2020     BMP:    Lab Results   Component Value Date     09/04/2020    K 3.8 09/04/2020    K 4.0 08/31/2020     09/04/2020    CO2 24 09/04/2020    BUN 18 09/04/2020    LABALBU 3.5 08/31/2020    CREATININE 1.3 09/04/2020    CALCIUM 8.0 09/04/2020    LABGLOM 52 09/04/2020    GLUCOSE 150 09/04/2020         RADIOLOGY:     ASSESSMENT  1. POD #2 robotic assisted laparoscopic left inguinal hernia repair and exploratory laparotomy with small bowel resection for giant multiple ileal diverticula  2. Acute on chronic renal insufficiency improving    PLAN  1. Encourage activity  2.  VTE: heparin  3. Stress ulcer prophylaxis: PPI  4. Tolerating NG clamped some belching will remove later today if tolerating liquids.     Narda Bosworth, MD  Electronically signed 9/4/2020 at 12:10 PM

## 2020-09-04 NOTE — PROGRESS NOTES
09/04/20 1814   Provider Notification   Reason for Communication Critical Value (comment)  (Bladder scan 615 ML)   Provider Name Dr. Tamra Burkitt   Provider Notification Physician   Method of Communication Call   Response Waiting for response   Notification Time [phone]

## 2020-09-04 NOTE — PROGRESS NOTES
Pt in bed awake, resting with eyes open. Alert and oriented x 4. Respirations easy and unlabored. Pain level 5 out of 10. Incisions in abdomen dry and intact. Denies any needs at this time. Bed alarm on and in lowest position. Call light within reach.      Fritz Allan  [facility] Student Nurse

## 2020-09-04 NOTE — PROGRESS NOTES
Artie Chan is a 80 y.o. male patient.     Current Facility-Administered Medications   Medication Dose Route Frequency Provider Last Rate Last Dose    HYDROmorphone (DILAUDID) injection 0.25 mg  0.25 mg Intravenous Q3H PRN Pearl Camargo MD   0.25 mg at 09/03/20 1614    Or    HYDROmorphone (DILAUDID) injection 0.5 mg  0.5 mg Intravenous Q3H PRN Pearl Camargo MD   0.5 mg at 09/03/20 2007    dextrose 5 % and 0.45 % sodium chloride infusion   Intravenous Continuous Pearl Camargo  mL/hr at 09/03/20 1614      heparin (porcine) injection 5,000 Units  5,000 Units Subcutaneous BID Pearl Camargo MD   5,000 Units at 09/03/20 2037    amLODIPine (NORVASC) tablet 5 mg  5 mg Oral Daily Pearl Camagro MD   5 mg at 09/03/20 1762    pantoprazole (PROTONIX) injection 40 mg  40 mg Intravenous Daily Pearl Camargo MD   40 mg at 09/03/20 4133    And    sodium chloride (PF) 0.9 % injection 10 mL  10 mL Intravenous Daily Pearl Camargo MD   10 mL at 09/03/20 0924    carvedilol (COREG) tablet 12.5 mg  12.5 mg Oral BID WC Pearl Camargo MD   12.5 mg at 09/03/20 1807    atorvastatin (LIPITOR) tablet 20 mg  20 mg Oral Daily Pearl Camargo MD   20 mg at 09/03/20 2037    tamsulosin (FLOMAX) capsule 0.4 mg  0.4 mg Oral Daily Pearl Camargo MD   0.4 mg at 09/03/20 3056    sodium chloride flush 0.9 % injection 10 mL  10 mL Intravenous 2 times per day Pearl Camargo MD        sodium chloride flush 0.9 % injection 10 mL  10 mL Intravenous PRN Pearl Camargo MD   10 mL at 09/01/20 6793    acetaminophen (TYLENOL) tablet 650 mg  650 mg Oral Q6H PRN Pearl Camargo MD   650 mg at 09/03/20 4382    Or    acetaminophen (TYLENOL) suppository 650 mg  650 mg Rectal Q6H PRN Pearl Camargo MD        promethazine (PHENERGAN) tablet 12.5 mg  12.5 mg Oral Q6H PRN Pearl Camargo MD        Or    ondansetron [facility]) injection 4 mg  4 mg Intravenous Q6H PRN Pearl Camargo MD   4 mg at 09/03/20 1615    [Held by provider] lisinopril (PRINIVIL;ZESTRIL) tablet 20 mg  20 mg Oral

## 2020-09-04 NOTE — PROGRESS NOTES
Dylan Strange is a 80 y.o. male patient.     Current Facility-Administered Medications   Medication Dose Route Frequency Provider Last Rate Last Dose    metoclopramide (REGLAN) injection 10 mg  10 mg Intravenous Q8H Rebekah Butt MD   10 mg at 09/04/20 1532    lisinopril (PRINIVIL;ZESTRIL) tablet 10 mg  10 mg Oral BID Marina Solorzano MD        HYDROmorphone (DILAUDID) injection 0.25 mg  0.25 mg Intravenous Q3H PRN Rebekah Butt MD   0.25 mg at 09/03/20 1614    Or    HYDROmorphone (DILAUDID) injection 0.5 mg  0.5 mg Intravenous Q3H PRN Rebekah Butt MD   0.5 mg at 09/04/20 1121    dextrose 5 % and 0.45 % sodium chloride infusion   Intravenous Continuous Rebekah Butt  mL/hr at 09/04/20 1346      heparin (porcine) injection 5,000 Units  5,000 Units Subcutaneous BID Rebekah Butt MD   5,000 Units at 09/04/20 0800    amLODIPine (NORVASC) tablet 5 mg  5 mg Oral Daily Rebekah Butt MD   5 mg at 09/04/20 0800    pantoprazole (PROTONIX) injection 40 mg  40 mg Intravenous Daily Rebekah Butt MD   40 mg at 09/04/20 0800    And    sodium chloride (PF) 0.9 % injection 10 mL  10 mL Intravenous Daily Rebekah Butt MD   10 mL at 09/04/20 0807    carvedilol (COREG) tablet 12.5 mg  12.5 mg Oral BID WC Rebekah Butt MD   12.5 mg at 09/04/20 1532    atorvastatin (LIPITOR) tablet 20 mg  20 mg Oral Daily Rebekah Butt MD   20 mg at 09/03/20 2037    tamsulosin (FLOMAX) capsule 0.4 mg  0.4 mg Oral Daily Rebekah Butt MD   0.4 mg at 09/04/20 0759    sodium chloride flush 0.9 % injection 10 mL  10 mL Intravenous 2 times per day Rebekah Butt MD        sodium chloride flush 0.9 % injection 10 mL  10 mL Intravenous PRN Rebekah Butt MD   10 mL at 09/01/20 0905    acetaminophen (TYLENOL) tablet 650 mg  650 mg Oral Q6H PRN Rebekah Butt MD   650 mg at 09/03/20 6631    Or    acetaminophen (TYLENOL) suppository 650 mg  650 mg Rectal Q6H PRN Rebekah Butt MD        promethazine (PHENERGAN) tablet 12.5 mg  12.5 mg Oral Q6H PRN Santosh Edwards Skin:  Warm and dry. Assessment:    Condition: In stable condition. Improving. (Post small bowel resection of 20 cm secondary to a large diverticulum causing small bowel obstruc    Left inguinal hernia    Post atherosclerotic  heart disease post bypass surgery stable    Hypertension adjust meds    Ileus improving    Hyperlipidemia stable    Initial acute kidney injury corrected). Plan:   Out of bed and up to chair. Start/continue incentive spirometry and wean off oxygen. Consults: cardiology and general surgery. Clear liquid diet. Administer medications as ordered. (Adjust blood pressure medications    Cardiac remained stable    Gradually increase diet is still persistent pain    Needs increased pulmonary toilet    Overall doing well at).        Sheryl Calderon MD  9/4/2020

## 2020-09-04 NOTE — PROGRESS NOTES
Report given to primary nurse, Emelina Harmon. Pt is in bed and quiet. Bed alarm on and call light within reach.     Sony Whitaker  [facility] Student Nurse

## 2020-09-04 NOTE — PLAN OF CARE
Problem: Skin Integrity:  Goal: Will show no infection signs and symptoms  Description: Will show no infection signs and symptoms  Outcome: Ongoing  Note: No new skin issues, will continue to monitor. Problem: Bowel/Gastric:  Goal: Control of bowel function will improve  Description: Control of bowel function will improve  Outcome: Ongoing  Note: Bowel sounds active in all 4 quadrants, no BM this day, diet advanced to clear liquid with NG tube clamped, will continue to monitor. Problem: Discharge Planning:  Goal: Participates in care planning  Description: Participates in care planning  Outcome: Ongoing  Note: Patient plans to be discharged home with wife when medically stable. Problem: Pain:  Goal: Pain level will decrease  Description: Pain level will decrease  Outcome: Ongoing  Note: Patient complains of pain of 7 on 0-10 scale, Dilaudud given per [facility], will continue to monitor. Problem: Pain:  Goal: Pain level will decrease  Description: Pain level will decrease  Outcome: Ongoing  Note: Patient complains of pain of 7 on 0-10 scale, Dilaudud given per [facility], will continue to monitor. Problem: Falls - Risk of:  Goal: Will remain free from falls  Description: Will remain free from falls  Outcome: Ongoing  Note: Call light within reach. Side rails up x2. Bed alarm on. Non skid slippers available. Problem: Pain:  Goal: Pain level will decrease  Description: Pain level will decrease  Outcome: Ongoing  Note: Patient complains of pain of 7 on 0-10 scale, Dilaudud given per [facility], will continue to monitor. Care plan reviewed with patient. Patient verbalize understanding of the plan of care and contribute to goal setting.

## 2020-09-04 NOTE — PLAN OF CARE
Problem: Nutrition  Goal: Optimal nutrition therapy  Outcome: Ongoing   Nutrition Problem #1: Severe malnutrition, In context of acute illness or injury  Intervention: Food and/or Nutrient Delivery: Continue Current Diet, Start Oral Nutrition Supplement(Diet advancement when able.)  Nutritional Goals: Patient will tolerate clear liquid diet so diet can be advanced in the next 1-4 days.

## 2020-09-05 ENCOUNTER — APPOINTMENT (OUTPATIENT)
Dept: GENERAL RADIOLOGY | Age: 85
DRG: 329 | End: 2020-09-05
Payer: MEDICARE

## 2020-09-05 LAB
ANION GAP SERPL CALCULATED.3IONS-SCNC: 7 MEQ/L (ref 8–16)
BUN BLDV-MCNC: 13 MG/DL (ref 7–22)
CALCIUM SERPL-MCNC: 7.9 MG/DL (ref 8.5–10.5)
CHLORIDE BLD-SCNC: 101 MEQ/L (ref 98–111)
CO2: 24 MEQ/L (ref 23–33)
CREAT SERPL-MCNC: 1.2 MG/DL (ref 0.4–1.2)
GFR SERPL CREATININE-BSD FRML MDRD: 57 ML/MIN/1.73M2
GLUCOSE BLD-MCNC: 150 MG/DL (ref 70–108)
GLUCOSE BLD-MCNC: 150 MG/DL (ref 70–108)
MAGNESIUM: 1.6 MG/DL (ref 1.6–2.4)
PHOSPHORUS: 2.2 MG/DL (ref 2.4–4.7)
POTASSIUM SERPL-SCNC: 3.8 MEQ/L (ref 3.5–5.2)
SODIUM BLD-SCNC: 132 MEQ/L (ref 135–145)

## 2020-09-05 PROCEDURE — 2580000003 HC RX 258: Performed by: SURGERY

## 2020-09-05 PROCEDURE — 2500000003 HC RX 250 WO HCPCS: Performed by: PHARMACIST

## 2020-09-05 PROCEDURE — 99024 POSTOP FOLLOW-UP VISIT: CPT | Performed by: SURGERY

## 2020-09-05 PROCEDURE — 6370000000 HC RX 637 (ALT 250 FOR IP): Performed by: SURGERY

## 2020-09-05 PROCEDURE — 36569 INSJ PICC 5 YR+ W/O IMAGING: CPT

## 2020-09-05 PROCEDURE — 74019 RADEX ABDOMEN 2 VIEWS: CPT

## 2020-09-05 PROCEDURE — 83735 ASSAY OF MAGNESIUM: CPT

## 2020-09-05 PROCEDURE — 76937 US GUIDE VASCULAR ACCESS: CPT

## 2020-09-05 PROCEDURE — C1751 CATH, INF, PER/CENT/MIDLINE: HCPCS

## 2020-09-05 PROCEDURE — 2709999900 HC NON-CHARGEABLE SUPPLY

## 2020-09-05 PROCEDURE — 1200000003 HC TELEMETRY R&B

## 2020-09-05 PROCEDURE — 94760 N-INVAS EAR/PLS OXIMETRY 1: CPT

## 2020-09-05 PROCEDURE — C9113 INJ PANTOPRAZOLE SODIUM, VIA: HCPCS | Performed by: SURGERY

## 2020-09-05 PROCEDURE — 2500000003 HC RX 250 WO HCPCS: Performed by: STUDENT IN AN ORGANIZED HEALTH CARE EDUCATION/TRAINING PROGRAM

## 2020-09-05 PROCEDURE — 6360000002 HC RX W HCPCS: Performed by: SURGERY

## 2020-09-05 PROCEDURE — 6360000002 HC RX W HCPCS: Performed by: STUDENT IN AN ORGANIZED HEALTH CARE EDUCATION/TRAINING PROGRAM

## 2020-09-05 PROCEDURE — 6370000000 HC RX 637 (ALT 250 FOR IP): Performed by: FAMILY MEDICINE

## 2020-09-05 PROCEDURE — 02HV33Z INSERTION OF INFUSION DEVICE INTO SUPERIOR VENA CAVA, PERCUTANEOUS APPROACH: ICD-10-PCS | Performed by: FAMILY MEDICINE

## 2020-09-05 PROCEDURE — 82948 REAGENT STRIP/BLOOD GLUCOSE: CPT

## 2020-09-05 PROCEDURE — 2580000003 HC RX 258: Performed by: STUDENT IN AN ORGANIZED HEALTH CARE EDUCATION/TRAINING PROGRAM

## 2020-09-05 PROCEDURE — 80048 BASIC METABOLIC PNL TOTAL CA: CPT

## 2020-09-05 PROCEDURE — 84100 ASSAY OF PHOSPHORUS: CPT

## 2020-09-05 PROCEDURE — 36415 COLL VENOUS BLD VENIPUNCTURE: CPT

## 2020-09-05 RX ORDER — SODIUM CHLORIDE 9 MG/ML
INJECTION, SOLUTION INTRAVENOUS CONTINUOUS
Status: DISCONTINUED | OUTPATIENT
Start: 2020-09-05 | End: 2020-09-09

## 2020-09-05 RX ORDER — SODIUM CHLORIDE 9 MG/ML
INJECTION, SOLUTION INTRAVENOUS CONTINUOUS
Status: ACTIVE | OUTPATIENT
Start: 2020-09-05 | End: 2020-09-05

## 2020-09-05 RX ORDER — MAGNESIUM SULFATE IN WATER 40 MG/ML
2 INJECTION, SOLUTION INTRAVENOUS ONCE
Status: COMPLETED | OUTPATIENT
Start: 2020-09-05 | End: 2020-09-05

## 2020-09-05 RX ADMIN — Medication 10 ML: at 08:26

## 2020-09-05 RX ADMIN — CARVEDILOL 12.5 MG: 25 TABLET, FILM COATED ORAL at 16:07

## 2020-09-05 RX ADMIN — PANTOPRAZOLE SODIUM 40 MG: 40 INJECTION, POWDER, FOR SOLUTION INTRAVENOUS at 08:26

## 2020-09-05 RX ADMIN — HYDROMORPHONE HYDROCHLORIDE 0.25 MG: 1 INJECTION, SOLUTION INTRAMUSCULAR; INTRAVENOUS; SUBCUTANEOUS at 03:24

## 2020-09-05 RX ADMIN — CARVEDILOL 12.5 MG: 25 TABLET, FILM COATED ORAL at 08:26

## 2020-09-05 RX ADMIN — HYDROMORPHONE HYDROCHLORIDE 0.25 MG: 1 INJECTION, SOLUTION INTRAMUSCULAR; INTRAVENOUS; SUBCUTANEOUS at 22:02

## 2020-09-05 RX ADMIN — SODIUM PHOSPHATE, MONOBASIC, MONOHYDRATE 9 MMOL: 276; 142 INJECTION, SOLUTION INTRAVENOUS at 16:07

## 2020-09-05 RX ADMIN — SODIUM CHLORIDE: 9 INJECTION, SOLUTION INTRAVENOUS at 10:19

## 2020-09-05 RX ADMIN — METOCLOPRAMIDE 10 MG: 5 INJECTION, SOLUTION INTRAMUSCULAR; INTRAVENOUS at 23:53

## 2020-09-05 RX ADMIN — SODIUM CHLORIDE: 9 INJECTION, SOLUTION INTRAVENOUS at 18:24

## 2020-09-05 RX ADMIN — HYDROMORPHONE HYDROCHLORIDE 0.5 MG: 1 INJECTION, SOLUTION INTRAMUSCULAR; INTRAVENOUS; SUBCUTANEOUS at 12:44

## 2020-09-05 RX ADMIN — HEPARIN SODIUM 5000 UNITS: 5000 INJECTION INTRAVENOUS; SUBCUTANEOUS at 08:26

## 2020-09-05 RX ADMIN — METOCLOPRAMIDE 10 MG: 5 INJECTION, SOLUTION INTRAMUSCULAR; INTRAVENOUS at 08:26

## 2020-09-05 RX ADMIN — HEPARIN SODIUM 5000 UNITS: 5000 INJECTION INTRAVENOUS; SUBCUTANEOUS at 20:30

## 2020-09-05 RX ADMIN — ATORVASTATIN CALCIUM 20 MG: 20 TABLET, FILM COATED ORAL at 20:29

## 2020-09-05 RX ADMIN — LISINOPRIL 10 MG: 10 TABLET ORAL at 20:29

## 2020-09-05 RX ADMIN — MAGNESIUM SULFATE HEPTAHYDRATE 2 G: 40 INJECTION, SOLUTION INTRAVENOUS at 16:07

## 2020-09-05 RX ADMIN — SODIUM CHLORIDE, PRESERVATIVE FREE 10 ML: 5 INJECTION INTRAVENOUS at 20:30

## 2020-09-05 RX ADMIN — LISINOPRIL 10 MG: 10 TABLET ORAL at 08:25

## 2020-09-05 RX ADMIN — TAMSULOSIN HYDROCHLORIDE 0.4 MG: 0.4 CAPSULE ORAL at 08:26

## 2020-09-05 RX ADMIN — METOCLOPRAMIDE 10 MG: 5 INJECTION, SOLUTION INTRAMUSCULAR; INTRAVENOUS at 16:06

## 2020-09-05 RX ADMIN — LEUCINE, PHENYLALANINE, LYSINE, METHIONINE, ISOLEUCINE, VALINE, HISTIDINE, THREONINE, TRYPTOPHAN, ALANINE, GLYCINE, ARGININE, PROLINE, SERINE, TYROSINE, DEXTROSE: 365; 280; 290; 200; 300; 290; 240; 210; 90; 1035; 515; 575; 340; 250; 20; 15 INJECTION INTRAVENOUS at 18:24

## 2020-09-05 RX ADMIN — AMLODIPINE BESYLATE 5 MG: 5 TABLET ORAL at 08:26

## 2020-09-05 ASSESSMENT — PAIN SCALES - GENERAL
PAINLEVEL_OUTOF10: 7
PAINLEVEL_OUTOF10: 6
PAINLEVEL_OUTOF10: 5
PAINLEVEL_OUTOF10: 0
PAINLEVEL_OUTOF10: 4

## 2020-09-05 ASSESSMENT — PAIN DESCRIPTION - ONSET: ONSET: ON-GOING

## 2020-09-05 ASSESSMENT — PAIN - FUNCTIONAL ASSESSMENT: PAIN_FUNCTIONAL_ASSESSMENT: ACTIVITIES ARE NOT PREVENTED

## 2020-09-05 ASSESSMENT — PAIN DESCRIPTION - PROGRESSION: CLINICAL_PROGRESSION: NOT CHANGED

## 2020-09-05 ASSESSMENT — PAIN DESCRIPTION - PAIN TYPE: TYPE: ACUTE PAIN

## 2020-09-05 ASSESSMENT — PAIN DESCRIPTION - DESCRIPTORS: DESCRIPTORS: ACHING

## 2020-09-05 ASSESSMENT — PAIN DESCRIPTION - FREQUENCY: FREQUENCY: CONTINUOUS

## 2020-09-05 ASSESSMENT — PAIN DESCRIPTION - ORIENTATION: ORIENTATION: MID

## 2020-09-05 ASSESSMENT — PAIN DESCRIPTION - LOCATION: LOCATION: ABDOMEN

## 2020-09-05 NOTE — PLAN OF CARE
Problem: Skin Integrity:  Goal: Will show no infection signs and symptoms  Description: Will show no infection signs and symptoms  Outcome: Ongoing  Note: No new skin issues, will continue to monitor. Problem: Bowel/Gastric:  Goal: Control of bowel function will improve  Description: Control of bowel function will improve  Outcome: Ongoing  Note: Bowel sounds active, NG tube in place, clear liquids diet, not tolerating well. No BM this day, will continue to monitor. Problem: Discharge Planning:  Goal: Participates in care planning  Description: Participates in care planning  Outcome: Ongoing  Note: Patient plans to be discharged home with wife when medically stable. Problem: Pain:  Goal: Pain level will decrease  Description: Pain level will decrease  Outcome: Ongoing  Note: Patient complaining of pain of 5 on 0-10 scale, Dilaudid given per [facility], will continue to monitor. Problem: Falls - Risk of:  Goal: Will remain free from falls  Description: Will remain free from falls  Outcome: Ongoing  Note: Patient ambulates stand by assist. Call light within reach. Side rails up x2. Bed alarm on. Non skid slippers available. Problem: Pain:  Goal: Pain level will decrease  Description: Pain level will decrease  Outcome: Ongoing  Note: Patient complaining of pain of 5 on 0-10 scale, Dilaudid given per [facility], will continue to monitor. Care plan reviewed with patient. Patient verbalize understanding of the plan of care and contribute to goal setting.

## 2020-09-05 NOTE — PROGRESS NOTES
PICC Procedure Note    Sudha Dimas   Admitted- 8/30/2020 10:10 AM  Admission diagnosis- [facility] obstruction, [facility] [E62.537]      Attending Physician- Noman Solares MD  Ordering Physician-Dr Valladares  Indication for Insertion: TPN    Catheter Insertion Date- 9/5/2020   Lot Number- [id number]  Gauge-5  Lumen-dual    Insertion Site- FARIBA Basilic  Vein Diameter- 2.22 mm  Catheter Length- 42 cm  Internal Length- 41 cm  Exposed Catheter Length- 1cm   Upper Arm Circumference- 24cm  Tip Confirmation System Bundle met- Yes  If X-ray required, Tip Location- n/a  Radiologist- n/a    PICC insertion successful- Yes  Ultrasound- yes    Okay To Use PICC- Yes    Electronically signed by Dionisio Zhou RN on 9/5/2020 at 1:43 PM

## 2020-09-05 NOTE — PROGRESS NOTES
Pharmacy Consult       TPN    Ordering Provider: Dr Richmond Bautista    Indication for TPN: Postop ileus    Macronutrients   DW: 60kg   Total Kcal: 10 Kcal/kg   Infusion Rate: Clinimix 5/15 plain at 35 mL/hr    Electrolyte Replacement: NaPhos 9 mmol IV x 1                                           Mag sulfate 2g IV x 1    Assessment/ Plan:  -PICC placed  -Will start Clinimix 5/15 plain at 35 ml/hr.   -Decrease NS to 65ml/hr when TPN starts for total IV fluids at 100 ml/hr  -Chemsticks Q6H with low dose Humalog sliding scale coverage  -Re-check BMP, Mg, PO4, iCa 9/6/20 AM    Chevy Chapin PharmD, BCPS 9/5/2020 3:00 PM

## 2020-09-05 NOTE — PROGRESS NOTES
Range: 26.0 - 33.0 pg 31.3 31.0    MCHC Latest Ref Range: 32.2 - 35.5 gm/dl 33.9 33.4    MPV Latest Ref Range: 9.4 - 12.4 fL 10.5 10.6    RDW-CV Latest Ref Range: 11.5 - 14.5 % 12.8 13.0    RDW-SD Latest Ref Range: 35.0 - 45.0 fL 42.5 44.2    Platelet Count Latest Ref Range: 130 - 400 thou/mm3 209 171        Electronically signed by Virginia Fischer MD on 9/5/2020 at 12:46 PM

## 2020-09-05 NOTE — PROGRESS NOTES
[facility]  Postoperative Progress Note    Pt Name: Fortunato Lutz  Medical Record Number: [id number]  Date of Birth 6/28/1933   Today's Date: 9/5/2020    Orlin Genao is very impatient with his progress. NG tube output still high abdomen distended. Continue sips of clears for his comfort no flatus as yet. Had urinary retention last night requiring Garcia catheter placement  OBJECTIVE  VITALS: VITALS:  BP (!) 179/79   Pulse 80   Temp 98.2 °F (36.8 °C) (Oral)   Resp 16   Ht 5' 7\" (1.702 m)   Wt 133 lb 1.6 oz (60.4 kg)   SpO2 95%   BMI 20.85 kg/m²   GENERAL: alert, cooperative, no acute distress  LUNGS: Clear no rhonchi or wheezing  HEART: Normal heart rate  ABDOMEN: Soft incisional tenderness positive distention with tympany few bowel sounds are present iNCISION: clean, dry and intact  EXTERMITY: no cyanosis or edema  INTAKE/OUTPUT :   INTAKE/OUTPUT:      Intake/Output Summary (Last 24 hours) at 9/5/2020 0824  Last data filed at 9/5/2020 0334  Gross per 24 hour   Intake 2824 ml   Output 1625 ml   Net 1199 ml        LABS  CBC with Differential:    Lab Results   Component Value Date    WBC 6.9 09/04/2020    RBC 3.64 09/04/2020    HGB 11.3 09/04/2020    HCT 33.8 09/04/2020     09/04/2020    MCV 92.9 09/04/2020    MCH 31.0 09/04/2020    MCHC 33.4 09/04/2020    NRBC 0 09/01/2020    SEGSPCT 80.6 09/01/2020    MONOPCT 7.6 09/01/2020    MONOSABS 0.4 09/01/2020    LYMPHSABS 0.3 09/01/2020    EOSABS 0.3 09/01/2020    BASOSABS 0.0 09/01/2020     BMP:    Lab Results   Component Value Date     09/05/2020    K 3.8 09/05/2020    K 4.0 08/31/2020     09/05/2020    CO2 24 09/05/2020    BUN 13 09/05/2020    LABALBU 3.5 08/31/2020    CREATININE 1.2 09/05/2020    CALCIUM 7.9 09/05/2020    LABGLOM 57 09/05/2020    GLUCOSE 150 09/05/2020         RADIOLOGY:     ASSESSMENT  1.  POD #3 robotic assisted laparoscopic left inguinal hernia repair and exploratory laparotomy with small bowel resection for giant multiple ileal diverticula  2. Acute on chronic renal insufficiency improving  3. Expected postoperative ileus  4. Urinary retention with BPH  5.  Mild hyponatremia  PLAN  1. Encourage activity  2. VTE: heparin  3. Stress ulcer prophylaxis: PPI  4. NG tube placed back to suction. May take some oral liquids for his comfort. On Reglan. Check abdominal x-ray  5. Fluids changed to normal saline now with hyponatremia  6.  Order for PICC line and TPN n.p.o. for a week duration essentially    Emani Ortega MD  Electronically signed 9/5/2020 at 8:24 AM

## 2020-09-06 LAB
ANION GAP SERPL CALCULATED.3IONS-SCNC: 9 MEQ/L (ref 8–16)
BUN BLDV-MCNC: 18 MG/DL (ref 7–22)
CALCIUM IONIZED: 1.12 MMOL/L (ref 1.12–1.32)
CALCIUM SERPL-MCNC: 7.5 MG/DL (ref 8.5–10.5)
CHLORIDE BLD-SCNC: 102 MEQ/L (ref 98–111)
CO2: 22 MEQ/L (ref 23–33)
CREAT SERPL-MCNC: 1 MG/DL (ref 0.4–1.2)
GFR SERPL CREATININE-BSD FRML MDRD: 71 ML/MIN/1.73M2
GLUCOSE BLD-MCNC: 132 MG/DL (ref 70–108)
GLUCOSE BLD-MCNC: 140 MG/DL (ref 70–108)
GLUCOSE BLD-MCNC: 142 MG/DL (ref 70–108)
GLUCOSE BLD-MCNC: 150 MG/DL (ref 70–108)
GLUCOSE BLD-MCNC: 154 MG/DL (ref 70–108)
MAGNESIUM: 1.9 MG/DL (ref 1.6–2.4)
PHOSPHORUS: 1.8 MG/DL (ref 2.4–4.7)
PHOSPHORUS: 1.9 MG/DL (ref 2.4–4.7)
POTASSIUM SERPL-SCNC: 3.5 MEQ/L (ref 3.5–5.2)
SODIUM BLD-SCNC: 133 MEQ/L (ref 135–145)

## 2020-09-06 PROCEDURE — 94760 N-INVAS EAR/PLS OXIMETRY 1: CPT

## 2020-09-06 PROCEDURE — 83735 ASSAY OF MAGNESIUM: CPT

## 2020-09-06 PROCEDURE — 82330 ASSAY OF CALCIUM: CPT

## 2020-09-06 PROCEDURE — 2580000003 HC RX 258: Performed by: SURGERY

## 2020-09-06 PROCEDURE — 2500000003 HC RX 250 WO HCPCS: Performed by: PHARMACIST

## 2020-09-06 PROCEDURE — C9113 INJ PANTOPRAZOLE SODIUM, VIA: HCPCS | Performed by: SURGERY

## 2020-09-06 PROCEDURE — 84100 ASSAY OF PHOSPHORUS: CPT

## 2020-09-06 PROCEDURE — 6370000000 HC RX 637 (ALT 250 FOR IP): Performed by: PHARMACIST

## 2020-09-06 PROCEDURE — 6370000000 HC RX 637 (ALT 250 FOR IP): Performed by: SURGERY

## 2020-09-06 PROCEDURE — 2500000003 HC RX 250 WO HCPCS: Performed by: STUDENT IN AN ORGANIZED HEALTH CARE EDUCATION/TRAINING PROGRAM

## 2020-09-06 PROCEDURE — 1200000003 HC TELEMETRY R&B

## 2020-09-06 PROCEDURE — 99024 POSTOP FOLLOW-UP VISIT: CPT | Performed by: SURGERY

## 2020-09-06 PROCEDURE — 6370000000 HC RX 637 (ALT 250 FOR IP): Performed by: FAMILY MEDICINE

## 2020-09-06 PROCEDURE — 2580000003 HC RX 258: Performed by: STUDENT IN AN ORGANIZED HEALTH CARE EDUCATION/TRAINING PROGRAM

## 2020-09-06 PROCEDURE — 80048 BASIC METABOLIC PNL TOTAL CA: CPT

## 2020-09-06 PROCEDURE — 6360000002 HC RX W HCPCS: Performed by: PHARMACIST

## 2020-09-06 PROCEDURE — 82948 REAGENT STRIP/BLOOD GLUCOSE: CPT

## 2020-09-06 PROCEDURE — 6360000002 HC RX W HCPCS: Performed by: SURGERY

## 2020-09-06 PROCEDURE — 36415 COLL VENOUS BLD VENIPUNCTURE: CPT

## 2020-09-06 RX ORDER — POTASSIUM CHLORIDE 29.8 MG/ML
20 INJECTION INTRAVENOUS
Status: COMPLETED | OUTPATIENT
Start: 2020-09-06 | End: 2020-09-06

## 2020-09-06 RX ADMIN — POTASSIUM CHLORIDE 20 MEQ: 29.8 INJECTION, SOLUTION INTRAVENOUS at 15:37

## 2020-09-06 RX ADMIN — TAMSULOSIN HYDROCHLORIDE 0.4 MG: 0.4 CAPSULE ORAL at 08:11

## 2020-09-06 RX ADMIN — Medication 3 MG: at 23:48

## 2020-09-06 RX ADMIN — INSULIN LISPRO 1 UNITS: 100 INJECTION, SOLUTION INTRAVENOUS; SUBCUTANEOUS at 11:13

## 2020-09-06 RX ADMIN — LISINOPRIL 10 MG: 10 TABLET ORAL at 08:11

## 2020-09-06 RX ADMIN — SODIUM PHOSPHATE, MONOBASIC, MONOHYDRATE 12 MMOL: 276; 142 INJECTION, SOLUTION INTRAVENOUS at 20:42

## 2020-09-06 RX ADMIN — CARVEDILOL 12.5 MG: 25 TABLET, FILM COATED ORAL at 08:10

## 2020-09-06 RX ADMIN — HYDROMORPHONE HYDROCHLORIDE 0.25 MG: 1 INJECTION, SOLUTION INTRAMUSCULAR; INTRAVENOUS; SUBCUTANEOUS at 20:56

## 2020-09-06 RX ADMIN — LISINOPRIL 10 MG: 10 TABLET ORAL at 20:42

## 2020-09-06 RX ADMIN — SODIUM CHLORIDE: 9 INJECTION, SOLUTION INTRAVENOUS at 21:02

## 2020-09-06 RX ADMIN — Medication 10 ML: at 08:11

## 2020-09-06 RX ADMIN — CARVEDILOL 12.5 MG: 25 TABLET, FILM COATED ORAL at 15:37

## 2020-09-06 RX ADMIN — HEPARIN SODIUM 5000 UNITS: 5000 INJECTION INTRAVENOUS; SUBCUTANEOUS at 20:55

## 2020-09-06 RX ADMIN — INSULIN LISPRO 1 UNITS: 100 INJECTION, SOLUTION INTRAVENOUS; SUBCUTANEOUS at 23:53

## 2020-09-06 RX ADMIN — ATORVASTATIN CALCIUM 20 MG: 20 TABLET, FILM COATED ORAL at 20:42

## 2020-09-06 RX ADMIN — POTASSIUM CHLORIDE 20 MEQ: 29.8 INJECTION, SOLUTION INTRAVENOUS at 16:28

## 2020-09-06 RX ADMIN — METOCLOPRAMIDE 10 MG: 5 INJECTION, SOLUTION INTRAMUSCULAR; INTRAVENOUS at 15:37

## 2020-09-06 RX ADMIN — HYDROMORPHONE HYDROCHLORIDE 0.5 MG: 1 INJECTION, SOLUTION INTRAMUSCULAR; INTRAVENOUS; SUBCUTANEOUS at 10:24

## 2020-09-06 RX ADMIN — HEPARIN SODIUM 5000 UNITS: 5000 INJECTION INTRAVENOUS; SUBCUTANEOUS at 08:11

## 2020-09-06 RX ADMIN — LEUCINE, PHENYLALANINE, LYSINE, METHIONINE, ISOLEUCINE, VALINE, HISTIDINE, THREONINE, TRYPTOPHAN, ALANINE, GLYCINE, ARGININE, PROLINE, SERINE, TYROSINE, DEXTROSE: 365; 280; 290; 200; 300; 290; 240; 210; 90; 1035; 515; 575; 340; 250; 20; 15 INJECTION INTRAVENOUS at 17:32

## 2020-09-06 RX ADMIN — PANTOPRAZOLE SODIUM 40 MG: 40 INJECTION, POWDER, FOR SOLUTION INTRAVENOUS at 08:11

## 2020-09-06 RX ADMIN — METOCLOPRAMIDE 10 MG: 5 INJECTION, SOLUTION INTRAMUSCULAR; INTRAVENOUS at 08:11

## 2020-09-06 RX ADMIN — HYDROMORPHONE HYDROCHLORIDE: 1 INJECTION, SOLUTION INTRAMUSCULAR; INTRAVENOUS; SUBCUTANEOUS at 04:02

## 2020-09-06 RX ADMIN — SODIUM CHLORIDE, PRESERVATIVE FREE 10 ML: 5 INJECTION INTRAVENOUS at 20:42

## 2020-09-06 RX ADMIN — SODIUM PHOSPHATE, MONOBASIC, MONOHYDRATE 10 MMOL: 276; 142 INJECTION, SOLUTION INTRAVENOUS at 11:13

## 2020-09-06 RX ADMIN — AMLODIPINE BESYLATE 5 MG: 5 TABLET ORAL at 08:11

## 2020-09-06 RX ADMIN — METOCLOPRAMIDE 10 MG: 5 INJECTION, SOLUTION INTRAMUSCULAR; INTRAVENOUS at 23:48

## 2020-09-06 ASSESSMENT — PAIN SCALES - GENERAL
PAINLEVEL_OUTOF10: 7
PAINLEVEL_OUTOF10: 6
PAINLEVEL_OUTOF10: 3
PAINLEVEL_OUTOF10: 6
PAINLEVEL_OUTOF10: 5
PAINLEVEL_OUTOF10: 0
PAINLEVEL_OUTOF10: 5
PAINLEVEL_OUTOF10: 7

## 2020-09-06 ASSESSMENT — PAIN DESCRIPTION - LOCATION
LOCATION: ABDOMEN
LOCATION: ABDOMEN

## 2020-09-06 ASSESSMENT — PAIN DESCRIPTION - PAIN TYPE
TYPE: ACUTE PAIN
TYPE: ACUTE PAIN

## 2020-09-06 ASSESSMENT — PAIN DESCRIPTION - ONSET: ONSET: ON-GOING

## 2020-09-06 ASSESSMENT — PAIN DESCRIPTION - ORIENTATION
ORIENTATION: MID
ORIENTATION: MID

## 2020-09-06 ASSESSMENT — PAIN DESCRIPTION - PROGRESSION: CLINICAL_PROGRESSION: NOT CHANGED

## 2020-09-06 ASSESSMENT — PAIN DESCRIPTION - FREQUENCY: FREQUENCY: INTERMITTENT

## 2020-09-06 ASSESSMENT — PAIN - FUNCTIONAL ASSESSMENT: PAIN_FUNCTIONAL_ASSESSMENT: ACTIVITIES ARE NOT PREVENTED

## 2020-09-06 ASSESSMENT — PAIN DESCRIPTION - DESCRIPTORS: DESCRIPTORS: ACHING

## 2020-09-06 NOTE — PLAN OF CARE
Problem: Skin Integrity:  Goal: Will show no infection signs and symptoms  Description: Will show no infection signs and symptoms  9/6/2020 1422 by Daniel Galan RN  Outcome: Ongoing  Note: No new skin issues, will continue to monitor. Problem: Bowel/Gastric:  Goal: Control of bowel function will improve  Description: Control of bowel function will improve  9/6/2020 1422 by Daniel Galan RN  Outcome: Ongoing  Note: Bowel sounds active, NG tube in place, receiving TPN at 35 ML/HR, no BM this day, will continue to monitor. Problem: Discharge Planning:  Goal: Participates in care planning  Description: Participates in care planning  9/6/2020 1422 by Daniel Galan RN  Outcome: Ongoing  Note: Patient plans to be discharged home with wife when medically stable. Problem: Fluid Volume - Deficit:  Goal: Absence of fluid volume deficit signs and symptoms  Description: Absence of fluid volume deficit signs and symptoms  9/6/2020 1422 by Daniel Galan RN  Outcome: Ongoing  Note:   Vitals:    09/06/20 1100   BP: (!) 151/67   Pulse: 67   Resp: 16   Temp: 99 °F (37.2 °C)   SpO2: 94%          Problem: Pain:  Goal: Pain level will decrease  Description: Pain level will decrease  9/6/2020 1422 by Daniel Galan RN  Outcome: Ongoing  Note: Patient complaining of pain of 7 on 0-10 scale, Dilaudid given per [facility], will continue to monitor. Problem: Falls - Risk of:  Goal: Will remain free from falls  Description: Will remain free from falls  9/6/2020 1422 by Daniel Galan RN  Outcome: Ongoing  Note: Patient ambulates stand by assist. Call light within reach. Side rails up x2. Bed alarm on. Non skid slippers available. Problem: Pain:  Goal: Pain level will decrease  Description: Pain level will decrease  9/6/2020 1422 by Daniel Galan RN  Outcome: Ongoing  Note: Patient complaining of pain of 7 on 0-10 scale, Dilaudid given per [facility], will continue to monitor.       Problem: Infection - Central Venous Catheter-Associated Bloodstream Infection:  Goal: Will show no infection signs and symptoms  Description: Will show no infection signs and symptoms  9/6/2020 1422 by Teresa Edmond RN  Outcome: Ongoing  Note: No new skin issues, will continue to monitor. Care plan reviewed with patient. Patient verbalize understanding of the plan of care and contribute to goal setting.

## 2020-09-06 NOTE — PROGRESS NOTES
Denisha Donaldson  Postoperative Progress Note    Pt Name: [address] Record Number: [id number]  Date of Birth 6/28/1933   Today's Date: 9/6/2020    Gustaov Vázquez is feeling better today he did sleep at least 5 hours uninterrupted last night. Bowel sounds are active. He is beginning to pass flatus. Still had 250 out his NG tube last shift. He wants to keep his Garcia catheter today anticipate trial of voiding tomorrow. Abdomen is soft and benign. He still complains of some incisional tenderness. Denies any nausea.     OBJECTIVE  VITALS: VITALS:  /63   Pulse 77   Temp 99.2 °F (37.3 °C) (Oral)   Resp 16   Ht 5' 7\" (1.702 m)   Wt 148 lb 12.8 oz (67.5 kg)   SpO2 95%   BMI 23.31 kg/m²   GENERAL: alert, cooperative, no acute distress  LUNGS: Clear no rhonchi or wheezing  HEART: Normal heart rate  ABDOMEN: Soft incisional tenderness, less distended and active bowel sounds   INCISION: clean, dry and intact  EXTREMITY: no cyanosis or edema  INTAKE/OUTPUT :   INTAKE/OUTPUT:      Intake/Output Summary (Last 24 hours) at 9/6/2020 0731  Last data filed at 9/6/2020 0729  Gross per 24 hour   Intake 2593.04 ml   Output 1100 ml   Net 1493.04 ml        LABS  CBC with Differential:    Lab Results   Component Value Date    WBC 6.9 09/04/2020    RBC 3.64 09/04/2020    HGB 11.3 09/04/2020    HCT 33.8 09/04/2020     09/04/2020    MCV 92.9 09/04/2020    MCH 31.0 09/04/2020    MCHC 33.4 09/04/2020    NRBC 0 09/01/2020    SEGSPCT 80.6 09/01/2020    MONOPCT 7.6 09/01/2020    MONOSABS 0.4 09/01/2020    LYMPHSABS 0.3 09/01/2020    EOSABS 0.3 09/01/2020    BASOSABS 0.0 09/01/2020     BMP:    Lab Results   Component Value Date     09/05/2020    K 3.8 09/05/2020    K 4.0 08/31/2020     09/05/2020    CO2 24 09/05/2020    BUN 13 09/05/2020    LABALBU 3.5 08/31/2020    CREATININE 1.2 09/05/2020    CALCIUM 7.9 09/05/2020    LABGLOM 57 09/05/2020    GLUCOSE

## 2020-09-06 NOTE — PROGRESS NOTES
Patient: Maulik Roldan  Unit/Bed: 7T-27/993-Y  YOB: 1933  MRN: [id number] Acct: [de-identified]   Admitting Diagnosis: Small bowel obstruction, partial ([facility] [K90.440]  Admit Date:  8/30/2020  Hospital Day: 7    Assessment:     Active Problems:    Small bowel obstruction (HCC)    Intractable nausea and vomiting    ANJANA (acute kidney injury) ([facility] [address]    Severe malnutrition ([facility] [address]  Resolved Problems:    * No resolved hospital problems. *      Plan:     He looks stronger than yesterday  Pharmacy with the TPN        Subjective:     Patient denies CP or SOB, he states feeling less distended than yesterday  Medication side effects: none    Scheduled Meds:   sodium phosphate IVPB  10 mmol Intravenous Once    potassium chloride  20 mEq Intravenous Q1H    insulin lispro  0-6 Units Subcutaneous 4 times per day    metoclopramide  10 mg Intravenous Q8H    lisinopril  10 mg Oral BID    heparin (porcine)  5,000 Units Subcutaneous BID    amLODIPine  5 mg Oral Daily    pantoprazole  40 mg Intravenous Daily    And    sodium chloride (PF)  10 mL Intravenous Daily    carvedilol  12.5 mg Oral BID WC    atorvastatin  20 mg Oral Daily    tamsulosin  0.4 mg Oral Daily    sodium chloride flush  10 mL Intravenous 2 times per day    magnesium replacement protocol   Other RX Placeholder    phosphorus replacement protocol   Other RX Placeholder     Continuous Infusions:   PN-Adult Premix 5/15 - Central 35 mL/hr at 09/05/20 1824    sodium chloride 65 mL/hr at 09/05/20 1824     PRN Meds:HYDROmorphone **OR** HYDROmorphone, sodium chloride flush, acetaminophen **OR** acetaminophen, promethazine **OR** ondansetron, hydrALAZINE, melatonin    Review of Systems  Pertinent items are noted in HPI.     Objective:     Patient Vitals for the past 8 hrs:   BP Temp Temp src Pulse Resp SpO2 Weight   09/06/20 0835 -- -- -- -- -- 95 % --   09/06/20 0800 (!) 126/58 98.8 °F (37.1 °C) Oral 75 16 95 % --   09/06/20 0350 139/63 99.2 °F (37.3 °C) Oral 77 16 95 % 148 lb 12.8 oz (67.5 kg)     I/O last 3 completed shifts:   In: 2593 [P.O.:440; I.V.:2153]  Out: 850 [Urine:450; Emesis/NG output:400]  I/O this shift:  In: -   Out: 250 [Emesis/NG output:250]    BP (!) 126/58   Pulse 75   Temp 98.8 °F (37.1 °C) (Oral)   Resp 16   Ht 5' 7\" (1.702 m)   Wt 148 lb 12.8 oz (67.5 kg)   SpO2 95%   BMI 23.31 kg/m²     BP (!) 126/58   Pulse 75   Temp 98.8 °F (37.1 °C) (Oral)   Resp 16   Ht 5' 7\" (1.702 m)   Wt 148 lb 12.8 oz (67.5 kg)   SpO2 95%   BMI 23.31 kg/m²   General appearance: alert, appears stated age and cooperative  Head: Normocephalic, without obvious abnormality, atraumatic  Lungs: clear to auscultation bilaterally  Heart: regular rate and rhythm, S1, S2 normal, no murmur, click, rub or gallop  Abdomen: with the NG pinched there are active BS, incisions look well and no acute focal tenderness  Extremities: extremities normal, atraumatic, no cyanosis or edema  Skin: Skin color, texture, turgor normal. No rashes or lesions  Neurologic: weak      Electronically signed by Mich Rondon MD on 9/6/2020 at 11:07 AM

## 2020-09-06 NOTE — PLAN OF CARE
Problem: Skin Integrity:  Goal: Will show no infection signs and symptoms  Description: Will show no infection signs and symptoms  Outcome: Ongoing  Note: Pt shows no new signs or symptoms of infection on my shift. Will continue to assess. Problem: Skin Integrity:  Goal: Absence of new skin breakdown  Description: Absence of new skin breakdown  Outcome: Ongoing  Note: Absence of new skin integrity issues on my shift. Will continue to assess. Problem: Bowel/Gastric:  Goal: Control of bowel function will improve  Description: Control of bowel function will improve  Outcome: Ongoing  Note: No bowel movement on my shift. Patient able to pass gas. Problem: Bowel/Gastric:  Goal: Ability to achieve a regular elimination pattern will improve  Description: Ability to achieve a regular elimination pattern will improve  Outcome: Ongoing  Note: Pt has a mansfield catheter. Problem: Nutritional:  Goal: Ability to follow a diet with enough fiber (20 to 30 grams) for normal bowel function will improve  Description: Ability to follow a diet with enough fiber (20 to 30 grams) for normal bowel function will improve  Outcome: Ongoing  Note: Pt able to follow a diet with fiber     Problem: Discharge Planning:  Goal: Participates in care planning  Description: Participates in care planning  Outcome: Ongoing  Note: Pt verbalizes understanding of care plan. Pt contributes to goal settings. Problem: Discharge Planning:  Goal: Discharged to appropriate level of care  Description: Discharged to appropriate level of care  Outcome: Ongoing  Note: Pt will discharge home when appropriate. Problem: Fluid Volume - Deficit:  Goal: Absence of fluid volume deficit signs and symptoms  Description: Absence of fluid volume deficit signs and symptoms  Outcome: Ongoing  Note: Absence of fluid volume deficit s/s on my shift.      Problem: Fluid Volume - Deficit:  Goal: Electrolytes within specified parameters  Description: Pt voices 6/10 pain on my shift. Non pharmaceutical interventions like ice and repositioning offered before pain medications. Will continue to assess. Problem: Nutrition  Goal: Optimal nutrition therapy  Outcome: Ongoing  Note: Pt NPO. Receiving TPN     Problem: Pain:  Goal: Control of acute pain  Description: Control of acute pain  Outcome: Ongoing  Note: Pt voices 6/10 pain on my shift. Non pharmaceutical interventions like ice and repositioning offered before pain medications. Will continue to assess     Problem: Pain:  Goal: Control of chronic pain  Description: Control of chronic pain  Outcome: Ongoing  Note: Pt voices 6/10 pain on my shift. Non pharmaceutical interventions like ice and repositioning offered before pain medications. Will continue to assess     Problem: Infection - Central Venous Catheter-Associated Bloodstream Infection:  Goal: Will show no infection signs and symptoms  Description: Will show no infection signs and symptoms  Outcome: Ongoing  Note: Pt shows no new signs or symptoms of infection on my shift. Will continue to assess. Pt verbalizes understanding of care plan. Pt contributes to goal settings.

## 2020-09-07 LAB
ANION GAP SERPL CALCULATED.3IONS-SCNC: 8 MEQ/L (ref 8–16)
BUN BLDV-MCNC: 20 MG/DL (ref 7–22)
CALCIUM IONIZED: 1.12 MMOL/L (ref 1.12–1.32)
CALCIUM SERPL-MCNC: 7.5 MG/DL (ref 8.5–10.5)
CHLORIDE BLD-SCNC: 102 MEQ/L (ref 98–111)
CO2: 21 MEQ/L (ref 23–33)
CREAT SERPL-MCNC: 1.1 MG/DL (ref 0.4–1.2)
GFR SERPL CREATININE-BSD FRML MDRD: 63 ML/MIN/1.73M2
GLUCOSE BLD-MCNC: 137 MG/DL (ref 70–108)
GLUCOSE BLD-MCNC: 142 MG/DL (ref 70–108)
GLUCOSE BLD-MCNC: 148 MG/DL (ref 70–108)
GLUCOSE BLD-MCNC: 148 MG/DL (ref 70–108)
GLUCOSE BLD-MCNC: 152 MG/DL (ref 70–108)
MAGNESIUM: 1.7 MG/DL (ref 1.6–2.4)
PHOSPHORUS: 2.5 MG/DL (ref 2.4–4.7)
POTASSIUM SERPL-SCNC: 3.4 MEQ/L (ref 3.5–5.2)
SODIUM BLD-SCNC: 131 MEQ/L (ref 135–145)

## 2020-09-07 PROCEDURE — 6370000000 HC RX 637 (ALT 250 FOR IP): Performed by: FAMILY MEDICINE

## 2020-09-07 PROCEDURE — 6370000000 HC RX 637 (ALT 250 FOR IP): Performed by: SURGERY

## 2020-09-07 PROCEDURE — 36415 COLL VENOUS BLD VENIPUNCTURE: CPT

## 2020-09-07 PROCEDURE — 94760 N-INVAS EAR/PLS OXIMETRY 1: CPT

## 2020-09-07 PROCEDURE — 80048 BASIC METABOLIC PNL TOTAL CA: CPT

## 2020-09-07 PROCEDURE — 84100 ASSAY OF PHOSPHORUS: CPT

## 2020-09-07 PROCEDURE — 51798 US URINE CAPACITY MEASURE: CPT

## 2020-09-07 PROCEDURE — 6360000002 HC RX W HCPCS: Performed by: SURGERY

## 2020-09-07 PROCEDURE — 2580000003 HC RX 258: Performed by: SURGERY

## 2020-09-07 PROCEDURE — 99024 POSTOP FOLLOW-UP VISIT: CPT | Performed by: SURGERY

## 2020-09-07 PROCEDURE — C9113 INJ PANTOPRAZOLE SODIUM, VIA: HCPCS | Performed by: SURGERY

## 2020-09-07 PROCEDURE — 83735 ASSAY OF MAGNESIUM: CPT

## 2020-09-07 PROCEDURE — 1200000003 HC TELEMETRY R&B

## 2020-09-07 PROCEDURE — 2500000003 HC RX 250 WO HCPCS: Performed by: SURGERY

## 2020-09-07 PROCEDURE — 82330 ASSAY OF CALCIUM: CPT

## 2020-09-07 PROCEDURE — 82948 REAGENT STRIP/BLOOD GLUCOSE: CPT

## 2020-09-07 RX ORDER — POTASSIUM CHLORIDE 29.8 MG/ML
20 INJECTION INTRAVENOUS
Status: COMPLETED | OUTPATIENT
Start: 2020-09-07 | End: 2020-09-07

## 2020-09-07 RX ADMIN — LISINOPRIL 10 MG: 10 TABLET ORAL at 09:30

## 2020-09-07 RX ADMIN — HYDROMORPHONE HYDROCHLORIDE 0.5 MG: 1 INJECTION, SOLUTION INTRAMUSCULAR; INTRAVENOUS; SUBCUTANEOUS at 13:41

## 2020-09-07 RX ADMIN — SODIUM CHLORIDE: 9 INJECTION, SOLUTION INTRAVENOUS at 23:42

## 2020-09-07 RX ADMIN — CARVEDILOL 12.5 MG: 25 TABLET, FILM COATED ORAL at 09:30

## 2020-09-07 RX ADMIN — AMLODIPINE BESYLATE 5 MG: 5 TABLET ORAL at 09:29

## 2020-09-07 RX ADMIN — METOCLOPRAMIDE 10 MG: 5 INJECTION, SOLUTION INTRAMUSCULAR; INTRAVENOUS at 09:31

## 2020-09-07 RX ADMIN — PANTOPRAZOLE SODIUM 40 MG: 40 INJECTION, POWDER, FOR SOLUTION INTRAVENOUS at 09:31

## 2020-09-07 RX ADMIN — ATORVASTATIN CALCIUM 20 MG: 20 TABLET, FILM COATED ORAL at 20:16

## 2020-09-07 RX ADMIN — INSULIN LISPRO 1 UNITS: 100 INJECTION, SOLUTION INTRAVENOUS; SUBCUTANEOUS at 06:49

## 2020-09-07 RX ADMIN — INSULIN LISPRO 1 UNITS: 100 INJECTION, SOLUTION INTRAVENOUS; SUBCUTANEOUS at 11:15

## 2020-09-07 RX ADMIN — HEPARIN SODIUM 5000 UNITS: 5000 INJECTION INTRAVENOUS; SUBCUTANEOUS at 09:30

## 2020-09-07 RX ADMIN — TAMSULOSIN HYDROCHLORIDE 0.4 MG: 0.4 CAPSULE ORAL at 09:30

## 2020-09-07 RX ADMIN — HYDROMORPHONE HYDROCHLORIDE 0.25 MG: 1 INJECTION, SOLUTION INTRAMUSCULAR; INTRAVENOUS; SUBCUTANEOUS at 09:39

## 2020-09-07 RX ADMIN — HYDROMORPHONE HYDROCHLORIDE 0.5 MG: 1 INJECTION, SOLUTION INTRAMUSCULAR; INTRAVENOUS; SUBCUTANEOUS at 21:11

## 2020-09-07 RX ADMIN — Medication 3 MG: at 21:13

## 2020-09-07 RX ADMIN — HEPARIN SODIUM 5000 UNITS: 5000 INJECTION INTRAVENOUS; SUBCUTANEOUS at 20:16

## 2020-09-07 RX ADMIN — METOCLOPRAMIDE 10 MG: 5 INJECTION, SOLUTION INTRAMUSCULAR; INTRAVENOUS at 16:21

## 2020-09-07 RX ADMIN — SODIUM CHLORIDE, PRESERVATIVE FREE 10 ML: 5 INJECTION INTRAVENOUS at 09:33

## 2020-09-07 RX ADMIN — CARVEDILOL 12.5 MG: 25 TABLET, FILM COATED ORAL at 16:21

## 2020-09-07 RX ADMIN — Medication 10 ML: at 09:53

## 2020-09-07 RX ADMIN — ONDANSETRON 4 MG: 2 INJECTION INTRAMUSCULAR; INTRAVENOUS at 12:28

## 2020-09-07 RX ADMIN — LEUCINE, PHENYLALANINE, LYSINE, METHIONINE, ISOLEUCINE, VALINE, HISTIDINE, THREONINE, TRYPTOPHAN, ALANINE, GLYCINE, ARGININE, PROLINE, SERINE, TYROSINE, DEXTROSE: 365; 280; 290; 200; 300; 290; 240; 210; 90; 1035; 515; 575; 340; 250; 20; 15 INJECTION INTRAVENOUS at 17:50

## 2020-09-07 RX ADMIN — POTASSIUM CHLORIDE 20 MEQ: 29.8 INJECTION, SOLUTION INTRAVENOUS at 09:32

## 2020-09-07 RX ADMIN — METOCLOPRAMIDE 10 MG: 5 INJECTION, SOLUTION INTRAMUSCULAR; INTRAVENOUS at 23:42

## 2020-09-07 RX ADMIN — HYDROMORPHONE HYDROCHLORIDE 0.5 MG: 1 INJECTION, SOLUTION INTRAMUSCULAR; INTRAVENOUS; SUBCUTANEOUS at 17:50

## 2020-09-07 RX ADMIN — LISINOPRIL 10 MG: 10 TABLET ORAL at 20:16

## 2020-09-07 RX ADMIN — SODIUM CHLORIDE: 9 INJECTION, SOLUTION INTRAVENOUS at 11:14

## 2020-09-07 RX ADMIN — POTASSIUM CHLORIDE 20 MEQ: 29.8 INJECTION, SOLUTION INTRAVENOUS at 10:35

## 2020-09-07 ASSESSMENT — PAIN SCALES - GENERAL
PAINLEVEL_OUTOF10: 4
PAINLEVEL_OUTOF10: 7
PAINLEVEL_OUTOF10: 7
PAINLEVEL_OUTOF10: 5
PAINLEVEL_OUTOF10: 6
PAINLEVEL_OUTOF10: 7
PAINLEVEL_OUTOF10: 5

## 2020-09-07 ASSESSMENT — PAIN - FUNCTIONAL ASSESSMENT
PAIN_FUNCTIONAL_ASSESSMENT: ACTIVITIES ARE NOT PREVENTED

## 2020-09-07 ASSESSMENT — PAIN DESCRIPTION - PROGRESSION
CLINICAL_PROGRESSION: GRADUALLY WORSENING
CLINICAL_PROGRESSION: NOT CHANGED
CLINICAL_PROGRESSION: NOT CHANGED

## 2020-09-07 ASSESSMENT — PAIN DESCRIPTION - FREQUENCY
FREQUENCY: INTERMITTENT

## 2020-09-07 ASSESSMENT — PAIN DESCRIPTION - LOCATION
LOCATION: ABDOMEN

## 2020-09-07 ASSESSMENT — PAIN DESCRIPTION - PAIN TYPE
TYPE: ACUTE PAIN;SURGICAL PAIN
TYPE: ACUTE PAIN

## 2020-09-07 ASSESSMENT — PAIN DESCRIPTION - ORIENTATION
ORIENTATION: MID

## 2020-09-07 ASSESSMENT — PAIN DESCRIPTION - ONSET
ONSET: ON-GOING

## 2020-09-07 ASSESSMENT — PAIN DESCRIPTION - DESCRIPTORS
DESCRIPTORS: ACHING
DESCRIPTORS: DULL
DESCRIPTORS: DULL

## 2020-09-07 NOTE — PROCEDURES
Bladder scan was completed by LINH Mead at 1450. The residual amount of  210ml was resulted to [employer].

## 2020-09-07 NOTE — PROGRESS NOTES
[facility]te[facility]  Postoperative Progress Note    Pt Name: Carolyn Guerra Record Number: [id number]  Date of Birth 6/28/1933   Today's Date: 9/7/2020    Lamberto Pastor is feeling better today. He has started passing flatus. Bowel sounds present. NG tube output decreased overnight only 50 mL's. OBJECTIVE  VITALS: VITALS:  BP (!) 155/71   Pulse 76   Temp 98.2 °F (36.8 °C) (Oral)   Resp 18   Ht 5' 7\" (1.702 m)   Wt 149 lb 8 oz (67.8 kg)   SpO2 95% Comment: o2 not needed at this time per o2 protocol  BMI 23.42 kg/m²   GENERAL: alert, cooperative, no acute distress  LUNGS: Clear no rhonchi or wheezing  HEART: Normal heart rate  ABDOMEN: Soft incisional tenderness, less distended and active bowel sounds   INCISION: clean, dry and intact  EXTREMITY: no cyanosis or edema  INTAKE/OUTPUT :   INTAKE/OUTPUT:      Intake/Output Summary (Last 24 hours) at 9/7/2020 1037  Last data filed at 9/7/2020 1032  Gross per 24 hour   Intake 3396.07 ml   Output 1950 ml   Net 1446.07 ml        LABS  CBC with Differential:    Lab Results   Component Value Date    WBC 6.9 09/04/2020    RBC 3.64 09/04/2020    HGB 11.3 09/04/2020    HCT 33.8 09/04/2020     09/04/2020    MCV 92.9 09/04/2020    MCH 31.0 09/04/2020    MCHC 33.4 09/04/2020    NRBC 0 09/01/2020    SEGSPCT 80.6 09/01/2020    MONOPCT 7.6 09/01/2020    MONOSABS 0.4 09/01/2020    LYMPHSABS 0.3 09/01/2020    EOSABS 0.3 09/01/2020    BASOSABS 0.0 09/01/2020     BMP:    Lab Results   Component Value Date     09/07/2020    K 3.4 09/07/2020    K 4.0 08/31/2020     09/07/2020    CO2 21 09/07/2020    BUN 20 09/07/2020    LABALBU 3.5 08/31/2020    CREATININE 1.1 09/07/2020    CALCIUM 7.5 09/07/2020    LABGLOM 63 09/07/2020    GLUCOSE 142 09/07/2020         RADIOLOGY:     ASSESSMENT  1.  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 Acute on chronic renal insufficiency improving  3. postoperative ileus  4. Urinary retention with BPH  5.  Mild hyponatremia  PLAN  1. Encourage activity  2. VTE: heparin  3. Stress ulcer prophylaxis: PPI  4. Continue Reglan trial clamping NG hope to remove later today. Start oral fluids  5. Labs reviewed  6.  Continue TPN  7. 7.  Pulmonary hygiene    Madeline Seaman MD  Electronically signed 9/7/2020 at 10:37 AM

## 2020-09-07 NOTE — PLAN OF CARE
Problem: Skin Integrity:  Goal: Will show no infection signs and symptoms  Description: Will show no infection signs and symptoms  Outcome: Ongoing  Note: No signs or symptoms of infection. Dressing clean dry and intact. Problem: Skin Integrity:  Goal: Absence of new skin breakdown  Description: Absence of new skin breakdown  Outcome: Ongoing  Note: No new skin breakdown noted at this time. Will continue to reassess. Patient turns and repositions self in bed frequent       Problem: Bowel/Gastric:  Goal: Control of bowel function will improve  Description: Control of bowel function will improve  Outcome: Ongoing  Note: Patient has not had bowel movement today. Passing gas. Will continue to reassess. Problem: Bowel/Gastric:  Goal: Ability to achieve a regular elimination pattern will improve  Description: Ability to achieve a regular elimination pattern will improve  Outcome: Ongoing  Note: Garcia removed. Has not voided. Will continue to reassess. Problem: Nutritional:  Goal: Ability to follow a diet with enough fiber (20 to 30 grams) for normal bowel function will improve  Description: Ability to follow a diet with enough fiber (20 to 30 grams) for normal bowel function will improve  Outcome: Ongoing  Note: Patient on clear/full liquid diet as tolerated. Will continue to reassess. Problem: Discharge Planning:  Goal: Discharged to appropriate level of care  Description: Discharged to appropriate level of care  Outcome: Ongoing  Note: Patient plans to discharge home when medically stable. Problem: Fluid Volume - Deficit:  Goal: Electrolytes within specified parameters  Description: Electrolytes within specified parameters  Outcome: Ongoing  Note: Patient receiving TPN and monitoring electrolytes. Will continue to reassess. Problem: Pain:  Goal: Pain level will decrease  Description: Pain level will decrease  Outcome: Ongoing  Note: Patient rates pain on 0-10 pain scale.  States pain is a 7 on 0-10 scale. States goal is 0. Repositioned for comfort. PRN pain medication given as needed. Will continue to reassess. Problem: Falls - Risk of:  Goal: Will remain free from falls  Description: Will remain free from falls  Outcome: Ongoing  Note: Falling star placed outside of patient's room. 2/4 bed rails up. Non-skid socks provided. Call light and personal items with in reach. Bed alarm on. Problem: Pain:  Goal: Pain level will decrease  Description: Pain level will decrease  Outcome: Ongoing  Note: Patient rates pain on 0-10 pain scale. States pain is a 7 on 0-10 scale. States goal is 0. Repositioned for comfort. PRN pain medication given as needed. Will continue to reassess. Problem: Infection - Central Venous Catheter-Associated Bloodstream Infection:  Goal: Will show no infection signs and symptoms  Description: Will show no infection signs and symptoms  Outcome: Ongoing  Note: No signs or symptoms of infection. Dressing clean dry and intact. Care plan reviewed with patient. No concerns voiced.

## 2020-09-07 NOTE — PROGRESS NOTES
Patient: Rhea Bernard  Unit/Bed: 0Y-07/082-L  YOB: 1933  MRN: [id number] Acct: [de-identified]   Admitting Diagnosis: Small bowel obstruction, partial ([facility] [address] [N14.500]  Admit Date:  8/30/2020  Hospital Day: 8    Assessment:     Active Problems:    Small bowel obstruction (HCC)    Intractable nausea and vomiting    ANJANA (acute kidney injury) ([facility] [address]    Severe malnutrition ([facility] [address]  Resolved Problems:    * No resolved hospital problems. *      Plan:     Pharmacy managing the electrolytes        Subjective:     Patient has no complaint of CP, SOB, GI upset and the mansfield was just removed. .   Medication side effects: none    Scheduled Meds:   insulin lispro  0-6 Units Subcutaneous 4 times per day    metoclopramide  10 mg Intravenous Q8H    lisinopril  10 mg Oral BID    heparin (porcine)  5,000 Units Subcutaneous BID    amLODIPine  5 mg Oral Daily    pantoprazole  40 mg Intravenous Daily    And    sodium chloride (PF)  10 mL Intravenous Daily    carvedilol  12.5 mg Oral BID WC    atorvastatin  20 mg Oral Daily    tamsulosin  0.4 mg Oral Daily    sodium chloride flush  10 mL Intravenous 2 times per day    magnesium replacement protocol   Other RX Placeholder    phosphorus replacement protocol   Other RX Placeholder     Continuous Infusions:   PN-Adult Premix 5/15 - Central      PN-Adult Premix 5/15 - Central 35 mL/hr at 09/06/20 1732    sodium chloride 65 mL/hr at 09/06/20 2102     PRN Meds:HYDROmorphone **OR** HYDROmorphone, sodium chloride flush, acetaminophen **OR** acetaminophen, promethazine **OR** ondansetron, hydrALAZINE, melatonin    Review of Systems  Pertinent items are noted in HPI.     Objective:     Patient Vitals for the past 8 hrs:   BP Temp Temp src Pulse Resp SpO2 Weight   09/07/20 0910 -- -- -- -- -- 95 % --   09/07/20 0800 (!) 155/71 98.2 °F (36.8 °C) Oral 76 18 95 % --   09/07/20 0400 (!) 126/58 98 °F (36.7 °C) Oral 72 18 96 % 149 lb 8 oz (67.8 kg)     I/O last 3 completed - 4.7 mg/dL  1.9 (L)  2.5        Electronically signed by Beck Whittington MD on 9/7/2020 at 10:46 AM

## 2020-09-07 NOTE — PLAN OF CARE
Problem: Skin Integrity:  Goal: Will show no infection signs and symptoms  Description: Will show no infection signs and symptoms  9/6/2020 2200 by Ana Vaughn RN  Outcome: Ongoing  Note: No new skin breakdown noted this shift. Pt repositioning independently. Encouraged to reposition q2h and PRN. Waffle cushion in chair when patient is up to the chair. No redness or drainage noted to PICC site. Problem: Skin Integrity:  Goal: Absence of new skin breakdown  Description: Absence of new skin breakdown  Outcome: Ongoing     Problem: Bowel/Gastric:  Goal: Control of bowel function will improve  Description: Control of bowel function will improve  9/6/2020 2200 by Ana Vaughn RN  Outcome: Ongoing  Note: Bowel sounds hypoactive. Pt states he is passing flatus. NG remains in place to intermittent wall suction. Problem: Bowel/Gastric:  Goal: Ability to achieve a regular elimination pattern will improve  Description: Ability to achieve a regular elimination pattern will improve  Outcome: Ongoing     Problem: Nutritional:  Goal: Ability to follow a diet with enough fiber (20 to 30 grams) for normal bowel function will improve  Description: Ability to follow a diet with enough fiber (20 to 30 grams) for normal bowel function will improve  Outcome: Ongoing  Note: Pt remains NPO. TPN infusing. Problem: Discharge Planning:  Goal: Participates in care planning  Description: Participates in care planning  9/6/2020 2200 by Ana Vaughn RN  Outcome: Ongoing  Note: Pt is active in his plan of care. Problem: Discharge Planning:  Goal: Discharged to appropriate level of care  Description: Discharged to appropriate level of care  Outcome: Ongoing  Note: Pt plans on returning home with his wife when medically stable.       Problem: Fluid Volume - Deficit:  Goal: Absence of fluid volume deficit signs and symptoms  Description: Absence of fluid volume deficit signs and symptoms  9/6/2020 2200 by Tyson Martinez RN  Outcome: Ongoing  Note: Monitoring electrolytes. Replacing phosphorus tonight. Problem: Pain:  Goal: Pain level will decrease  Description: Pain level will decrease  9/6/2020 2200 by Tyson Martinez RN  Outcome: Ongoing  Note: Pt verbalizes pain to surgical site this shift. PRN norco on MAR. Problem: Falls - Risk of:  Goal: Will remain free from falls  Description: Will remain free from falls  9/6/2020 2200 by Tyson Martinez RN  Outcome: Ongoing  Note: No falls this shift. Patient educated on not getting up without help. Falling star protocol in place. Call light in reach. Bed in lowest position. Side rails up x2. Nonskid footwear on. Bed alarm set. Patient visually checked on hourly rounds. Problem: Falls - Risk of:  Goal: Absence of physical injury  Description: Absence of physical injury  Outcome: Ongoing     Problem: Pain:  Goal: Pain level will decrease  Description: Pain level will decrease  9/6/2020 2200 by Tyson Martinez RN  Outcome: Ongoing  Note: Pt verbalizes pain to surgical site this shift. PRN norco on MAR. Problem: Infection - Central Venous Catheter-Associated Bloodstream Infection:  Goal: Will show no infection signs and symptoms  Description: Will show no infection signs and symptoms  9/6/2020 2200 by Tyson Martinez RN  Outcome: Ongoing  Note: No new skin breakdown noted this shift. Pt repositioning independently. Encouraged to reposition q2h and PRN. Waffle cushion in chair when patient is up to the chair. No redness or drainage noted to PICC site. Care plan reviewed with patient. Patient verbalizes understanding of the plan of care and contribute to goal setting.

## 2020-09-07 NOTE — PROGRESS NOTES
09/07/20 1452   Provider Notification   Reason for Communication Evaluate   Provider Name Dr. Dickerson Officer   Provider Notification Physician   Method of Communication Secure Message   Response See orders     Patient tolerating liquids, no n/v. Provider ok to remove NG tube.

## 2020-09-08 LAB
ANION GAP SERPL CALCULATED.3IONS-SCNC: 7 MEQ/L (ref 8–16)
BUN BLDV-MCNC: 25 MG/DL (ref 7–22)
CALCIUM IONIZED: 1.18 MMOL/L (ref 1.12–1.32)
CALCIUM SERPL-MCNC: 7.8 MG/DL (ref 8.5–10.5)
CHLORIDE BLD-SCNC: 103 MEQ/L (ref 98–111)
CO2: 21 MEQ/L (ref 23–33)
CREAT SERPL-MCNC: 1.1 MG/DL (ref 0.4–1.2)
GFR SERPL CREATININE-BSD FRML MDRD: 63 ML/MIN/1.73M2
GLUCOSE BLD-MCNC: 123 MG/DL (ref 70–108)
GLUCOSE BLD-MCNC: 141 MG/DL (ref 70–108)
GLUCOSE BLD-MCNC: 144 MG/DL (ref 70–108)
GLUCOSE BLD-MCNC: 149 MG/DL (ref 70–108)
GLUCOSE BLD-MCNC: 155 MG/DL (ref 70–108)
MAGNESIUM: 1.6 MG/DL (ref 1.6–2.4)
PHOSPHORUS: 1.7 MG/DL (ref 2.4–4.7)
POTASSIUM SERPL-SCNC: 3.7 MEQ/L (ref 3.5–5.2)
SODIUM BLD-SCNC: 131 MEQ/L (ref 135–145)

## 2020-09-08 PROCEDURE — 94760 N-INVAS EAR/PLS OXIMETRY 1: CPT

## 2020-09-08 PROCEDURE — 1200000003 HC TELEMETRY R&B

## 2020-09-08 PROCEDURE — 6370000000 HC RX 637 (ALT 250 FOR IP): Performed by: SURGERY

## 2020-09-08 PROCEDURE — 2500000003 HC RX 250 WO HCPCS: Performed by: SURGERY

## 2020-09-08 PROCEDURE — 6360000002 HC RX W HCPCS: Performed by: SURGERY

## 2020-09-08 PROCEDURE — 84100 ASSAY OF PHOSPHORUS: CPT

## 2020-09-08 PROCEDURE — 80048 BASIC METABOLIC PNL TOTAL CA: CPT

## 2020-09-08 PROCEDURE — 83735 ASSAY OF MAGNESIUM: CPT

## 2020-09-08 PROCEDURE — 2580000003 HC RX 258: Performed by: SURGERY

## 2020-09-08 PROCEDURE — 82948 REAGENT STRIP/BLOOD GLUCOSE: CPT

## 2020-09-08 PROCEDURE — 6370000000 HC RX 637 (ALT 250 FOR IP): Performed by: FAMILY MEDICINE

## 2020-09-08 PROCEDURE — 99024 POSTOP FOLLOW-UP VISIT: CPT | Performed by: SURGERY

## 2020-09-08 PROCEDURE — 36415 COLL VENOUS BLD VENIPUNCTURE: CPT

## 2020-09-08 PROCEDURE — 2500000003 HC RX 250 WO HCPCS: Performed by: PHARMACIST

## 2020-09-08 PROCEDURE — C9113 INJ PANTOPRAZOLE SODIUM, VIA: HCPCS | Performed by: SURGERY

## 2020-09-08 PROCEDURE — 82330 ASSAY OF CALCIUM: CPT

## 2020-09-08 PROCEDURE — 2580000003 HC RX 258: Performed by: PHARMACIST

## 2020-09-08 RX ORDER — DEXTROSE MONOHYDRATE 25 G/50ML
12.5 INJECTION, SOLUTION INTRAVENOUS PRN
Status: DISCONTINUED | OUTPATIENT
Start: 2020-09-08 | End: 2020-09-10 | Stop reason: HOSPADM

## 2020-09-08 RX ORDER — NICOTINE POLACRILEX 4 MG
15 LOZENGE BUCCAL PRN
Status: DISCONTINUED | OUTPATIENT
Start: 2020-09-08 | End: 2020-09-10 | Stop reason: HOSPADM

## 2020-09-08 RX ORDER — DEXTROSE MONOHYDRATE 50 MG/ML
100 INJECTION, SOLUTION INTRAVENOUS PRN
Status: DISCONTINUED | OUTPATIENT
Start: 2020-09-08 | End: 2020-09-10 | Stop reason: HOSPADM

## 2020-09-08 RX ADMIN — HYDROMORPHONE HYDROCHLORIDE 0.25 MG: 1 INJECTION, SOLUTION INTRAMUSCULAR; INTRAVENOUS; SUBCUTANEOUS at 11:29

## 2020-09-08 RX ADMIN — HYDROMORPHONE HYDROCHLORIDE 0.5 MG: 1 INJECTION, SOLUTION INTRAMUSCULAR; INTRAVENOUS; SUBCUTANEOUS at 20:39

## 2020-09-08 RX ADMIN — HYDROMORPHONE HYDROCHLORIDE 0.5 MG: 1 INJECTION, SOLUTION INTRAMUSCULAR; INTRAVENOUS; SUBCUTANEOUS at 15:17

## 2020-09-08 RX ADMIN — HEPARIN SODIUM 5000 UNITS: 5000 INJECTION INTRAVENOUS; SUBCUTANEOUS at 08:09

## 2020-09-08 RX ADMIN — HYDRALAZINE HYDROCHLORIDE 25 MG: 25 TABLET, FILM COATED ORAL at 08:02

## 2020-09-08 RX ADMIN — CALCIUM GLUCONATE: 98 INJECTION, SOLUTION INTRAVENOUS at 23:26

## 2020-09-08 RX ADMIN — SODIUM CHLORIDE, PRESERVATIVE FREE 10 ML: 5 INJECTION INTRAVENOUS at 08:15

## 2020-09-08 RX ADMIN — HEPARIN SODIUM 5000 UNITS: 5000 INJECTION INTRAVENOUS; SUBCUTANEOUS at 20:38

## 2020-09-08 RX ADMIN — Medication 10 ML: at 11:23

## 2020-09-08 RX ADMIN — LISINOPRIL 10 MG: 10 TABLET ORAL at 08:02

## 2020-09-08 RX ADMIN — ATORVASTATIN CALCIUM 20 MG: 20 TABLET, FILM COATED ORAL at 20:39

## 2020-09-08 RX ADMIN — METOCLOPRAMIDE 10 MG: 5 INJECTION, SOLUTION INTRAMUSCULAR; INTRAVENOUS at 08:02

## 2020-09-08 RX ADMIN — CARVEDILOL 12.5 MG: 25 TABLET, FILM COATED ORAL at 08:01

## 2020-09-08 RX ADMIN — CARVEDILOL 12.5 MG: 25 TABLET, FILM COATED ORAL at 19:16

## 2020-09-08 RX ADMIN — METOCLOPRAMIDE 10 MG: 5 INJECTION, SOLUTION INTRAMUSCULAR; INTRAVENOUS at 23:25

## 2020-09-08 RX ADMIN — HYDROMORPHONE HYDROCHLORIDE 0.5 MG: 1 INJECTION, SOLUTION INTRAMUSCULAR; INTRAVENOUS; SUBCUTANEOUS at 02:58

## 2020-09-08 RX ADMIN — METOCLOPRAMIDE 10 MG: 5 INJECTION, SOLUTION INTRAMUSCULAR; INTRAVENOUS at 15:17

## 2020-09-08 RX ADMIN — AMLODIPINE BESYLATE 5 MG: 5 TABLET ORAL at 08:01

## 2020-09-08 RX ADMIN — PANTOPRAZOLE SODIUM 40 MG: 40 INJECTION, POWDER, FOR SOLUTION INTRAVENOUS at 11:22

## 2020-09-08 RX ADMIN — SODIUM PHOSPHATE, MONOBASIC, MONOHYDRATE 10 MMOL: 276; 142 INJECTION, SOLUTION INTRAVENOUS at 15:17

## 2020-09-08 RX ADMIN — LISINOPRIL 10 MG: 10 TABLET ORAL at 20:39

## 2020-09-08 RX ADMIN — HYDROMORPHONE HYDROCHLORIDE 0.25 MG: 1 INJECTION, SOLUTION INTRAMUSCULAR; INTRAVENOUS; SUBCUTANEOUS at 08:02

## 2020-09-08 RX ADMIN — SODIUM CHLORIDE: 9 INJECTION, SOLUTION INTRAVENOUS at 20:39

## 2020-09-08 RX ADMIN — TAMSULOSIN HYDROCHLORIDE 0.4 MG: 0.4 CAPSULE ORAL at 08:01

## 2020-09-08 ASSESSMENT — PAIN SCALES - GENERAL
PAINLEVEL_OUTOF10: 7
PAINLEVEL_OUTOF10: 6
PAINLEVEL_OUTOF10: 7
PAINLEVEL_OUTOF10: 6
PAINLEVEL_OUTOF10: 6
PAINLEVEL_OUTOF10: 8
PAINLEVEL_OUTOF10: 4

## 2020-09-08 ASSESSMENT — PAIN DESCRIPTION - DESCRIPTORS
DESCRIPTORS: ACHING
DESCRIPTORS: ACHING

## 2020-09-08 ASSESSMENT — PAIN DESCRIPTION - PROGRESSION
CLINICAL_PROGRESSION: GRADUALLY WORSENING
CLINICAL_PROGRESSION: NOT CHANGED
CLINICAL_PROGRESSION: GRADUALLY WORSENING

## 2020-09-08 ASSESSMENT — PAIN DESCRIPTION - PAIN TYPE
TYPE: SURGICAL PAIN
TYPE: SURGICAL PAIN

## 2020-09-08 ASSESSMENT — PAIN DESCRIPTION - FREQUENCY
FREQUENCY: INTERMITTENT
FREQUENCY: INTERMITTENT

## 2020-09-08 ASSESSMENT — PAIN DESCRIPTION - LOCATION
LOCATION: ABDOMEN
LOCATION: ABDOMEN

## 2020-09-08 ASSESSMENT — PAIN DESCRIPTION - ONSET
ONSET: ON-GOING
ONSET: ON-GOING

## 2020-09-08 ASSESSMENT — PAIN - FUNCTIONAL ASSESSMENT
PAIN_FUNCTIONAL_ASSESSMENT: ACTIVITIES ARE NOT PREVENTED
PAIN_FUNCTIONAL_ASSESSMENT: ACTIVITIES ARE NOT PREVENTED

## 2020-09-08 ASSESSMENT — PAIN DESCRIPTION - ORIENTATION
ORIENTATION: MID
ORIENTATION: MID

## 2020-09-08 NOTE — PLAN OF CARE
Problem: Skin Integrity:  Goal: Will show no infection signs and symptoms  Description: Will show no infection signs and symptoms  9/8/2020 0318 by Amador Carrion RN  Outcome: Ongoing  Note: No signs of infection at this time, will continue to assess. Encourage hand hygiene. Problem: Skin Integrity:  Goal: Absence of new skin breakdown  Description: Absence of new skin breakdown  9/8/2020 0318 by Amador Carrion RN  Outcome: Ongoing  Note: No new skin breakdown so far this shift. Pt has abdominal sound from surgery that is healing nicely. Buttocks red but blanches. Will continue to encourage pt to turn and ambulate. Problem: Bowel/Gastric:  Goal: Control of bowel function will improve  Description: Control of bowel function will improve  9/8/2020 0318 by Amador Carrion RN  Outcome: Ongoing  Note: No bowel movement so far this shift, but does have bowel sounds actively. Will continue to assess. Problem: Discharge Planning:  Goal: Participates in care planning  Description: Participates in care planning  Outcome: Ongoing  Note: Pt is active in plan of care, continue to assist as needed. Problem: Discharge Planning:  Goal: Discharged to appropriate level of care  Description: Discharged to appropriate level of care  9/8/2020 0318 by Amador Carrion RN  Outcome: Ongoing  Note: Pt prefers to return home upon discharge with wife but may need rehab. Problem: Fluid Volume - Deficit:  Goal: Absence of fluid volume deficit signs and symptoms  Description: Absence of fluid volume deficit signs and symptoms  Outcome: Ongoing  Note: Pt getting IV fluids, no signs of deficiency at this time. Will continue to assess intake and output. Problem: Pain:  Goal: Pain level will decrease  Description: Pain level will decrease  9/8/2020 0318 by Amador Carrion RN  Outcome: Ongoing  Note: Pt getting IV dilaudid every 3 hours prn. Will continue to assess using 0-10 pain scale.  Pt states pain goal is no pain, which is tolerable. Problem: Falls - Risk of:  Goal: Will remain free from falls  Description: Will remain free from falls  9/8/2020 0318 by Tess Gong RN  Outcome: Ongoing  Note: No falls so far this shift, call light and bedside table within reach. Bed in lowest position and alarm on, nonskid socks on bilaterally. Problem: Nutrition  Goal: Optimal nutrition therapy  Outcome: Ongoing  Note: Nutritional intake appears adequate at this time, will continue to assess intake and output. Problem: Pain:  Goal: Pain level will decrease  Description: Pain level will decrease  9/8/2020 0318 by Tess Gong RN  Outcome: Ongoing  Note: Pt getting IV dilaudid every 3 hours prn. Will continue to assess using 0-10 pain scale. Pt states pain goal is no pain, which is tolerable. Problem: Infection - Central Venous Catheter-Associated Bloodstream Infection:  Goal: Will show no infection signs and symptoms  Description: Will show no infection signs and symptoms  9/8/2020 0318 by Tess Gong RN  Outcome: Ongoing  Note: No signs of infection at this time, will continue to assess. Encourage hand hygiene. Care plan reviewed with patient. Patient verbalize understanding of the plan of care and contribute to goal setting.

## 2020-09-08 NOTE — PLAN OF CARE
Problem: Skin Integrity:  Goal: Will show no infection signs and symptoms  Description: Will show no infection signs and symptoms  9/8/2020 1110 by Jeanette Batista RN  Outcome: Ongoing  Note: No signs and symptoms of infection. Dressing clean dry and intact. Will continue to reassess. Problem: Skin Integrity:  Goal: Absence of new skin breakdown  Description: Absence of new skin breakdown  9/8/2020 1110 by Jeanette Batista RN  Outcome: Ongoing  Note: No new skin breakdown noted at this time. Will continue to reassess. Patient turns and repositions self in bed frequently. Encourage to turn every 2 hours. Problem: Bowel/Gastric:  Goal: Ability to achieve a regular elimination pattern will improve  Description: Ability to achieve a regular elimination pattern will improve  Outcome: Ongoing  Note: Monitoring output. Using bathroom for urination. Has not had bowel movement. Will continue to reassess. Problem: Discharge Planning:  Goal: Participates in care planning  Description: Participates in care planning  9/8/2020 1110 by Jeanette Batista RN  Outcome: Ongoing  Note: Patient plans to discharge home when medically stable. Problem: Fluid Volume - Deficit:  Goal: Absence of fluid volume deficit signs and symptoms  Description: Absence of fluid volume deficit signs and symptoms  9/8/2020 1110 by Jeanette Batista RN  Outcome: Ongoing  Note: I/O last 3 completed shifts: In: 2569.9 [P.O.:200; I.V.:2369.9]  Out: 900 [Urine:900]  No intake/output data recorded. Will continue to reassess. Problem: Fluid Volume - Deficit:  Goal: Electrolytes within specified parameters  Description: Electrolytes within specified parameters  Outcome: Ongoing  Note: Monitoring electrolytes daily. Replacing as needed. Problem: Pain:  Goal: Pain level will decrease  Description: Pain level will decrease  9/8/2020 1110 by Jeanette Batista RN  Outcome: Ongoing  Note: Patient rates pain on 0-10 pain scale. States pain is a 6 on 0-10 scale. States goal is 0. Repositioned for comfort. PRN pain medication given as needed. Will continue to reassess. Problem: Falls - Risk of:  Goal: Will remain free from falls  Description: Will remain free from falls  9/8/2020 1110 by Neal Watkins RN  Outcome: Ongoing  Note: Falling star placed outside of patient's room. 2/4 bed rails up. Non-skid socks provided. Call light and personal items with in reach. Bed alarm on. Problem: Nutrition  Goal: Optimal nutrition therapy  9/8/2020 1110 by Neal Watkins RN  Outcome: Ongoing  Note: Patient receiving TPN through PICC line. On full liquid diet. Will continue to reassess. Problem: Pain:  Goal: Pain level will decrease  Description: Pain level will decrease  9/8/2020 1110 by Neal Watkins RN  Outcome: Ongoing  Note: Patient rates pain on 0-10 pain scale. States pain is a 6 on 0-10 scale. States goal is 0. Repositioned for comfort. PRN pain medication given as needed. Will continue to reassess. Problem: Infection - Central Venous Catheter-Associated Bloodstream Infection:  Goal: Will show no infection signs and symptoms  Description: Will show no infection signs and symptoms  9/8/2020 1110 by Neal Watkins RN  Outcome: Ongoing  Note: No signs and symptoms of infection. Dressing clean dry and intact. Will continue to reassess. Care plan reviewed with patient no concerns voiced.

## 2020-09-08 NOTE — PLAN OF CARE
Problem: Nutrition  Goal: Optimal nutrition therapy  9/8/2020 1131 by Timothy Delatorre RD, LD  Outcome: Ongoing   Nutrition Problem #1: Severe malnutrition, In context of acute illness or injury  Intervention: Food and/or Nutrient Delivery: Continue Current Diet, Modify Oral Nutrition Supplement, Modify Parenteral Nutrition  Nutritional Goals: Patient will receive diet and PN to meet 75% or more of estimated nutrient needs during LOS.

## 2020-09-08 NOTE — PROGRESS NOTES
Jessica Calvillo is a 80 y.o. male patient.     Current Facility-Administered Medications   Medication Dose Route Frequency Provider Last Rate Last Dose    glucose (GLUTOSE) 40 % oral gel 15 g  15 g Oral PRN Florinda Hua MD        dextrose 50 % IV solution  12.5 g Intravenous PRN Florinda Hua MD        glucagon (rDNA) injection 1 mg  1 mg Intramuscular PRN Florinda Hua MD        dextrose 5 % solution  100 mL/hr Intravenous PRN Florinda Hua MD        sodium phosphate 10 mmol in dextrose 5 % 250 mL IVPB  10 mmol Intravenous Once Salvador Wiliam, [address]        PN-Adult  3 IN 1 Central Line (Custom)   Intravenous Continuous TPN Denisse Reilly MD        PN-Adult Premix 5/15 - Central   Intravenous Continuous TPN Denisse Reilly MD 35 mL/hr at 09/07/20 1750      insulin lispro (HUMALOG) injection vial 0-6 Units  0-6 Units Subcutaneous 4 times per day Rhina Amin, [facility]   1 Units at 09/07/20 1115    0.9 % sodium chloride infusion   Intravenous Continuous Denisse Reilly MD 20 mL/hr at 09/08/20 1318      metoclopramide (REGLAN) injection 10 mg  10 mg Intravenous Q8H Denisse Reilly MD   10 mg at 09/08/20 0802    lisinopril (PRINIVIL;ZESTRIL) tablet 10 mg  10 mg Oral BID Florinda Hua MD   10 mg at 09/08/20 0802    HYDROmorphone (DILAUDID) injection 0.25 mg  0.25 mg Intravenous Q3H PRN Denisse Reilly MD   0.25 mg at 09/08/20 1129    Or    HYDROmorphone (DILAUDID) injection 0.5 mg  0.5 mg Intravenous Q3H PRN Denisse Reilly MD   0.5 mg at 09/08/20 0258    heparin (porcine) injection 5,000 Units  5,000 Units Subcutaneous BID Denisse Reilly MD   5,000 Units at 09/08/20 0809    amLODIPine (NORVASC) tablet 5 mg  5 mg Oral Daily Denisse Reilly MD   5 mg at 09/08/20 0801    pantoprazole (PROTONIX) injection 40 mg  40 mg Intravenous Daily Denisse Reilly MD   40 mg at 09/08/20 1122    And    sodium chloride (PF) 0.9 % injection 10 mL  10 mL Intravenous Daily Denisse Reilly MD   10 mL at 09/08/20 7219    carvedilol (COREG) tablet 12.5 mg  12.5 mg Oral BID WC Denisse Reilly MD   12.5 mg at 09/08/20 0801    atorvastatin (LIPITOR) tablet 20 mg  20 mg Oral Daily Denisse Reilly MD   20 mg at 09/07/20 2016    tamsulosin (FLOMAX) capsule 0.4 mg  0.4 mg Oral Daily Denisse Reilly MD   0.4 mg at 09/08/20 0801    sodium chloride flush 0.9 % injection 10 mL  10 mL Intravenous 2 times per day Denisse Reilly MD   10 mL at 09/08/20 0815    sodium chloride flush 0.9 % injection 10 mL  10 mL Intravenous PRN Denisse Reilly MD   10 mL at 09/01/20 9180    acetaminophen (TYLENOL) tablet 650 mg  650 mg Oral Q6H PRN Denisse Reilly MD   650 mg at 09/03/20 3544    Or    acetaminophen (TYLENOL) suppository 650 mg  650 mg Rectal Q6H PRN Denisse Reilly MD        promethazine (PHENERGAN) tablet 12.5 mg  12.5 mg Oral Q6H PRN Denisse Reilly MD        Or    [facility] injection 4 mg  4 mg Intravenous Q6H PRN Denisse Reilly MD   4 mg at 09/07/20 1228    magnesium replacement protocol   Other RX Basilio Aase, MD        phosphorus replacement protocol   Other RX Basilio Aase, MD        hydrALAZINE (APRESOLINE) tablet 25 mg  25 mg Oral Q6H PRN Denisse Reilly MD   25 mg at 09/08/20 0802    melatonin tablet 3 mg  3 mg Oral Nightly PRN Denisse Reilly MD   3 mg at 09/07/20 2113     Allergies   Allergen Reactions    Pcn [Penicillins] Anaphylaxis    Ibuprofen     Percocet [Oxycodone-Acetaminophen] Nausea And Vomiting     Active Problems:    Small bowel obstruction (HCC)    Intractable nausea and vomiting    ANJANA (acute kidney injury) ([facility] [address]    Severe malnutrition ([facility] [address]  Resolved Problems:    * No resolved hospital problems. *    Blood pressure (!) 110/58, pulse 67, temperature 98.2 °F (36.8 °C), temperature source Oral, resp. rate 16, height 5' 7\" (1.702 m), weight 147 lb 12.8 oz (67 kg), SpO2 94 %. Subjective:  Symptoms:  Stable. Diet:  Adequate intake. Activity level: Normal.    Pain:  He complains of pain that is mild. (Abdominal  Pain  Mild ). Objective:  General Appearance:  Comfortable. Vital signs: (most recent): Blood pressure (!) 110/58, pulse 67, temperature 98.2 °F (36.8 °C), temperature source Oral, resp. rate 16, height 5' 7\" (1.702 m), weight 147 lb 12.8 oz (67 kg), SpO2 94 %. Vital signs are normal.    Output: Producing urine and producing stool. HEENT: Normal HEENT exam.    Lungs:  Normal effort and normal respiratory rate. Breath sounds clear to auscultation. Heart: Normal rate. Regular rhythm. S1 normal and S2 normal.  No murmur. Abdomen: Abdomen is soft. Bowel sounds are normal.   There is generalized tenderness. ( Mild  pain). Extremities: Normal range of motion. There is no effusion or dependent edema. Neurological: Patient is alert and oriented to person, place and time. Normal strength. Patient has normal reflexes and normal muscle tone. Assessment:    Condition: In stable condition. Improving.   (  Post  Op  Ileus  Better and starting  Clear  Fluids       Resection  Of  Small  Bowel stable  Cause  Of  partial    Bowel obstruction  Large  diverticulum     ashd  Stable      htn   Stable      pvc  In past         ). Plan:   Out of bed and up to chair. Consults: general surgery and cardiology. Advance diet as tolerated. Administer medications as ordered. (   Stable   Post  Op      advance  Diet          Bowel  Sounds  Noted  And improving      ).        Caroline Nascimento MD  9/8/2020

## 2020-09-08 NOTE — PROGRESS NOTES
ricardouds today, ate some ice cream, pudding, coffee. Agreeable to Ensure Enlive TID. Last BM 8/31/20. PN started 9/5/20: Clinimix 5/15 at 35 ml/hr. Humalog coverage, IVF's at 65 ml/hr, reglan, protonix. Sodium 131, BUN 25, Glucose 149, Magnesium 1.6, Phosphorus 1.7. Answered pt and wife's diet questions. Previous GI soft/low fiber diet education provided. Wounds:  (coccyx stage I, abdominal surgical wounds 9/2/20-robotic assisted lap, left hernia repair and exploritory lap with SB resection for giant multiple ileal diverticula)       Current Nutrition Therapies:    Full liquid diet. Current Parenteral Nutrition Orders:  · Type and Formula: Premix Central(Clinimix 5/15 at 35 ml/hr)   · Lipids: None  · Duration: Continuous  · Rate/Volume: 35 ml/hr  · Current PN Order Provides: ~ 596 kcals, 42 grams protein, 126 grams CHO per 24 hours    Anthropometric Measures:  · Height: 5' 7\" (170.2 cm)  · Current Body Weight: 147 lb 12.8 oz (67 kg)(9/8/20 no edema)   · Admission Body Weight: 135 lb 9.6 oz (61.5 kg)(8/30/20 no edema)    · Usual Body Weight: (~ 145# per pt. Per EMR: 11/22/19: 148#8oz, 1/22/20: 144#12.8oz, 8/6/20: 142#3. 2oz)     · Ideal Body Weight: 148 lbs;   · BMI: 23.1  · BMI Categories: Normal Weight (BMI 22.0 to 24.9) age over 72       Nutrition Diagnosis:   · Severe malnutrition, In context of acute illness or injury related to altered GI function as evidenced by poor intake prior to admission, weight loss greater than or equal to 5% in 1 month, moderate loss of subcutaneous fat, moderate muscle loss      Nutrition Interventions:   Food and/or Nutrient Delivery:  Continue Current Diet, Modify Oral Nutrition Supplement, Modify Parenteral Nutrition  Nutrition Education/Counseling:  Education completed(Low fiber/soft diet reviewed, handout given as well 9/4/20.)   Coordination of Nutrition Care:  Continued Inpatient Monitoring    Goals:  Patient will receive diet and PN to meet 75% or more of estimated nutrient needs during LOS. Nutrition Monitoring and Evaluation:   Food/Nutrient Intake Outcomes:  Diet Advancement/Tolerance, Food and Nutrient Intake, Supplement Intake, Parenteral Nutrition Intake/Tolerance  Physical Signs/Symptoms Outcomes:  Biochemical Data, GI Status, Nutrition Focused Physical Findings, Skin, Weight     Discharge Planning:     Too soon to determine     Electronically signed by Sapna Martinez RD, LD on 9/8/20 at 11:32 AM EDT    Contact: [phone]

## 2020-09-08 NOTE — PROGRESS NOTES
Sherri Dowell  Postoperative Progress Note    Pt Name: [address] Record Number: [id number]  Date of Birth 6/28/1933   Today's Date: 9/8/2020    AYAD Germain is feeling much better, passing more flatus. Tolerating liquids. No BM yet. OBJECTIVE  VITALS: VITALS:  BP (!) 116/56   Pulse 68   Temp 98.7 °F (37.1 °C) (Oral)   Resp 16   Ht 5' 7\" (1.702 m)   Wt 147 lb 12.8 oz (67 kg)   SpO2 95%   BMI 23.15 kg/m²   GENERAL: alert, cooperative, no acute distress  LUNGS: Clear no rhonchi or wheezing  HEART: Normal heart rate  ABDOMEN: Soft incisional tenderness, less distended and active bowel sounds   INCISION: clean, dry and intact  EXTREMITY: no cyanosis or edema  INTAKE/OUTPUT :   INTAKE/OUTPUT:      Intake/Output Summary (Last 24 hours) at 9/8/2020 0910  Last data filed at 9/8/2020 0257  Gross per 24 hour   Intake 2569.91 ml   Output 900 ml   Net 1669.91 ml        LABS  CBC with Differential:    Lab Results   Component Value Date    WBC 6.9 09/04/2020    RBC 3.64 09/04/2020    HGB 11.3 09/04/2020    HCT 33.8 09/04/2020     09/04/2020    MCV 92.9 09/04/2020    MCH 31.0 09/04/2020    MCHC 33.4 09/04/2020    NRBC 0 09/01/2020    SEGSPCT 80.6 09/01/2020    MONOPCT 7.6 09/01/2020    MONOSABS 0.4 09/01/2020    LYMPHSABS 0.3 09/01/2020    EOSABS 0.3 09/01/2020    BASOSABS 0.0 09/01/2020     BMP:    Lab Results   Component Value Date     09/08/2020    K 3.7 09/08/2020    K 4.0 08/31/2020     09/08/2020    CO2 21 09/08/2020    BUN 25 09/08/2020    LABALBU 3.5 08/31/2020    CREATININE 1.1 09/08/2020    CALCIUM 7.8 09/08/2020    LABGLOM 63 09/08/2020    GLUCOSE 149 09/08/2020         RADIOLOGY:     ASSESSMENT  1. POD #6  robotic assisted laparoscopic left inguinal hernia repair and exploratory laparotomy with small bowel resection for giant multiple ileal diverticula  2.  Acute on chronic renal insufficiency I, acute resolved  3. postoperative ileus  4. Urinary retention with BPH, voiding  5. Mild hyponatremia  PLAN  1. Encourage activity  2. VTE: heparin  3. Stress ulcer prophylaxis: PPI  4. Advance dietLabs reviewed  5.  Continue TPN if toleratuing diet discontinue 24 hours  6. 7.  Pulmonary hygiene    Jagdish Serrato MD  Electronically signed 9/8/2020 at 9:10 AM

## 2020-09-09 ENCOUNTER — TELEPHONE (OUTPATIENT)
Dept: FAMILY MEDICINE CLINIC | Age: 85
End: 2020-09-09

## 2020-09-09 LAB
ANION GAP SERPL CALCULATED.3IONS-SCNC: 8 MEQ/L (ref 8–16)
BUN BLDV-MCNC: 25 MG/DL (ref 7–22)
CALCIUM IONIZED: 1.21 MMOL/L (ref 1.12–1.32)
CALCIUM SERPL-MCNC: 7.7 MG/DL (ref 8.5–10.5)
CHLORIDE BLD-SCNC: 104 MEQ/L (ref 98–111)
CO2: 19 MEQ/L (ref 23–33)
CREAT SERPL-MCNC: 1.1 MG/DL (ref 0.4–1.2)
GFR SERPL CREATININE-BSD FRML MDRD: 63 ML/MIN/1.73M2
GLUCOSE BLD-MCNC: 113 MG/DL (ref 70–108)
GLUCOSE BLD-MCNC: 117 MG/DL (ref 70–108)
GLUCOSE BLD-MCNC: 144 MG/DL (ref 70–108)
GLUCOSE BLD-MCNC: 171 MG/DL (ref 70–108)
GLUCOSE BLD-MCNC: 96 MG/DL (ref 70–108)
MAGNESIUM: 1.6 MG/DL (ref 1.6–2.4)
PHOSPHORUS: 1.8 MG/DL (ref 2.4–4.7)
POTASSIUM SERPL-SCNC: 3.8 MEQ/L (ref 3.5–5.2)
SODIUM BLD-SCNC: 131 MEQ/L (ref 135–145)

## 2020-09-09 PROCEDURE — 6360000002 HC RX W HCPCS: Performed by: SURGERY

## 2020-09-09 PROCEDURE — 2580000003 HC RX 258: Performed by: SURGERY

## 2020-09-09 PROCEDURE — 2580000003 HC RX 258: Performed by: PHARMACIST

## 2020-09-09 PROCEDURE — 94760 N-INVAS EAR/PLS OXIMETRY 1: CPT

## 2020-09-09 PROCEDURE — 6370000000 HC RX 637 (ALT 250 FOR IP): Performed by: SURGERY

## 2020-09-09 PROCEDURE — 82948 REAGENT STRIP/BLOOD GLUCOSE: CPT

## 2020-09-09 PROCEDURE — 6370000000 HC RX 637 (ALT 250 FOR IP): Performed by: FAMILY MEDICINE

## 2020-09-09 PROCEDURE — 2500000003 HC RX 250 WO HCPCS: Performed by: PHARMACIST

## 2020-09-09 PROCEDURE — 83735 ASSAY OF MAGNESIUM: CPT

## 2020-09-09 PROCEDURE — 99024 POSTOP FOLLOW-UP VISIT: CPT | Performed by: SURGERY

## 2020-09-09 PROCEDURE — C9113 INJ PANTOPRAZOLE SODIUM, VIA: HCPCS | Performed by: SURGERY

## 2020-09-09 PROCEDURE — 84100 ASSAY OF PHOSPHORUS: CPT

## 2020-09-09 PROCEDURE — 1200000003 HC TELEMETRY R&B

## 2020-09-09 PROCEDURE — 82330 ASSAY OF CALCIUM: CPT

## 2020-09-09 PROCEDURE — 36415 COLL VENOUS BLD VENIPUNCTURE: CPT

## 2020-09-09 PROCEDURE — 80048 BASIC METABOLIC PNL TOTAL CA: CPT

## 2020-09-09 RX ORDER — MAGNESIUM SULFATE IN WATER 40 MG/ML
2 INJECTION, SOLUTION INTRAVENOUS ONCE
Status: COMPLETED | OUTPATIENT
Start: 2020-09-09 | End: 2020-09-09

## 2020-09-09 RX ORDER — HYDROCODONE BITARTRATE AND ACETAMINOPHEN 5; 325 MG/1; MG/1
2 TABLET ORAL EVERY 4 HOURS PRN
Status: DISCONTINUED | OUTPATIENT
Start: 2020-09-09 | End: 2020-09-10 | Stop reason: HOSPADM

## 2020-09-09 RX ORDER — HYDROCODONE BITARTRATE AND ACETAMINOPHEN 5; 325 MG/1; MG/1
1 TABLET ORAL EVERY 4 HOURS PRN
Status: DISCONTINUED | OUTPATIENT
Start: 2020-09-09 | End: 2020-09-10 | Stop reason: HOSPADM

## 2020-09-09 RX ADMIN — AMLODIPINE BESYLATE 5 MG: 5 TABLET ORAL at 08:32

## 2020-09-09 RX ADMIN — LISINOPRIL 10 MG: 10 TABLET ORAL at 20:44

## 2020-09-09 RX ADMIN — Medication 3 MG: at 20:44

## 2020-09-09 RX ADMIN — CARVEDILOL 12.5 MG: 25 TABLET, FILM COATED ORAL at 08:32

## 2020-09-09 RX ADMIN — SODIUM CHLORIDE, PRESERVATIVE FREE 10 ML: 5 INJECTION INTRAVENOUS at 08:33

## 2020-09-09 RX ADMIN — MAGNESIUM SULFATE HEPTAHYDRATE 2 G: 40 INJECTION, SOLUTION INTRAVENOUS at 14:10

## 2020-09-09 RX ADMIN — CARVEDILOL 12.5 MG: 25 TABLET, FILM COATED ORAL at 17:48

## 2020-09-09 RX ADMIN — HYDROCODONE BITARTRATE AND ACETAMINOPHEN 2 TABLET: 5; 325 TABLET ORAL at 08:26

## 2020-09-09 RX ADMIN — METOCLOPRAMIDE 10 MG: 5 INJECTION, SOLUTION INTRAMUSCULAR; INTRAVENOUS at 08:33

## 2020-09-09 RX ADMIN — SODIUM PHOSPHATE, MONOBASIC, MONOHYDRATE 10 MMOL: 276; 142 INJECTION, SOLUTION INTRAVENOUS at 08:45

## 2020-09-09 RX ADMIN — TAMSULOSIN HYDROCHLORIDE 0.4 MG: 0.4 CAPSULE ORAL at 08:33

## 2020-09-09 RX ADMIN — HEPARIN SODIUM 5000 UNITS: 5000 INJECTION INTRAVENOUS; SUBCUTANEOUS at 08:37

## 2020-09-09 RX ADMIN — Medication 10 ML: at 08:33

## 2020-09-09 RX ADMIN — PANTOPRAZOLE SODIUM 40 MG: 40 INJECTION, POWDER, FOR SOLUTION INTRAVENOUS at 08:33

## 2020-09-09 RX ADMIN — SODIUM CHLORIDE, PRESERVATIVE FREE 10 ML: 5 INJECTION INTRAVENOUS at 20:45

## 2020-09-09 RX ADMIN — HYDROCODONE BITARTRATE AND ACETAMINOPHEN 2 TABLET: 5; 325 TABLET ORAL at 14:08

## 2020-09-09 RX ADMIN — LISINOPRIL 10 MG: 10 TABLET ORAL at 08:32

## 2020-09-09 RX ADMIN — METOCLOPRAMIDE 10 MG: 5 INJECTION, SOLUTION INTRAMUSCULAR; INTRAVENOUS at 17:50

## 2020-09-09 RX ADMIN — ATORVASTATIN CALCIUM 20 MG: 20 TABLET, FILM COATED ORAL at 20:44

## 2020-09-09 RX ADMIN — MAGNESIUM HYDROXIDE 30 ML: 2400 SUSPENSION ORAL at 08:32

## 2020-09-09 RX ADMIN — HEPARIN SODIUM 5000 UNITS: 5000 INJECTION INTRAVENOUS; SUBCUTANEOUS at 20:45

## 2020-09-09 ASSESSMENT — PAIN DESCRIPTION - PROGRESSION
CLINICAL_PROGRESSION: GRADUALLY WORSENING
CLINICAL_PROGRESSION: GRADUALLY WORSENING
CLINICAL_PROGRESSION: NOT CHANGED
CLINICAL_PROGRESSION: NOT CHANGED
CLINICAL_PROGRESSION: GRADUALLY WORSENING
CLINICAL_PROGRESSION: NOT CHANGED
CLINICAL_PROGRESSION: GRADUALLY WORSENING
CLINICAL_PROGRESSION: NOT CHANGED
CLINICAL_PROGRESSION: GRADUALLY WORSENING
CLINICAL_PROGRESSION: NOT CHANGED
CLINICAL_PROGRESSION: GRADUALLY WORSENING
CLINICAL_PROGRESSION: NOT CHANGED
CLINICAL_PROGRESSION: GRADUALLY WORSENING
CLINICAL_PROGRESSION: NOT CHANGED
CLINICAL_PROGRESSION: GRADUALLY WORSENING
CLINICAL_PROGRESSION: NOT CHANGED
CLINICAL_PROGRESSION: NOT CHANGED
CLINICAL_PROGRESSION: GRADUALLY WORSENING
CLINICAL_PROGRESSION: GRADUALLY WORSENING

## 2020-09-09 ASSESSMENT — PAIN SCALES - GENERAL
PAINLEVEL_OUTOF10: 0
PAINLEVEL_OUTOF10: 8
PAINLEVEL_OUTOF10: 7
PAINLEVEL_OUTOF10: 4

## 2020-09-09 ASSESSMENT — PAIN DESCRIPTION - DESCRIPTORS
DESCRIPTORS: ACHING
DESCRIPTORS: ACHING

## 2020-09-09 ASSESSMENT — PAIN DESCRIPTION - LOCATION
LOCATION: ABDOMEN
LOCATION: ABDOMEN

## 2020-09-09 ASSESSMENT — PAIN - FUNCTIONAL ASSESSMENT
PAIN_FUNCTIONAL_ASSESSMENT: PREVENTS OR INTERFERES SOME ACTIVE ACTIVITIES AND ADLS
PAIN_FUNCTIONAL_ASSESSMENT: PREVENTS OR INTERFERES SOME ACTIVE ACTIVITIES AND ADLS

## 2020-09-09 ASSESSMENT — PAIN DESCRIPTION - FREQUENCY
FREQUENCY: INTERMITTENT
FREQUENCY: INTERMITTENT

## 2020-09-09 ASSESSMENT — PAIN DESCRIPTION - ORIENTATION
ORIENTATION: RIGHT;LEFT;MID
ORIENTATION: RIGHT;LEFT

## 2020-09-09 ASSESSMENT — PAIN DESCRIPTION - PAIN TYPE
TYPE: ACUTE PAIN
TYPE: ACUTE PAIN;SURGICAL PAIN

## 2020-09-09 ASSESSMENT — PAIN DESCRIPTION - ONSET
ONSET: ON-GOING
ONSET: ON-GOING

## 2020-09-09 NOTE — TELEPHONE ENCOUNTER
Late entry  Patient seen at the emergency room on 8/6/2020  With  Abdominal pain and vomiting found to have a small bowel obstruction.   Admitted for further treatment

## 2020-09-09 NOTE — PROGRESS NOTES
TPN Follow Up Note    Assessment: diet ordered, if patient tolerates may consider weaning TPN  Electrolyte Replacement: Na Phos 10 mmol & Mag sulf 2 g    TPN changes for (today) at 1800: increase KPhos to 15 mmol    Re-check BMP, Mg, PO4, iCa 9/10/2020 AM    Philippe Thomas PharmD, BCPS, BCCCP  9/9/2020 10:23 AM

## 2020-09-09 NOTE — PLAN OF CARE
Problem: Skin Integrity:  Goal: Will show no infection signs and symptoms  Description: Will show no infection signs and symptoms  Outcome: Ongoing  Note: No new signs or symptoms of skin breakdown noted this shift, encouraging patient to turn and reposition self in bed q2h       Problem: Bowel/Gastric:  Goal: Control of bowel function will improve  Description: Control of bowel function will improve  Outcome: Ongoing  Note: Patient has hypoactive bowel sounds throughout. Surgical incision closed with glue slightly red around edges. Patient given Miralax to have BM today he is passing gas. Diet advanced. Problem: Nutritional:  Goal: Ability to follow a diet with enough fiber (20 to 30 grams) for normal bowel function will improve  Description: Ability to follow a diet with enough fiber (20 to 30 grams) for normal bowel function will improve  Outcome: Ongoing  Note: Patient diet advanced to low fiber soft diet today plan to stop TPN tonight if he tolerates      Problem: Discharge Planning:  Goal: Participates in care planning  Description: Participates in care planning  Outcome: Ongoing  Note: Patient plans to bed discharged home with wife when medically stable      Problem: Fluid Volume - Deficit:  Goal: Absence of fluid volume deficit signs and symptoms  Description: Absence of fluid volume deficit signs and symptoms  Outcome: Ongoing  Note: Patient Michele@ROLI and Ns@20ml/hr plan for them to be stopped tonight if he tolerates oral. He has Trace edema BLE and SOB with exertion      Problem: Pain:  Goal: Pain level will decrease  Description: Pain level will decrease  Outcome: Ongoing  Note: Patient states pain to be 8 out of 10 in his abdomen surgical site using the 0-10 pain scale.  Patient repositioned for comfort and pain medication given per [facility] will continue to reassess      Problem: Falls - Risk of:  Goal: Will remain free from falls  Description: Will remain free from falls  Outcome: Ongoing  Note: No falls noted this shift. Continue falling star program. Bed alarm on, bed in low position. Call light and personal belongings in reach. Patient uses call light appropriately. Problem: Pain:  Goal: Pain level will decrease  Description: Pain level will decrease  Outcome: Ongoing  Note: Patient states pain to be 8 out of 10 in his abdomen surgical site using the 0-10 pain scale. Patient repositioned for comfort and pain medication given per [facility] will continue to reassess      Problem: Infection - Central Venous Catheter-Associated Bloodstream Infection:  Goal: Will show no infection signs and symptoms  Description: Will show no infection signs and symptoms  Outcome: Ongoing  Note: No new signs or symptoms of skin breakdown noted this shift, encouraging patient to turn and reposition self in bed q2h     Care plan reviewed with patient and family. Patient and family verbalize understanding of the plan of care and contribute to goal setting.

## 2020-09-09 NOTE — PLAN OF CARE
of fluid volume deficit signs and symptoms  9/8/2020 2229 by Kylie Celeste RN  Outcome: Ongoing     Problem: Fluid Volume - Deficit:  Goal: Electrolytes within specified parameters  Description: Electrolytes within specified parameters  9/8/2020 2229 by Kylie Celeste RN  Outcome: Ongoing  Note: Monitoring electrolytes daily and replacing as needed. Problem: Pain:  Goal: Pain level will decrease  Description: Pain level will decrease  9/8/2020 2229 by Kylie Celeste RN  Outcome: Ongoing     Problem: Pain:  Goal: Ability to notify healthcare provider of pain before it becomes unmanageable or unbearable will improve  Description: Ability to notify healthcare provider of pain before it becomes unmanageable or unbearable will improve  Outcome: Ongoing     Problem: Pain:  Goal: Control of acute pain  Description: Control of acute pain  Outcome: Ongoing  Note: Pt states pain is a 8/10 in his mid abdomen. PRN pain medication given per MAR. Pt repositioned with pillow support. Will continue to monitor      Problem: Pain:  Goal: Control of chronic pain  Description: Control of chronic pain  Outcome: Ongoing     Problem: Falls - Risk of:  Goal: Will remain free from falls  Description: Will remain free from falls  9/8/2020 2229 by Kylie Celeste RN  Outcome: Ongoing  Note: No falls noted this shift. Falling star protocol in place and call light within reach. Bed alarm active and nonskid socks on. Patient utilizes call light appropriately        Problem: Falls - Risk of:  Goal: Absence of physical injury  Description: Absence of physical injury  Outcome: Ongoing     Problem: Nutrition  Goal: Optimal nutrition therapy  9/8/2020 2229 by Kylie Celeste RN  Outcome: Ongoing  Note: Pt on full liquid diet as tolerated. TPN running at 35. Chem Q6 checks.  Will continue to monitor      Problem: Pain:  Goal: Pain level will decrease  Description: Pain level will decrease  9/8/2020 2229 by Kylie Celeste RN  Outcome: Ongoing Problem: Pain:  Goal: Control of acute pain  Description: Control of acute pain  Outcome: Ongoing     Problem: Pain:  Goal: Control of chronic pain  Description: Control of chronic pain  Outcome: Ongoing     Problem: Infection - Central Venous Catheter-Associated Bloodstream Infection:  Goal: Will show no infection signs and symptoms  Description: Will show no infection signs and symptoms  9/8/2020 2229 by Sterling Moses RN  Outcome: Ongoing  Note: No signs or symptoms of infection at this time. CVC dressing clean, dry and intact. Will continue to assess       Care plan reviewed with patient. Patient  verbalize understanding of the plan of care and contribute to goal setting.

## 2020-09-09 NOTE — PROGRESS NOTES
Comprehensive Nutrition Assessment    Type and Reason for Visit:  Reassess    Nutrition Recommendations/Plan:   Diet advance per MD.   Recommend continue current TPN macronutrients for evening bag, if not discontinued per MD.   ONS changed: Magic cup BID. Nutrition Assessment:    Pt improving from a nutritional standpoint AEB diet advancement today to low fiber, patient reports tolerating full liquids but small intake due to not fond of limited items allowed on liquid diet, also receiving PN. Remains at risk for further nutritional compromise r/t inadequate oral intake, advanced age, increased nutrient needs to support wound healing, and underlying medical condition (SBO, s/p surgery 9/20/20, post-op ileus, history of CAD, ANJANA, HTN, CVA, hyperlipidemia, benign prostatic hypertrophy), and need for nutrition support. Nutrition recommendations/interventions as per above. Malnutrition Assessment:  Malnutrition Status:  Severe malnutrition    Context:  Acute Illness     Findings of the 6 clinical characteristics of malnutrition:  Energy Intake:  7 - 50% or less of estimated energy requirements for 5 or more days  Weight Loss:  1 - 5% over 1 month(7.2%)     Body Fat Loss:  7 - Moderate body fat loss Orbital, Triceps, Fat Overlying Ribs   Muscle Mass Loss:  7 - Moderate muscle mass loss Temples (temporalis)  Fluid Accumulation:  No significant fluid accumulation     Strength:  Not Performed    Estimated Daily Nutrient Needs:  Energy (kcal):  0470-8631 kcals (30-35 kcals/kg/day);  Weight Used for Energy Requirements:  (60 kg, 9/1/20)     Protein (g):  60 grams (1 gram/kg/day based) pending renal status;          Nutrition Related Findings:  s/p surgery 9/2/20 for SBO, post-op ileus, NG tube removed, passing gas, no BM since surgery, not fond of liquid diet and looking forward to having solid food for lunch, intake has been mostly pudding and ice cream, disliked ensure clear and enlive, agreeable to trial magic cup; TPN infusing at 80ml/ hour; phosphorus 1.8, glucose 144; reglan and electrolyte replacement      Wounds:  (coccyx stage I, abdominal surgical wounds 9/2/20-robotic assisted lap, left hernia repair and exploritory lap with SB resection for giant multiple ileal diverticula)       Current Nutrition Therapies:    PN-Adult  3 IN 1 Central Line (Custom)  DIET LOW FIBER;  PN-Adult  3 IN 1 Central Line (Custom)  Dietary Nutrition Supplements: Frozen Oral Supplement  Current Parenteral Nutrition Orders:  · Type and Formula: 3-in-1 Custom(10 kcal/kgm, 1 gram protein/kgm, 20% lipids with dose weight 67kgm)   · Lipids: Daily  · Duration: Continuous  · Rate/Volume: 80ml/ hour  · Current PN Order Provides: 670 kcals, 67 grams protein, 79 grams carbohydrate per 24 hours        Anthropometric Measures:  · Height: 5' 7\" (170.2 cm)  · Current Body Weight: 137 lb (62.1 kg)(9/9; +1 pitting edema)   · Admission Body Weight: 135 lb 9.6 oz (61.5 kg)(8/30/20 no edema)    · Usual Body Weight: (~ 145# per pt. Per EMR: 11/22/19: 148#8oz, 1/22/20: 144#12.8oz, 8/6/20: 142#3. 2oz)     · Ideal Body Weight: 148 lbs;     · BMI: 21.5      · BMI Categories: Underweight (BMI less than 22) age over 72       Nutrition Diagnosis:   · Severe malnutrition, In context of acute illness or injury related to altered GI function as evidenced by poor intake prior to admission, weight loss greater than or equal to 5% in 1 month, moderate loss of subcutaneous fat, moderate muscle loss      Nutrition Interventions:   Food and/or Nutrient Delivery:  Continue Current Diet, Modify Oral Nutrition Supplement, Continue Current Parenteral Nutrition  Nutrition Education/Counseling:  Education completed(Low fiber/soft diet reviewed, handout given as well 9/4/20.)   Coordination of Nutrition Care:  Continued Inpatient Monitoring    Goals:  Patient will receive diet and PN to meet 75% or more of estimated nutrient needs during LOS.        Nutrition Monitoring and Evaluation:       Food/Nutrient Intake Outcomes:  Diet Advancement/Tolerance, Food and Nutrient Intake, Supplement Intake, Parenteral Nutrition Intake/Tolerance  Physical Signs/Symptoms Outcomes:  Biochemical Data, GI Status, Nutrition Focused Physical Findings, Skin, Weight     Discharge Planning:     Too soon to determine     Electronically signed by Claude Penny RD, LD on 9/9/20 at 11:15 AM EDT    Contact: [phone]

## 2020-09-09 NOTE — PROGRESS NOTES
[facility] Dr. Pearl Camargo  Postoperative Progress Note    Pt Name: Wendy Santos Record Number: [id number]  Date of Birth 6/28/1933   Today's Date: 9/9/2020    Chliango Alba is feeling much better, passing more flatus. Feels better every day. No BM yet. OBJECTIVE  VITALS: VITALS:  BP (!) 143/56   Pulse 73   Temp 98 °F (36.7 °C) (Oral)   Resp 16   Ht 5' 7\" (1.702 m)   Wt 137 lb (62.1 kg)   SpO2 94%   BMI 21.46 kg/m²   GENERAL: alert, cooperative, no acute distress  LUNGS: Clear no rhonchi or wheezing  HEART: Normal heart rate  ABDOMEN: Soft incisional tenderness,  active bowel sounds   INCISION: clean, dry and intact  EXTREMITY: no cyanosis or edema  INTAKE/OUTPUT :   INTAKE/OUTPUT:      Intake/Output Summary (Last 24 hours) at 9/9/2020 0712  Last data filed at 9/9/2020 0414  Gross per 24 hour   Intake 2861.09 ml   Output 350 ml   Net 2511.09 ml        LABS  CBC with Differential:    Lab Results   Component Value Date    WBC 6.9 09/04/2020    RBC 3.64 09/04/2020    HGB 11.3 09/04/2020    HCT 33.8 09/04/2020     09/04/2020    MCV 92.9 09/04/2020    MCH 31.0 09/04/2020    MCHC 33.4 09/04/2020    NRBC 0 09/01/2020    SEGSPCT 80.6 09/01/2020    MONOPCT 7.6 09/01/2020    MONOSABS 0.4 09/01/2020    LYMPHSABS 0.3 09/01/2020    EOSABS 0.3 09/01/2020    BASOSABS 0.0 09/01/2020     BMP:    Lab Results   Component Value Date     09/09/2020    K 3.8 09/09/2020    K 4.0 08/31/2020     09/09/2020    CO2 19 09/09/2020    BUN 25 09/09/2020    LABALBU 3.5 08/31/2020    CREATININE 1.1 09/09/2020    CALCIUM 7.7 09/09/2020    LABGLOM 63 09/09/2020    GLUCOSE 117 09/09/2020         RADIOLOGY:     ASSESSMENT  1. POD #7  robotic assisted laparoscopic left inguinal hernia repair and exploratory laparotomy with small bowel resection for giant multiple ileal diverticula  2. Acute on chronic renal insufficiency I, acute resolved  3. postoperative ileus resolving  4. Urinary retention with BPH, voiding  5. Mild hyponatremia resolved  PLAN  1. Encourage activity  2. VTE: heparin  3. Stress ulcer prophylaxis: PPI  4. Advance diet  5.  Discontinue TPN  6. 7.  Pulmonary hygiene    Juan Moreira MD  Electronically signed 9/9/2020 at 7:12 AM

## 2020-09-09 NOTE — FLOWSHEET NOTE
[place] [address] PROGRESS NOTE      Patient: Fortunato Lutz  Room #: 0C-40/426-B            YOB: 1933  Age: 80 y.o. Gender: male            Admit Date & Time: 8/30/2020 10:10 AM    Assessment:  Pt was sitting up in bed. Pt's wife was laying down on the couch. Pt shared that he hopes to go home tomorrow. He is not sure about his care, because wife seemed hesitant about her ability to care for the pt. Pt reiterated 3 times during the conversation that he considers himself \"a [employer]. He was again ok with spiritual care visits, after expressing during last conversation that he wasn't. Interventions:   provided a listening presence for pt and his wife. Outcomes:  Pt was more upbeat at the end of the encounter. Plan:  1. Provide spiritual care and support. 2.  Explore in more detail with the pt what it means to be [employer]. Electronically signed by Esme Stewart, on 9/9/2020 at 1:42 PM.  [address] [facility]  [phone]       09/09/20 1015   Encounter Summary   Services provided to: Patient and family together   Referral/Consult From: [facility]; [facility]   [facility]   ([address] [facility])   Contact Christian Completed   Continue Visiting Yes  (9/9)   Complexity of Encounter Moderate   Length of Encounter 30 minutes   Spiritual Assessment Completed Yes   Spiritual/Advent   Type Spiritual support   Assessment Calm; Approachable   Intervention Active listening;Explored feelings, thoughts, concerns;Explored coping resources;Sustaining presence/ Ministry of presence   Outcome Connection/belonging;Comfort;Engaged in conversation;Expressed feelings/needs/concerns

## 2020-09-09 NOTE — CARE COORDINATION
9/9/20, 1:45 PM EDT    DISCHARGE ON GOING Cecilio Kramer day: 10  Location: [facility]/017- Reason for admit: Small bowel obstruction, partial ([facility] [address] [K56.600]   Procedure: POD #7  robotic assisted laparoscopic left inguinal hernia repair and exploratory laparotomy with small bowel resection for giant multiple ileal diverticula by Dr Steven Daniel of Care: passing flatus, no BM, increase activity, stop TPN, Mg 1.6 pain control, low fiber diet now, electrolyte replacement, pain control with Norco, Reglan and Protonix IV, dietitian following, dressing care,   Barriers to Discharge: not medically ready per surgery  PCP: Winnie Closs, MD  Readmission Risk Score: 20%  Patient Goals/Plan/Treatment Preferences: plans home with spouse; declined [facility] previously twice with CM.

## 2020-09-09 NOTE — PLAN OF CARE
Problem: Nutrition  Goal: Optimal nutrition therapy  9/9/2020 1115 by Leobardo Vail RD, LD  Outcome: Ongoing   Nutrition Problem #1: Severe malnutrition, In context of acute illness or injury  Intervention: Food and/or Nutrient Delivery: Continue Current Diet, Modify Oral Nutrition Supplement, Continue Current Parenteral Nutrition  Nutritional Goals: Patient will receive diet and PN to meet 75% or more of estimated nutrient needs during LOS.

## 2020-09-10 VITALS
WEIGHT: 159.3 LBS | TEMPERATURE: 98.6 F | OXYGEN SATURATION: 95 % | BODY MASS INDEX: 25 KG/M2 | SYSTOLIC BLOOD PRESSURE: 121 MMHG | HEART RATE: 64 BPM | DIASTOLIC BLOOD PRESSURE: 58 MMHG | RESPIRATION RATE: 18 BRPM | HEIGHT: 67 IN

## 2020-09-10 LAB
ANION GAP SERPL CALCULATED.3IONS-SCNC: 7 MEQ/L (ref 8–16)
BUN BLDV-MCNC: 26 MG/DL (ref 7–22)
CALCIUM IONIZED: 1.2 MMOL/L (ref 1.12–1.32)
CALCIUM SERPL-MCNC: 7.9 MG/DL (ref 8.5–10.5)
CHLORIDE BLD-SCNC: 106 MEQ/L (ref 98–111)
CO2: 21 MEQ/L (ref 23–33)
CREAT SERPL-MCNC: 1 MG/DL (ref 0.4–1.2)
GFR SERPL CREATININE-BSD FRML MDRD: 71 ML/MIN/1.73M2
GLUCOSE BLD-MCNC: 103 MG/DL (ref 70–108)
GLUCOSE BLD-MCNC: 98 MG/DL (ref 70–108)
MAGNESIUM: 2.1 MG/DL (ref 1.6–2.4)
PHOSPHORUS: 2.5 MG/DL (ref 2.4–4.7)
POTASSIUM SERPL-SCNC: 4.2 MEQ/L (ref 3.5–5.2)
SODIUM BLD-SCNC: 134 MEQ/L (ref 135–145)

## 2020-09-10 PROCEDURE — 82948 REAGENT STRIP/BLOOD GLUCOSE: CPT

## 2020-09-10 PROCEDURE — 2580000003 HC RX 258: Performed by: SURGERY

## 2020-09-10 PROCEDURE — 80048 BASIC METABOLIC PNL TOTAL CA: CPT

## 2020-09-10 PROCEDURE — 6370000000 HC RX 637 (ALT 250 FOR IP): Performed by: SURGERY

## 2020-09-10 PROCEDURE — 6360000002 HC RX W HCPCS: Performed by: SURGERY

## 2020-09-10 PROCEDURE — 6370000000 HC RX 637 (ALT 250 FOR IP): Performed by: FAMILY MEDICINE

## 2020-09-10 PROCEDURE — 82330 ASSAY OF CALCIUM: CPT

## 2020-09-10 PROCEDURE — 83735 ASSAY OF MAGNESIUM: CPT

## 2020-09-10 PROCEDURE — 36415 COLL VENOUS BLD VENIPUNCTURE: CPT

## 2020-09-10 PROCEDURE — 84100 ASSAY OF PHOSPHORUS: CPT

## 2020-09-10 PROCEDURE — C9113 INJ PANTOPRAZOLE SODIUM, VIA: HCPCS | Performed by: SURGERY

## 2020-09-10 PROCEDURE — 99024 POSTOP FOLLOW-UP VISIT: CPT | Performed by: SURGERY

## 2020-09-10 RX ORDER — POLYETHYLENE GLYCOL 3350 17 G/17G
17 POWDER, FOR SOLUTION ORAL DAILY
Status: DISCONTINUED | OUTPATIENT
Start: 2020-09-10 | End: 2020-09-10 | Stop reason: HOSPADM

## 2020-09-10 RX ORDER — HYDROCODONE BITARTRATE AND ACETAMINOPHEN 5; 325 MG/1; MG/1
1 TABLET ORAL EVERY 4 HOURS PRN
Qty: 20 TABLET | Refills: 0 | Status: SHIPPED | OUTPATIENT
Start: 2020-09-10 | End: 2020-09-10

## 2020-09-10 RX ORDER — HYDROCODONE BITARTRATE AND ACETAMINOPHEN 5; 325 MG/1; MG/1
1 TABLET ORAL EVERY 4 HOURS PRN
Qty: 20 TABLET | Refills: 0 | Status: SHIPPED | OUTPATIENT
Start: 2020-09-10 | End: 2020-09-15

## 2020-09-10 RX ADMIN — POLYETHYLENE GLYCOL 3350 17 G: 17 POWDER, FOR SOLUTION ORAL at 08:20

## 2020-09-10 RX ADMIN — AMLODIPINE BESYLATE 5 MG: 5 TABLET ORAL at 08:16

## 2020-09-10 RX ADMIN — LISINOPRIL 10 MG: 10 TABLET ORAL at 08:16

## 2020-09-10 RX ADMIN — TAMSULOSIN HYDROCHLORIDE 0.4 MG: 0.4 CAPSULE ORAL at 08:17

## 2020-09-10 RX ADMIN — HYDROCODONE BITARTRATE AND ACETAMINOPHEN 2 TABLET: 5; 325 TABLET ORAL at 11:49

## 2020-09-10 RX ADMIN — CARVEDILOL 12.5 MG: 25 TABLET, FILM COATED ORAL at 08:16

## 2020-09-10 RX ADMIN — HYDROCODONE BITARTRATE AND ACETAMINOPHEN 2 TABLET: 5; 325 TABLET ORAL at 04:59

## 2020-09-10 RX ADMIN — HEPARIN SODIUM 5000 UNITS: 5000 INJECTION INTRAVENOUS; SUBCUTANEOUS at 08:24

## 2020-09-10 RX ADMIN — SODIUM CHLORIDE, PRESERVATIVE FREE 10 ML: 5 INJECTION INTRAVENOUS at 08:17

## 2020-09-10 RX ADMIN — METOCLOPRAMIDE 10 MG: 5 INJECTION, SOLUTION INTRAMUSCULAR; INTRAVENOUS at 08:16

## 2020-09-10 RX ADMIN — METOCLOPRAMIDE 10 MG: 5 INJECTION, SOLUTION INTRAMUSCULAR; INTRAVENOUS at 01:06

## 2020-09-10 RX ADMIN — PANTOPRAZOLE SODIUM 40 MG: 40 INJECTION, POWDER, FOR SOLUTION INTRAVENOUS at 08:16

## 2020-09-10 RX ADMIN — Medication 10 ML: at 08:18

## 2020-09-10 RX ADMIN — MAGNESIUM HYDROXIDE 30 ML: 2400 SUSPENSION ORAL at 08:20

## 2020-09-10 ASSESSMENT — PAIN DESCRIPTION - PROGRESSION
CLINICAL_PROGRESSION: NOT CHANGED

## 2020-09-10 ASSESSMENT — ENCOUNTER SYMPTOMS
SHORTNESS OF BREATH: 0
COUGH: 0
ABDOMINAL PAIN: 1

## 2020-09-10 ASSESSMENT — PAIN SCALES - GENERAL
PAINLEVEL_OUTOF10: 7
PAINLEVEL_OUTOF10: 7

## 2020-09-10 NOTE — PROGRESS NOTES
Patient seen and examined. Tolerating oral intake. He is requesting discharge today no bowel movement as yet but passing large amounts of flatus. Discussed with Dr. Emy Bird who feels comfortable with discharge as do I.  Prescription for pain medications written. Follow-up with me in the office in 2 weeks. Recommend continued bowel regimen at home. No heavy lifting over 20 to 25 pounds.

## 2020-09-10 NOTE — CARE COORDINATION
9/10/20, 9:11 AM EDT    Home today with wife. He again denies discharge needs and declines HH. Patient goals/plan/ treatment preferences discussed by  and . Patient goals/plan/ treatment preferences reviewed with patient/ family. Patient/ family verbalize understanding of discharge plan and are in agreement with goal/plan/treatment preferences. Understanding was demonstrated using the teach back method. AVS provided by RN at time of discharge, which includes all necessary medical information pertaining to the patients current course of illness, treatment, post-discharge goals of care, and treatment preferences.         IMM Letter  IMM Letter given to Patient/Family/Significant other/Guardian/POA/by[de-identified] Andres Crawford CM  IMM Letter date given[de-identified] 09/10/20  IMM Letter time given[de-identified] 0821

## 2020-09-10 NOTE — PROGRESS NOTES
Discharge teaching and instructions for diagnosis/procedure of bowel obstruction completed with patient using teachback method. AVS reviewed. Printed prescriptions given to patient. Patient voiced understanding regarding prescriptions, follow up appointments, and care of self at home. Discharged in a wheelchair to  home with support per family     This RN gave patient bowel medications no BM yet however patient has active bowel sounds and passing flatus so he is ok to go her per surgery.  F/u in 2 weeks with them and 1 week with PCP

## 2020-09-10 NOTE — PROGRESS NOTES
Progress Note  Date:9/10/2020       BAPW:[id number]  Patient Bulmaro Aelx     YOB: 1933     Age:87 y.o. Subjective    Subjective:  Symptoms:  Stable. He reports weakness (generalized and less). No shortness of breath or cough. Diet:  Adequate intake. Activity level: Impaired due to weakness. Pain:  He reports no pain. (Much less abdominal pain). Review of Systems   Constitutional: Positive for activity change. Respiratory: Negative for cough and shortness of breath. Gastrointestinal: Positive for abdominal pain (less and better). Genitourinary: Negative for difficulty urinating. Neurological: Positive for weakness (generalized and less). Objective         Vitals Last 24 Hours:  TEMPERATURE:  Temp  Av.2 °F (36.8 °C)  Min: 97.7 °F (36.5 °C)  Max: 98.3 °F (36.8 °C)  RESPIRATIONS RANGE: Resp  Av  Min: 16  Max: 20  PULSE OXIMETRY RANGE: SpO2  Av.2 %  Min: 94 %  Max: 98 %  PULSE RANGE: Pulse  Av.2  Min: 63  Max: 70  BLOOD PRESSURE RANGE: Systolic (39JIM), RNB:486 , Min:104 , RAINER:513   ; Diastolic (02JOB), SEX:27, Min:55, Max:64    I/O (24Hr): Intake/Output Summary (Last 24 hours) at 9/10/2020 0521  Last data filed at 2020  Gross per 24 hour   Intake 1840.42 ml   Output 500 ml   Net 1340.42 ml     Objective:  General Appearance:  Comfortable. Vital signs: (most recent): Blood pressure (!) 116/56, pulse 63, temperature 98.3 °F (36.8 °C), temperature source Oral, resp. rate 16, height 5' 7\" (1.702 m), weight 159 lb 4.8 oz (72.3 kg), SpO2 94 %. Vital signs are normal.    Output: Producing urine. Stool output assessment: flatus but  no stool. HEENT: Normal HEENT exam.    Lungs:  Normal effort and normal respiratory rate. Breath sounds clear to auscultation. Heart: Normal rate. Regular rhythm. S1 normal and S2 normal.  No murmur. Abdomen: Abdomen is soft and non-distended. (Incision clean and dry).   Bowel sounds are normal.

## 2020-09-10 NOTE — PROGRESS NOTES
CLINICAL PHARMACY: [address] [facility] Patient?: Yes  Total # of Interventions Recommended: 0   -   Total # Interventions Accepted: 0  Intervention Severity:   - Level 1 Intervention Present?: No   - Level 2 #: 0   - Level 3 #: 0   Time Spent (min): 15    Additional Documentation:    Dr Marcela Bowen had printed the Miramontes [facility], RN was unable to locate printed Rx due to printer jam.  Dr Marcela Bowen asked RN to call in the Rx to [facility]. CS Rx cannot be called in and I was unable to e-scribe for Dr Marcela Bowen. Located printed Rx on printer and gave to RN. She will inform Dr Marcela Bowen she will need to return to floor to sign.      Amado Short PharmD 9/10/2020 9:59 AM

## 2020-09-10 NOTE — PROGRESS NOTES
Comprehensive Nutrition Assessment    Type and Reason for Visit:  Reassess    Nutrition Recommendations/Plan:   Continue current diet and ONS. Consider MVI. Nutrition Assessment:    Pt improving from a nutritional standpoint AEB pt report tolerating diet advancement to low fiber and magic cups.  Remains at risk for further nutritional compromise r/t inadequate oral intake, early satiety, advanced age, increased nutrient needs to support wound healing, and underlying medical condition (SBO, s/p surgery 9/20/20, post-op ileus, history of CAD, ANJANA, HTN, CVA, hyperlipidemia, benign prostatic hypertrophy), and need for nutrition support.  Nutrition recommendations/interventions as per above. Malnutrition Assessment:  Malnutrition Status:  Severe malnutrition    Context:  Acute Illness     Findings of the 6 clinical characteristics of malnutrition:  Energy Intake:  7 - 50% or less of estimated energy requirements for 5 or more days  Weight Loss:  1 - 5% over 1 month(7.2%)     Body Fat Loss:  7 - Moderate body fat loss Orbital, Triceps, Fat Overlying Ribs   Muscle Mass Loss:  7 - Moderate muscle mass loss Temples (temporalis)  Fluid Accumulation:  No significant fluid accumulation     Strength:  Not Performed    Estimated Daily Nutrient Needs:  Energy (kcal):  6023-4204 kcals (30-35 kcals/kg/day); Weight Used for Energy Requirements:  (60 kg, 9/1/20)     Protein (g):  60 grams (1 gram/kg/day based) pending renal status; Weight Used for Protein Requirements:             Nutrition Related Findings:  s/p surgery 9/2/20 for SBO, post-op ileus. Pt is having a lot of gas but no bowel movement since surgery. Reports tolerating diet advancement to low fiber, appetite not up to par. Likes magic cups. Ate all of lunch yesteray, \"nibbled at supper last night and breakfast this morning\". Reports early satiety. TPN stopped. Planning home later today.   Discussed ONS and diet recommendations for home with pt and wife.  Sodium 134, BUN 26. Glycolax, reglan, lipitor, humalog. Wounds:  (coccyx stage I, abdominal surgical wounds 9/2/20-robotic assisted lap, left hernia repair and exploritory lap with SB resection for giant multiple ileal diverticula)       Current Nutrition Therapies:    DIET LOW FIBER; Dietary Nutrition Supplements: Frozen Oral Supplement    Anthropometric Measures:  · Height: 5' 7\" (170.2 cm)  · Current Body Weight: 159 lb 4.8 oz (72.3 kg)(9/10/20 +2 LE edema, question accuracy as wt 9/9/20: 137# +2 LE edema)   · Admission Body Weight: 135 lb 9.6 oz (61.5 kg)(8/30/20 no edema)    · Usual Body Weight: (~ 145# per pt. Per EMR: 11/22/19: 148#8oz, 1/22/20: 144#12.8oz, 8/6/20: 142#3. 2oz)     · Ideal Body Weight: 148 lbs;   · BMI: 24.9  · BMI Categories: Normal Weight (BMI 22.0 to 24.9) age over 72       Nutrition Diagnosis:   · Severe malnutrition, In context of acute illness or injury related to altered GI function as evidenced by poor intake prior to admission, weight loss greater than or equal to 5% in 1 month, moderate loss of subcutaneous fat, moderate muscle loss      Nutrition Interventions:   Food and/or Nutrient Delivery:  Continue Current Diet, Continue Oral Nutrition Supplement  Nutrition Education/Counseling:  Education completed(Low fiber/soft diet reviewed, handout given as well 9/4/20.)   Coordination of Nutrition Care:  Continued Inpatient Monitoring    Goals:  Patient will consume 75% or more of meals during LOS.        Nutrition Monitoring and Evaluation:   Food/Nutrient Intake Outcomes:  Food and Nutrient Intake, Supplement Intake  Physical Signs/Symptoms Outcomes:  Biochemical Data, GI Status, Nutrition Focused Physical Findings, Skin, Weight     Discharge Planning:    Continue current diet, Continue Oral Nutrition Supplement     Electronically signed by Yesenia Redd RD, LD on 9/10/20 at 11:18 AM EDT    Contact: [phone]

## 2020-09-10 NOTE — PLAN OF CARE
plans to return home with wife when medically stable. Problem: Fluid Volume - Deficit:  Goal: Electrolytes within specified parameters  Description: Electrolytes within specified parameters  9/9/2020 2218 by Myrtle Garcia RN  Outcome: Ongoing  Note: Patient's electrolytes being monitored with AM labs, will replace as needed. Problem: Pain:  Goal: Control of acute pain  Description: Control of acute pain  9/9/2020 2218 by Myrtle Garcia RN  Outcome: Ongoing  Note: Patient denies the presence of pain so far this shift. Will continue to monitor. Problem: Pain:  Goal: Control of chronic pain  Description: Control of chronic pain  9/9/2020 2218 by Myrtle Garcia RN  Outcome: Ongoing  Note: Denies chronic pain. Problem: Falls - Risk of:  Goal: Will remain free from falls  Description: Will remain free from falls  9/9/2020 2218 by Myrtle Garcia RN  Outcome: Ongoing  Note: Falling star program in place. Bed alarm on. Non skid socks applied. Call light within reach. No falls noted this shift. Will continue to monitor. Problem: Falls - Risk of:  Goal: Will remain free from falls  Description: Will remain free from falls  9/9/2020 2218 by Myrtle Garcia RN  Outcome: Ongoing  Note: Falling star program in place. Bed alarm on. Non skid socks applied. Call light within reach. No falls noted this shift. Will continue to monitor. Problem: Falls - Risk of:  Goal: Absence of physical injury  Description: Absence of physical injury  9/9/2020 2218 by Myrtle Garcia RN  Outcome: Ongoing  Note: Stay with me and falling star program in place. No signs of physical injury at this time. Problem: Nutrition  Goal: Optimal nutrition therapy  9/9/2020 2218 by Myrtle Garcia RN  Outcome: Ongoing  Note: Patient advance to low fiber diet today, appears to be tolerating well. Care plan reviewed with patient. Patient verbalize understanding of the plan of care and contribute to goal setting.

## 2020-09-10 NOTE — DISCHARGE INSTR - DIET
 Good nutrition is important when healing from an illness, injury, or surgery. Follow any nutrition recommendations given to you during your hospital stay.  If you were given an oral nutrition supplement while in the hospital, continue to take this supplement at home. You can take it with meals, in-between meals, and/or before bedtime. These supplements can be purchased at most local grocery stores, pharmacies, and chain super-stores.  If you have any questions about your diet or nutrition, call the hospital and ask for the dietitian. Low-Fiber Diet: Care Instructions  Your Care Instructions     When your bowels are irritated, you may need to limit fiber in your diet until the problem clears up. Your doctor and dietitian can help you design a low-fiber diet based on your health and what you prefer to eat. Ask your doctor how long you should stay on a low-fiber diet. Your doctor probably will have you start adding more fiber to your diet as you get better. Always talk with your doctor or dietitian before you make changes in your diet. Follow-up care is a key part of your treatment and safety. Be sure to make and go to all appointments, and call your doctor if you are having problems. It's also a good idea to know your test results and keep a list of the medicines you take. How can you care for yourself at home? · Choose white or refined grains, and avoid whole grains. That means eating white or refined cereals, breads, crackers, rice, or pasta. · Peel the skin from fruits and vegetables before you eat or cook them. Avoid eating skins, seeds, and hulls. ? Eat frozen or canned fruit. Low-fiber fruits include applesauce, ripe bananas, and fruit juice without pulp. ? Eat low-fiber vegetables, which include well-cooked vegetables and vegetable juice. · Drink or eat milk, yogurt, or other milk products, if you can digest dairy without too many problems.  Your doctor may limit milk and milk products for a while. If so, he or she may recommend a calcium and vitamin D supplement. · Eat well-cooked meat, such as chicken, turkey, fish, or lean meat. You also can eat eggs and tofu. · Avoid these foods:  ? Bran, brown or wild rice, oatmeal, granola, corn, nirmal crackers, barley, and whole wheat and other whole-grain breads, such as rye bread  ? Cereals with more than 3 grams of fiber a serving  ? Berries, rhubarb, prunes, prune juice, and all dried fruits  ? Raw vegetables  ? Cabbage, broccoli, brussels sprouts, and cauliflower  ? Cooked dried beans, lentils, and split peas  ? Crunchy peanut butter  ? Ice cream with fruit pieces in it  ? Coconut, nuts, popcorn, raisins, and seeds, or any ice cream, yogurt, or cheese with these in them  Where can you learn more? Go to [email or web address] and sign in to your [employer] account. Enter Z323 in the [employer] box to learn more about \"Low-Fiber Diet: Care Instructions. \"     If you do not have an account, please click on the \"Sign Up Now\" link. Current as of: August 22, 2019               Content Version: 12.5  © 6802-0495 Healthwise, Incorporated. Care instructions adapted under license by [facility] ([facility]). If you have questions about a medical condition or this instruction, always ask your healthcare professional. [place] [address] any warranty or liability for your use of this information.

## 2020-09-10 NOTE — PLAN OF CARE
Problem: Nutrition  Goal: Optimal nutrition therapy  9/10/2020 1117 by Jorge Rebolledo RD, LD  Outcome: Ongoing   Nutrition Problem #1: Severe malnutrition, In context of acute illness or injury  Intervention: Food and/or Nutrient Delivery: Continue Current Diet, Continue Oral Nutrition Supplement  Nutritional Goals: Patient will consume 75% or more of meals during LOS.

## 2020-09-11 ENCOUNTER — CARE COORDINATION (OUTPATIENT)
Dept: CASE MANAGEMENT | Age: 85
End: 2020-09-11

## 2020-09-11 ENCOUNTER — TELEPHONE (OUTPATIENT)
Dept: SURGERY | Age: 85
End: 2020-09-11

## 2020-09-11 PROCEDURE — 1111F DSCHRG MED/CURRENT MED MERGE: CPT | Performed by: PHYSICAL MEDICINE & REHABILITATION

## 2020-09-11 RX ORDER — METOCLOPRAMIDE 10 MG/1
10 TABLET ORAL
Qty: 30 TABLET | Refills: 0 | Status: SHIPPED | OUTPATIENT
Start: 2020-09-11 | End: 2020-09-17

## 2020-09-11 RX ORDER — ONDANSETRON 4 MG/1
4 TABLET, FILM COATED ORAL EVERY 8 HOURS PRN
Qty: 12 TABLET | Refills: 1 | Status: SHIPPED | OUTPATIENT
Start: 2020-09-11 | End: 2021-02-22 | Stop reason: ALTCHOICE

## 2020-09-11 ASSESSMENT — ENCOUNTER SYMPTOMS: ABDOMINAL PAIN: 1

## 2020-09-11 NOTE — PROGRESS NOTES
RDW, PLT in the last 72 hours. CHEMISTRIES:  Recent Labs     09/08/20  0649 09/09/20  0510 09/10/20  0600   * 131* 134*   K 3.7 3.8 4.2    104 106   CO2 21* 19* 21*   BUN 25* 25* 26*   CREATININE 1.1 1.1 1.0   GLUCOSE 149* 117* 98   PHOS 1.7* 1.8* 2.5   MG 1.6 1.6 2.1     PT/INR:No results for input(s): PROTIME, INR in the last 72 hours. APTT:No results for input(s): APTT in the last 72 hours. LIVER PROFILE:No results for input(s): AST, ALT, BILIDIR, BILITOT, ALKPHOS in the last 72 hours. Imaging Last 24 Hours:  No results found. Assessment//Plan           Hospital Problems           Last Modified POA    Small bowel obstruction ([facility] [address] 8/30/2020 Yes    Intractable nausea and vomiting 8/30/2020 Yes    NICKY (acute kidney injury) ([facility] [address] 8/30/2020 Yes    Severe malnutrition ([facility] [address] 9/1/2020 Yes        Assessment:    Condition: In stable condition. Improving. (Post small  Bowel resection  Due to large diverticulum of  Duodenum     htn stable      ashd  Stable      hx  Of  pvc stable      post  Ileus  stable      Nicky and  Due  To dehydration and  Corrected      malnutrition  Severe and  Better and had PPN and stable and  Now  Eating      bph stable ). Plan:   Discharge home. Per physical therapy. Consults: general surgery. Advance diet as tolerated. Administer medications as ordered. (  Overall  Doing well      regular diet and  Tolerating      ambulation  Good and passing flatus     Chart reviewed as well as medication and discharge summary done and spent 35  Minutes   On the  Above and preparation for  Discharge ).        Electronically signed by Caroline Nascimento MD on 9/11/20 at 5:32 AM EDT

## 2020-09-11 NOTE — CARE COORDINATION
not needed, tylenol controls the pain, pt stated as \"minimal pain\"  Pt is up in the house & has \"lots of energy\"  Reminded pt to cough & deep breath frequently throughout the day. Pt denied any needs or concerns.       Follow Up  Future Appointments   Date Time Provider Gunnar Self   9/17/2020  9:30 AM Florinda Hua MD [facility]   9/24/2020 10:15 AM Denisse Reilly MD Adv Surg [facility] - Taisha Lee RN  Care Transition Nurse  [phone]

## 2020-09-11 NOTE — TELEPHONE ENCOUNTER
Patient s/p- 1. Robotic-assisted laparoscopic repair of left inguinal hernia with  ProGrip mesh. 2.  Laparoscopy with conversion to laparotomy and resection of 20 inches  of proximal small bowel with multiple giant diverticula 9/2- D/C [facility] 9/10- wife called stating he vomited 3 times in the middle of the night, and thought it was from the norco he took at 1030 yesterday before he left the hospital, Patients wife stated that he has had 2 small BMs. Also stated that he has not ate since breakfast yesterday morning. He attempted to eat a germaine doone cookies and a peppermint jason and has been nauseous since. stated patient is not having any pain, fever or chills. Wife wondering is they can get something for nausea to help and if we have any further suggestions.

## 2020-09-14 NOTE — DISCHARGE SUMMARY
Discharge Summary     Patient Identification:  Nan Mcmahon  : 1933  MRN: [id number]   Account: [de-identified]     Admit date: 2020  Discharge date: 9/10/2020  Attending provider: No att. providers found        Primary care provider: Stef Leblanc MD     Discharge Diagnoses: Active Problems:    Small bowel obstruction (HCC)    Intractable nausea and vomiting    ANJANA (acute kidney injury) ([address]    Severe malnutrition (HCC)  Resolved Problems:    * No resolved hospital problems. *       Hospital Course:   Nan Mcmahon is a 80 y.o. male admitted to [address] [place] [address] on 2020 for evaluation of abdominal pain nausea vomiting partial small bowel obstruction and a reducible left inguinal hernia seen on CT scan. He had another admission earlier in the month with similar symptoms which had resolved. CT of the abdomen and pelvis revealed multiple dilated loops of small bowel with early or partial small bowel obstruction. Discrete transition point was not seen. Incidental left inguinal hernia was reducible and not point of transition. He was admitted placed on bowel rest.  He is actually admitted to the hospitalist service and then transitioned over to his primary care provider Dr. Sonny Dumont. Given recurring symptoms and failure to resolve with medical management. Patient opted for surgical exploration. He was taken to surgery on . 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 He had a robotic assisted laparoscopic repair of his left inguinal hernia and open small bowel resection as that could not be safely achieved minimally invasively. Postoperatively he had expected ileus required NG tube and ultimately TPN. With evidence of return of bowel function NG tube was removed. He was tolerating a diet and passing flatus on the date of discharge.   Dr. Sonny Dumont and myself deemed him stable for discharge and per patient request he was discharged Weight: Weight: 159 lb 4.8 oz (72.3 kg)       Significant Diagnostics:   Radiology: Ct Abdomen Pelvis Wo Contrast Additional Contrast? Oral    Result Date: 8/31/2020  PROCEDURE: CT ABDOMEN PELVIS WO CONTRAST CLINICAL INFORMATION: Small bowel obstruction TECHNIQUE: CT of the abdomen and pelvis was performed following administration of oral contrast only. Axial images as well as coronal and sagittal reconstructions were obtained. All CT scans at this facility use dose modulation, iterative reconstruction, and/or weight-based dosing when appropriate to reduce radiation dose to as low as reasonably achievable. COMPARISON: CT abdomen and pelvis 8/30/2020 FINDINGS: Lower thorax: Visualized lung bases are clear. There is no pleural effusion. Small bowel: Mildly dilated small bowel loops in the upper abdomen measure up to 3.9 cm in diameter and are slightly improved compared to the prior study (image 20). Distal small bowel is of more normal caliber without a clear transition point. There is gas and retained fecal material in the colon. Abdomen: Evaluation is limited due to absence of contrast. Diverticula are seen throughout the colon without evidence of diverticulitis. There are multiple calcifications in the gallbladder. Calcifications in the kidneys are likely vascular. A hypoattenuating lesion at the upper left kidney is stable and is likely a cyst but is incompletely characterized on the current noncontrast examination. There is mild fatty replacement of the pancreas. The liver, spleen and right adrenal gland are normal  within limits of a noncontrast examination. There is stable indistinct nodularity of the left adrenal gland. Atherosclerotic calcifications are present in the abdominal aorta without evidence of aneurysm. There is no mesenteric or retroperitoneal lymphadenopathy. Degenerative and scoliotic changes are seen in the lumbar spine without evidence of aggressive osseous lesions. Pelvis:  The urinary Noncontrast evaluation of the liver, spleen, pancreas and adrenal glands appear normal. No hydronephrosis or hydroureter is seen. There is an exophytic low-density lesion arising off of the posterior left kidney which measures fluid density and may represent a renal cyst. The appendix appears within normal limits. There is gas present within the lumen of the appendix. There is arthrosis of the bilateral hip joints and pubic symphysis as well as the SI joints. There is also lower lumbar facet arthrosis and multilevel lumbar degenerative disc disease. There is a left inguinal hernia containing a short segment of sigmoid colon. This is demonstrated on coronal series image 21.     1. Multiple dilated loops of small bowel with air-fluid levels are demonstrated consistent with early or partial small bowel obstruction. A discrete transition zone is not definitely seen. However, there are relatively collapsed distal ileal small bowel loops within the right lower quadrant and pelvis. Therefore, the approximate location of the transition zone is likely the ileojejunal junction. The overall degree small bowel dilation appears progressed from previous examination. 2. 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 No evidence of diverticulitis is seen. 3. There is a left inguinal hernia containing a short segment of sigmoid colon. This is demonstrated on coronal series image 21. **This report has been created using voice recognition software. It may contain minor errors which are inherent in voice recognition technology. ** Final report electronically signed by Dr. Monserrat Dennis on 8/30/2020 12:31 PM    Xr Abdomen (kub) (single Ap View)    Result Date: 8/31/2020  PROCEDURE: XR ABDOMEN (KUB) (SINGLE AP VIEW) CLINICAL INFORMATION: follow-up partial SBO COMPARISON: 8/30/2020 TECHNIQUE: 2 supine images of the abdomen were obtained. FINDINGS: There is moderate gaseous distention of the transverse colon. Nani Mcguire MD                       REQ: Nadershimon Montoya OLT       Copies To:   JAZMIN LEON       Clinical Information: SMALL BOWEL OBSTRUCTION     FINAL DIAGNOSIS:   Jejunum, excision:    Small bowel with numerous large diverticula and no significant   inflammation.    Vascular amyloid deposition.    Vascular transformation of lymph node sinuses.    Please see microscopic description. Specimen:   SOFT TISSUE, JEJUNUM WITH SMALL BOWEL       Gross Examination:   The container is labeled Chantal Herrera, jejunum, small bowel.  Received in   formalin is a segmental resection of small bowel measuring   approximately 36 cm in length.  The serosal surface is congested. There are several large bulging diverticula.  The specimen is opened   longitudinally revealing a red-tan mucosal surface.   There are no   discrete masses.  There are several enlarged lymph nodes. Representative sections are submitted.  Cassette #1 - proximal and   distal resection lines; cassettes #2 through #4 - representative   diverticula; cassettes #5 and #6 - representative small bowel; cassette   #7 - tentatively identified lymph nodes.  ss.  MTK/DKR:[id number]     Microscopic Examination:   Sections demonstrate small bowel with multiple diverticula as well as   areas of wall thinning corresponding to an attenuated muscularis   propria. Bethena Lighter is no significant inflammation or evidence of rupture. The mucosa is otherwise unremarkable.  Numerous lamina propria vessels   contain pale eosinophilic deposits.  A Congo red stain is performed,   with appropriate controls.  These pale deposits are congophilic and   demonstrate apple green birefringence upon polarization.  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   That said,   correlation with SPEP could be considered, if clinically indicated. [id number]   [id number]                                                     <Sign Out Dr. Rebeca Molina M.D., F.C. A. P       NVML/ Krista  Printed on: [employer], [facility]   Original print date: 09/04/2020    Lab and Collection     Surgical Pathology - 9/2/2020     Discharge condition: good  Disposition: Home  Time spent on discharge: 40 minutes    Electronically signed by Stan Freeman MD on 9/14/2020 at 8:27 AM

## 2020-09-17 ENCOUNTER — OFFICE VISIT (OUTPATIENT)
Dept: FAMILY MEDICINE CLINIC | Age: 85
End: 2020-09-17

## 2020-09-17 VITALS
RESPIRATION RATE: 16 BRPM | SYSTOLIC BLOOD PRESSURE: 120 MMHG | HEART RATE: 80 BPM | TEMPERATURE: 96.9 F | DIASTOLIC BLOOD PRESSURE: 64 MMHG | BODY MASS INDEX: 22.29 KG/M2 | WEIGHT: 142 LBS | HEIGHT: 67 IN

## 2020-09-17 PROCEDURE — G0008 ADMIN INFLUENZA VIRUS VAC: HCPCS | Performed by: FAMILY MEDICINE

## 2020-09-17 PROCEDURE — 99496 TRANSJ CARE MGMT HIGH F2F 7D: CPT | Performed by: FAMILY MEDICINE

## 2020-09-17 PROCEDURE — 90688 IIV4 VACCINE SPLT 0.5 ML IM: CPT | Performed by: FAMILY MEDICINE

## 2020-09-17 PROCEDURE — 1111F DSCHRG MED/CURRENT MED MERGE: CPT | Performed by: FAMILY MEDICINE

## 2020-09-17 RX ORDER — POTASSIUM CHLORIDE 600 MG/1
8 TABLET, FILM COATED, EXTENDED RELEASE ORAL DAILY
Qty: 5 TABLET | Refills: 0 | Status: SHIPPED | OUTPATIENT
Start: 2020-09-17 | End: 2020-09-25 | Stop reason: SDUPTHER

## 2020-09-17 RX ORDER — FUROSEMIDE 20 MG/1
20 TABLET ORAL DAILY
Qty: 5 TABLET | Refills: 0 | Status: SHIPPED | OUTPATIENT
Start: 2020-09-17 | End: 2020-09-25 | Stop reason: SDUPTHER

## 2020-09-17 ASSESSMENT — ENCOUNTER SYMPTOMS
BLOOD IN STOOL: 0
BACK PAIN: 0
EYE PAIN: 0
SORE THROAT: 0
COUGH: 0
TROUBLE SWALLOWING: 0
NAUSEA: 0
ABDOMINAL PAIN: 0
CONSTIPATION: 0
SHORTNESS OF BREATH: 0
CHEST TIGHTNESS: 0

## 2020-09-17 NOTE — PROGRESS NOTES
capsule  TAKE 1 CAPSULE BY MOUTH EVERY DAY                   Medications marked \"taking\" at this time  Outpatient Medications Marked as Taking for the 9/17/20 encounter (Office Visit) with Sheryl Calderon MD   Medication Sig Dispense Refill    ondansetron (ZOFRAN) 4 MG tablet Take 1 tablet by mouth every 8 hours as needed for Nausea or Vomiting 12 tablet 1    carvedilol (COREG) 12.5 MG tablet TAKE 1 TABLET BY MOUTH TWICE DAILY 180 tablet 1    lisinopril-hydroCHLOROthiazide (PRINZIDE;ZESTORETIC) 20-12.5 MG per tablet TAKE 1 TABLET BY MOUTH ONCE DAILY 90 tablet 0    simvastatin (ZOCOR) 40 MG tablet TAKE 1 TABLET BY MOUTH EVERY NIGHT AT BEDTIME 90 tablet 1    tamsulosin (FLOMAX) 0.4 MG capsule TAKE 1 CAPSULE BY MOUTH EVERY DAY (Patient taking differently: TAKE 1 CAPSULE BY MOUTH EVERY DAY nightly) 30 capsule 3    aspirin 81 MG EC tablet Take 81 mg by mouth daily.  multivitamin (THERAGRAN) per tablet Take 1 tablet by mouth daily. Medications patient taking as of now reconciled against medications ordered at time of hospital discharge: Yes    Chief Complaint   Patient presents with    Follow-Up from Hospital       HPI    Inpatient course: Discharge summary reviewed- see chart. Interval history/Current status:  Post  surgery  For  SBO  Noted  And  With  Weakness  Noted and leg swelling       Stools  wnl  And   Urination     Review of Systems   Constitutional: Negative for fatigue and fever. HENT: Negative for congestion, ear pain, postnasal drip, sore throat and trouble swallowing. Eyes: Negative for pain. Respiratory: Negative for cough, chest tightness and shortness of breath. Cardiovascular: Positive for leg swelling. Negative for chest pain and palpitations. Gastrointestinal: Negative for abdominal pain, blood in stool, constipation and nausea. Genitourinary: Negative for difficulty urinating, frequency and urgency.    Musculoskeletal: Negative for arthralgias, back pain, joint swelling and neck stiffness. Skin: Negative for rash. Neurological: Negative for dizziness, weakness and headaches. Hematological: Negative for adenopathy. Does not bruise/bleed easily. Psychiatric/Behavioral: Negative for behavioral problems, dysphoric mood and sleep disturbance. Vitals:    09/17/20 0952   BP: 120/64   Site: Right Upper Arm   Position: Sitting   Cuff Size: Medium Adult   Pulse: 80   Resp: 16   Temp: 96.9 °F (36.1 °C)   TempSrc: Oral   Weight: 142 lb (64.4 kg)   Height: 5' 7\" (1.702 m)     Body mass index is 22.24 kg/m². Wt Readings from Last 3 Encounters:   09/17/20 142 lb (64.4 kg)   09/10/20 159 lb 4.8 oz (72.3 kg)   08/06/20 142 lb 3.2 oz (64.5 kg)     BP Readings from Last 3 Encounters:   09/17/20 120/64   09/10/20 (!) 121/58   09/02/20 (!) 124/57       Physical Exam  Vitals signs and nursing note reviewed. Constitutional:       Appearance: He is well-developed. HENT:      Head: Normocephalic and atraumatic. Right Ear: External ear normal.      Left Ear: External ear normal.      Nose: Nose normal.   Eyes:      Conjunctiva/sclera: Conjunctivae normal.      Pupils: Pupils are equal, round, and reactive to light. Comments: Fundi nl   Neck:      Musculoskeletal: Normal range of motion and neck supple. Thyroid: No thyromegaly. Cardiovascular:      Rate and Rhythm: Normal rate and regular rhythm. Heart sounds: Normal heart sounds. Pulmonary:      Effort: Pulmonary effort is normal.      Breath sounds: Normal breath sounds. No wheezing or rales. Abdominal:      General: Bowel sounds are normal.      Palpations: Abdomen is soft. There is no mass. Tenderness: There is no abdominal tenderness. Musculoskeletal: Normal range of motion. Right lower leg: Edema present. Left lower leg: Edema present. Comments:   3 + noted    Lymphadenopathy:      Cervical: No cervical adenopathy. Skin:     General: Skin is warm and dry. Findings: No rash. Neurological:      Mental Status: He is alert and oriented to person, place, and time. Cranial Nerves: No cranial nerve deficit. Deep Tendon Reflexes: Reflexes are normal and symmetric. Assessment/Plan:     Diagnosis Orders   1. Small bowel obstruction ([facility] [address]     2. Bilateral leg edema  furosemide (LASIX) 20 MG tablet    potassium chloride (KLOR-CON) 8 MEQ extended release tablet   3. Need for influenza vaccination  INFLUENZA, QUADV, 0.5ML, 6 MO AND OLDER, IM, MDV, ([facility])   4. ANJANA (acute kidney injury) ([facility] [address]     5. 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     6. Severe malnutrition ([facility] [address]         There are no diagnoses linked to this encounter. PLAn      Current Outpatient Medications   Medication Sig Dispense Refill    furosemide (LASIX) 20 MG tablet Take 1 tablet by mouth daily 5 tablet 0    potassium chloride (KLOR-CON) 8 MEQ extended release tablet Take 1 tablet by mouth daily 5 tablet 0    ondansetron (ZOFRAN) 4 MG tablet Take 1 tablet by mouth every 8 hours as needed for Nausea or Vomiting 12 tablet 1    carvedilol (COREG) 12.5 MG tablet TAKE 1 TABLET BY MOUTH TWICE DAILY 180 tablet 1    lisinopril-hydroCHLOROthiazide (PRINZIDE;ZESTORETIC) 20-12.5 MG per tablet TAKE 1 TABLET BY MOUTH ONCE DAILY 90 tablet 0    simvastatin (ZOCOR) 40 MG tablet TAKE 1 TABLET BY MOUTH EVERY NIGHT AT BEDTIME 90 tablet 1    tamsulosin (FLOMAX) 0.4 MG capsule TAKE 1 CAPSULE BY MOUTH EVERY DAY (Patient taking differently: TAKE 1 CAPSULE BY MOUTH EVERY DAY nightly) 30 capsule 3    aspirin 81 MG EC tablet Take 81 mg by mouth daily.  multivitamin (THERAGRAN) per tablet Take 1 tablet by mouth daily. No current facility-administered medications for this visit.     Medical Decision Making: moderate complexity  Orders Placed This Encounter   Procedures    INFLUENZA, QUADV, 0.5ML, 6 MO AND OLDER, IM, MDV, (Karrie Smith)       See in one  Month       lasix  One a  Day  For 5  Days

## 2020-09-17 NOTE — PROGRESS NOTES
Immunizations Administered     Name Date Dose Route    Influenza, Quadv, IM, (6 mo and older Fluzone, Flulaval, Fluarix and 3 yrs and older Afluria) 9/17/2020 0.5 mL Intramuscular    Site: Deltoid- Left    Lot: [id number]    NDC: 52804-124-43

## 2020-09-24 ENCOUNTER — TELEPHONE (OUTPATIENT)
Dept: FAMILY MEDICINE CLINIC | Age: 85
End: 2020-09-24

## 2020-09-24 ENCOUNTER — OFFICE VISIT (OUTPATIENT)
Dept: SURGERY | Age: 85
End: 2020-09-24

## 2020-09-24 VITALS
HEART RATE: 67 BPM | OXYGEN SATURATION: 97 % | HEIGHT: 67 IN | BODY MASS INDEX: 22.29 KG/M2 | DIASTOLIC BLOOD PRESSURE: 75 MMHG | TEMPERATURE: 97.2 F | RESPIRATION RATE: 16 BRPM | WEIGHT: 142 LBS | SYSTOLIC BLOOD PRESSURE: 138 MMHG

## 2020-09-24 PROCEDURE — 99024 POSTOP FOLLOW-UP VISIT: CPT | Performed by: SURGERY

## 2020-09-24 RX ORDER — HYDROCODONE BITARTRATE AND ACETAMINOPHEN 5; 325 MG/1; MG/1
1 TABLET ORAL EVERY 6 HOURS PRN
COMMUNITY
End: 2021-02-22 | Stop reason: ALTCHOICE

## 2020-09-24 RX ORDER — ACETAMINOPHEN 325 MG/1
650 TABLET ORAL EVERY 6 HOURS PRN
COMMUNITY

## 2020-09-24 NOTE — PROGRESS NOTES
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 Indirect left inguinal hernia with sigmoid colon, reducible. ASSESSMENT      1. Small bowel obstruction ([address]    2. ANJANA (acute kidney injury) ([facility] [address]    3. Postop check         PLAN       1. Surgical wound midline laparotomy healing well no drainage. Patient eating well. 2.  Recommend no lifting over 30 pounds for a month. 3.  Follow-up Dr. Willie Metcalf as scheduled follow-up bilateral lower extremity edema. Has been placed on Lasix. SymmetricalZaida Appiah is seen today for post-op follow-up. He is status post exploratory laparotomy and small bowel resection. He had multiple giant proximal small bowel diverticula required excision with obstructive symptoms. He had a postoperative ileus. He since been discharged home. He denies any nausea or vomiting is having bowel movements and tolerating oral intake. He seen Dr. Willie Metcalf for bilateral lower extremity edema cellulitis. He was placed on Lasix. Abdomen is benign he otherwise states he is doing well. He denies any chest pain or shortness of breath.   Lungs are clear on exam.  Medications    Current Outpatient Medications:     acetaminophen (TYLENOL) 325 MG tablet, Take 650 mg by mouth every 6 hours as needed for Pain, Disp: , Rfl:     carvedilol (COREG) 12.5 MG tablet, TAKE 1 TABLET BY MOUTH TWICE DAILY, Disp: 180 tablet, Rfl: 1    lisinopril-hydroCHLOROthiazide (PRINZIDE;ZESTORETIC) 20-12.5 MG per tablet, TAKE 1 TABLET BY MOUTH ONCE DAILY, Disp: 90 tablet, Rfl: 0    simvastatin (ZOCOR) 40 MG tablet, TAKE 1 TABLET BY MOUTH EVERY NIGHT AT BEDTIME, Disp: 90 tablet, Rfl: 1    tamsulosin (FLOMAX) 0.4 MG capsule, TAKE 1 CAPSULE BY MOUTH EVERY DAY (Patient taking differently: TAKE 1 CAPSULE BY MOUTH EVERY DAY nightly), Disp: 30 capsule, Rfl: 3    aspirin 81 MG EC tablet, Take 81 mg by mouth daily. , Disp: , Rfl:     multivitamin (THERAGRAN) per tablet, Take 1 tablet by mouth daily. , Disp: , Rfl:     furosemide (LASIX) 20 MG tablet, Take 1 tablet by mouth daily, Disp: 15 tablet, Rfl: 0    potassium chloride (KLOR-CON) 8 MEQ extended release tablet, Take 1 tablet by mouth daily, Disp: 15 tablet, Rfl: 0    HYDROcodone-acetaminophen (NORCO) 5-325 MG per tablet, Take 1 tablet by mouth every 6 hours as needed for Pain., Disp: , Rfl:     ondansetron (ZOFRAN) 4 MG tablet, Take 1 tablet by mouth every 8 hours as needed for Nausea or Vomiting (Patient not taking: Reported on 9/24/2020), Disp: 12 tablet, Rfl: 1    Allergies  Allergies   Allergen Reactions    Pcn [Penicillins] Anaphylaxis    Ibuprofen     Percocet [Oxycodone-Acetaminophen] Nausea And Vomiting       Review of Systems  History obtained from the patient. Constitutional: Denies any fever, chills, fatigue. Wound: Denies any rash, skin color changes or wound problems. Resp: Denies any cough, shortness of breath. CV: Denies any chest pain, orthopnea or syncope. GI: Positive for incisional discomfort only. Denies any nausea, vomiting, blood in the stool, constipation or diarrhea. : Denies any hematuria, hesitancy or dysuria. OBJECTIVE     VITALS: /75 (Site: Right Upper Arm, Position: Sitting, Cuff Size: Medium Adult)   Pulse 67   Temp 97.2 °F (36.2 °C) (Tympanic)   Resp 16   Ht 5' 7\" (1.702 m)   Wt 142 lb (64.4 kg)   SpO2 97%   BMI 22.24 kg/m²     CONSTITUTIONAL: Alert and oriented times 3, no acute distress and cooperative to examination. SKIN: Skin color, texture, turgor normal. No rashes or lesions.   INCISION: wound margins intact and healing well. No signs of infection. No drainage. LUNGS: Lungs Clear  CARDIOVASCULAR: Normal Rate  ABDOMEN: Soft nontender nondistended.    NEUROLOGIC: No sensory or motor nerve irritation

## 2020-09-24 NOTE — TELEPHONE ENCOUNTER
Finished 5 days of Lasix, still with some bilateral leg swelling itching and slight discomfort. Wondering if he should have more Furosemide/Lasix. States he doesn't want to come in.     Uses [facility]    Please call him back [phone]

## 2020-09-25 RX ORDER — FUROSEMIDE 20 MG/1
20 TABLET ORAL DAILY
Qty: 15 TABLET | Refills: 0 | Status: SHIPPED | OUTPATIENT
Start: 2020-09-25 | End: 2021-02-22 | Stop reason: ALTCHOICE

## 2020-09-25 RX ORDER — POTASSIUM CHLORIDE 600 MG/1
8 TABLET, FILM COATED, EXTENDED RELEASE ORAL DAILY
Qty: 15 TABLET | Refills: 0 | Status: SHIPPED | OUTPATIENT
Start: 2020-09-25 | End: 2021-02-22 | Stop reason: ALTCHOICE

## 2020-11-12 NOTE — TELEPHONE ENCOUNTER
Date of last visit:  9/17/2020  Date of next visit:  Visit date not found    Requested Prescriptions     Pending Prescriptions Disp Refills    lisinopril-hydroCHLOROthiazide (PRINZIDE;ZESTORETIC) 20-12.5 MG per tablet [Pharmacy Med Name: LISINOPRIL-HCTZ 20/12.5MG TABLETS] 90 tablet 0     Sig: TAKE 1 TABLET BY MOUTH ONCE DAILY

## 2020-11-13 RX ORDER — LISINOPRIL AND HYDROCHLOROTHIAZIDE 20; 12.5 MG/1; MG/1
TABLET ORAL
Qty: 90 TABLET | Refills: 0 | Status: SHIPPED | OUTPATIENT
Start: 2020-11-13 | End: 2021-02-15 | Stop reason: SDUPTHER

## 2020-11-30 ASSESSMENT — ENCOUNTER SYMPTOMS
BACK PAIN: 0
CONSTIPATION: 0
CHEST TIGHTNESS: 0
TROUBLE SWALLOWING: 0
SHORTNESS OF BREATH: 0
ABDOMINAL PAIN: 0
SORE THROAT: 0
BLOOD IN STOOL: 0
COUGH: 0
EYE PAIN: 0
NAUSEA: 0

## 2020-11-30 NOTE — PROGRESS NOTES
[address] Office Visit    Date of Face-to-Face: 11/30/2020     Patient verified Video Chat was not encrypted, and agrees to the Video Exam in presence of nurse. Personsat visit: none and video visit    Here for follow after hospitalizationfor: Gastroenteritis    Activity: activity as tolerated    Any medication changes since post-hospitalization? No    Any treatment changes since post-hospitalization? No    Any further education needed on medications/treatment plan? Yes diet and slow  advance    Current Medication List  Outpatient Encounter Medications as of 8/17/2020   Medication Sig Dispense Refill    [DISCONTINUED] ondansetron (ZOFRAN) 4 MG tablet Take 1 tablet by mouth 3 times daily as needed for Nausea or Vomiting 15 tablet 0    simvastatin (ZOCOR) 40 MG tablet TAKE 1 TABLET BY MOUTH EVERY NIGHT AT BEDTIME 90 tablet 1    [DISCONTINUED] lisinopril-hydroCHLOROthiazide (PRINZIDE;ZESTORETIC) 20-12.5 MG per tablet TAKE 1 TABLET BY MOUTH ONCE DAILY 90 tablet 0    tamsulosin (FLOMAX) 0.4 MG capsule TAKE 1 CAPSULE BY MOUTH EVERY DAY (Patient taking differently: TAKE 1 CAPSULE BY MOUTH EVERY DAY nightly) 30 capsule 3    [DISCONTINUED] carvedilol (COREG) 12.5 MG tablet TAKE 1 TABLET BY MOUTH TWICE DAILY 180 tablet 1    aspirin 81 MG EC tablet Take 81 mg by mouth daily.  multivitamin (THERAGRAN) per tablet Take 1 tablet by mouth daily.  [DISCONTINUED] acetaminophen (TYLENOL) 500 MG tablet Take 500 mg by mouth every 6 hours as needed. No facility-administered encounter medications on file as of 8/17/2020. Medication Reconciliation  Providerhas reviewed medication record and it has been modified as necessary to accuratelydepict current medications since hospitalization. David Polo has been instructedon these changes. meds where reconciled at visit today    HPI:      HPI      patient admitted  For N?V and some diarrhea . Ct scan suggested small bowel obstruction.   He saw  Duy of general surgery who felt this was not an an obstruction anigher and upper abdomen of the suggestion of blockage . Patient resolve and diet advanced and no pain and never had hernia area pian so dischargeed was gastroenteritis . The patient has never had any abdominal sugeries and only area is left lower quadrant hernia but obstruction was more upper abdomen and hen never had any hernia pain. . Diet advanced and discharged    has a current medication list which includes the following prescription(s): simvastatin, tamsulosin, aspirin, multivitamin, lisinopril-hydrochlorothiazide, furosemide, potassium chloride, hydrocodone-acetaminophen, acetaminophen, ondansetron, and carvedilol.     Allergies   Allergen Reactions    Pcn [Penicillins] Anaphylaxis    Ibuprofen     Percocet [Oxycodone-Acetaminophen] Nausea And Vomiting          Social History     Tobacco Use    Smoking status: Former Smoker     Packs/day: 3.00     Years: 20.00     Pack years: 60.00     Types: Cigarettes     Last attempt to quit: 1970     Years since quittin.3    Smokeless tobacco: Never Used   Substance Use Topics    Alcohol use: Yes     Comment: occas beer    Drug use: No       Past Medical History:   Diagnosis Date    [facility] [address]     Arrhythmia 2019    Arthritis     BPH (benign prostatic hypertrophy)     CAD (coronary artery disease)     Cerebrovascular disease     Hyperlipidemia     Hypertension     Mitral regurgitation     PVC (premature ventricular contraction) 2019    TIA (transient ischemic attack) 2009    post surgery resolved       Past Surgical History:   Procedure Laterality Date    CATARACT REMOVAL Right     Dr Claudia Ramon  2009    HERNIA REPAIR N/A 2020    ROBOTIC LEFT INGUINAL HERNIA REPAIR WITH MESH,ROBOTIC EXPLORATORY LAPAROSCOPY CONVERT TO OPEN, SMALL BOWEL RESECTION performed by Rosalina Harding MD at [address] History   Problem Relation Age of Onset    Other Mother     Cancer Father        There were no vitals taken for this visit. Wt Readings from Last 3 Encounters:   09/24/20 142 lb (64.4 kg)   09/17/20 142 lb (64.4 kg)   09/10/20 159 lb 4.8 oz (72.3 kg)       Review of Systems:     Review of Systems   Constitutional: Negative for fatigue and fever. HENT: Negative for congestion, ear pain, postnasal drip, sore throat and trouble swallowing. Eyes: Negative for pain. Respiratory: Negative for cough, chest tightness and shortness of breath. Cardiovascular: Negative for chest pain, palpitations and leg swelling. Gastrointestinal: Negative for abdominal pain, blood in stool, constipation and nausea. Stools an urine normal and no abdominal pain   Genitourinary: Negative for difficulty urinating, frequency and urgency. Musculoskeletal: Negative for arthralgias, back pain, joint swelling and neck stiffness. Skin: Negative for rash. Neurological: Negative for dizziness, weakness and headaches. Hematological: Negative for adenopathy. Does not bruise/bleed easily. Psychiatric/Behavioral: Negative for behavioral problems, dysphoric mood and sleep disturbance. Exam:   Physical Exam  Constitutional:       General: He is not in acute distress. Appearance: Normal appearance. He is not ill-appearing. HENT:      Nose: No congestion. Neck:      Musculoskeletal: Normal range of motion. Musculoskeletal: Normal range of motion. Skin:     Findings: No rash. Neurological:      General: No focal deficit present. Mental Status: He is alert and oriented to person, place, and time. Cranial Nerves: No cranial nerve deficit. Sensory: No sensory deficit. Motor: No weakness. Coordination: Coordination normal.      Gait: Gait normal.         Assessment:      Diagnosis Orders   1. Acute gastroenteritis  ND DISCHARGE MEDS RECONCILED W/ CURRENT OUTPATIENT MED LIST   2.  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     3. Essential hypertension         Plan:     Diagnostic Tests performed in hospital: ct of abdomen and pelvis  Requested/reviewed: Yes    Disease Education:   Regular stools and signs of bowel obstruction    Home Health Care :  None    Discussed with other providers: general surgeon at [facility] of discharge and observe     Orders Placed:  Orders Placed This Encounter   Procedures    MA DISCHARGE MEDS RECONCILED W/ CURRENT OUTPATIENT MED LIST     Medications Prescribed:  No orders of the defined types were placed in this encounter. Current Outpatient Medications   Medication Sig Dispense Refill    simvastatin (ZOCOR) 40 MG tablet TAKE 1 TABLET BY MOUTH EVERY NIGHT AT BEDTIME 90 tablet 1    tamsulosin (FLOMAX) 0.4 MG capsule TAKE 1 CAPSULE BY MOUTH EVERY DAY (Patient taking differently: TAKE 1 CAPSULE BY MOUTH EVERY DAY nightly) 30 capsule 3    aspirin 81 MG EC tablet Take 81 mg by mouth daily.  multivitamin (THERAGRAN) per tablet Take 1 tablet by mouth daily.  lisinopril-hydroCHLOROthiazide (PRINZIDE;ZESTORETIC) 20-12.5 MG per tablet TAKE 1 TABLET BY MOUTH ONCE DAILY 90 tablet 0    furosemide (LASIX) 20 MG tablet Take 1 tablet by mouth daily 15 tablet 0    potassium chloride (KLOR-CON) 8 MEQ extended release tablet Take 1 tablet by mouth daily 15 tablet 0    HYDROcodone-acetaminophen (NORCO) 5-325 MG per tablet Take 1 tablet by mouth every 6 hours as needed for Pain.  acetaminophen (TYLENOL) 325 MG tablet Take 650 mg by mouth every 6 hours as needed for Pain      ondansetron (ZOFRAN) 4 MG tablet Take 1 tablet by mouth every 8 hours as needed for Nausea or Vomiting (Patient not taking: Reported on 9/24/2020) 12 tablet 1    carvedilol (COREG) 12.5 MG tablet TAKE 1 TABLET BY MOUTH TWICE DAILY 180 tablet 1     No current facility-administered medications for this visit.       Return in 2 months (on 10/17/2020), or see [facility]  appt. stable and watch diet      lost note prior on express [facility] due to power surge and express line signed .  No coordination care done       Video visit due to covid     video 25 minutes and  covid precautions

## 2020-12-04 RX ORDER — TAMSULOSIN HYDROCHLORIDE 0.4 MG/1
CAPSULE ORAL
Qty: 30 CAPSULE | Refills: 5 | Status: SHIPPED | OUTPATIENT
Start: 2020-12-04 | End: 2020-12-05

## 2020-12-04 NOTE — TELEPHONE ENCOUNTER
Date of last visit:  9/17/2020  Date of next visit:  Visit date not found    Requested Prescriptions     Pending Prescriptions Disp Refills    tamsulosin (FLOMAX) 0.4 MG capsule [Pharmacy Med Name: TAMSULOSIN 0.4MG CAPSULES] 90 capsule      Sig: TAKE ONE CAPSULE BY MOUTH EVERY EVENING

## 2020-12-05 RX ORDER — TAMSULOSIN HYDROCHLORIDE 0.4 MG/1
CAPSULE ORAL
Qty: 90 CAPSULE | Refills: 1 | Status: SHIPPED | OUTPATIENT
Start: 2020-12-05 | End: 2021-06-02

## 2021-01-19 ENCOUNTER — IMMUNIZATION (OUTPATIENT)
Dept: PRIMARY CARE CLINIC | Age: 86
End: 2021-01-19
Payer: MEDICARE

## 2021-01-19 PROCEDURE — 0011A COVID-19, MODERNA VACCINE 100MCG/0.5ML DOSE: CPT | Performed by: FAMILY MEDICINE

## 2021-01-19 PROCEDURE — 91301 COVID-19, MODERNA VACCINE 100MCG/0.5ML DOSE: CPT | Performed by: FAMILY MEDICINE

## 2021-02-15 DIAGNOSIS — I10 ESSENTIAL HYPERTENSION: ICD-10-CM

## 2021-02-15 RX ORDER — LISINOPRIL AND HYDROCHLOROTHIAZIDE 20; 12.5 MG/1; MG/1
TABLET ORAL
Qty: 90 TABLET | Refills: 0 | Status: SHIPPED | OUTPATIENT
Start: 2021-02-15 | End: 2021-05-12

## 2021-02-15 RX ORDER — SIMVASTATIN 40 MG
40 TABLET ORAL NIGHTLY
Qty: 90 TABLET | Refills: 0 | Status: SHIPPED | OUTPATIENT
Start: 2021-02-15 | End: 2021-05-12

## 2021-02-15 NOTE — TELEPHONE ENCOUNTER
Grace Friedman called requesting a refill of the below medication which has been pended for you:     Requested Prescriptions     Pending Prescriptions Disp Refills    lisinopril-hydroCHLOROthiazide (PRINZIDE;ZESTORETIC) 20-12.5 MG per tablet 90 tablet 1     Sig: TAKE 1 TABLET BY MOUTH ONCE DAILY    simvastatin (ZOCOR) 40 MG tablet 90 tablet 1     Sig: Take 1 tablet by mouth nightly       Last Appointment Date: 9/17/2020  Next Appointment Date: Visit date not found    Allergies   Allergen Reactions    Pcn [Penicillins] Anaphylaxis    Ibuprofen     Percocet [Oxycodone-Acetaminophen] Nausea And Vomiting     Pt is completely out and he said that [facility] req refill on Thursday. Per verbal order Dr Denice Roberts sent medication to pharmacy.

## 2021-02-22 ENCOUNTER — VIRTUAL VISIT (OUTPATIENT)
Dept: FAMILY MEDICINE CLINIC | Age: 86
End: 2021-02-22

## 2021-02-22 DIAGNOSIS — I10 ESSENTIAL HYPERTENSION: Primary | ICD-10-CM

## 2021-02-22 DIAGNOSIS — I34.0 NONRHEUMATIC MITRAL VALVE REGURGITATION: ICD-10-CM

## 2021-02-22 DIAGNOSIS — R35.0 BENIGN PROSTATIC HYPERPLASIA WITH URINARY FREQUENCY: ICD-10-CM

## 2021-02-22 DIAGNOSIS — I25.810 CORONARY ARTERY DISEASE INVOLVING AUTOLOGOUS VEIN CORONARY BYPASS GRAFT WITHOUT ANGINA PECTORIS: ICD-10-CM

## 2021-02-22 DIAGNOSIS — I49.3 PVC (PREMATURE VENTRICULAR CONTRACTION): ICD-10-CM

## 2021-02-22 DIAGNOSIS — N40.1 BENIGN PROSTATIC HYPERPLASIA WITH URINARY FREQUENCY: ICD-10-CM

## 2021-02-22 DIAGNOSIS — E78.00 PURE HYPERCHOLESTEROLEMIA: ICD-10-CM

## 2021-02-22 PROBLEM — E43 SEVERE MALNUTRITION (HCC): Status: RESOLVED | Noted: 2020-09-01 | Resolved: 2021-02-22

## 2021-02-22 PROBLEM — K56.609 SMALL BOWEL OBSTRUCTION (HCC): Status: RESOLVED | Noted: 2020-08-30 | Resolved: 2021-02-22

## 2021-02-22 PROBLEM — I49.9 ARRHYTHMIA: Status: RESOLVED | Noted: 2019-01-22 | Resolved: 2021-02-22

## 2021-02-22 PROBLEM — R11.10 EMESIS: Status: RESOLVED | Noted: 2020-08-06 | Resolved: 2021-02-22

## 2021-02-22 PROCEDURE — G8427 DOCREV CUR MEDS BY ELIG CLIN: HCPCS | Performed by: FAMILY MEDICINE

## 2021-02-22 PROCEDURE — G8420 CALC BMI NORM PARAMETERS: HCPCS | Performed by: FAMILY MEDICINE

## 2021-02-22 PROCEDURE — 4040F PNEUMOC VAC/ADMIN/RCVD: CPT | Performed by: FAMILY MEDICINE

## 2021-02-22 PROCEDURE — 1123F ACP DISCUSS/DSCN MKR DOCD: CPT | Performed by: FAMILY MEDICINE

## 2021-02-22 PROCEDURE — 99213 OFFICE O/P EST LOW 20 MIN: CPT | Performed by: FAMILY MEDICINE

## 2021-02-22 PROCEDURE — 1036F TOBACCO NON-USER: CPT | Performed by: FAMILY MEDICINE

## 2021-02-22 ASSESSMENT — ENCOUNTER SYMPTOMS
ORTHOPNEA: 0
ABDOMINAL PAIN: 0
TROUBLE SWALLOWING: 0
CHEST TIGHTNESS: 0
EYE PAIN: 0
BLOOD IN STOOL: 0
COUGH: 0
SORE THROAT: 0
BACK PAIN: 0
CONSTIPATION: 0
SHORTNESS OF BREATH: 0
NAUSEA: 0

## 2021-02-22 ASSESSMENT — PATIENT HEALTH QUESTIONNAIRE - PHQ9
SUM OF ALL RESPONSES TO PHQ9 QUESTIONS 1 & 2: 0
1. LITTLE INTEREST OR PLEASURE IN DOING THINGS: 0
SUM OF ALL RESPONSES TO PHQ QUESTIONS 1-9: 0

## 2021-02-22 NOTE — PROGRESS NOTES
Subjective:      Patient ID: Luis Spencer is a 80 y.o. male. Patient verified Video Chat was not encrypted, and agrees to the Video Exam in presence of nurse. Left  Shoulder  ;pain   Noted        bph  Stable       ashd  stabl e       pvc  Stable     Hypertension  This is a chronic problem. The current episode started more than 1 year ago. The problem has been resolved since onset. The problem is controlled. Pertinent negatives include no chest pain, headaches, orthopnea, palpitations, peripheral edema or shortness of breath. The current treatment provides significant improvement. Hyperlipidemia  This is a chronic problem. The current episode started more than 1 year ago. The problem is controlled. Pertinent negatives include no chest pain or shortness of breath. Current antihyperlipidemic treatment includes statins. The current treatment provides significant improvement of lipids. Past Medical History:   Diagnosis Date    [facility] [address]     Arrhythmia 1/22/2019    Arthritis     BPH (benign prostatic hypertrophy)     CAD (coronary artery disease)     Cerebrovascular disease     Hyperlipidemia     Hypertension     Mitral regurgitation     PVC (premature ventricular contraction) 1/22/2019    TIA (transient ischemic attack) 2009    post surgery resolved     Review of Systems   Constitutional: Negative for fatigue and fever. HENT: Negative for congestion, ear pain, postnasal drip, sore throat and trouble swallowing. Eyes: Negative for pain. Respiratory: Negative for cough, chest tightness and shortness of breath. Cardiovascular: Negative for chest pain, palpitations, orthopnea and leg swelling. Gastrointestinal: Negative for abdominal pain, blood in stool, constipation and nausea. Genitourinary: Negative for difficulty urinating, frequency and urgency. Musculoskeletal: Negative for arthralgias, back pain, joint swelling and neck stiffness. Skin: Negative for rash. Neurological: Negative for dizziness, weakness and headaches. Hematological: Negative for adenopathy. Does not bruise/bleed easily. Psychiatric/Behavioral: Negative for behavioral problems, dysphoric mood and sleep disturbance. Objective:   Physical Exam  Constitutional:       General: He is not in acute distress. Appearance: Normal appearance. He is not ill-appearing or toxic-appearing. HENT:      Nose: No congestion. Neck:      Musculoskeletal: Normal range of motion. Musculoskeletal: Normal range of motion. Skin:     Findings: No rash. Neurological:      General: No focal deficit present. Mental Status: He is alert and oriented to person, place, and time. Cranial Nerves: No cranial nerve deficit. Sensory: No sensory deficit. Motor: No weakness. Coordination: Coordination normal.      Gait: Gait normal.      Deep Tendon Reflexes: Reflexes normal.         Assessment:       Diagnosis Orders   1. Essential hypertension     2. 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     3. Benign prostatic hyperplasia with urinary frequency     4. Pure hypercholesterolemia     5. Nonrheumatic mitral valve regurgitation     6. PVC (premature ventricular contraction)           Plan:      Current Outpatient Medications   Medication Sig Dispense Refill    lisinopril-hydroCHLOROthiazide (PRINZIDE;ZESTORETIC) 20-12.5 MG per tablet TAKE 1 TABLET BY MOUTH ONCE DAILY 90 tablet 0    simvastatin (ZOCOR) 40 MG tablet Take 1 tablet by mouth nightly 90 tablet 0    tamsulosin (FLOMAX) 0.4 MG capsule TAKE ONE CAPSULE BY MOUTH EVERY EVENING 90 capsule 1    acetaminophen (TYLENOL) 325 MG tablet Take 650 mg by mouth every 6 hours as needed for Pain      carvedilol (COREG) 12.5 MG tablet TAKE 1 TABLET BY MOUTH TWICE DAILY 180 tablet 1    aspirin 81 MG EC tablet Take 81 mg by mouth daily.         multivitamin (THERAGRAN) per tablet Take 1 tablet by mouth daily. No current facility-administered medications for this visit.        /No orders of the defined types were placed in this encounter.     Orders Placed This Encounter   Procedures    Lipid Panel     Standing Status:   Future     Standing Expiration Date:   2/22/2022     Order Specific Question:   Is Patient Fasting?/# of Hours     Answer:   YES 12 HOURS    TSH with Reflex     Standing Status:   Future     Standing Expiration Date:   2/22/2022    Comprehensive Metabolic Panel     Standing Status:   Future     Standing Expiration Date:   2/22/2022    CBC Auto Differential     Standing Status:   Future     Standing Expiration Date:   2/22/2022      see in  4  mths        covid precautoins    Video  15 mins   Jose Licea MD

## 2021-02-23 ENCOUNTER — IMMUNIZATION (OUTPATIENT)
Dept: PRIMARY CARE CLINIC | Age: 86
End: 2021-02-23
Payer: MEDICARE

## 2021-02-23 PROCEDURE — 0012A COVID-19, MODERNA VACCINE 100MCG/0.5ML DOSE: CPT | Performed by: FAMILY MEDICINE

## 2021-02-23 PROCEDURE — 91301 COVID-19, MODERNA VACCINE 100MCG/0.5ML DOSE: CPT | Performed by: FAMILY MEDICINE

## 2021-03-05 ENCOUNTER — NURSE ONLY (OUTPATIENT)
Dept: LAB | Age: 86
End: 2021-03-05

## 2021-03-05 DIAGNOSIS — I10 ESSENTIAL HYPERTENSION: ICD-10-CM

## 2021-03-05 LAB
ALBUMIN SERPL-MCNC: 4.1 G/DL (ref 3.5–5.1)
ALP BLD-CCNC: 88 U/L (ref 38–126)
ALT SERPL-CCNC: 12 U/L (ref 11–66)
ANION GAP SERPL CALCULATED.3IONS-SCNC: 11 MEQ/L (ref 8–16)
AST SERPL-CCNC: 18 U/L (ref 5–40)
BASOPHILS # BLD: 0.4 %
BASOPHILS ABSOLUTE: 0 THOU/MM3 (ref 0–0.1)
BILIRUB SERPL-MCNC: 0.6 MG/DL (ref 0.3–1.2)
BUN BLDV-MCNC: 39 MG/DL (ref 7–22)
CALCIUM SERPL-MCNC: 9.4 MG/DL (ref 8.5–10.5)
CHLORIDE BLD-SCNC: 101 MEQ/L (ref 98–111)
CHOLESTEROL, TOTAL: 119 MG/DL (ref 100–199)
CO2: 27 MEQ/L (ref 23–33)
CREAT SERPL-MCNC: 1.5 MG/DL (ref 0.4–1.2)
EOSINOPHIL # BLD: 8.4 %
EOSINOPHILS ABSOLUTE: 0.6 THOU/MM3 (ref 0–0.4)
ERYTHROCYTE [DISTWIDTH] IN BLOOD BY AUTOMATED COUNT: 14.6 % (ref 11.5–14.5)
ERYTHROCYTE [DISTWIDTH] IN BLOOD BY AUTOMATED COUNT: 49.4 FL (ref 35–45)
GFR SERPL CREATININE-BSD FRML MDRD: 44 ML/MIN/1.73M2
GLUCOSE BLD-MCNC: 139 MG/DL (ref 70–108)
HCT VFR BLD CALC: 42.4 % (ref 42–52)
HDLC SERPL-MCNC: 37 MG/DL
HEMOGLOBIN: 13.2 GM/DL (ref 14–18)
IMMATURE GRANS (ABS): 0.03 THOU/MM3 (ref 0–0.07)
IMMATURE GRANULOCYTES: 0.4 %
LDL CHOLESTEROL CALCULATED: 55 MG/DL
LYMPHOCYTES # BLD: 9.7 %
LYMPHOCYTES ABSOLUTE: 0.7 THOU/MM3 (ref 1–4.8)
MCH RBC QN AUTO: 28.7 PG (ref 26–33)
MCHC RBC AUTO-ENTMCNC: 31.1 GM/DL (ref 32.2–35.5)
MCV RBC AUTO: 92.2 FL (ref 80–94)
MONOCYTES # BLD: 5.9 %
MONOCYTES ABSOLUTE: 0.4 THOU/MM3 (ref 0.4–1.3)
NUCLEATED RED BLOOD CELLS: 0 /100 WBC
PLATELET # BLD: 248 THOU/MM3 (ref 130–400)
PMV BLD AUTO: 10.9 FL (ref 9.4–12.4)
POTASSIUM SERPL-SCNC: 4.1 MEQ/L (ref 3.5–5.2)
RBC # BLD: 4.6 MILL/MM3 (ref 4.7–6.1)
SEG NEUTROPHILS: 75.2 %
SEGMENTED NEUTROPHILS ABSOLUTE COUNT: 5.3 THOU/MM3 (ref 1.8–7.7)
SODIUM BLD-SCNC: 139 MEQ/L (ref 135–145)
TOTAL PROTEIN: 8.1 G/DL (ref 6.1–8)
TRIGL SERPL-MCNC: 136 MG/DL (ref 0–199)
TSH SERPL DL<=0.05 MIU/L-ACNC: 0.79 UIU/ML (ref 0.4–4.2)
WBC # BLD: 7.1 THOU/MM3 (ref 4.8–10.8)

## 2021-03-08 ENCOUNTER — TELEPHONE (OUTPATIENT)
Dept: FAMILY MEDICINE CLINIC | Age: 86
End: 2021-03-08

## 2021-03-08 DIAGNOSIS — N18.32 CHRONIC RENAL FAILURE, STAGE 3B (HCC): Primary | ICD-10-CM

## 2021-03-08 DIAGNOSIS — N18.30 CRF (CHRONIC RENAL FAILURE), STAGE 3 (MODERATE) (HCC): ICD-10-CM

## 2021-03-08 NOTE — TELEPHONE ENCOUNTER
----- Message from Bogdan Germain MD sent at 3/8/2021  5:21 AM EST -----  Call as drink more water as kidney function up some . Faustina Ca   Glu up and get hgba1c and glu at office      recheck bmp in one month

## 2021-03-09 ENCOUNTER — NURSE ONLY (OUTPATIENT)
Dept: FAMILY MEDICINE CLINIC | Age: 86
End: 2021-03-09

## 2021-03-09 DIAGNOSIS — R73.9 HYPERGLYCEMIA: Primary | ICD-10-CM

## 2021-03-09 LAB
CHP ED QC CHECK: ABNORMAL
GLUCOSE BLD-MCNC: 128 MG/DL
HBA1C MFR BLD: 5.4 %

## 2021-03-09 PROCEDURE — 83036 HEMOGLOBIN GLYCOSYLATED A1C: CPT | Performed by: FAMILY MEDICINE

## 2021-03-09 PROCEDURE — 82962 GLUCOSE BLOOD TEST: CPT | Performed by: FAMILY MEDICINE

## 2021-03-10 ENCOUNTER — TELEPHONE (OUTPATIENT)
Dept: FAMILY MEDICINE CLINIC | Age: 86
End: 2021-03-10

## 2021-03-10 NOTE — TELEPHONE ENCOUNTER
----- Message from Chalino Johnson MD sent at 3/10/2021 12:14 AM EST -----  Call as hgb a1c wnl 5.4  And no diabetes

## 2021-03-16 NOTE — TELEPHONE ENCOUNTER
Date of last visit:  2/22/2021  Date of next visit:  Visit date not found    Requested Prescriptions     Pending Prescriptions Disp Refills    carvedilol (COREG) 12.5 MG tablet [Pharmacy Med Name: CARVEDILOL 12.5MG TABLETS] 180 tablet 1     Sig: TAKE 1 TABLET BY MOUTH TWICE DAILY

## 2021-03-17 RX ORDER — CARVEDILOL 12.5 MG/1
TABLET ORAL
Qty: 180 TABLET | Refills: 1 | Status: SHIPPED | OUTPATIENT
Start: 2021-03-17 | End: 2021-09-17

## 2021-04-20 ENCOUNTER — NURSE ONLY (OUTPATIENT)
Dept: LAB | Age: 86
End: 2021-04-20

## 2021-04-20 DIAGNOSIS — N18.32 CHRONIC RENAL FAILURE, STAGE 3B (HCC): ICD-10-CM

## 2021-04-20 LAB
ANION GAP SERPL CALCULATED.3IONS-SCNC: 9 MEQ/L (ref 8–16)
BUN BLDV-MCNC: 28 MG/DL (ref 7–22)
CALCIUM SERPL-MCNC: 9.7 MG/DL (ref 8.5–10.5)
CHLORIDE BLD-SCNC: 101 MEQ/L (ref 98–111)
CO2: 26 MEQ/L (ref 23–33)
CREAT SERPL-MCNC: 1.5 MG/DL (ref 0.4–1.2)
GFR SERPL CREATININE-BSD FRML MDRD: 44 ML/MIN/1.73M2
GLUCOSE BLD-MCNC: 127 MG/DL (ref 70–108)
POTASSIUM SERPL-SCNC: 4.4 MEQ/L (ref 3.5–5.2)
SODIUM BLD-SCNC: 136 MEQ/L (ref 135–145)

## 2021-04-22 ENCOUNTER — TELEPHONE (OUTPATIENT)
Dept: FAMILY MEDICINE CLINIC | Age: 86
End: 2021-04-22

## 2021-04-22 DIAGNOSIS — N18.32 CHRONIC RENAL FAILURE, STAGE 3B (HCC): Primary | ICD-10-CM

## 2021-04-22 NOTE — TELEPHONE ENCOUNTER
----- Message from Natacha Meyers MD sent at 4/22/2021  6:08 AM EDT -----  Lab better as bun lower so more fluid but creat 1.5 slight up as was 1.0 in the fall   Will watch and when new script on lisinopril -hct due go to just lisinopril and no hct as will help.  please call

## 2021-04-22 NOTE — TELEPHONE ENCOUNTER
Pt informed. He said he is going to increase his fluids. He also said that he has started eating more eggs thinking this will help. Markus Frances he will also talk to  more at his next appt.

## 2021-05-11 DIAGNOSIS — I10 ESSENTIAL HYPERTENSION: ICD-10-CM

## 2021-05-11 NOTE — TELEPHONE ENCOUNTER
Date of last visit:  2/22/2021  Date of next visit:  Visit date not found    Requested Prescriptions     Pending Prescriptions Disp Refills    lisinopril-hydroCHLOROthiazide (PRINZIDE;ZESTORETIC) 20-12.5 MG per tablet [Pharmacy Med Name: LISINOPRIL-HCTZ 20/12.5MG TABLETS] 90 tablet 1     Sig: TAKE 1 TABLET BY MOUTH EVERY DAY    simvastatin (ZOCOR) 40 MG tablet [Pharmacy Med Name: SIMVASTATIN 40MG TABLETS] 90 tablet 1     Sig: TAKE 1 TABLET BY MOUTH EVERY NIGHT

## 2021-05-12 RX ORDER — LISINOPRIL AND HYDROCHLOROTHIAZIDE 20; 12.5 MG/1; MG/1
TABLET ORAL
Qty: 90 TABLET | Refills: 0 | Status: SHIPPED | OUTPATIENT
Start: 2021-05-12 | End: 2021-08-10

## 2021-05-12 RX ORDER — SIMVASTATIN 40 MG
TABLET ORAL
Qty: 90 TABLET | Refills: 1 | Status: SHIPPED | OUTPATIENT
Start: 2021-05-12 | End: 2021-11-10

## 2021-06-01 DIAGNOSIS — N40.1 BENIGN NON-NODULAR PROSTATIC HYPERPLASIA WITH LOWER URINARY TRACT SYMPTOMS: ICD-10-CM

## 2021-06-01 NOTE — TELEPHONE ENCOUNTER
Date of last visit:  2/22/2021  Date of next visit:  Visit date not found    Requested Prescriptions     Pending Prescriptions Disp Refills    tamsulosin (FLOMAX) 0.4 MG capsule [Pharmacy Med Name: TAMSULOSIN 0.4MG CAPSULES] 90 capsule 1     Sig: TAKE ONE CAPSULE BY MOUTH EVERY EVENING

## 2021-06-02 RX ORDER — TAMSULOSIN HYDROCHLORIDE 0.4 MG/1
CAPSULE ORAL
Qty: 90 CAPSULE | Refills: 1 | Status: SHIPPED | OUTPATIENT
Start: 2021-06-02 | End: 2021-12-01

## 2021-06-10 ENCOUNTER — NURSE ONLY (OUTPATIENT)
Dept: LAB | Age: 86
End: 2021-06-10

## 2021-06-10 DIAGNOSIS — E78.00 PURE HYPERCHOLESTEROLEMIA: ICD-10-CM

## 2021-06-10 DIAGNOSIS — I10 ESSENTIAL HYPERTENSION: ICD-10-CM

## 2021-06-10 LAB
ALBUMIN SERPL-MCNC: 4.2 G/DL (ref 3.5–5.1)
ALP BLD-CCNC: 92 U/L (ref 38–126)
ALT SERPL-CCNC: 14 U/L (ref 11–66)
ANION GAP SERPL CALCULATED.3IONS-SCNC: 10 MEQ/L (ref 8–16)
AST SERPL-CCNC: 23 U/L (ref 5–40)
BILIRUB SERPL-MCNC: 0.5 MG/DL (ref 0.3–1.2)
BUN BLDV-MCNC: 36 MG/DL (ref 7–22)
CALCIUM SERPL-MCNC: 9.5 MG/DL (ref 8.5–10.5)
CHLORIDE BLD-SCNC: 105 MEQ/L (ref 98–111)
CHOLESTEROL, TOTAL: 121 MG/DL (ref 100–199)
CO2: 23 MEQ/L (ref 23–33)
CREAT SERPL-MCNC: 1.3 MG/DL (ref 0.4–1.2)
GFR SERPL CREATININE-BSD FRML MDRD: 52 ML/MIN/1.73M2
GLUCOSE BLD-MCNC: 137 MG/DL (ref 70–108)
HDLC SERPL-MCNC: 36 MG/DL
LDL CHOLESTEROL CALCULATED: 47 MG/DL
POTASSIUM SERPL-SCNC: 4.7 MEQ/L (ref 3.5–5.2)
SODIUM BLD-SCNC: 138 MEQ/L (ref 135–145)
TOTAL PROTEIN: 7.9 G/DL (ref 6.1–8)
TRIGL SERPL-MCNC: 190 MG/DL (ref 0–199)
TSH SERPL DL<=0.05 MIU/L-ACNC: 1 UIU/ML (ref 0.4–4.2)

## 2021-06-11 ENCOUNTER — TELEPHONE (OUTPATIENT)
Dept: FAMILY MEDICINE CLINIC | Age: 86
End: 2021-06-11

## 2021-06-11 NOTE — TELEPHONE ENCOUNTER
----- Message from Natacha Meyers MD sent at 6/11/2021  6:11 AM EDT -----  Call as labs are good and  kidney function stable and should see in august

## 2021-08-10 DIAGNOSIS — I10 ESSENTIAL HYPERTENSION: ICD-10-CM

## 2021-08-11 RX ORDER — LISINOPRIL AND HYDROCHLOROTHIAZIDE 20; 12.5 MG/1; MG/1
TABLET ORAL
Qty: 90 TABLET | Refills: 0 | Status: SHIPPED | OUTPATIENT
Start: 2021-08-11 | End: 2021-11-09

## 2021-08-25 ENCOUNTER — OFFICE VISIT (OUTPATIENT)
Dept: FAMILY MEDICINE CLINIC | Age: 86
End: 2021-08-25

## 2021-08-25 VITALS
RESPIRATION RATE: 16 BRPM | HEIGHT: 67 IN | SYSTOLIC BLOOD PRESSURE: 136 MMHG | HEART RATE: 72 BPM | WEIGHT: 145.5 LBS | DIASTOLIC BLOOD PRESSURE: 74 MMHG | BODY MASS INDEX: 22.84 KG/M2 | TEMPERATURE: 97.3 F

## 2021-08-25 DIAGNOSIS — N40.1 BENIGN PROSTATIC HYPERPLASIA WITH URINARY FREQUENCY: ICD-10-CM

## 2021-08-25 DIAGNOSIS — N18.30 CRF (CHRONIC RENAL FAILURE), STAGE 3 (MODERATE) (HCC): ICD-10-CM

## 2021-08-25 DIAGNOSIS — E78.00 PURE HYPERCHOLESTEROLEMIA: ICD-10-CM

## 2021-08-25 DIAGNOSIS — I10 ESSENTIAL HYPERTENSION: ICD-10-CM

## 2021-08-25 DIAGNOSIS — R35.0 BENIGN PROSTATIC HYPERPLASIA WITH URINARY FREQUENCY: ICD-10-CM

## 2021-08-25 DIAGNOSIS — I25.810 CORONARY ARTERY DISEASE INVOLVING AUTOLOGOUS VEIN CORONARY BYPASS GRAFT WITHOUT ANGINA PECTORIS: Primary | ICD-10-CM

## 2021-08-25 PROCEDURE — 4040F PNEUMOC VAC/ADMIN/RCVD: CPT | Performed by: FAMILY MEDICINE

## 2021-08-25 PROCEDURE — 1036F TOBACCO NON-USER: CPT | Performed by: FAMILY MEDICINE

## 2021-08-25 PROCEDURE — G8420 CALC BMI NORM PARAMETERS: HCPCS | Performed by: FAMILY MEDICINE

## 2021-08-25 PROCEDURE — 99213 OFFICE O/P EST LOW 20 MIN: CPT | Performed by: FAMILY MEDICINE

## 2021-08-25 PROCEDURE — 1123F ACP DISCUSS/DSCN MKR DOCD: CPT | Performed by: FAMILY MEDICINE

## 2021-08-25 PROCEDURE — G8427 DOCREV CUR MEDS BY ELIG CLIN: HCPCS | Performed by: FAMILY MEDICINE

## 2021-08-25 SDOH — ECONOMIC STABILITY: FOOD INSECURITY: WITHIN THE PAST 12 MONTHS, THE FOOD YOU BOUGHT JUST DIDN'T LAST AND YOU DIDN'T HAVE MONEY TO GET MORE.: NEVER TRUE

## 2021-08-25 SDOH — ECONOMIC STABILITY: FOOD INSECURITY: WITHIN THE PAST 12 MONTHS, YOU WORRIED THAT YOUR FOOD WOULD RUN OUT BEFORE YOU GOT MONEY TO BUY MORE.: NEVER TRUE

## 2021-08-25 ASSESSMENT — ENCOUNTER SYMPTOMS
COUGH: 0
TROUBLE SWALLOWING: 0
NAUSEA: 0
BACK PAIN: 0
ABDOMINAL PAIN: 0
ORTHOPNEA: 0
SORE THROAT: 0
EYE PAIN: 0
SHORTNESS OF BREATH: 0
CONSTIPATION: 0
CHEST TIGHTNESS: 0
BLOOD IN STOOL: 0

## 2021-08-25 ASSESSMENT — SOCIAL DETERMINANTS OF HEALTH (SDOH): HOW HARD IS IT FOR YOU TO PAY FOR THE VERY BASICS LIKE FOOD, HOUSING, MEDICAL CARE, AND HEATING?: NOT HARD AT ALL

## 2021-08-25 NOTE — PROGRESS NOTES
Subjective:      Patient ID: Epi Cardenas is a 80 y.o. male. Bowel  Obstruction    Noted  And better     ashd  Stable       crf stable      bph  stable     Hypertension  This is a chronic problem. The current episode started more than 1 year ago. The problem has been resolved since onset. The problem is controlled. Pertinent negatives include no chest pain, headaches, orthopnea, palpitations, peripheral edema or shortness of breath. The current treatment provides significant improvement. There are no compliance problems. Hyperlipidemia  Pertinent negatives include no chest pain or shortness of breath. Past Medical History:   Diagnosis Date    [facility]) ([facility] [address]     Arrhythmia 1/22/2019    Arthritis     BPH (benign prostatic hypertrophy)     CAD (coronary artery disease)     Cerebrovascular disease     Hyperlipidemia     Hypertension     Mitral regurgitation     PVC (premature ventricular contraction) 1/22/2019    Small bowel obstruction ([facility] [address] 8/30/2020    TIA (transient ischemic attack) 2009    post surgery resolved       Review of Systems   Constitutional: Negative for fatigue and fever. HENT: Negative for congestion, ear pain, postnasal drip, sore throat and trouble swallowing. Eyes: Negative for pain. Respiratory: Negative for cough, chest tightness and shortness of breath. Cardiovascular: Negative for chest pain, palpitations, orthopnea and leg swelling. Gastrointestinal: Negative for abdominal pain, blood in stool, constipation and nausea. Genitourinary: Negative for difficulty urinating, frequency and urgency. Musculoskeletal: Negative for arthralgias, back pain, joint swelling and neck stiffness. Skin: Negative for rash. Neurological: Negative for dizziness, weakness and headaches. Hematological: Negative for adenopathy. Does not bruise/bleed easily. Psychiatric/Behavioral: Negative for behavioral problems, dysphoric mood and sleep disturbance. (PRINZIDE;ZESTORETIC) 20-12.5 MG per tablet TAKE 1 TABLET BY MOUTH EVERY DAY 90 tablet 0    tamsulosin (FLOMAX) 0.4 MG capsule TAKE ONE CAPSULE BY MOUTH EVERY EVENING 90 capsule 1    simvastatin (ZOCOR) 40 MG tablet TAKE 1 TABLET BY MOUTH EVERY NIGHT 90 tablet 1    carvedilol (COREG) 12.5 MG tablet TAKE 1 TABLET BY MOUTH TWICE DAILY 180 tablet 1    acetaminophen (TYLENOL) 325 MG tablet Take 650 mg by mouth every 6 hours as needed for Pain      aspirin 81 MG EC tablet Take 81 mg by mouth daily.  multivitamin (THERAGRAN) per tablet Take 1 tablet by mouth daily. No current facility-administered medications for this visit. No orders of the defined types were placed in this encounter.         See in  6mths     Mitch Mclaughlin MD

## 2021-09-16 NOTE — TELEPHONE ENCOUNTER
Date of last visit:  8/25/2021  Date of next visit:  Visit date not found    Requested Prescriptions     Pending Prescriptions Disp Refills    carvedilol (COREG) 12.5 MG tablet [Pharmacy Med Name: CARVEDILOL 12.5MG TABLETS] 180 tablet 1     Sig: TAKE 1 TABLET BY MOUTH TWICE DAILY

## 2021-09-17 RX ORDER — CARVEDILOL 12.5 MG/1
TABLET ORAL
Qty: 180 TABLET | Refills: 1 | Status: SHIPPED | OUTPATIENT
Start: 2021-09-17 | End: 2022-03-22

## 2021-11-09 DIAGNOSIS — I10 ESSENTIAL HYPERTENSION: ICD-10-CM

## 2021-11-09 NOTE — TELEPHONE ENCOUNTER
Date of last visit:  8/25/2021  Date of next visit:  Visit date not found    Requested Prescriptions     Pending Prescriptions Disp Refills    lisinopril-hydroCHLOROthiazide (PRINZIDE;ZESTORETIC) 20-12.5 MG per tablet [Pharmacy Med Name: LISINOPRIL-HCTZ 20/12.5MG TABLETS] 90 tablet 1     Sig: TAKE 1 TABLET BY MOUTH EVERY DAY    simvastatin (ZOCOR) 40 MG tablet [Pharmacy Med Name: SIMVASTATIN 40MG TABLETS] 90 tablet 1     Sig: TAKE 1 TABLET BY MOUTH EVERY NIGHT

## 2021-11-10 RX ORDER — LISINOPRIL AND HYDROCHLOROTHIAZIDE 20; 12.5 MG/1; MG/1
TABLET ORAL
Qty: 90 TABLET | Refills: 1 | Status: SHIPPED | OUTPATIENT
Start: 2021-11-10 | End: 2022-05-13

## 2021-11-10 RX ORDER — SIMVASTATIN 40 MG
TABLET ORAL
Qty: 90 TABLET | Refills: 1 | Status: SHIPPED | OUTPATIENT
Start: 2021-11-10 | End: 2022-05-13

## 2021-11-30 DIAGNOSIS — N40.1 BENIGN NON-NODULAR PROSTATIC HYPERPLASIA WITH LOWER URINARY TRACT SYMPTOMS: ICD-10-CM

## 2021-11-30 NOTE — TELEPHONE ENCOUNTER
Date of last visit:  8/25/2021  Date of next visit:  Visit date not found    Requested Prescriptions     Pending Prescriptions Disp Refills    tamsulosin (FLOMAX) 0.4 MG capsule [Pharmacy Med Name: TAMSULOSIN 0.4MG CAPSULES] 90 capsule 1     Sig: TAKE ONE CAPSULE BY MOUTH EVERY EVENING

## 2021-12-01 RX ORDER — TAMSULOSIN HYDROCHLORIDE 0.4 MG/1
CAPSULE ORAL
Qty: 90 CAPSULE | Refills: 1 | Status: SHIPPED | OUTPATIENT
Start: 2021-12-01 | End: 2022-06-07

## 2022-01-07 NOTE — PLAN OF CARE
Mom called   Requesting to speak with RN Farrah Nichols, was advised to return call to her in regards to providing documentation to [facility] . Problem: Skin Integrity:  Goal: Absence of new skin breakdown  Description: Absence of new skin breakdown  9/1/2020 0125 by Mireille Kc RN  Outcome: Ongoing  Note: Coccyx and bilat heels red but blanches. Pt turns self well in bed, will continue to encourage movement to prevent skin breakdown. None so far this shift. Problem: Bowel/Gastric:  Goal: Control of bowel function will improve  Description: Control of bowel function will improve  9/1/2020 0125 by Mireille Kc RN  Outcome: Ongoing  Note: Pt continues to have uncontrolled bowel movements. He has had two this shift. Will continue to assess. Problem: Nutritional:  Goal: Ability to follow a diet with enough fiber (20 to 30 grams) for normal bowel function will improve  Description: Ability to follow a diet with enough fiber (20 to 30 grams) for normal bowel function will improve  9/1/2020 0125 by Mireille Kc RN  Outcome: Ongoing  Note: Bowel movements remain loose and are typically passed by passing gas per pt statement. Problem: Discharge Planning:  Goal: Participates in care planning  Description: Participates in care planning  9/1/2020 0125 by Mireille Kc RN  Outcome: Ongoing  Note: Pt is active in plan of care, continue to update as needed. Problem: Discharge Planning:  Goal: Discharged to appropriate level of care  Description: Discharged to appropriate level of care  Outcome: Ongoing  Note: Pt prefers to return home with wife upon discharge. Problem: Fluid Volume - Deficit:  Goal: Absence of fluid volume deficit signs and symptoms  Description: Absence of fluid volume deficit signs and symptoms  9/1/2020 0125 by Mireille Kc RN  Outcome: Ongoing  Note: Pt remains NPO, will continue to assess for fluid output.       Problem: Pain:  Goal: Pain level will decrease  Description: Pain level will decrease  9/1/2020 0125 by Mireille Kc RN  Outcome: Ongoing  Note: Pt denies pain, will continue to assess using 0-10 pain scale. May have prn meds. Refer to [facility] Problem: Falls - Risk of:  Goal: Will remain free from falls  Description: Will remain free from falls  9/1/2020 0125 by Mabel Andino RN  Outcome: Ongoing  Note: No falls so far this shift, call light and bedside table within reach. Bed in lowest position and alarm on, nonskid socks on bilaterally. Care plan reviewed with patient. Patient verbalize understanding of the plan of care and contribute to goal setting. (4) excellent

## 2022-02-04 ENCOUNTER — APPOINTMENT (OUTPATIENT)
Dept: GENERAL RADIOLOGY | Age: 87
DRG: 683 | End: 2022-02-04
Payer: MEDICARE

## 2022-02-04 ENCOUNTER — HOSPITAL ENCOUNTER (INPATIENT)
Age: 87
LOS: 3 days | Discharge: HOME | DRG: 683 | End: 2022-02-07
Attending: EMERGENCY MEDICINE | Admitting: FAMILY MEDICINE
Payer: MEDICARE

## 2022-02-04 ENCOUNTER — APPOINTMENT (OUTPATIENT)
Dept: CT IMAGING | Age: 87
DRG: 683 | End: 2022-02-04
Payer: MEDICARE

## 2022-02-04 DIAGNOSIS — N17.9 AKI (ACUTE KIDNEY INJURY) (HCC): ICD-10-CM

## 2022-02-04 DIAGNOSIS — R19.7 DIARRHEA, UNSPECIFIED TYPE: Primary | ICD-10-CM

## 2022-02-04 DIAGNOSIS — E86.0 DEHYDRATION: ICD-10-CM

## 2022-02-04 LAB
ADENOVIRUS F 40 41 PCR: NOT DETECTED
ALBUMIN SERPL-MCNC: 4.4 G/DL (ref 3.5–5.1)
ALP BLD-CCNC: 80 U/L (ref 38–126)
ALT SERPL-CCNC: 18 U/L (ref 11–66)
ANION GAP SERPL CALCULATED.3IONS-SCNC: 13 MEQ/L (ref 8–16)
ANION GAP SERPL CALCULATED.3IONS-SCNC: 16 MEQ/L (ref 8–16)
AST SERPL-CCNC: 27 U/L (ref 5–40)
ASTROVIRUS PCR: NOT DETECTED
BASOPHILS # BLD: 0.3 %
BASOPHILS # BLD: 0.3 %
BASOPHILS ABSOLUTE: 0 THOU/MM3 (ref 0–0.1)
BASOPHILS ABSOLUTE: 0 THOU/MM3 (ref 0–0.1)
BILIRUB SERPL-MCNC: 0.7 MG/DL (ref 0.3–1.2)
BILIRUBIN DIRECT: < 0.2 MG/DL (ref 0–0.3)
BUN BLDV-MCNC: 58 MG/DL (ref 7–22)
BUN BLDV-MCNC: 60 MG/DL (ref 7–22)
C DIFF TOXIN/ANTIGEN: NEGATIVE
CALCIUM SERPL-MCNC: 8.5 MG/DL (ref 8.5–10.5)
CALCIUM SERPL-MCNC: 9.1 MG/DL (ref 8.5–10.5)
CAMPYLOBACTER PCR: NOT DETECTED
CHLORIDE BLD-SCNC: 105 MEQ/L (ref 98–111)
CHLORIDE BLD-SCNC: 105 MEQ/L (ref 98–111)
CLOSTRIDIUM DIFFICILE, PCR: ABNORMAL
CO2: 16 MEQ/L (ref 23–33)
CO2: 20 MEQ/L (ref 23–33)
CREAT SERPL-MCNC: 2.8 MG/DL (ref 0.4–1.2)
CREAT SERPL-MCNC: 3 MG/DL (ref 0.4–1.2)
CRYPTOSPORIDIUM PCR: NOT DETECTED
CYCLOSPORA CAYETANENSIS PCR: NOT DETECTED
E COLI 0157 PCR: ABNORMAL
E COLI ENTEROAGGREGATIVE PCR: NOT DETECTED
E COLI ENTEROPATHOGENIC PCR: NOT DETECTED
E COLI ENTEROTOXIGENIC PCR: NOT DETECTED
E COLI SHIGA LIKE TOXIN PCR: NOT DETECTED
E COLI SHIGELLA/ENTEROINVASIVE PCR: NOT DETECTED
E HISTOLYTICA GI FILM ARRAY: NOT DETECTED
EOSINOPHIL # BLD: 1.1 %
EOSINOPHIL # BLD: 3 %
EOSINOPHILS ABSOLUTE: 0.1 THOU/MM3 (ref 0–0.4)
EOSINOPHILS ABSOLUTE: 0.1 THOU/MM3 (ref 0–0.4)
ERYTHROCYTE [DISTWIDTH] IN BLOOD BY AUTOMATED COUNT: 13.6 % (ref 11.5–14.5)
ERYTHROCYTE [DISTWIDTH] IN BLOOD BY AUTOMATED COUNT: 13.6 % (ref 11.5–14.5)
ERYTHROCYTE [DISTWIDTH] IN BLOOD BY AUTOMATED COUNT: 46.5 FL (ref 35–45)
ERYTHROCYTE [DISTWIDTH] IN BLOOD BY AUTOMATED COUNT: 47.2 FL (ref 35–45)
GFR SERPL CREATININE-BSD FRML MDRD: 20 ML/MIN/1.73M2
GFR SERPL CREATININE-BSD FRML MDRD: 21 ML/MIN/1.73M2
GIARDIA LAMBLIA PCR: NOT DETECTED
GLUCOSE BLD-MCNC: 102 MG/DL (ref 70–108)
GLUCOSE BLD-MCNC: 160 MG/DL (ref 70–108)
HCT VFR BLD CALC: 40.6 % (ref 42–52)
HCT VFR BLD CALC: 45.4 % (ref 42–52)
HEMOGLOBIN: 13.4 GM/DL (ref 14–18)
HEMOGLOBIN: 14.9 GM/DL (ref 14–18)
IMMATURE GRANS (ABS): 0.02 THOU/MM3 (ref 0–0.07)
IMMATURE GRANS (ABS): 0.03 THOU/MM3 (ref 0–0.07)
IMMATURE GRANULOCYTES: 0.5 %
IMMATURE GRANULOCYTES: 0.5 %
LYMPHOCYTES # BLD: 3.7 %
LYMPHOCYTES # BLD: 6 %
LYMPHOCYTES ABSOLUTE: 0.2 THOU/MM3 (ref 1–4.8)
LYMPHOCYTES ABSOLUTE: 0.2 THOU/MM3 (ref 1–4.8)
MCH RBC QN AUTO: 30.7 PG (ref 26–33)
MCH RBC QN AUTO: 30.9 PG (ref 26–33)
MCHC RBC AUTO-ENTMCNC: 32.8 GM/DL (ref 32.2–35.5)
MCHC RBC AUTO-ENTMCNC: 33 GM/DL (ref 32.2–35.5)
MCV RBC AUTO: 93.5 FL (ref 80–94)
MCV RBC AUTO: 93.6 FL (ref 80–94)
MONOCYTES # BLD: 5.8 %
MONOCYTES # BLD: 6.7 %
MONOCYTES ABSOLUTE: 0.2 THOU/MM3 (ref 0.4–1.3)
MONOCYTES ABSOLUTE: 0.4 THOU/MM3 (ref 0.4–1.3)
NOROVIRUS GI GII PCR: DETECTED
NUCLEATED RED BLOOD CELLS: 0 /100 WBC
NUCLEATED RED BLOOD CELLS: 0 /100 WBC
OSMOLALITY CALCULATION: 292.8 MOSMOL/KG (ref 275–300)
OSMOLALITY CALCULATION: 293.4 MOSMOL/KG (ref 275–300)
PLATELET # BLD: 182 THOU/MM3 (ref 130–400)
PLATELET # BLD: 263 THOU/MM3 (ref 130–400)
PLATELET ESTIMATE: ADEQUATE
PLESIOMONAS SHIGELLOIDES PCR: NOT DETECTED
PMV BLD AUTO: 10.1 FL (ref 9.4–12.4)
PMV BLD AUTO: 10.3 FL (ref 9.4–12.4)
POTASSIUM REFLEX MAGNESIUM: 4.6 MEQ/L (ref 3.5–5.2)
POTASSIUM REFLEX MAGNESIUM: 4.6 MEQ/L (ref 3.5–5.2)
RBC # BLD: 4.34 MILL/MM3 (ref 4.7–6.1)
RBC # BLD: 4.85 MILL/MM3 (ref 4.7–6.1)
ROTAVIRUS A PCR: NOT DETECTED
SALMONELLA PCR: NOT DETECTED
SAPOVIRUS PCR: NOT DETECTED
SARS-COV-2, NAAT: NOT  DETECTED
SCAN OF BLOOD SMEAR: NORMAL
SEG NEUTROPHILS: 84.4 %
SEG NEUTROPHILS: 87.7 %
SEGMENTED NEUTROPHILS ABSOLUTE COUNT: 3.4 THOU/MM3 (ref 1.8–7.7)
SEGMENTED NEUTROPHILS ABSOLUTE COUNT: 5.6 THOU/MM3 (ref 1.8–7.7)
SODIUM BLD-SCNC: 137 MEQ/L (ref 135–145)
SODIUM BLD-SCNC: 138 MEQ/L (ref 135–145)
TOTAL PROTEIN: 7.9 G/DL (ref 6.1–8)
VIBRIO CHOLERAE PCR: NOT DETECTED
VIBRIO PCR: NOT DETECTED
WBC # BLD: 4 THOU/MM3 (ref 4.8–10.8)
WBC # BLD: 6.4 THOU/MM3 (ref 4.8–10.8)
YERSINIA ENTEROCOLITICA PCR: NOT DETECTED

## 2022-02-04 PROCEDURE — 87635 SARS-COV-2 COVID-19 AMP PRB: CPT

## 2022-02-04 PROCEDURE — 96374 THER/PROPH/DIAG INJ IV PUSH: CPT

## 2022-02-04 PROCEDURE — 0097U HC GI PTHGN MULT REV TRANS & AMP PRB TECH 22 TRGT: CPT

## 2022-02-04 PROCEDURE — 93005 ELECTROCARDIOGRAM TRACING: CPT | Performed by: EMERGENCY MEDICINE

## 2022-02-04 PROCEDURE — 99284 EMERGENCY DEPT VISIT MOD MDM: CPT

## 2022-02-04 PROCEDURE — 2580000003 HC RX 258: Performed by: EMERGENCY MEDICINE

## 2022-02-04 PROCEDURE — 1200000003 HC TELEMETRY R&B

## 2022-02-04 PROCEDURE — 74022 RADEX COMPL AQT ABD SERIES: CPT

## 2022-02-04 PROCEDURE — 2580000003 HC RX 258: Performed by: FAMILY MEDICINE

## 2022-02-04 PROCEDURE — 6370000000 HC RX 637 (ALT 250 FOR IP): Performed by: FAMILY MEDICINE

## 2022-02-04 PROCEDURE — 51798 US URINE CAPACITY MEASURE: CPT

## 2022-02-04 PROCEDURE — 87449 NOS EACH ORGANISM AG IA: CPT

## 2022-02-04 PROCEDURE — 85025 COMPLETE CBC W/AUTO DIFF WBC: CPT

## 2022-02-04 PROCEDURE — 74176 CT ABD & PELVIS W/O CONTRAST: CPT

## 2022-02-04 PROCEDURE — 36415 COLL VENOUS BLD VENIPUNCTURE: CPT

## 2022-02-04 PROCEDURE — 6360000002 HC RX W HCPCS: Performed by: FAMILY MEDICINE

## 2022-02-04 PROCEDURE — 99222 1ST HOSP IP/OBS MODERATE 55: CPT | Performed by: SURGERY

## 2022-02-04 PROCEDURE — 80076 HEPATIC FUNCTION PANEL: CPT

## 2022-02-04 PROCEDURE — 80048 BASIC METABOLIC PNL TOTAL CA: CPT

## 2022-02-04 PROCEDURE — 6360000002 HC RX W HCPCS: Performed by: EMERGENCY MEDICINE

## 2022-02-04 RX ORDER — ONDANSETRON 2 MG/ML
4 INJECTION INTRAMUSCULAR; INTRAVENOUS EVERY 6 HOURS PRN
Status: DISCONTINUED | OUTPATIENT
Start: 2022-02-04 | End: 2022-02-07 | Stop reason: HOSPADM

## 2022-02-04 RX ORDER — ACETAMINOPHEN 325 MG/1
650 TABLET ORAL EVERY 4 HOURS PRN
Status: DISCONTINUED | OUTPATIENT
Start: 2022-02-04 | End: 2022-02-04 | Stop reason: SDUPTHER

## 2022-02-04 RX ORDER — ACETAMINOPHEN 325 MG/1
650 TABLET ORAL EVERY 6 HOURS PRN
Status: DISCONTINUED | OUTPATIENT
Start: 2022-02-04 | End: 2022-02-07 | Stop reason: HOSPADM

## 2022-02-04 RX ORDER — POTASSIUM CHLORIDE 20 MEQ/1
40 TABLET, EXTENDED RELEASE ORAL PRN
Status: DISCONTINUED | OUTPATIENT
Start: 2022-02-04 | End: 2022-02-07 | Stop reason: HOSPADM

## 2022-02-04 RX ORDER — SODIUM CHLORIDE 9 MG/ML
25 INJECTION, SOLUTION INTRAVENOUS PRN
Status: DISCONTINUED | OUTPATIENT
Start: 2022-02-04 | End: 2022-02-07 | Stop reason: HOSPADM

## 2022-02-04 RX ORDER — ONDANSETRON 2 MG/ML
4 INJECTION INTRAMUSCULAR; INTRAVENOUS ONCE
Status: COMPLETED | OUTPATIENT
Start: 2022-02-04 | End: 2022-02-04

## 2022-02-04 RX ORDER — SODIUM CHLORIDE 0.9 % (FLUSH) 0.9 %
5-40 SYRINGE (ML) INJECTION EVERY 12 HOURS SCHEDULED
Status: DISCONTINUED | OUTPATIENT
Start: 2022-02-04 | End: 2022-02-07 | Stop reason: HOSPADM

## 2022-02-04 RX ORDER — ONDANSETRON 4 MG/1
4 TABLET, ORALLY DISINTEGRATING ORAL EVERY 8 HOURS PRN
Status: DISCONTINUED | OUTPATIENT
Start: 2022-02-04 | End: 2022-02-07 | Stop reason: HOSPADM

## 2022-02-04 RX ORDER — POTASSIUM CHLORIDE 7.45 MG/ML
10 INJECTION INTRAVENOUS PRN
Status: DISCONTINUED | OUTPATIENT
Start: 2022-02-04 | End: 2022-02-07 | Stop reason: HOSPADM

## 2022-02-04 RX ORDER — SODIUM CHLORIDE, SODIUM LACTATE, POTASSIUM CHLORIDE, CALCIUM CHLORIDE 600; 310; 30; 20 MG/100ML; MG/100ML; MG/100ML; MG/100ML
INJECTION, SOLUTION INTRAVENOUS CONTINUOUS
Status: DISCONTINUED | OUTPATIENT
Start: 2022-02-04 | End: 2022-02-07 | Stop reason: HOSPADM

## 2022-02-04 RX ORDER — TAMSULOSIN HYDROCHLORIDE 0.4 MG/1
0.4 CAPSULE ORAL DAILY
Status: DISCONTINUED | OUTPATIENT
Start: 2022-02-04 | End: 2022-02-07 | Stop reason: HOSPADM

## 2022-02-04 RX ORDER — ACETAMINOPHEN 650 MG/1
650 SUPPOSITORY RECTAL EVERY 6 HOURS PRN
Status: DISCONTINUED | OUTPATIENT
Start: 2022-02-04 | End: 2022-02-07 | Stop reason: HOSPADM

## 2022-02-04 RX ORDER — SODIUM CHLORIDE, SODIUM LACTATE, POTASSIUM CHLORIDE, AND CALCIUM CHLORIDE .6; .31; .03; .02 G/100ML; G/100ML; G/100ML; G/100ML
1000 INJECTION, SOLUTION INTRAVENOUS ONCE
Status: COMPLETED | OUTPATIENT
Start: 2022-02-04 | End: 2022-02-04

## 2022-02-04 RX ORDER — POLYETHYLENE GLYCOL 3350 17 G/17G
17 POWDER, FOR SOLUTION ORAL DAILY PRN
Status: DISCONTINUED | OUTPATIENT
Start: 2022-02-04 | End: 2022-02-07 | Stop reason: HOSPADM

## 2022-02-04 RX ORDER — SODIUM CHLORIDE 0.9 % (FLUSH) 0.9 %
5-40 SYRINGE (ML) INJECTION PRN
Status: DISCONTINUED | OUTPATIENT
Start: 2022-02-04 | End: 2022-02-07 | Stop reason: HOSPADM

## 2022-02-04 RX ORDER — CARVEDILOL 6.25 MG/1
12.5 TABLET ORAL 2 TIMES DAILY WITH MEALS
Status: DISCONTINUED | OUTPATIENT
Start: 2022-02-04 | End: 2022-02-07 | Stop reason: HOSPADM

## 2022-02-04 RX ADMIN — ONDANSETRON 4 MG: 2 INJECTION INTRAMUSCULAR; INTRAVENOUS at 16:12

## 2022-02-04 RX ADMIN — ONDANSETRON 4 MG: 2 INJECTION INTRAMUSCULAR; INTRAVENOUS at 02:12

## 2022-02-04 RX ADMIN — TAMSULOSIN HYDROCHLORIDE 0.4 MG: 0.4 CAPSULE ORAL at 08:20

## 2022-02-04 RX ADMIN — CARVEDILOL 12.5 MG: 6.25 TABLET, FILM COATED ORAL at 17:19

## 2022-02-04 RX ADMIN — SODIUM CHLORIDE, POTASSIUM CHLORIDE, SODIUM LACTATE AND CALCIUM CHLORIDE: 600; 310; 30; 20 INJECTION, SOLUTION INTRAVENOUS at 05:42

## 2022-02-04 RX ADMIN — SODIUM CHLORIDE, PRESERVATIVE FREE 10 ML: 5 INJECTION INTRAVENOUS at 09:09

## 2022-02-04 RX ADMIN — SODIUM CHLORIDE, POTASSIUM CHLORIDE, SODIUM LACTATE AND CALCIUM CHLORIDE 1000 ML: 600; 310; 30; 20 INJECTION, SOLUTION INTRAVENOUS at 02:12

## 2022-02-04 RX ADMIN — CARVEDILOL 12.5 MG: 6.25 TABLET, FILM COATED ORAL at 08:20

## 2022-02-04 RX ADMIN — SODIUM CHLORIDE, POTASSIUM CHLORIDE, SODIUM LACTATE AND CALCIUM CHLORIDE: 600; 310; 30; 20 INJECTION, SOLUTION INTRAVENOUS at 03:50

## 2022-02-04 RX ADMIN — ACETAMINOPHEN 650 MG: 325 TABLET ORAL at 08:20

## 2022-02-04 ASSESSMENT — ENCOUNTER SYMPTOMS
BACK PAIN: 0
EYE REDNESS: 0
SORE THROAT: 0
ABDOMINAL DISTENTION: 0
ABDOMINAL PAIN: 0
SINUS PRESSURE: 0
DIARRHEA: 1
COUGH: 0
COLOR CHANGE: 0
CHEST TIGHTNESS: 0
PHOTOPHOBIA: 0
NAUSEA: 1
VOMITING: 1

## 2022-02-04 ASSESSMENT — PAIN SCALES - GENERAL
PAINLEVEL_OUTOF10: 2
PAINLEVEL_OUTOF10: 0

## 2022-02-04 NOTE — ED NOTES
Bed: 021A  Expected date:   Expected time:   Means of arrival:   Comments:  [address], RN  02/04/22 [id number]

## 2022-02-04 NOTE — CONSULTS
[address] Surgery Consultation Erick Schaefer MD  Pt Name: Lindsey Bradford  MRN: [id number]  YOB: 1933  Date of evaluation: 2/4/2022  Primary Care Physician: Noreen Snyder MD  Patient evaluated at the request of  Dr. Fletcher Avelar  Reason for evaluation: ?  Partial small bowel obstruction  IMPRESSIONS:   1. Sudden onset nausea vomiting and diarrhea most consistent with enteritis less likely a mechanical obstruction. Stool at and beyond anastomosis on imaging  2. History of jejunal diverticula  3. Benign abdomen  4. GI panel pending  5. Covid negative  6.  has a past medical history of ANJANA (acute kidney injury) ([address] Arrhythmia, Arthritis, BPH (benign prostatic hypertrophy), CAD (coronary artery disease), Cerebrovascular disease, Hyperlipidemia, Hypertension, Mitral regurgitation, PVC (premature ventricular contraction), Small bowel obstruction ([address] and TIA (transient ischemic attack). RECOMMENDATIONS:   1. Continue clear liquid diet as tolerated. Patient states he is just not hungry. Advance as tolerated from a surgical standpoint. 2. Hydration and supportive care at this point. I do not think NG tube is indicated. SUBJECTIVE:   History of Chief Complaint:    Gisela Pineda is a 80 y.o.male who presents with sudden onset early this morning nausea, vomiting, and watery nonbloody diarrhea. Yaneth Weeks is known to me I operated on him in 2020 for recurrent small bowel obstruction. He had repair of an inguinal hernia at that time. He had multiple giant diverticula of the jejunum and underwent a small bowel resection. At present he denies any nausea or abdominal pain he is nondistended. He continues to have some watery diarrhea. He has active bowel sounds. He reports passing flatus. He is nontoxic in appearance. He is hoping to go home soon. He does not report being on antibiotics recently. He did report that he ate a cheese ball that was left over from around [place].         Past Medical History   has a past medical history of ANJANA (acute kidney injury) ([facility] [address] Arrhythmia, Arthritis, BPH (benign prostatic hypertrophy), CAD (coronary artery disease), Cerebrovascular disease, Hyperlipidemia, Hypertension, Mitral regurgitation, PVC (premature ventricular contraction), Small bowel obstruction ([facility] [address] and TIA (transient ischemic attack). Past Surgical History   has a past surgical history that includes Coronary artery bypass graft (01/01/2009); Cataract removal (Right, 07/01/2015); hernia repair (N/A, 09/02/2020); and Small intestine surgery (09/02/2020). Medications  Prior to Admission medications    Medication Sig Start Date End Date Taking? Authorizing Provider   tamsulosin (FLOMAX) 0.4 MG capsule TAKE ONE CAPSULE BY MOUTH EVERY EVENING 12/1/21  Yes Josie Cardenas MD   lisinopril-hydroCHLOROthiazide (PRINZIDE;ZESTORETIC) 20-12.5 MG per tablet TAKE 1 TABLET BY MOUTH EVERY DAY 11/10/21  Yes Josie Cardenas MD   simvastatin (ZOCOR) 40 MG tablet TAKE 1 TABLET BY MOUTH EVERY NIGHT 11/10/21  Yes Josie Cardenas MD   carvedilol (COREG) 12.5 MG tablet TAKE 1 TABLET BY MOUTH TWICE DAILY 9/17/21  Yes Josie Cardenas MD   aspirin 81 MG EC tablet Take 81 mg by mouth daily. Yes Historical Provider, MD   multivitamin [facility]) per tablet Take 1 tablet by mouth daily.      Yes Historical Provider, MD   acetaminophen (TYLENOL) 325 MG tablet Take 650 mg by mouth every 6 hours as needed for Pain    Historical Provider, MD    Scheduled Meds:   sodium chloride flush  5-40 mL IntraVENous 2 times per day    enoxaparin  40 mg SubCUTAneous Daily    carvedilol  12.5 mg Oral BID WC    tamsulosin  0.4 mg Oral Daily     Continuous Infusions:   lactated ringers 100 mL/hr at 02/04/22 0542    sodium chloride       PRN Meds:.sodium chloride flush, sodium chloride, ondansetron **OR** ondansetron, polyethylene glycol, acetaminophen **OR** acetaminophen, potassium chloride **OR** potassium alternative oral replacement **OR** potassium chloride  Allergies  is allergic to pcn [penicillins], ibuprofen, and percocet [oxycodone-acetaminophen]. Family History  family history includes Cancer in his father; Other in his mother. Social History   reports that he quit smoking about 51 years ago. His smoking use included cigarettes. He has a 60.00 pack-year smoking history. He has never used smokeless tobacco. He reports current alcohol use. He reports that he does not use drugs. Review of Systems  General Denies any fever or chills  HEENT Denies any diplopia, tinnitus or vertigo  Resp Denies any shortness of breath, cough or wheezing  Cardiac Denies any chest pain, palpitations, claudication or edema  GI Positive for diarrhea, nausea and vomiting  Denies any melena, hematochezia, hematemesis or pyrosis   negative for dysuria and hematuria  Heme Denies bruising or bleeding easily  Endocrine Denies any history of diabetes or thyroid disease  Neuro Denies any focal motor or sensory deficits  SUBJECTIVE:   CURRENT VITALS:  height is 5' 7\" (1.702 m) and weight is 155 lb 13.8 oz (70.7 kg). His oral temperature is 98 °F (36.7 °C). His blood pressure is 158/74 (abnormal) and his pulse is 82. His respiration is 18 and oxygen saturation is 99%. Body mass index is 24.41 kg/m². Temperature Range (24h):Temp: 98 °F (36.7 °C) Temp  Av °F (36.7 °C)  Min: 97.9 °F (36.6 °C)  Max: 98 °F (36.7 °C)  BP Range (18B): Systolic (43DGV), OAY:956 , Min:125 , PMT:504     Diastolic (85EJI), MEV:13, Min:60, Max:84    Pulse Range (24h): Pulse  Av.5  Min: 67  Max: 82  Respiration Range (24h): Resp  Av  Min: 18  Max: 18  Current Pulse Ox (24h):  SpO2: 99 %  Pulse Ox Range (24h):  SpO2  Av.2 %  Min: 93 %  Max: 99 %  Oxygen Amount and Delivery:    CONSTITUTIONAL: Alert and oriented times 3, no acute distress and cooperative to examination with proper mood and affect.   SKIN: Skin color, texture, turgor normal. No rashes or lesions. LYMPH: no cervical nodes, no supraclavicular nodes  HEENT: Pupils equal round reactive, sclera anicteric. NECK: Supple no palpable adenopathy  CHEST/LUNGS: Clear no rhonchi or wheezing  CARDIOVASCULAR: Normal rate  ABDOMEN: Soft nontender nondistended, no hernia, no masses, bowel sounds active not high-pitched  RECTAL: Deferred  NEUROLOGIC: Alert oriented  EXTREMITIES: No cyanosis or edema  LABS:     Recent Labs     02/04/22  0155 02/04/22  0745   WBC 6.4 4.0*   HGB 14.9 13.4*   HCT 45.4 40.6*    182     --    K 4.6  --      --    CO2 16*  --    BUN 58*  --    CREATININE 3.0*  --    CALCIUM 9.1  --    AST 27  --    ALT 18  --    BILITOT 0.7  --    BILIDIR <0.2  --      RADIOLOGY:     CT ABDOMEN PELVIS WO CONTRAST Additional Contrast? None   Final Result   Findings concerning for mid partial small bowel obstruction. Dense colonic fecal retention. Splenomegaly. Gallstones and renal stones. Enlarged prostate. This document has been electronically signed by: Arturo Pickett MD on    02/04/2022 04:35 AM      All CTs at this facility use dose modulation techniques and iterative    reconstructions, and/or weight-based dosing   when appropriate to reduce radiation to a low as reasonably achievable. XR ACUTE ABD SERIES CHEST 1 VW   Final Result   No free air. Ileus vs small bowel obstruction. No acute thoracic disease. This document has been electronically signed by:  Arturo Pickett MD on    02/04/2022 03:02 AM            imaging reviewed    Electronically signed by Yovany Ba MD on 2/4/2022 at 8:08 AM

## 2022-02-04 NOTE — PROGRESS NOTES
Pt admitted to  [phone] in a wheelchair. Complaints: diarrhea, nausea, vomiting. IV lactated Ringer's infusing into the hand right, condition patent and no redness at a rate of 100 mls/ hour with about 1,000 mls in the bag still. IV site free of s/s of infection or infiltration. Vital signs obtained. Assessment and data collection initiated. Two nurse skin assessment performed by Keren Houser and Marya Martins. Oriented to room. Policies and procedures for [facility] explained. Jada ABDI discussed hourly rounding with patient addressing 5 P's. Fall prevention and safety brochure discussed with patient. Bed alarm on. Call light in reach. The best day to schedule a follow up Dr appointment is:  Tuesday p.m. Explained patients right to have family, representative or physician notified of their admission. Patient has N/A for physician to be notified. Patient has N/A for family/representative to be notified. All questions answered with no further questions at this time.

## 2022-02-04 NOTE — H&P
BUN and a creatinine of 1.3 to 58  and 3.0. Thus it was felt the patient had significant acute kidney  injury being present. His liver functions were noted to be stable. He  does have a history of small bowel obstruction and which he had  resection of the jejunum because of giant diverticulum being present,  causing him problems, this was done in 2020. His initial acute  abdominal series noted no free air suggestion of an ileus versus small  bowel obstruction and chest x-ray was noted to be normal.  Consequently,  CT scan of abdomen and pelvis was obtained, although the patient's  abdomen was fairly soft. This noted questionable mid partial small  bowel obstruction being present with some focal retention of stool being  present, although he is having large volumes of stool being noted. He  does have some splenomegaly, gallstones and renal stones both, and some  BPH, although he has not had any significant symptoms from both. Because of all the above, it was felt that the patient did require  admission for further evaluation and continued IV fluids. Because of  the concerns of prior bowel obstruction and surgery and now with  questionable some partial bowel obstruction, it was felt best to have  consultation with Surgery in addition. PAST MEDICAL HISTORY:  Notes medications at time of admission includes  lisinopril-hydrochlorothiazide 20-12.5 one a day, Zocor 40 mg a day,  Flomax 0.4 mg daily, Coreg 12.5 mg twice a day, aspirin 81 mg a day,  multivitamin, and Tylenol. ALLERGIES:  HE HAS PROBLEMS WITH PENICILLIN, IBUPROFEN, AND PERCOCET. PAST SURGICAL HISTORY:  Notes he has had cataract surgery remote in  2015, coronary artery bypass graft in 2009, hernia repair in 09/2020  then he had the small intestinal surgery with a jejunal resection  because of large giant diverticulum causing some obstruction symptoms  was in 09/2020 in addition.     ILLNESSES:  He does have a history of underlying PVCs that are isolated  generalized diffuse arthritis; coronary artery disease; he did have a  TIA to suggest some cerebrovascular disease, although he has had no  recent problems with this; hypertension; hyperlipidemia; he does have  some mild to moderate mitral regurgitation and mild aortic stenosis  changes being present; remote TIA in 2009, this was post surgery and had  resolved after his bypass surgery. SOCIAL HISTORY:  Notes that he is . Alcohol on occasion. His  smoking was only for 20 years, stopping back in 1970. FAMILY HISTORY:  Notes mother unsure cause of her death. Father had  some underlying cancer being present. REVIEW OF SYSTEMS:  CONSTITUTIONAL:  He has had increased amount of weakness being present  with all the vomiting and mostly diarrhea. He has had trouble with gait  and weakness being present. EYES:  Without any blurred vision. EARS, NOSE, THROAT:  Feel somewhat dry. BACK:  Without complaints of pain and discomfort noted. LUNGS:  He denies any cough. CARDIAC:  No chest pressure, discomfort or palpitation. No irregular  beats present. GI:  As stated still feels little bit queasy in fact when we entered the  room today, he had sips of water and had some slight emesis. He denies  belly pain or discomfort being present. He has had significant  diarrhea, although he has had two large stools since he has been  admitted at 02:00 a.m., although he was having this considerably more  frequently, stating he had bowel movements every half hour for the last  10 hours and that is what brought him in for further evaluation. He  denies any significant belly pain, discomfort. MUSCULOSKELETAL:  Muscular complaints of cramping of the legs noted. He  has had no falls. NEUROLOGIC:  No significant headaches, dizziness, lightheadedness, or  other symptoms noted.     PHYSICAL EXAMINATION:  VITAL SIGNS:  Temperature of 98.6, pulse 72, respiratory rate 16,  initial blood pressure 168/72, pulse ox 98% on room air. GENERAL:  Notes elderly male, in no apparent distress at this time. HEENT:  Head:  Atraumatic, normocephalic. Eyes:  PERRL with normal  sclerae noted. Ears:  Normal.  Nose:  Slight congestion. Throat:   Clear. NECK:  Supple without adenopathy, increased thyroid. LUNGS:  They are clear, slight decrease in the bases but otherwise  clear. CARDIAC:  Regular rate and rhythm. Normal S1, S2.  Grade 1/6 to 2/6  systolic murmur present. No carotid bruits. Peripheral pulse 2+. No  peripheral edema. ABDOMEN:  Soft. Positive bowel sounds. Nontender. No  hepatosplenomegaly. No masses. EXTREMITIES:  Full range of motion. No significant tenderness. Generalized weakness noted. NEUROLOGIC:  Alert and oriented x3. Cranial nerves II through XII are  intact. Reflex 2+. Motor and sensory intact. LABORATORY DATA:  As we stated yesterday upon admission BUN 58,  creatinine 3. This morning BUN 60, creatinine of 2.8. Sodium 138,  potassium 4.6, chloride 105, CO2 20, glucose 102. Liver panel was noted  to be normal.  White count initially was 6.4, now is 4000; hemoglobin of  13.4; hematocrit of 40.6; platelet count [phone],872; 84 segs; 3 lymphs; 6  monos; 5 eosinophils. COVID-19 testing obtained and noted to be  negative. CT scan of abdomen and pelvis, features of possible small  bowel obstruction, dense colonic fecal retention, some splenomegaly,  both kidney stones and gallstones nonobstructive, and some BPH. Chest  x-ray essentially clear. EKG noted some PACs, anterior infarct as noted  before on 08/2020 which is essentially unchanged. IMPRESSION:  1. Acute gastroenteritis, some nausea and mostly diarrhea. 2.  Acute kidney injury. 3.  Moderate dehydration. 4.  Atherosclerotic heart disease. 5.  Questionable small bowel obstruction present with prior bowel  surgery had been done. 6.  History of small bowel resection secondary to giant diverticulum of  the jejunum.     PLAN:  At this time because of questionable bowel obstruction on CT  scan, we will ask Dr. Reyes Lor to see, but he really has actually more of an  acute gastroenteritis. Major concern is of course with the kidney  function. Hold lisinopril. Watch blood pressure. Keep on telemetry. Slow IV hydration at this time, and we need to follow kidney function  for correction of this as this is the most concerning. Stool seemed to  be slowing down. GI panel was sent for the stools, although this may be  all strictly viral.  COVID testing however was noted to be negative. He  will need several days for appropriate IV hydration and correction of  his kidney function. At this time he is admitted to the floor for  further evaluation with telemetry being included in addition.         Chilango Rodriguez M.D.    D: 02/04/2022 16:07:25       T: 02/04/2022 16:15:29     [email or web address]  Job#: [id number]     Doc#: [id number]    CC:

## 2022-02-04 NOTE — CARE COORDINATION
2/4/22, 8:06 AM EST  DISCHARGE PLANNING EVALUATION:    Wood Rivera       Admitted: 2/4/2022/ [facility] day: 0   Location: -04/004-A Reason for admit: Dehydration [E86.0]  ANJANA (acute kidney injury) ([facility] [address] [N17.9]  Diarrhea, unspecified type [R19.7]   PMH:  has a past medical history of ANJANA (acute kidney injury) ([facility] [address] Arrhythmia, Arthritis, BPH (benign prostatic hypertrophy), CAD (coronary artery disease), Cerebrovascular disease, Hyperlipidemia, Hypertension, Mitral regurgitation, PVC (premature ventricular contraction), Small bowel obstruction ([facility] [address] and TIA (transient ischemic attack). Procedure: none  Barriers to Discharge:  Creat 3.0, CO2 16. LR infusion. Clear liquid diet. General surgery consult pending. PCP: Marek Soliz MD   %    Patient Goals/Plan/Treatment Preferences: Met with Abhi Richards, he is from home with his wife. He has a cane that he only uses prn. [facility] and does not feel they are needed at this time. He confirms PCP and insurance. Denies needs. Transportation/Food Security/Housekeeping Addressed:  No issues identified.

## 2022-02-04 NOTE — ED PROVIDER NOTES
[place] ENCOUNTER          Pt Name: Urmila Millan  MRN: [id number]  [place] 6/28/1933  Date of evaluation: 2/4/2022  Emergency Physician: Altaf Sun DO    CHIEF COMPLAINT       Chief Complaint   Patient presents with    Diarrhea     History obtained from the patient. HISTORY OF PRESENT ILLNESS    HPI  Urmila Millan is a 80 y.o. male who presents to the emergency department for evaluation of diarrhea. Patient states over the last 24 hours he has had 20+ episodes of loose watery stool. He states he ate a month old cheese ball leftover from the holidays. He states then started having diarrhea. He reports 2 episodes of emesis. Last episode was greater than 10 hours ago. States he has been able to keep liquids down since. He has been attempting to drink water mixed with valeri mist. He is concerned he may get dehydrated so came into the ED this evening. No recent antibiotics. History of previous abdominal surgeries inguinal hernia repair with small bowel obstruction and resection 2 years ago. The patient has no other acute complaints at this time. REVIEW OF SYSTEMS   Review of Systems   Constitutional: Negative for activity change, chills and fever. HENT: Negative for congestion, sinus pressure and sore throat. Eyes: Negative for photophobia, redness and visual disturbance. Respiratory: Negative for cough and chest tightness. Cardiovascular: Negative for chest pain, palpitations and leg swelling. Gastrointestinal: Positive for diarrhea, nausea and vomiting. Negative for abdominal distention and abdominal pain. Endocrine: Negative for polydipsia and polyuria. Genitourinary: Negative for decreased urine volume, difficulty urinating, dysuria, flank pain, frequency and urgency. Musculoskeletal: Negative for back pain, myalgias and neck pain. Skin: Negative for color change and rash.    Neurological: Negative for weakness and headaches. Hematological: Negative for adenopathy. Does not bruise/bleed easily. Psychiatric/Behavioral: Negative for confusion and self-injury. PAST MEDICAL AND SURGICAL HISTORY     Past Medical History:   Diagnosis Date    [facility]NA [facility]) ([facility] [address]     Arrhythmia 1/22/2019    Arthritis     BPH (benign prostatic hypertrophy)     CAD (coronary artery disease)     Cerebrovascular disease     Hyperlipidemia     Hypertension     Mitral regurgitation     PVC (premature ventricular contraction) 1/22/2019    Small bowel obstruction ([facility] [address] 8/30/2020    TIA (transient ischemic attack) 2009    post surgery resolved     Past Surgical History:   Procedure Laterality Date    CATARACT REMOVAL Right 07/01/2015    Dr Marga Eden GRAFT  01/01/2009    HERNIA REPAIR N/A 09/02/2020    ROBOTIC LEFT INGUINAL HERNIA REPAIR WITH MESH,ROBOTIC EXPLORATORY LAPAROSCOPY CONVERT TO OPEN, SMALL BOWEL RESECTION performed by Jaylen Samuels MD at [address] [place] [address]  09/02/2020         MEDICATIONS     Current Facility-Administered Medications:     lactated ringers bolus, 1,000 mL, IntraVENous, Once, Reginald Marin DO    ondansetron [facility] injection 4 mg, 4 mg, IntraVENous, Once, Reginald Marin DO    Current Outpatient Medications:     tamsulosin (FLOMAX) 0.4 MG capsule, TAKE ONE CAPSULE BY MOUTH EVERY EVENING, Disp: 90 capsule, Rfl: 1    lisinopril-hydroCHLOROthiazide (PRINZIDE;ZESTORETIC) 20-12.5 MG per tablet, TAKE 1 TABLET BY MOUTH EVERY DAY, Disp: 90 tablet, Rfl: 1    simvastatin (ZOCOR) 40 MG tablet, TAKE 1 TABLET BY MOUTH EVERY NIGHT, Disp: 90 tablet, Rfl: 1    carvedilol (COREG) 12.5 MG tablet, TAKE 1 TABLET BY MOUTH TWICE DAILY, Disp: 180 tablet, Rfl: 1    acetaminophen (TYLENOL) 325 MG tablet, Take 650 mg by mouth every 6 hours as needed for Pain, Disp: , Rfl:     aspirin 81 MG EC tablet, Take 81 mg by mouth daily.   , Disp: , Rfl:     multivitamin (Cristian Kava) per tablet, Take 1 tablet by mouth daily. , Disp: , Rfl:       SOCIAL HISTORY     Social History     Social History Narrative    Not on file     Social History     Tobacco Use    Smoking status: Former Smoker     Packs/day: 3.00     Years: 20.00     Pack years: 60.00     Types: Cigarettes     Quit date: 1970     Years since quittin.5    Smokeless tobacco: Never Used   Substance Use Topics    Alcohol use: Yes     Comment: occas beer    Drug use: No         ALLERGIES     Allergies   Allergen Reactions    Pcn [Penicillins] Anaphylaxis    Ibuprofen     Percocet [Oxycodone-Acetaminophen] Nausea And Vomiting         FAMILY HISTORY     Family History   Problem Relation Age of Onset    Other Mother     Cancer Father          PHYSICAL EXAM     ED Triage Vitals [22]   BP Temp Temp src Pulse Resp SpO2 Height Weight   136/84 -- -- -- 18 -- -- --         Additional Vital Signs:  Vitals:    22   BP: 136/84   Resp: 18       Physical Exam  Vitals and nursing note reviewed. Constitutional:       General: He is not in acute distress. Appearance: He is well-developed. He is not diaphoretic. HENT:      Head: Normocephalic. Mouth/Throat:      Mouth: Mucous membranes are dry. Eyes:      Pupils: Pupils are equal, round, and reactive to light. Neck:      Vascular: No JVD. Cardiovascular:      Rate and Rhythm: Normal rate and regular rhythm. Heart sounds: Normal heart sounds. Pulmonary:      Effort: Pulmonary effort is normal.      Breath sounds: Normal breath sounds. Abdominal:      General: Bowel sounds are normal. There is no distension. Palpations: Abdomen is soft. Tenderness: There is no abdominal tenderness. Musculoskeletal:         General: No tenderness or deformity. Normal range of motion. Cervical back: Normal range of motion and neck supple. Skin:     General: Skin is warm and dry.       Capillary Refill: Capillary refill takes less than 2 seconds. Neurological:      Mental Status: He is alert and oriented to person, place, and time. Comments: Non-focal. Moves all extremities. Initial vital signs and nursing assessment reviewed and normal.   Pulsoximetry is normal per my interpretation. MEDICAL DECISION MAKING   Initial Assessment: Given the patient's above chief complaint and findings on history and physical examination, I thought it was appropriate to consider the following emergency medical conditions: Dehydration, electrolyte abnormality, enteritis, gastroenteritis, bowel obstruction, ANJANA although some of these diagnoses are unlikely they were considered in my medical decision making. Plan: CBC, BMP, ECG, hepatic function, symptomatic treatment with IV hydration, antiemetics and reassess         ED RESULTS   Laboratory results:  Labs Reviewed   COVID-19, RAPID   CBC WITH AUTO DIFFERENTIAL   BASIC METABOLIC PANEL W/ REFLEX TO MG FOR LOW K   HEPATIC FUNCTION PANEL   URINE RT REFLEX TO CULTURE       Radiologic studies results:  XR ACUTE ABD SERIES CHEST 1 VW    (Results Pending)       ED Medications administered this visit:   Medications   lactated ringers bolus (has no administration in time range)   ondansetron (ZOFRAN) injection 4 mg (has no administration in time range)         ED COURSE     ED Course as of 02/04/22 1428   Fri Feb 04, 2022   0301 Creatinine(!!): 3.0  Prior Cr 1.3. plan to continue IV hydration. Bladder scan ordered. [DD]   9353 Gastrointestinal Panel, Molecular [DD]   6976 Clostridium Difficile Toxin/Antigen  pending [DD]   0318 XR ACUTE ABD SERIES CHEST 1 VW  Follow-up CT scan ordered  [DD]   3380 CT ABDOMEN PELVIS WO CONTRAST Additional Contrast? None  Patient admitted to Medicine, Dr. Pennie Sims. Questionable partial obstruction. No abdominal distension and diarrhea. Likely enteritis, No high grade obstruction.   [DD]      ED Course User Index  [DD] Zulema Castro, DO         The diagnosis, extensive differential diagnosis, laboratory and imaging findings were discussed at the bedside. The patient was an active participant in their care. They are agreeable to the plan of care. All questions and concerns were addressed at the time of the encounter. MEDICATION CHANGES     DISCHARGE MEDICATIONS:  New Prescriptions    No medications on file            FINAL DISPOSITION     Final diagnoses:   Diarrhea, unspecified type   Dehydration   ANJANA (acute kidney injury) ([facility] [address]     Condition: condition: good  Dispo: Admit to telemetry    PATIENT REFERRED TO:  No follow-up provider specified. Critical Care Time   None    This transcription was electronically signed. Parts of this transcriptions may have been dictated by use of voice recognition software and electronically transcribed, and parts may have been transcribed with the assistance of an ED scribe. The transcription may contain errors not detected in proofreading.     Electronically Signed: Butch Henderson DO, 02/04/22, 2:01 AM      Butch Henderson DO  02/04/22 8858

## 2022-02-04 NOTE — FLOWSHEET NOTE
Pt was in bed and alone at the time of the visit. He was dealing with acute kidney injury but was hopeful. He was anointed. 02/04/22 1640   Encounter Summary   Services provided to: Patient   Referral/Consult From: [facility] Family members   Place of [address] [place] [address] Visiting Yes  (2/4 )   Complexity of Encounter Low   Length of Encounter 15 minutes   Routine   Type Initial   Assessment Approachable;Calm   Intervention [place];Prayer;Nurtured hope   Outcome Acceptance;Expressed gratitude;Encouraged; Hopeful;Receptive   Sacraments   Sacrament of Sick-Anointing Anointed

## 2022-02-05 PROBLEM — E86.0 DEHYDRATION: Status: ACTIVE | Noted: 2022-02-05

## 2022-02-05 PROBLEM — A08.11 GASTROENTERITIS DUE TO NOROVIRUS: Status: ACTIVE | Noted: 2022-02-05

## 2022-02-05 LAB
ANION GAP SERPL CALCULATED.3IONS-SCNC: 13 MEQ/L (ref 8–16)
BASOPHILS # BLD: 0.2 %
BASOPHILS ABSOLUTE: 0 THOU/MM3 (ref 0–0.1)
BUN BLDV-MCNC: 61 MG/DL (ref 7–22)
CALCIUM SERPL-MCNC: 8.3 MG/DL (ref 8.5–10.5)
CHLORIDE BLD-SCNC: 107 MEQ/L (ref 98–111)
CO2: 19 MEQ/L (ref 23–33)
CREAT SERPL-MCNC: 2.4 MG/DL (ref 0.4–1.2)
EKG ATRIAL RATE: 80 BPM
EKG P AXIS: 47 DEGREES
EKG P-R INTERVAL: 186 MS
EKG Q-T INTERVAL: 372 MS
EKG QRS DURATION: 90 MS
EKG QTC CALCULATION (BAZETT): 429 MS
EKG R AXIS: 58 DEGREES
EKG T AXIS: 48 DEGREES
EKG VENTRICULAR RATE: 80 BPM
EOSINOPHIL # BLD: 0.9 %
EOSINOPHILS ABSOLUTE: 0 THOU/MM3 (ref 0–0.4)
ERYTHROCYTE [DISTWIDTH] IN BLOOD BY AUTOMATED COUNT: 13.3 % (ref 11.5–14.5)
ERYTHROCYTE [DISTWIDTH] IN BLOOD BY AUTOMATED COUNT: 45.6 FL (ref 35–45)
GFR SERPL CREATININE-BSD FRML MDRD: 26 ML/MIN/1.73M2
GLUCOSE BLD-MCNC: 114 MG/DL (ref 70–108)
HCT VFR BLD CALC: 39.7 % (ref 42–52)
HEMOGLOBIN: 13 GM/DL (ref 14–18)
IMMATURE GRANS (ABS): 0.02 THOU/MM3 (ref 0–0.07)
IMMATURE GRANULOCYTES: 0.4 %
LYMPHOCYTES # BLD: 8.2 %
LYMPHOCYTES ABSOLUTE: 0.4 THOU/MM3 (ref 1–4.8)
MCH RBC QN AUTO: 30.4 PG (ref 26–33)
MCHC RBC AUTO-ENTMCNC: 32.7 GM/DL (ref 32.2–35.5)
MCV RBC AUTO: 93 FL (ref 80–94)
MONOCYTES # BLD: 6.5 %
MONOCYTES ABSOLUTE: 0.3 THOU/MM3 (ref 0.4–1.3)
NUCLEATED RED BLOOD CELLS: 0 /100 WBC
PLATELET # BLD: 187 THOU/MM3 (ref 130–400)
PMV BLD AUTO: 10.3 FL (ref 9.4–12.4)
POTASSIUM REFLEX MAGNESIUM: 4.8 MEQ/L (ref 3.5–5.2)
RBC # BLD: 4.27 MILL/MM3 (ref 4.7–6.1)
SEG NEUTROPHILS: 83.8 %
SEGMENTED NEUTROPHILS ABSOLUTE COUNT: 3.9 THOU/MM3 (ref 1.8–7.7)
SODIUM BLD-SCNC: 139 MEQ/L (ref 135–145)
WBC # BLD: 4.6 THOU/MM3 (ref 4.8–10.8)

## 2022-02-05 PROCEDURE — 36415 COLL VENOUS BLD VENIPUNCTURE: CPT

## 2022-02-05 PROCEDURE — 85025 COMPLETE CBC W/AUTO DIFF WBC: CPT

## 2022-02-05 PROCEDURE — 1200000003 HC TELEMETRY R&B

## 2022-02-05 PROCEDURE — 80048 BASIC METABOLIC PNL TOTAL CA: CPT

## 2022-02-05 PROCEDURE — 99232 SBSQ HOSP IP/OBS MODERATE 35: CPT | Performed by: SURGERY

## 2022-02-05 PROCEDURE — 6370000000 HC RX 637 (ALT 250 FOR IP): Performed by: FAMILY MEDICINE

## 2022-02-05 PROCEDURE — 2700000000 HC OXYGEN THERAPY PER DAY

## 2022-02-05 RX ADMIN — CARVEDILOL 12.5 MG: 6.25 TABLET, FILM COATED ORAL at 07:56

## 2022-02-05 RX ADMIN — TAMSULOSIN HYDROCHLORIDE 0.4 MG: 0.4 CAPSULE ORAL at 07:56

## 2022-02-05 RX ADMIN — CARVEDILOL 12.5 MG: 6.25 TABLET, FILM COATED ORAL at 17:35

## 2022-02-05 RX ADMIN — ACETAMINOPHEN 650 MG: 325 TABLET ORAL at 17:38

## 2022-02-05 ASSESSMENT — PAIN SCALES - GENERAL
PAINLEVEL_OUTOF10: 0
PAINLEVEL_OUTOF10: 3
PAINLEVEL_OUTOF10: 0

## 2022-02-05 NOTE — PROGRESS NOTES
[address] [address]  General Surgery   Nilo Daniel MD M.D. FACS covering for Dr Og Mar  Daily Progress Note    Pt Name: Torie Crowder Record Number: [id number]  Date of Birth 6/28/1933   Today's Date: 2/5/2022    ASSESSMENT:     1. HD # 1  Active Hospital Problems    Diagnosis Date Noted    ANJANA (acute kidney injury) ([facility] [address] [N17.9] 02/04/2022   2. Chief Complaint:  Chief Complaint   Patient presents with    Diarrhea           PLAN:     1. Still with diarrhea, very little po and had emesis after taking  2. Does not fit picture of SBO  3. WBC low suggestive viral?  4. No adb pain now or since it started   5. Gen surg signing off, reconsult if needed thanks! SUBJECTIVE:     Rudean David is unchanged. No abd pain. Still loose stools, hasnt taken much po       OBJECTIVE:     Patient Vitals for the past 24 hrs:   BP Temp Temp src Pulse Resp SpO2 Weight   02/05/22 0628     16 97 %    02/05/22 0405 133/60 98.8 °F (37.1 °C) Oral 79 18 96 % 156 lb 8.4 oz (71 kg)   02/04/22 2336 (!) 161/73 98 °F (36.7 °C) Oral 81 18 95 %    02/04/22 1951 (!) 154/68 98.2 °F (36.8 °C) Oral 72 18 97 %    02/04/22 1547 128/62 98.5 °F (36.9 °C) Oral 56 17 96 %    02/04/22 1131 125/60 98.3 °F (36.8 °C) Oral 54 17 96 %    02/04/22 0815 (!) 168/72 98.6 °F (37 °C) Oral 72 16 98 %          Intake/Output Summary (Last 24 hours) at 2/5/2022 0646  Last data filed at 2/5/2022 0601  Gross per 24 hour   Intake 4423.31 ml   Output 0 ml   Net 4423.31 ml       In: 4423.3 [P.O.:240; I.V.:4183.3]  Out: 0     I/O last 3 completed shifts: In: 977 [P.O.:120; I.V.:857]  Out: -      Date 02/05/22 0000 - 02/05/22 2359   Shift 8847-2729 3860-0437 2343-5239 24 Hour Total   INTAKE   P.O.(mL/kg/hr) 120   120   I. V.(mL/kg) 3326. 3(46.8)   3326. 3(46.8)   Shift Total(mL/kg) 3446. 3(48.5)   3446. 3(48.5)   OUTPUT   Urine(mL/kg/hr) 0   0   Emesis/NG output(mL/kg) 0(0)   0(0)   Other(mL/kg) 0(0)   0(0)   Stool(mL/kg) 0(0)   0(0)   Blood(mL/kg) 0(0) 0(0)   Shift Total(mL/kg) 0(0)   0(0)   Weight (kg) 71 71 71 71       Wt Readings from Last 3 Encounters:   02/05/22 156 lb 8.4 oz (71 kg)   08/25/21 145 lb 8 oz (66 kg)   09/24/20 142 lb (64.4 kg)        Body mass index is 24.52 kg/m². Diet: ADULT DIET; Clear Liquid        MEDS:     Scheduled Meds:   sodium chloride flush  5-40 mL IntraVENous 2 times per day    enoxaparin  30 mg SubCUTAneous Daily    carvedilol  12.5 mg Oral BID WC    tamsulosin  0.4 mg Oral Daily     Continuous Infusions:   lactated ringers 100 mL/hr at 02/05/22 0601    sodium chloride       PRN Meds:sodium chloride flush, 5-40 mL, PRN  sodium chloride, 25 mL, PRN  ondansetron, 4 mg, Q8H PRN   Or  ondansetron, 4 mg, Q6H PRN  polyethylene glycol, 17 g, Daily PRN  acetaminophen, 650 mg, Q6H PRN   Or  acetaminophen, 650 mg, Q6H PRN  potassium chloride, 40 mEq, PRN   Or  potassium alternative oral replacement, 40 mEq, PRN   Or  potassium chloride, 10 mEq, PRN          PHYSICAL EXAM:     CONSTITUTIONAL: Alert and oriented times 3, no acute distress and cooperative to examination. ABDOMEN: Non distended, non tender, hypo BS currently        LABS:     CBC:   Recent Labs     02/04/22 0155 02/04/22  0745 02/05/22  0344   WBC 6.4 4.0* 4.6*   RBC 4.85 4.34* 4.27*   HGB 14.9 13.4* 13.0*   HCT 45.4 40.6* 39.7*   MCV 93.6 93.5 93.0   MCH 30.7 30.9 30.4   MCHC 32.8 33.0 32.7    182 187   MPV 10.3 10.1 10.3      Last 3 CMP:   Recent Labs     02/04/22 0155 02/04/22  0745 02/05/22  0344    138 139   K 4.6 4.6 4.8    105 107   CO2 16* 20* 19*   BUN 58* 60* 61*   CREATININE 3.0* 2.8* 2.4*   GLUCOSE 160* 102 114*   CALCIUM 9.1 8.5 8.3*   PROT 7.9  --   --    LABALBU 4.4  --   --    BILITOT 0.7  --   --    ALKPHOS 80  --   --    AST 27  --   --    ALT 18  --   --       Troponin: No results for input(s): TROPONINI in the last 72 hours.   Calcium:   Lab Results   Component Value Date    CALCIUM 8.3 02/05/2022    CALCIUM 8.5 02/04/2022 CALCIUM 9.1 02/04/2022      Ionized Calcium: No results found for: IONCA   Lipids: No results for input(s): CHOL, HDL in the last 72 hours. Invalid input(s): LDLCALCU  INR: No results for input(s): INR in the last 72 hours. Lactic Acid: No results found for: LACTA               DVT prophylaxis: [] Lovenox                                 [] SCDs                                 [] SQ Heparin                                 [] Encourage ambulation, low risk for DVT, no chemical or mechanical prophylaxis necessary              [] Already on Anticoagulation      RADIOLOGY:      CT ABDOMEN PELVIS WO CONTRAST Additional Contrast? None    Result Date: 2/4/2022  Findings concerning for mid partial small bowel obstruction. Dense colonic fecal retention. Splenomegaly. Gallstones and renal stones. Enlarged prostate. This document has been electronically signed by: Carmel Caballero MD on 02/04/2022 04:35 AM All CTs at this facility use dose modulation techniques and iterative reconstructions, and/or weight-based dosing when appropriate to reduce radiation to a low as reasonably achievable. XR ACUTE ABD SERIES CHEST 1 VW    Result Date: 2/4/2022  No free air. Ileus vs small bowel obstruction. No acute thoracic disease. This document has been electronically signed by: Carmel Caballero MD on 02/04/2022 03:02 AM         Tameka Garcia MD, MZaidaD.  FACS  Electronically signed 2/5/2022 at 6:46 AM

## 2022-02-05 NOTE — PROGRESS NOTES
Family Medicine Progress Notes/Coverage    Today's Date: 2/5/22  [id number]  Medical Record # [id number]  CSN/Account # [de-identified]      Mr. Charlee Jimenes admitted on 2/4/2022        Subjective / Interval History :     No vomiting, + diarrhea x 3 overnight  No appetite, no abdominal pain  Reviewed notes, consults, laboratory and radiology results,    Objective:       Physical Exam:  Patient Vitals for the past 24 hrs:   BP Temp Temp src Pulse Resp SpO2 Weight   02/05/22 0747 121/71 98.1 °F (36.7 °C)  79 16 96 %    02/05/22 0628     16 97 %    02/05/22 0405 133/60 98.8 °F (37.1 °C) Oral 79 18 96 % 156 lb 8.4 oz (71 kg)   02/04/22 2336 (!) 161/73 98 °F (36.7 °C) Oral 81 18 95 %    02/04/22 1951 (!) 154/68 98.2 °F (36.8 °C) Oral 72 18 97 %    02/04/22 1547 128/62 98.5 °F (36.9 °C) Oral 56 17 96 %    02/04/22 1131 125/60 98.3 °F (36.8 °C) Oral 54 17 96 %        General Appearance:  Alert, cooperative, no distress, appears stated age   HEENT:  Neck:      Chest/Lungs:  Heart: CTA  RRR   Abdomen:  Back: Soft flat non tender   Extremities:  Neurological Exam: No edema  WNL       Assessment:     Admitting Diagnosis:    Dehydration [E86.0]  ANJANA (acute kidney injury) ([facility] [address] [N17.9]  Diarrhea, unspecified type [R19.7]    Active Hospital Problems    Diagnosis Date Noted    Gastroenteritis due to norovirus [A08.11] 02/05/2022     Priority: High    Dehydration [E86.0] 02/05/2022     Priority: High    ANJANA (acute kidney injury) ([address] [N17.9] 02/04/2022     Priority: High    Nausea vomiting and diarrhea [R11.2, R19.7]      Priority: High    CAD (coronary artery disease) [I25.10]      Priority: Medium    Hyperlipidemia [E78.5]      Priority: Medium       Plan:     Discussed plans with the nursing staff  Advance diet  Continue hydration  Monitor labs    Medications, Laboratories and Imaging results:    Scheduled Meds:   sodium chloride flush  5-40 mL IntraVENous 2 times per day    enoxaparin  30 mg SubCUTAneous Daily    carvedilol  12.5 mg Oral BID WC    tamsulosin  0.4 mg Oral Daily     Continuous Infusions:   lactated ringers 100 mL/hr at 02/05/22 0601    sodium chloride       PRN Meds:sodium chloride flush, sodium chloride, ondansetron **OR** ondansetron, polyethylene glycol, acetaminophen **OR** acetaminophen, potassium chloride **OR** potassium alternative oral replacement **OR** potassium chloride    Imaging:    Lab Review :    Lab Results   Component Value Date    WBC 4.6 (L) 02/05/2022    HGB 13.0 (L) 02/05/2022    HCT 39.7 (L) 02/05/2022    MCV 93.0 02/05/2022     02/05/2022     Lab Results   Component Value Date    CREATININE 2.4 (H) 02/05/2022    BUN 61 (H) 02/05/2022     02/05/2022    K 4.8 02/05/2022     02/05/2022    CO2 19 (L) 02/05/2022     No results found for: CKTOTAL, CKMB, CKMBINDEX, TROPONINI  Lab Results   Component Value Date    ALT 18 02/04/2022    AST 27 02/04/2022    ALKPHOS 80 02/04/2022    BILITOT 0.7 02/04/2022     Lab Results   Component Value Date    LIPASE 23.1 08/30/2020         Electronically signed by Ramsey Montiel MD on 2/5/22 at 10:21 AM VALENTINA Browning M.D.

## 2022-02-05 NOTE — PLAN OF CARE
Problem: Falls - Risk of:  Goal: Will remain free from falls  Description: Will remain free from falls  Outcome: Ongoing  Note: No falls noted this shift. Continue falling star program. Bed alarm on, bed in low position. Call light and personal belongings in reach. Patient uses call light appropriately. Goal: Absence of physical injury  Description: Absence of physical injury  Outcome: Ongoing     Problem: Bowel/Gastric:  Goal: Control of bowel function will improve  Description: Control of bowel function will improve  Outcome: Ongoing  Note: Patient still very nauseated with episodes of vomiting and diarrhea noted throughout the shift. PRN medications given as available and hydration therapy maintained.    Goal: Ability to achieve a regular elimination pattern will improve  Description: Ability to achieve a regular elimination pattern will improve  Outcome: Ongoing

## 2022-02-06 LAB
ANION GAP SERPL CALCULATED.3IONS-SCNC: 12 MEQ/L (ref 8–16)
BUN BLDV-MCNC: 45 MG/DL (ref 7–22)
CALCIUM SERPL-MCNC: 8.1 MG/DL (ref 8.5–10.5)
CHLORIDE BLD-SCNC: 108 MEQ/L (ref 98–111)
CO2: 20 MEQ/L (ref 23–33)
CREAT SERPL-MCNC: 1.5 MG/DL (ref 0.4–1.2)
GFR SERPL CREATININE-BSD FRML MDRD: 44 ML/MIN/1.73M2
GLUCOSE BLD-MCNC: 101 MG/DL (ref 70–108)
POTASSIUM SERPL-SCNC: 4.1 MEQ/L (ref 3.5–5.2)
SODIUM BLD-SCNC: 140 MEQ/L (ref 135–145)

## 2022-02-06 PROCEDURE — 2580000003 HC RX 258: Performed by: EMERGENCY MEDICINE

## 2022-02-06 PROCEDURE — 6360000002 HC RX W HCPCS: Performed by: FAMILY MEDICINE

## 2022-02-06 PROCEDURE — 1200000003 HC TELEMETRY R&B

## 2022-02-06 PROCEDURE — 80048 BASIC METABOLIC PNL TOTAL CA: CPT

## 2022-02-06 PROCEDURE — 6370000000 HC RX 637 (ALT 250 FOR IP): Performed by: FAMILY MEDICINE

## 2022-02-06 PROCEDURE — 36415 COLL VENOUS BLD VENIPUNCTURE: CPT

## 2022-02-06 PROCEDURE — 6370000000 HC RX 637 (ALT 250 FOR IP): Performed by: EMERGENCY MEDICINE

## 2022-02-06 RX ORDER — FAMOTIDINE 20 MG/1
20 TABLET, FILM COATED ORAL DAILY
Status: DISCONTINUED | OUTPATIENT
Start: 2022-02-06 | End: 2022-02-07 | Stop reason: HOSPADM

## 2022-02-06 RX ADMIN — CARVEDILOL 12.5 MG: 6.25 TABLET, FILM COATED ORAL at 17:13

## 2022-02-06 RX ADMIN — FAMOTIDINE 20 MG: 20 TABLET ORAL at 10:52

## 2022-02-06 RX ADMIN — ENOXAPARIN SODIUM 30 MG: 100 INJECTION SUBCUTANEOUS at 08:30

## 2022-02-06 RX ADMIN — SODIUM CHLORIDE, POTASSIUM CHLORIDE, SODIUM LACTATE AND CALCIUM CHLORIDE: 600; 310; 30; 20 INJECTION, SOLUTION INTRAVENOUS at 07:21

## 2022-02-06 RX ADMIN — CARVEDILOL 12.5 MG: 6.25 TABLET, FILM COATED ORAL at 08:28

## 2022-02-06 RX ADMIN — SODIUM CHLORIDE, POTASSIUM CHLORIDE, SODIUM LACTATE AND CALCIUM CHLORIDE: 600; 310; 30; 20 INJECTION, SOLUTION INTRAVENOUS at 15:24

## 2022-02-06 RX ADMIN — TAMSULOSIN HYDROCHLORIDE 0.4 MG: 0.4 CAPSULE ORAL at 08:28

## 2022-02-06 ASSESSMENT — PAIN SCALES - GENERAL: PAINLEVEL_OUTOF10: 0

## 2022-02-06 NOTE — PROGRESS NOTES
Laboratories and Imaging results:    Scheduled Meds:   famotidine  20 mg Oral BID    sodium chloride flush  5-40 mL IntraVENous 2 times per day    enoxaparin  30 mg SubCUTAneous Daily    carvedilol  12.5 mg Oral BID WC    tamsulosin  0.4 mg Oral Daily     Continuous Infusions:   lactated ringers 100 mL/hr at 02/06/22 0721    sodium chloride       PRN Meds:sodium chloride flush, sodium chloride, ondansetron **OR** ondansetron, polyethylene glycol, acetaminophen **OR** acetaminophen, potassium chloride **OR** potassium alternative oral replacement **OR** potassium chloride    Imaging:    Lab Review :    Lab Results   Component Value Date    WBC 4.6 (L) 02/05/2022    HGB 13.0 (L) 02/05/2022    HCT 39.7 (L) 02/05/2022    MCV 93.0 02/05/2022     02/05/2022     Lab Results   Component Value Date    CREATININE 1.5 (H) 02/06/2022    BUN 45 (H) 02/06/2022     02/06/2022    K 4.1 02/06/2022     02/06/2022    CO2 20 (L) 02/06/2022     No results found for: CKTOTAL, CKMB, CKMBINDEX, TROPONINI  Lab Results   Component Value Date    ALT 18 02/04/2022    AST 27 02/04/2022    ALKPHOS 80 02/04/2022    BILITOT 0.7 02/04/2022     Lab Results   Component Value Date    LIPASE 23.1 08/30/2020         Electronically signed by Kelley Vicente MD on 2/6/22 at 9:49 AM VALENTINA Salguero M.D.

## 2022-02-06 NOTE — PLAN OF CARE
Problem: Falls - Risk of:  Goal: Will remain free from falls  Description: Will remain free from falls  2/5/2022 2246 by Lubna Gustafson RN  Outcome: Ongoing  Note: No falls noted this shift. Continue falling star program. Bed alarm on, bed in low position. Call light and personal belongings in reach. Patient uses call light appropriately. Goal: Absence of physical injury  Description: Absence of physical injury  2/5/2022 2246 by Lubna Gustafson RN  Outcome: Ongoing    Problem: Bowel/Gastric:  Goal: Control of bowel function will improve  Description: Control of bowel function will improve  2/5/2022 2246 by Lubna Gustafson RN  Outcome: Ongoing  Note: Patient denies any nausea/vomiting or diarrhea at this time. No bowel movements noted this shift but he is passing gas appropriately. Patients appetite is still decreased. Goal: Ability to achieve a regular elimination pattern will improve  Description: Ability to achieve a regular elimination pattern will improve  2/5/2022 2246 by Lubna Gustafson RN  Outcome: Ongoing       Problem: Skin Integrity:  Goal: Will show no infection signs and symptoms  Description: Will show no infection signs and symptoms  2/5/2022 2246 by Lubna Gustafson RN  Outcome: Ongoing    Goal: Absence of new skin breakdown  Description: Absence of new skin breakdown  2/5/2022 2246 by Lubna Gustafson RN  Outcome: Ongoing  Note: No new breakdown noted.

## 2022-02-07 VITALS
DIASTOLIC BLOOD PRESSURE: 71 MMHG | WEIGHT: 139.99 LBS | OXYGEN SATURATION: 97 % | TEMPERATURE: 97.2 F | SYSTOLIC BLOOD PRESSURE: 151 MMHG | RESPIRATION RATE: 18 BRPM | HEIGHT: 67 IN | BODY MASS INDEX: 21.97 KG/M2 | HEART RATE: 67 BPM

## 2022-02-07 LAB
ANION GAP SERPL CALCULATED.3IONS-SCNC: 12 MEQ/L (ref 8–16)
BUN BLDV-MCNC: 31 MG/DL (ref 7–22)
CALCIUM SERPL-MCNC: 8.1 MG/DL (ref 8.5–10.5)
CHLORIDE BLD-SCNC: 108 MEQ/L (ref 98–111)
CO2: 20 MEQ/L (ref 23–33)
CREAT SERPL-MCNC: 1.3 MG/DL (ref 0.4–1.2)
GFR SERPL CREATININE-BSD FRML MDRD: 52 ML/MIN/1.73M2
GLUCOSE BLD-MCNC: 98 MG/DL (ref 70–108)
POTASSIUM SERPL-SCNC: 3.7 MEQ/L (ref 3.5–5.2)
SODIUM BLD-SCNC: 140 MEQ/L (ref 135–145)

## 2022-02-07 PROCEDURE — 80048 BASIC METABOLIC PNL TOTAL CA: CPT

## 2022-02-07 PROCEDURE — 6370000000 HC RX 637 (ALT 250 FOR IP): Performed by: FAMILY MEDICINE

## 2022-02-07 PROCEDURE — 6370000000 HC RX 637 (ALT 250 FOR IP): Performed by: EMERGENCY MEDICINE

## 2022-02-07 PROCEDURE — 36415 COLL VENOUS BLD VENIPUNCTURE: CPT

## 2022-02-07 RX ORDER — FAMOTIDINE 40 MG/1
40 TABLET, FILM COATED ORAL EVERY EVENING
Qty: 30 TABLET | Refills: 3 | Status: SHIPPED | OUTPATIENT
Start: 2022-02-07 | End: 2022-02-08

## 2022-02-07 RX ORDER — LISINOPRIL 10 MG/1
10 TABLET ORAL DAILY
Status: DISCONTINUED | OUTPATIENT
Start: 2022-02-07 | End: 2022-02-07 | Stop reason: HOSPADM

## 2022-02-07 RX ADMIN — TAMSULOSIN HYDROCHLORIDE 0.4 MG: 0.4 CAPSULE ORAL at 08:51

## 2022-02-07 RX ADMIN — LISINOPRIL 10 MG: 10 TABLET ORAL at 09:06

## 2022-02-07 RX ADMIN — CARVEDILOL 12.5 MG: 6.25 TABLET, FILM COATED ORAL at 08:51

## 2022-02-07 RX ADMIN — FAMOTIDINE 20 MG: 20 TABLET ORAL at 08:51

## 2022-02-07 NOTE — PLAN OF CARE
Problem: Falls - Risk of:  Goal: Will remain free from falls  Description: Will remain free from falls  2/6/2022 2231 by Mitch Lobato RN  Outcome: Ongoing  Note: No falls noted this shift. Continue falling star program. Bed alarm on, bed in low position. Call light and personal belongings in reach. Patient uses call light appropriately. Problem: Bowel/Gastric:  Goal: Control of bowel function will improve  Description: Control of bowel function will improve  2/6/2022 2231 by Mitch Lobato RN  Outcome: Ongoing  Note: No diarrhea noted this shift. Patient denies any nausea or vomiting. .      Problem: Bowel/Gastric:  Goal: Ability to achieve a regular elimination pattern will improve  Description: Ability to achieve a regular elimination pattern will improve  2/6/2022 2231 by Mitch Lobato RN  Outcome: Ongoing     Problem: Skin Integrity:  Goal: Will show no infection signs and symptoms  Description: Will show no infection signs and symptoms  2/6/2022 2231 by Mitch Lobato RN  Outcome: Ongoing     Problem: Skin Integrity:  Goal: Absence of new skin breakdown  Description: Absence of new skin breakdown  2/6/2022 2231 by Mitch Lobato RN  Outcome: Ongoing  Note: No new signs or symptoms of skin breakdown noted this shift, encouraging patient to turn and reposition self in bed every two hours       Problem: Pain:  Goal: Control of acute pain  Description: Control of acute pain  Outcome: Ongoing  Note: No complaint of pain voiced at this time. Continue to monitor. PRN medications available if needed.         Problem: Pain:  Goal: Control of chronic pain  Description: Control of chronic pain  Outcome: Ongoing

## 2022-02-07 NOTE — PROGRESS NOTES
Discharge paperwork signed and discussed with patient. He verbalized understanding. This nurse walked patient to the car with daughter. No acute distress noted at this time.

## 2022-02-07 NOTE — CARE COORDINATION
2/7/22, 10:45 AM EST    Planning discharge today. Met with Glory Chen and his family member, he continues to deny needs for DME, HH, or OP services. His family member confirms that she lives \"in walking distance\" to Glory Chen and his wife and will be checking in on them . Patient goals/plan/ treatment preferences discussed by  and . Patient goals/plan/ treatment preferences reviewed with patient/ family. Patient/ family verbalize understanding of discharge plan and are in agreement with goal/plan/treatment preferences. Understanding was demonstrated using the teach back method. AVS provided by RN at time of discharge, which includes all necessary medical information pertaining to the patients current course of illness, treatment, post-discharge goals of care, and treatment preferences.         IMM Letter  IMM Letter given to Patient/Family/Significant other/Guardian/POA/by[de-identified] Emily Webster CM  IMM Letter date given[de-identified] 02/07/22  IMM Letter time given[de-identified] 8400

## 2022-02-08 ENCOUNTER — CARE COORDINATION (OUTPATIENT)
Dept: CASE MANAGEMENT | Age: 87
End: 2022-02-08

## 2022-02-08 DIAGNOSIS — A08.11 GASTROENTERITIS DUE TO NOROVIRUS: Primary | ICD-10-CM

## 2022-02-08 PROCEDURE — 1111F DSCHRG MED/CURRENT MED MERGE: CPT | Performed by: PHYSICAL MEDICINE & REHABILITATION

## 2022-02-08 RX ORDER — FAMOTIDINE 40 MG/1
TABLET, FILM COATED ORAL
Qty: 90 TABLET | Refills: 1 | Status: SHIPPED | OUTPATIENT
Start: 2022-02-08

## 2022-02-08 ASSESSMENT — ENCOUNTER SYMPTOMS
COUGH: 1
VOICE CHANGE: 0
VOMITING: 0
CHEST TIGHTNESS: 0
ABDOMINAL PAIN: 0
SINUS PRESSURE: 0
SHORTNESS OF BREATH: 0
DIARRHEA: 0
ABDOMINAL DISTENTION: 0
NAUSEA: 0

## 2022-02-08 NOTE — PROGRESS NOTES
Progress Note  Date:2/8/2022       NBKX:[id number]  Patient Marianna Holguin     YOB: 1933     Age:88 y.o. Subjective    Subjective:  Symptoms:  Stable. He reports malaise and cough. No shortness of breath, weakness, anorexia or diarrhea. Diet:  Adequate intake. No nausea or vomiting. Activity level: Returning to normal.    Pain:  He reports no pain. No current facility-administered medications for this encounter. Current Outpatient Medications   Medication Sig Dispense Refill    famotidine (PEPCID) 40 MG tablet Take 1 tablet by mouth every evening 30 tablet 3    tamsulosin (FLOMAX) 0.4 MG capsule TAKE ONE CAPSULE BY MOUTH EVERY EVENING 90 capsule 1    lisinopril-hydroCHLOROthiazide (PRINZIDE;ZESTORETIC) 20-12.5 MG per tablet TAKE 1 TABLET BY MOUTH EVERY DAY 90 tablet 1    simvastatin (ZOCOR) 40 MG tablet TAKE 1 TABLET BY MOUTH EVERY NIGHT 90 tablet 1    carvedilol (COREG) 12.5 MG tablet TAKE 1 TABLET BY MOUTH TWICE DAILY 180 tablet 1    aspirin 81 MG EC tablet Take 81 mg by mouth daily.  multivitamin (THERAGRAN) per tablet Take 1 tablet by mouth daily.  acetaminophen (TYLENOL) 325 MG tablet Take 650 mg by mouth every 6 hours as needed for Pain        Review of Systems   Constitutional: Positive for activity change (better). HENT: Negative for congestion, sinus pressure and voice change. Respiratory: Positive for cough. Negative for chest tightness and shortness of breath. Gastrointestinal: Negative for abdominal distention, abdominal pain, anorexia, diarrhea, nausea and vomiting. Genitourinary: Negative for difficulty urinating, flank pain and hematuria. Musculoskeletal: Positive for arthralgias. Neurological: Negative for dizziness, tremors, seizures, weakness, light-headedness and headaches. Psychiatric/Behavioral: Negative for agitation. The patient is not nervous/anxious.       Objective         Vitals Last 24 Hours:  TEMPERATURE: Temp  Av.7 °F (36.5 °C)  Min: 97.2 °F (36.2 °C)  Max: 98.2 °F (36.8 °C)  RESPIRATIONS RANGE: Resp  Av  Min: 18  Max: 18  PULSE OXIMETRY RANGE: SpO2  Av.3 %  Min: 97 %  Max: 98 %  PULSE RANGE: Pulse  Av  Min: 67  Max: 69  BLOOD PRESSURE RANGE: Systolic (60PIQ), NL , Min:150 , YUP:717   ; Diastolic (53UOM), LLA:59, Min:65, Max:71    I/O (24Hr): No intake or output data in the 24 hours ending 22 0445  Objective:  Vital signs: (most recent): Blood pressure (!) 151/71, pulse 67, temperature 97.2 °F (36.2 °C), temperature source Oral, resp. rate 18, height 5' 7\" (1.702 m), weight 139 lb 15.9 oz (63.5 kg), SpO2 97 %. HEENT: Normal HEENT exam.    Lungs:  Normal effort and normal respiratory rate. Breath sounds clear to auscultation. Heart: Normal rate. Regular rhythm. S1 normal and S2 normal.  Positive for murmur (1 to2 /6 korey). Abdomen: Abdomen is soft and non-distended. Bowel sounds are normal.   There is no mass. There is no splenomegaly. Extremities: Normal range of motion. There is no dependent edema. Neurological: Patient is alert and oriented to person, place and time. Patient has normal reflexes and normal muscle tone. Skin:  Warm and dry. Labs/Imaging/Diagnostics    Labs:  CBC:No results for input(s): WBC, RBC, HGB, HCT, MCV, RDW, PLT in the last 72 hours. CHEMISTRIES:  Recent Labs     22  0354 22  0435    140   K 4.1 3.7    108   CO2 20* 20*   BUN 45* 31*   CREATININE 1.5* 1.3*   GLUCOSE 101 98     PT/INR:No results for input(s): PROTIME, INR in the last 72 hours. APTT:No results for input(s): APTT in the last 72 hours. LIVER PROFILE:No results for input(s): AST, ALT, BILIDIR, BILITOT, ALKPHOS in the last 72 hours. Imaging Last 24 Hours:  No results found.   Assessment//Plan           Hospital Problems           Last Modified POA    Gastroenteritis due to norovirus 2022 Yes    Dehydration 2022 Yes    CAD (coronary artery disease) 2/5/2022 Yes    Hyperlipidemia 2/5/2022 Yes    Nausea vomiting and diarrhea 2/5/2022 Yes    ANJANA (acute kidney injury) ([address] 2/5/2022 Yes        Assessment:    Condition: In stable condition. Improving. ( Acute gastroenteritis   Due to norovirus now better      acute kidney injury now  Better as creat  Back to 1.3     dehydration has corrected and   Better     htn  Resume back the ace inhibitor as half the dose today and normal dose  In am     ashd stable      bph as  Noted      deconditioning and will walk and see and make sure stabloe ). Plan:   Discharge home. Ad beatrice activity. Start/continue incentive spirometry. Regular diet. Resume home regimen. (  Overall much improvea nd feel better      eating ok and  No emesis and no diarrhea     g ait stable      home today  And see one week      review  Of  Chart and medication  And along with the discharge summary spent 35 minutes  On disposition of the patient).        Electronically signed by Marek Soliz MD on 2/8/22 at 4:45 AM EST

## 2022-02-08 NOTE — CARE COORDINATION
Transitions of Care Initial Call    Was this an external facility discharge? No Discharge Facility: [address] [place] [address] to be reviewed by the provider   Additional needs identified to be addressed with provider: No  none             Method of communication with provider : none      Advance Care Planning:   Does patient have an Advance Directive: not on file. Was this a readmission? No  Patient stated reason for admission: Diarrhea, Dehydration  Patients top risk factors for readmission: medical condition-CAD, CABG, TIA, CRF    Care Transition Nurse (CTN) contacted the patient by telephone to perform post hospital discharge assessment. Verified name and  with patient as identifiers. Provided introduction to self, and explanation of the CTN role. CTN reviewed discharge instructions, medical action plan and red flags with patient who verbalized understanding. Patient given an opportunity to ask questions and does not have any further questions or concerns at this time. Were discharge instructions available to patient? Yes. Reviewed appropriate site of care based on symptoms and resources available to patient including: PCP, Specialist, Urgent care clinics and When to call 911. The patient agrees to contact the PCP office for questions related to their healthcare. Medication reconciliation was performed with patient, who verbalizes understanding of administration of home medications. Advised obtaining a 90-day supply of all daily and as-needed medications. Covid Risk Education     Educated patient about risk for severe COVID-19 due to risk factors according to CDC guidelines. CTN reviewed discharge instructions, medical action plan and red flag symptoms with the patient who verbalized understanding. Discussed COVID vaccination status: Yes. Education provided on COVID-19 vaccination as appropriate. Discussed exposure protocols and quarantine with CDC Guidelines.  Patient was given an opportunity to verbalize any questions and concerns and agrees to contact CTN or health care provider for questions related to their healthcare. Reviewed and educated patient on any new and changed medications related to discharge diagnosis. CTN provided contact information. Plan for follow-up call in 5-7 days based on severity of symptoms and risk factors. Plan for next call: symptom management--  follow up appointment-2/15/22  medication management--    [facility] [address] Transitions Initial Follow Up Call    Call within 2 business days of discharge: Yes    Patient: Danika Francois Patient : 1933   MRN: [id number]  Reason for Admission: Diarrhea, Norovirus. ANJANA,   Discharge Date: 22 RARS: Readmission Risk Score: 13.9 ( )      Last Discharge [facility]       Complaint Diagnosis Description Type Department Provider    22 Diarrhea Diarrhea, unspecified type . .. ED to Hosp-Admission (Discharged) (ADMITTED) RA 6K Dinesh Boogie MD; Edmund Streeter. .. Spoke with: Adrianna Spencer, reports he is feeling \"alright. \" C/o occas gastric reflux this am, will be picking up his new med Pepcid this afternoon for this. No N&V. Denies any further diarrhea. Adrianna Spencer able to eat this am without issue (typically eats a light bfast). 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 He is able to get around his home fairly well, c/o feeling weak although better. Lives with wife, indept PTA. Discussed AVS & DC meds & importance of follow up appts. The only new med is Pepcid, all other meds  taking as prescribed PTA. Adrianna Spencer has follow up appt with PCP Dr Hermann Ledbetter on 2/15/2022. No issue with transportaion. Declines need for senior care/transportation resources.           Non-face-to-face services provided:  Scheduled appointment with PCP-2/15/22  Obtained and reviewed discharge summary and/or continuity of care documents  Education of patient/family/caregiver/guardian to support self-management--  Assessment and support for treatment adherence and medication management-DC meds revoiewed 1111F completed    Care Transitions 24 Hour Call    Schedule Follow Up Appointment with PCP: Completed  Do you have any ongoing symptoms?: No  Do you have a copy of your discharge instructions?: Yes  Do you have all of your prescriptions and are they filled?: Yes  Have you been contacted by a [facility] Pharmacist?: No  Have you scheduled your follow up appointment?: Yes  How are you going to get to your appointment?: Car - family or friend to transport  Were you discharged with any Home Care or Post Acute Services: No  Do you feel like you have everything you need to keep you well at home?: Yes  Care Transitions Interventions   Home Care Waiver: Declined        Transportation Support: Declined      DME Assistance: Declined   Disease Association: Completed             Follow Up  Future Appointments   Date Time Provider Gunnar Self   2/15/2022 11:30 AM Josie Cardenas MD [address] [phone]

## 2022-02-08 NOTE — TELEPHONE ENCOUNTER
Date of last visit:  8/25/2021  Date of next visit:  2/15/2022    Requested Prescriptions     Pending Prescriptions Disp Refills    famotidine (PEPCID) 40 MG tablet [Pharmacy Med Name: FAMOTIDINE 40MG TABLETS] 90 tablet 1     Sig: TAKE ONE TABLET BY MOUTH EVERY EVENING

## 2022-02-15 ENCOUNTER — OFFICE VISIT (OUTPATIENT)
Dept: FAMILY MEDICINE CLINIC | Age: 87
End: 2022-02-15

## 2022-02-15 VITALS
TEMPERATURE: 96.1 F | DIASTOLIC BLOOD PRESSURE: 80 MMHG | SYSTOLIC BLOOD PRESSURE: 162 MMHG | RESPIRATION RATE: 16 BRPM | WEIGHT: 143.5 LBS | HEART RATE: 80 BPM | BODY MASS INDEX: 22.52 KG/M2 | HEIGHT: 67 IN

## 2022-02-15 DIAGNOSIS — I10 ESSENTIAL HYPERTENSION: Primary | ICD-10-CM

## 2022-02-15 DIAGNOSIS — I10 PRIMARY HYPERTENSION: ICD-10-CM

## 2022-02-15 DIAGNOSIS — A08.11 GASTROENTERITIS DUE TO NOROVIRUS: ICD-10-CM

## 2022-02-15 DIAGNOSIS — N18.30 CRF (CHRONIC RENAL FAILURE), STAGE 3 (MODERATE) (HCC): ICD-10-CM

## 2022-02-15 DIAGNOSIS — N17.9 AKI (ACUTE KIDNEY INJURY) (HCC): ICD-10-CM

## 2022-02-15 DIAGNOSIS — I25.810 CORONARY ARTERY DISEASE INVOLVING AUTOLOGOUS VEIN CORONARY BYPASS GRAFT WITHOUT ANGINA PECTORIS: ICD-10-CM

## 2022-02-15 PROCEDURE — 1036F TOBACCO NON-USER: CPT | Performed by: FAMILY MEDICINE

## 2022-02-15 PROCEDURE — 99214 OFFICE O/P EST MOD 30 MIN: CPT | Performed by: FAMILY MEDICINE

## 2022-02-15 PROCEDURE — G8427 DOCREV CUR MEDS BY ELIG CLIN: HCPCS | Performed by: FAMILY MEDICINE

## 2022-02-15 PROCEDURE — 1123F ACP DISCUSS/DSCN MKR DOCD: CPT | Performed by: FAMILY MEDICINE

## 2022-02-15 PROCEDURE — G8420 CALC BMI NORM PARAMETERS: HCPCS | Performed by: FAMILY MEDICINE

## 2022-02-15 NOTE — PROGRESS NOTES
Post-Discharge Transitional Care Management Services or Hospital Follow Up      Sandra Collins   YOB: 1933    Date of Office Visit:  2/15/2022  Date of Hospital Admission: 2/4/22  Date of Hospital Discharge: 2/7/22  Readmission Risk Score(high >=14%. Medium >=10%):Readmission Risk Score: 13.9 ( )      Care management risk score Rising risk (score 2-5) and Complex Care (Scores >=6): 1     Non face to face  following discharge, date last encounter closed (first attempt may have been earlier): 2/8/2022  1:17 PM 2/8/2022  1:17 PM    Call initiated 2 business days of discharge: Yes     Patient Active Problem List   Diagnosis    CAD (coronary artery disease)    Hyperlipidemia    Hypertension    Benign prostatic hyperplasia    Mitral regurgitation    Asymptomatic PVCs    Nausea vomiting and diarrhea    [facility]F [facility]ch[facility]), [facility] 3 ([facility]) [facility] [address]    ANJANA (acute kidney injury) [facility] [address]    Gastroenteritis due to norovirus    Dehydration       Allergies   Allergen Reactions    Pcn [Penicillins] Anaphylaxis    Ibuprofen     Percocet [Oxycodone-Acetaminophen] Nausea And Vomiting       Medications listed as ordered at the time of discharge from hospital     Medication List          Accurate as of February 15, 2022 11:40 AM. If you have any questions, ask your nurse or doctor.             CONTINUE taking these medications    acetaminophen 325 MG tablet  Commonly known as: TYLENOL     aspirin 81 MG EC tablet     carvedilol 12.5 MG tablet  Commonly known as: COREG  TAKE 1 TABLET BY MOUTH TWICE DAILY     famotidine 40 MG tablet  Commonly known as: PEPCID  TAKE ONE TABLET BY MOUTH EVERY EVENING     lisinopril-hydroCHLOROthiazide 20-12.5 MG per tablet  Commonly known as: PRINZIDE;ZESTORETIC  TAKE 1 TABLET BY MOUTH EVERY DAY     multivitamin per tablet     simvastatin 40 MG tablet  Commonly known as: ZOCOR  TAKE 1 TABLET BY MOUTH EVERY NIGHT     tamsulosin 0.4 MG capsule  Commonly known as: FLOMAX  TAKE ONE CAPSULE BY MOUTH EVERY EVENING              Medications marked \"taking\" at this time  Outpatient Medications Marked as Taking for the 2/15/22 encounter (Office Visit) with Bambi Padron MD   Medication Sig Dispense Refill    famotidine (PEPCID) 40 MG tablet TAKE ONE TABLET BY MOUTH EVERY EVENING 90 tablet 1    tamsulosin (FLOMAX) 0.4 MG capsule TAKE ONE CAPSULE BY MOUTH EVERY EVENING 90 capsule 1    lisinopril-hydroCHLOROthiazide (PRINZIDE;ZESTORETIC) 20-12.5 MG per tablet TAKE 1 TABLET BY MOUTH EVERY DAY 90 tablet 1    simvastatin (ZOCOR) 40 MG tablet TAKE 1 TABLET BY MOUTH EVERY NIGHT 90 tablet 1    carvedilol (COREG) 12.5 MG tablet TAKE 1 TABLET BY MOUTH TWICE DAILY 180 tablet 1    acetaminophen (TYLENOL) 325 MG tablet Take 650 mg by mouth every 6 hours as needed for Pain      aspirin 81 MG EC tablet Take 81 mg by mouth daily.  multivitamin (THERAGRAN) per tablet Take 1 tablet by mouth daily. Medications patient taking as of now reconciled against medications ordered at time of hospital discharge: Yes    Chief Complaint   Patient presents with    Follow-Up from Hospital       HPI    Inpatient course: Discharge summary reviewed- see chart. Interval history/Current status:   Stable       Diarrhea  Now  Better  And  Formed  Stool    And  2  To  3   A day       No  cramping        gerd  Better      ANJANA and  Better  And  Need  To  Recheck        norovirus   Better     Review of Systems    Vitals:    02/15/22 1126   BP: (!) 156/76   Site: Right Upper Arm   Position: Sitting   Cuff Size: Medium Adult   Pulse: 80   Resp: 16   Temp: 96.1 °F (35.6 °C)   TempSrc: Infrared   Weight: 143 lb 8 oz (65.1 kg)   Height: 5' 7\" (1.702 m)     Body mass index is 22.48 kg/m².    Wt Readings from Last 3 Encounters:   02/15/22 143 lb 8 oz (65.1 kg)   02/07/22 139 lb 15.9 oz (63.5 kg)   08/25/21 145 lb 8 oz (66 kg)     BP Readings from Last 3 Encounters:   02/15/22 (!) 156/76   02/07/22 (!) 151/71 08/25/21 136/74       Physical Exam  Vitals and nursing note reviewed. Constitutional:       Appearance: He is well-developed. HENT:      Head: Normocephalic and atraumatic. Right Ear: External ear normal.      Left Ear: External ear normal.      Nose: Nose normal.   Eyes:      Conjunctiva/sclera: Conjunctivae normal.      Pupils: Pupils are equal, round, and reactive to light. Comments: Fundi nl   Neck:      Thyroid: No thyromegaly. Cardiovascular:      Rate and Rhythm: Normal rate and regular rhythm. Heart sounds: Normal heart sounds. Pulmonary:      Effort: Pulmonary effort is normal.      Breath sounds: Normal breath sounds. No wheezing or rales. Abdominal:      General: Bowel sounds are normal.      Palpations: Abdomen is soft. There is no mass. Tenderness: There is no abdominal tenderness. Musculoskeletal:         General: Normal range of motion. Cervical back: Normal range of motion and neck supple. Lymphadenopathy:      Cervical: No cervical adenopathy. Skin:     General: Skin is warm and dry. Findings: No rash. Neurological:      Mental Status: He is alert and oriented to person, place, and time. Cranial Nerves: No cranial nerve deficit. Deep Tendon Reflexes: Reflexes are normal and symmetric. Assessment  . Diagnosis Orders   1. Essential hypertension     2. ANJANA (acute kidney injury) ([facility] [address]  BUN & Creatinine    Electrolyte Panel   3. Gastroenteritis due to norovirus     4. CRF (chronic renal failure), stage 3 (moderate) ([facility])     5. Primary hypertension     6.  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         /Plan:      Current Outpatient Medications   Medication Sig Dispense Refill    famotidine (PEPCID) 40 MG tablet TAKE ONE TABLET BY MOUTH EVERY EVENING 90 tablet 1    tamsulosin (FLOMAX) 0.4 MG capsule TAKE ONE CAPSULE BY MOUTH EVERY EVENING 90 capsule 1    lisinopril-hydroCHLOROthiazide (PRINZIDE;ZESTORETIC) 20-12.5 MG per tablet TAKE 1 TABLET BY MOUTH EVERY DAY 90 tablet 1    simvastatin (ZOCOR) 40 MG tablet TAKE 1 TABLET BY MOUTH EVERY NIGHT 90 tablet 1    carvedilol (COREG) 12.5 MG tablet TAKE 1 TABLET BY MOUTH TWICE DAILY 180 tablet 1    acetaminophen (TYLENOL) 325 MG tablet Take 650 mg by mouth every 6 hours as needed for Pain      aspirin 81 MG EC tablet Take 81 mg by mouth daily.  multivitamin (THERAGRAN) per tablet Take 1 tablet by mouth daily. No current facility-administered medications for this visit. There are no diagnoses linked to this encounter.       Medical Decision Making: moderate complexity    Orders Placed This Encounter   Procedures    BUN & Creatinine     Standing Status:   Future     Standing Expiration Date:   2/15/2023    Electrolyte Panel     Standing Status:   Future     Standing Expiration Date:   2/15/2023         See in       3  mths        bp  check  In  2  weeks and

## 2022-02-17 ENCOUNTER — CARE COORDINATION (OUTPATIENT)
Dept: CASE MANAGEMENT | Age: 87
End: 2022-02-17

## 2022-02-17 NOTE — CARE COORDINATION
[email or web address]    [facility]22    Patient: Florentino De Dios  Patient : 1933   MRN: [id number]  Reason for Admission: Diarrhea, Dehydration  Discharge Date: 22 RARS: Readmission Risk Score: 13.9 ( )    Care Transitions Follow Up Call:    Patient states, \"I am doing very well\". No further symptoms of diarrhea or dehydration. Patient confirms all medications are available and are being taken as directed. Patient has no needs or concerns for writer at this time. Patient is aware of when to contact MD with any new or worsening symptoms. Advised to contact PCP  with any health concerns for early outpatient intervention in an effort to avoid hospitalization. Report any worsening symptoms to PCP and/or Call 911 and/or GO TO  EMERGENCY ROOM if symptoms are severe. Expresses understanding. Patient Denies Transportation, Home, or Medication needs or concerns at this time. Patient had Transitional Follow up appointment. 2/15/2022  Instructed patient that this is the Final Transition Call and to call their Specialist/PCP for any problems or issues that may occur. Expresses understanding. Lovely Taveras LPN    [phone]  [employer] [facility] Coordinator      Needs to be reviewed by the provider   Additional needs identified to be addressed with provider No  none             Method of communication with provider : none      Care Transition Nurse (CTN) contacted the patient by telephone to follow up after admission on 2022. Verified name and  with patient as identifiers. Discussed follow-up appointments. If no appointment was previously scheduled, appointment scheduling offered: Yes   Is follow up appointment scheduled within 7 days of discharge? Yes    Advance Care Planning:   Does patient have an Advance Directive:  reviewed and current.      CTN reviewed discharge instructions, medical action plan and red flags with patient and discussed any barriers to care and/or understanding of plan of care after discharge. Discussed appropriate site of care based on symptoms and resources available to patient including: PCP, When to call 911 and [employer]. The patient agrees to contact the PCP office for questions related to their healthcare. Patients top risk factors for readmission: lack of knowledge about disease  medical condition-Diarrhea, Dehydration  medication management  Interventions to address risk factors: Obtained and reviewed discharge summary and/or continuity of care documents    Non-[facility] follow up appointment(s): 2/15/2022    CTN provided contact information for future needs. No further follow-up call identified based on severity of symptoms and risk factors. Plan for next call:              Care Transitions Subsequent and Final Call    Subsequent and Final Calls  Do you have any ongoing symptoms?: No  Have your medications changed?: No  Do you have any questions related to your medications?: No  Do you currently have any active services?: No  Do you have any needs or concerns that I can assist you with?: No  Identified Barriers: None  Care Transitions Interventions   Home Care Waiver: Declined        Transportation Support: Declined      DME Assistance: Declined   Disease Association: Completed      Other Interventions: Follow Up  No future appointments.     Grabiel Newsome LPN Cardiac

## 2022-03-03 ENCOUNTER — NURSE ONLY (OUTPATIENT)
Dept: LAB | Age: 87
End: 2022-03-03

## 2022-03-03 DIAGNOSIS — N17.9 AKI (ACUTE KIDNEY INJURY) (HCC): ICD-10-CM

## 2022-03-03 LAB
ANION GAP SERPL CALCULATED.3IONS-SCNC: 12 MEQ/L (ref 8–16)
BUN BLDV-MCNC: 27 MG/DL (ref 7–22)
CHLORIDE BLD-SCNC: 103 MEQ/L (ref 98–111)
CO2: 25 MEQ/L (ref 23–33)
CREAT SERPL-MCNC: 1.6 MG/DL (ref 0.4–1.2)
GFR SERPL CREATININE-BSD FRML MDRD: 41 ML/MIN/1.73M2
POTASSIUM SERPL-SCNC: 4.6 MEQ/L (ref 3.5–5.2)
SODIUM BLD-SCNC: 140 MEQ/L (ref 135–145)

## 2022-03-07 ENCOUNTER — TELEPHONE (OUTPATIENT)
Dept: FAMILY MEDICINE CLINIC | Age: 87
End: 2022-03-07

## 2022-03-07 PROBLEM — E86.0 DEHYDRATION: Status: RESOLVED | Noted: 2022-02-05 | Resolved: 2022-03-07

## 2022-03-07 NOTE — TELEPHONE ENCOUNTER
----- Message from Joselyn Barboza MD sent at 3/6/2022  3:10 PM EST -----  Call as creat  is stable at 1.6 so better but still drink enough water

## 2022-03-11 NOTE — DISCHARGE SUMMARY
[address] [id number]                               DISCHARGE SUMMARY    PATIENT NAME: Mg Matthew                       :        1933  MED REC NO:   [id number]                           ROOM:       0004  ACCOUNT NO:   [de-identified]                           ADMIT DATE: 2022  PROVIDER:     Quynh Rey M.D.           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 The patient states he was doing well until Wednesday night on  2022. At that time, developed significant symptoms of diarrhea  and nausea being present. This became progressive and severe, having a  large amount of bowel movements every 30 minutes. He had some  significant weakness, but never actually any syncope being present. He  did initially have some emesis, but this seemed to stop. He was  nauseated, taking in limited food. By the morning of 02:00 a.m. on  2022, he stated that he had 12 to 14 hours of persistent diarrhea  with weakness being present with minimal urine output that he had noted,  consequently presented to the emergency room where his white count was  noted to be elevated at only 6400 with a hemoglobin of 14.9, hematocrit  45.4; however, his kidney function notes a significant change with a BUN  of 36 and creatinine of 1.3 to baseline 58 BUN and a creatinine of 3.0. It was felt that he had acute kidney injury secondary to dehydration. Liver functions were noted to be stable. Acute abdominal series noted  no free air to suggest an ileus with small bowel obstruction.   CT scan  of the abdomen and pelvis obtained, questionable mid partial small bowel  obstruction being present. Large volumes of stool was present in  addition. Questionable some splenomegaly being present with gallstones  and kidney stones being present, some BPH. Because of the above, the  patient was admitted. Because of the questionable obstruction, Surgery  would be consulted in addition at this time. MEDICATIONS:  At the time of admission include,  1. Lisinopril/hydrochlorothiazide. 2.  Zocor. 3.  Flomax. 4.  Coreg. 5.  Aspirin. 6.  Multivitamin. 7.  Tylenol. ALLERGIES:  PENICILLIN, IBUPROFEN, PERCOCET. SURGERIES:  Cataract surgery in 2015, bypass surgery in 2009, hernia  repair in 2020 with an intestinal jejunal resection because of large  giant diverticulum present in addition. ILLNESSES:  History of PVCs, isolated diffuse calcified coronary artery  disease, questionable TIA in the past, hypertension, hyperlipidemia,  moderate mitral regurgitation present. Remote TIA, bypass surgery in  2009. PHYSICAL EXAMINATION:  On admission notes,  VITAL SIGNS:  His temperature is 98.6, pulse 72, respiratory rate 16,  blood pressure 168/72, pulse ox 98%. HEENT:  Within normal limits. NECK:  Supple without adenopathy. LUNGS:  Slightly decreased in the bases. CARDIAC:  Regular rate and rhythm. Normal S1, S2.  Grade 1 to 2/6  systolic murmur. No carotid bruits. No peripheral edema. ABDOMEN:  Soft. Positive bowel sounds. Nontender. No  hepatosplenomegaly. It was not distended. Increased bowel sounds. EXTREMITIES:  Full range of motion. No CVA tenderness. NEUROLOGIC:  Alert and oriented x3. Grossly motor and sensory being  present. LABORATORY DATA:  The patient noted to have a BUN of 58, creatinine of  3, and on repeat with hydration, his BUN and creatinine changed to 60  and 2.8. Sodium was 138, potassium 4.6, chloride 105, CO2 of 20,  glucose 102.   Liver panel was normal.  White count 6400, which was now  down to 4000 with hemoglobin of 13.4, hematocrit 40.6. COVID test was  noted to be negative. CT abdomen and pelvis today had questionable  changes present with partial small bowel obstruction. EKG has some  PACs, but otherwise was unchanged. He continued with IV fluids. We asked Dr. Esperanza Vázquez to see the patient in  consultation. His lisinopril/hydrochlorothiazide was held because of  his elevated BUN and creatinine. GI panel was sent for evaluation  because of the stools. He continued with IV hydration. Dr. Esperanza Vázquez did see  the patient in consultation. It is her feeling that this was probable  due to gastritis and that there was no evidence of any bowel obstruction  being present. She has felt that this was not surgical and the patient  needs an obvious IV hydration. He continued with IV fluids, and by  02/06/2022, he showed steady improvement with blood pressure remained  stable with increased diet also being done. On 02/07/2022, his BUN was  down to 31 with a creatinine of 1.3. He is eating, tolerating diet. He  had no further abdominal pain or discomfort being present. The GI panel  came back with norovirus that was the cause of the diarrhea. It was  felt that he had acute infectious diarrhea as a viral gastritis with  improvement. Blood pressure was improving, although we held his ACE  inhibitor until further improvement. He was able to walk and was  ambulating and was stable. It was felt that he could be discharged on  02/07/2022. Overall, he had improved. He declined PT, OT, and home  therapy at this time. FINAL DIAGNOSES:  1. Acute viral gastritis secondary to norovirus. 2.  Acute kidney injury due to dehydration, resolved. 3.  Atherosclerotic heart disease. 4.  Hypertension. 5.  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 6.  Hyperlipidemia. 7.  Dehydration as stated. DIET:  AHA diet with gradual improvement.     DISPOSITION:  Follow up in approximately one week Dr. Medardo Clark for  repeat labs at that time for kidney function. Patient released to home    DISCHARGE MEDICATIONS:  1. Pepcid 40 mg once a day. 2.  Flomax 0.4 mg a day. 3.  His lisinopril/HCT 20/12.5 will be started on 02/08/2022. 4.  Zocor 40 mg a day. 5.  Coreg 12.5 mg twice a day. 6.  Aspirin 81 mg a day. 7.  Multivitamin. 8.  Tylenol. CONDITION AT THE TIME OF DISCHARGE:  Stable and improved.         Subhash Carrasco M.D.    D: 03/10/2022 6:28:07       T: 03/10/2022 16:00:08     [email or web address]  Job#: [id number]     Doc#: [id number]    CC:

## 2022-03-21 NOTE — TELEPHONE ENCOUNTER
Date of last visit:  2/15/2022  Date of next visit:  Visit date not found    Requested Prescriptions     Pending Prescriptions Disp Refills    carvedilol (COREG) 12.5 MG tablet [Pharmacy Med Name: CARVEDILOL 12.5MG TABLETS] 180 tablet 1     Sig: TAKE 1 TABLET BY MOUTH TWICE DAILY

## 2022-03-22 RX ORDER — CARVEDILOL 12.5 MG/1
TABLET ORAL
Qty: 180 TABLET | Refills: 1 | Status: SHIPPED | OUTPATIENT
Start: 2022-03-22 | End: 2022-09-20

## 2022-05-12 DIAGNOSIS — I10 ESSENTIAL HYPERTENSION: ICD-10-CM

## 2022-05-12 NOTE — TELEPHONE ENCOUNTER
Date of last visit:  2/15/2022  Date of next visit:  Visit date not found    Requested Prescriptions     Pending Prescriptions Disp Refills    simvastatin (ZOCOR) 40 MG tablet [Pharmacy Med Name: SIMVASTATIN 40MG TABLETS] 90 tablet 1     Sig: TAKE 1 TABLET BY MOUTH EVERY NIGHT    lisinopril-hydroCHLOROthiazide (PRINZIDE;ZESTORETIC) 20-12.5 MG per tablet [Pharmacy Med Name: LISINOPRIL-HCTZ 20/12.5MG TABLETS] 90 tablet 1     Sig: TAKE 1 TABLET BY MOUTH EVERY DAY

## 2022-05-13 RX ORDER — LISINOPRIL AND HYDROCHLOROTHIAZIDE 20; 12.5 MG/1; MG/1
TABLET ORAL
Qty: 90 TABLET | Refills: 1 | Status: SHIPPED | OUTPATIENT
Start: 2022-05-13

## 2022-05-13 RX ORDER — SIMVASTATIN 40 MG
TABLET ORAL
Qty: 90 TABLET | Refills: 1 | Status: SHIPPED | OUTPATIENT
Start: 2022-05-13

## 2022-06-06 DIAGNOSIS — N40.1 BENIGN NON-NODULAR PROSTATIC HYPERPLASIA WITH LOWER URINARY TRACT SYMPTOMS: ICD-10-CM

## 2022-06-06 DIAGNOSIS — I10 PRIMARY HYPERTENSION: ICD-10-CM

## 2022-06-06 DIAGNOSIS — E78.00 PURE HYPERCHOLESTEROLEMIA: Primary | ICD-10-CM

## 2022-06-06 DIAGNOSIS — I10 ESSENTIAL HYPERTENSION: ICD-10-CM

## 2022-06-06 NOTE — TELEPHONE ENCOUNTER
Date of last visit:  2/15/2022  Date of next visit:  Visit date not found    Requested Prescriptions     Pending Prescriptions Disp Refills    tamsulosin (FLOMAX) 0.4 mg capsule [Pharmacy Med Name: TAMSULOSIN 0.4MG CAPSULES] 90 capsule 1     Sig: TAKE 1 CAPSULE BY MOUTH EVERY EVENING

## 2022-06-07 RX ORDER — TAMSULOSIN HYDROCHLORIDE 0.4 MG/1
CAPSULE ORAL
Qty: 90 CAPSULE | Refills: 1 | Status: SHIPPED | OUTPATIENT
Start: 2022-06-07

## 2022-06-07 NOTE — TELEPHONE ENCOUNTER
Marlena dtue in sept so call  And also will need lab before appt and on printer and do in sept and do as medicare well    please call

## 2022-07-05 ENCOUNTER — NURSE ONLY (OUTPATIENT)
Dept: LAB | Age: 87
End: 2022-07-05

## 2022-07-05 DIAGNOSIS — I10 PRIMARY HYPERTENSION: ICD-10-CM

## 2022-07-05 DIAGNOSIS — N40.1 BENIGN NON-NODULAR PROSTATIC HYPERPLASIA WITH LOWER URINARY TRACT SYMPTOMS: ICD-10-CM

## 2022-07-05 DIAGNOSIS — E78.00 PURE HYPERCHOLESTEROLEMIA: ICD-10-CM

## 2022-07-05 DIAGNOSIS — I10 ESSENTIAL HYPERTENSION: ICD-10-CM

## 2022-07-05 LAB
ALBUMIN SERPL-MCNC: 4.5 G/DL (ref 3.5–5.1)
ALP BLD-CCNC: 77 U/L (ref 38–126)
ALT SERPL-CCNC: 11 U/L (ref 11–66)
ANION GAP SERPL CALCULATED.3IONS-SCNC: 13 MEQ/L (ref 8–16)
AST SERPL-CCNC: 17 U/L (ref 5–40)
BASOPHILS # BLD: 0.7 %
BASOPHILS ABSOLUTE: 0 THOU/MM3 (ref 0–0.1)
BILIRUB SERPL-MCNC: 0.4 MG/DL (ref 0.3–1.2)
BUN BLDV-MCNC: 40 MG/DL (ref 7–22)
CALCIUM SERPL-MCNC: 9.5 MG/DL (ref 8.5–10.5)
CHLORIDE BLD-SCNC: 103 MEQ/L (ref 98–111)
CHOLESTEROL, TOTAL: 129 MG/DL (ref 100–199)
CO2: 22 MEQ/L (ref 23–33)
CREAT SERPL-MCNC: 1.7 MG/DL (ref 0.4–1.2)
EOSINOPHIL # BLD: 9.8 %
EOSINOPHILS ABSOLUTE: 0.6 THOU/MM3 (ref 0–0.4)
ERYTHROCYTE [DISTWIDTH] IN BLOOD BY AUTOMATED COUNT: 13.2 % (ref 11.5–14.5)
ERYTHROCYTE [DISTWIDTH] IN BLOOD BY AUTOMATED COUNT: 44.3 FL (ref 35–45)
GFR SERPL CREATININE-BSD FRML MDRD: 38 ML/MIN/1.73M2
GLUCOSE BLD-MCNC: 120 MG/DL (ref 70–108)
HCT VFR BLD CALC: 40.6 % (ref 42–52)
HDLC SERPL-MCNC: 41 MG/DL
HEMOGLOBIN: 13.2 GM/DL (ref 14–18)
IMMATURE GRANS (ABS): 0.03 THOU/MM3 (ref 0–0.07)
IMMATURE GRANULOCYTES: 0.5 %
LDL CHOLESTEROL CALCULATED: 58 MG/DL
LYMPHOCYTES # BLD: 8.4 %
LYMPHOCYTES ABSOLUTE: 0.5 THOU/MM3 (ref 1–4.8)
MAGNESIUM: 2 MG/DL (ref 1.6–2.4)
MCH RBC QN AUTO: 29.9 PG (ref 26–33)
MCHC RBC AUTO-ENTMCNC: 32.5 GM/DL (ref 32.2–35.5)
MCV RBC AUTO: 91.9 FL (ref 80–94)
MONOCYTES # BLD: 6.1 %
MONOCYTES ABSOLUTE: 0.4 THOU/MM3 (ref 0.4–1.3)
NUCLEATED RED BLOOD CELLS: 0 /100 WBC
PLATELET # BLD: 212 THOU/MM3 (ref 130–400)
PMV BLD AUTO: 10.2 FL (ref 9.4–12.4)
POTASSIUM SERPL-SCNC: 4.7 MEQ/L (ref 3.5–5.2)
PROSTATE SPECIFIC ANTIGEN: 0.55 NG/ML (ref 0–1)
RBC # BLD: 4.42 MILL/MM3 (ref 4.7–6.1)
SEG NEUTROPHILS: 74.5 %
SEGMENTED NEUTROPHILS ABSOLUTE COUNT: 4.4 THOU/MM3 (ref 1.8–7.7)
SODIUM BLD-SCNC: 138 MEQ/L (ref 135–145)
TOTAL PROTEIN: 7.1 G/DL (ref 6.1–8)
TRIGL SERPL-MCNC: 149 MG/DL (ref 0–199)
TSH SERPL DL<=0.05 MIU/L-ACNC: 0.97 UIU/ML (ref 0.4–4.2)
WBC # BLD: 5.9 THOU/MM3 (ref 4.8–10.8)

## 2022-07-06 ENCOUNTER — TELEPHONE (OUTPATIENT)
Dept: FAMILY MEDICINE CLINIC | Age: 87
End: 2022-07-06

## 2022-07-06 NOTE — TELEPHONE ENCOUNTER
----- Message from Chano Matute MD sent at 7/5/2022 11:35 PM EDT -----  Call as lab stable but needs to drink more water

## 2022-07-08 ENCOUNTER — OFFICE VISIT (OUTPATIENT)
Dept: FAMILY MEDICINE CLINIC | Age: 87
End: 2022-07-08

## 2022-07-08 VITALS
SYSTOLIC BLOOD PRESSURE: 138 MMHG | DIASTOLIC BLOOD PRESSURE: 88 MMHG | RESPIRATION RATE: 16 BRPM | HEART RATE: 72 BPM | BODY MASS INDEX: 22.01 KG/M2 | WEIGHT: 140.25 LBS | HEIGHT: 67 IN

## 2022-07-08 DIAGNOSIS — N18.30 CRF (CHRONIC RENAL FAILURE), STAGE 3 (MODERATE) (HCC): ICD-10-CM

## 2022-07-08 DIAGNOSIS — I34.0 NONRHEUMATIC MITRAL VALVE REGURGITATION: ICD-10-CM

## 2022-07-08 DIAGNOSIS — I10 PRIMARY HYPERTENSION: Primary | ICD-10-CM

## 2022-07-08 DIAGNOSIS — R73.9 HYPERGLYCEMIA: ICD-10-CM

## 2022-07-08 DIAGNOSIS — I25.810 CORONARY ARTERY DISEASE INVOLVING AUTOLOGOUS VEIN CORONARY BYPASS GRAFT WITHOUT ANGINA PECTORIS: ICD-10-CM

## 2022-07-08 DIAGNOSIS — E78.00 PURE HYPERCHOLESTEROLEMIA: ICD-10-CM

## 2022-07-08 PROBLEM — A08.11 GASTROENTERITIS DUE TO NOROVIRUS: Status: RESOLVED | Noted: 2022-02-05 | Resolved: 2022-07-08

## 2022-07-08 PROBLEM — N17.9 AKI (ACUTE KIDNEY INJURY) (HCC): Status: RESOLVED | Noted: 2022-02-04 | Resolved: 2022-07-08

## 2022-07-08 LAB
CHP ED QC CHECK: NORMAL
GLUCOSE BLD-MCNC: 118 MG/DL
HBA1C MFR BLD: 5.4 %

## 2022-07-08 PROCEDURE — G8420 CALC BMI NORM PARAMETERS: HCPCS | Performed by: FAMILY MEDICINE

## 2022-07-08 PROCEDURE — 83036 HEMOGLOBIN GLYCOSYLATED A1C: CPT | Performed by: FAMILY MEDICINE

## 2022-07-08 PROCEDURE — 1036F TOBACCO NON-USER: CPT | Performed by: FAMILY MEDICINE

## 2022-07-08 PROCEDURE — 1123F ACP DISCUSS/DSCN MKR DOCD: CPT | Performed by: FAMILY MEDICINE

## 2022-07-08 PROCEDURE — G8427 DOCREV CUR MEDS BY ELIG CLIN: HCPCS | Performed by: FAMILY MEDICINE

## 2022-07-08 PROCEDURE — 82962 GLUCOSE BLOOD TEST: CPT | Performed by: FAMILY MEDICINE

## 2022-07-08 PROCEDURE — 99213 OFFICE O/P EST LOW 20 MIN: CPT | Performed by: FAMILY MEDICINE

## 2022-07-08 ASSESSMENT — ENCOUNTER SYMPTOMS
ABDOMINAL PAIN: 0
BLOOD IN STOOL: 0
EYE PAIN: 0
NAUSEA: 0
BACK PAIN: 0
TROUBLE SWALLOWING: 0
CHEST TIGHTNESS: 0
CONSTIPATION: 0
SHORTNESS OF BREATH: 0
SORE THROAT: 0
COUGH: 0

## 2022-07-08 ASSESSMENT — PATIENT HEALTH QUESTIONNAIRE - PHQ9
SUM OF ALL RESPONSES TO PHQ QUESTIONS 1-9: 0
SUM OF ALL RESPONSES TO PHQ QUESTIONS 1-9: 0
SUM OF ALL RESPONSES TO PHQ9 QUESTIONS 1 & 2: 0
SUM OF ALL RESPONSES TO PHQ QUESTIONS 1-9: 0
SUM OF ALL RESPONSES TO PHQ QUESTIONS 1-9: 0
1. LITTLE INTEREST OR PLEASURE IN DOING THINGS: 0
2. FEELING DOWN, DEPRESSED OR HOPELESS: 0

## 2022-07-08 NOTE — PROGRESS NOTES
mitral valve regurgitation     5. Pure hypercholesterolemia     6. CRF (chronic renal failure), stage 3 (moderate) ([facility])           Plan:      Current Outpatient Medications   Medication Sig Dispense Refill    tamsulosin (FLOMAX) 0.4 mg capsule TAKE 1 CAPSULE BY MOUTH EVERY EVENING 90 capsule 1    simvastatin (ZOCOR) 40 MG tablet TAKE 1 TABLET BY MOUTH EVERY NIGHT 90 tablet 1    lisinopril-hydroCHLOROthiazide (PRINZIDE;ZESTORETIC) 20-12.5 MG per tablet TAKE 1 TABLET BY MOUTH EVERY DAY 90 tablet 1    carvedilol (COREG) 12.5 MG tablet TAKE 1 TABLET BY MOUTH TWICE DAILY 180 tablet 1    famotidine (PEPCID) 40 MG tablet TAKE ONE TABLET BY MOUTH EVERY EVENING 90 tablet 1    acetaminophen (TYLENOL) 325 MG tablet Take 650 mg by mouth every 6 hours as needed for Pain      aspirin 81 MG EC tablet Take 81 mg by mouth daily.  multivitamin (THERAGRAN) per tablet Take 1 tablet by mouth daily. No current facility-administered medications for this visit. Orders Placed This Encounter   Procedures    Basic Metabolic Panel     Standing Status:   Future     Standing Expiration Date:   7/8/2023    POCT Glucose    POCT glycosylated hemoglobin (Hb A1C)         Results for orders placed or performed in visit on 07/08/22   POCT Glucose   Result Value Ref Range    Glucose 118 mg/dL    QC OK?      POCT glycosylated hemoglobin (Hb A1C)   Result Value Ref Range    Hemoglobin A1C 5.4 %            Do ;lab in  Radha Jacobson MD

## 2022-09-19 NOTE — TELEPHONE ENCOUNTER
Date of last visit:  7/8/2022  Date of next visit:  Visit date not found    Requested Prescriptions     Pending Prescriptions Disp Refills    carvedilol (COREG) 12.5 MG tablet [Pharmacy Med Name: CARVEDILOL 12.5MG TABLETS] 180 tablet 1     Sig: TAKE 1 TABLET BY MOUTH TWICE DAILY

## 2022-09-20 RX ORDER — CARVEDILOL 12.5 MG/1
TABLET ORAL
Qty: 180 TABLET | Refills: 1 | Status: SHIPPED | OUTPATIENT
Start: 2022-09-20

## 2022-10-19 ENCOUNTER — NURSE ONLY (OUTPATIENT)
Dept: FAMILY MEDICINE CLINIC | Age: 87
End: 2022-10-19

## 2022-10-19 DIAGNOSIS — Z23 NEED FOR INFLUENZA VACCINATION: Primary | ICD-10-CM

## 2022-10-19 PROCEDURE — 90688 IIV4 VACCINE SPLT 0.5 ML IM: CPT | Performed by: FAMILY MEDICINE

## 2022-10-19 PROCEDURE — G0008 ADMIN INFLUENZA VIRUS VAC: HCPCS | Performed by: FAMILY MEDICINE

## 2022-10-19 NOTE — PROGRESS NOTES
Immunizations Administered       Name Date Dose Route    Influenza, AFLURIA (age 1 yrs+), FLUZONE, (age 10 mo+), MDV, 0.5mL 10/19/2022 0.5 mL Intramuscular    Site: Deltoid- Left    Lot: [id number]    NDC: 10383-964-62

## 2022-11-10 DIAGNOSIS — I10 ESSENTIAL HYPERTENSION: ICD-10-CM

## 2022-11-10 NOTE — TELEPHONE ENCOUNTER
Date of last visit:  7/8/2022  Date of next visit:  Visit date not found    Requested Prescriptions     Pending Prescriptions Disp Refills    simvastatin (ZOCOR) 40 MG tablet [Pharmacy Med Name: SIMVASTATIN 40MG TABLETS] 90 tablet 1     Sig: TAKE 1 TABLET BY MOUTH EVERY NIGHT    lisinopril-hydroCHLOROthiazide (PRINZIDE;ZESTORETIC) 20-12.5 MG per tablet [Pharmacy Med Name: LISINOPRIL-HCTZ 20/12.5MG TABLETS] 90 tablet 1     Sig: TAKE 1 TABLET BY MOUTH EVERY DAY

## 2022-11-11 RX ORDER — SIMVASTATIN 40 MG
TABLET ORAL
Qty: 90 TABLET | Refills: 1 | Status: SHIPPED | OUTPATIENT
Start: 2022-11-11

## 2022-11-11 RX ORDER — LISINOPRIL AND HYDROCHLOROTHIAZIDE 20; 12.5 MG/1; MG/1
TABLET ORAL
Qty: 90 TABLET | Refills: 1 | Status: SHIPPED | OUTPATIENT
Start: 2022-11-11

## 2022-12-01 DIAGNOSIS — N40.1 BENIGN NON-NODULAR PROSTATIC HYPERPLASIA WITH LOWER URINARY TRACT SYMPTOMS: ICD-10-CM

## 2022-12-01 NOTE — TELEPHONE ENCOUNTER
Date of last visit:  7/8/2022  Date of next visit:  1/17/2023    Requested Prescriptions     Pending Prescriptions Disp Refills    tamsulosin (FLOMAX) 0.4 MG capsule [Pharmacy Med Name: TAMSULOSIN 0.4MG CAPSULES] 90 capsule 1     Sig: TAKE 1 CAPSULE BY MOUTH EVERY EVENING

## 2022-12-02 RX ORDER — TAMSULOSIN HYDROCHLORIDE 0.4 MG/1
CAPSULE ORAL
Qty: 90 CAPSULE | Refills: 1 | Status: SHIPPED | OUTPATIENT
Start: 2022-12-02

## 2023-01-12 ENCOUNTER — NURSE ONLY (OUTPATIENT)
Dept: LAB | Age: 88
End: 2023-01-12

## 2023-01-12 DIAGNOSIS — N18.30 CRF (CHRONIC RENAL FAILURE), STAGE 3 (MODERATE) (HCC): ICD-10-CM

## 2023-01-12 LAB
ANION GAP SERPL CALCULATED.3IONS-SCNC: 12 MEQ/L (ref 8–16)
BUN BLDV-MCNC: 38 MG/DL (ref 7–22)
CALCIUM SERPL-MCNC: 9 MG/DL (ref 8.5–10.5)
CHLORIDE BLD-SCNC: 104 MEQ/L (ref 98–111)
CO2: 23 MEQ/L (ref 23–33)
CREAT SERPL-MCNC: 1.7 MG/DL (ref 0.4–1.2)
GFR SERPL CREATININE-BSD FRML MDRD: 38 ML/MIN/1.73M2
GLUCOSE BLD-MCNC: 130 MG/DL (ref 70–108)
POTASSIUM SERPL-SCNC: 4.9 MEQ/L (ref 3.5–5.2)
SODIUM BLD-SCNC: 139 MEQ/L (ref 135–145)

## 2023-01-13 ENCOUNTER — TELEPHONE (OUTPATIENT)
Dept: FAMILY MEDICINE CLINIC | Age: 88
End: 2023-01-13

## 2023-01-13 NOTE — TELEPHONE ENCOUNTER
----- Message from Anika Mccain MD sent at 1/13/2023  5:45 AM EST -----  Call kidney  function the same  so stable

## 2023-01-17 ENCOUNTER — OFFICE VISIT (OUTPATIENT)
Dept: FAMILY MEDICINE CLINIC | Age: 88
End: 2023-01-17

## 2023-01-17 VITALS
HEIGHT: 67 IN | SYSTOLIC BLOOD PRESSURE: 136 MMHG | RESPIRATION RATE: 12 BRPM | DIASTOLIC BLOOD PRESSURE: 78 MMHG | HEART RATE: 76 BPM | WEIGHT: 141.13 LBS | BODY MASS INDEX: 22.15 KG/M2

## 2023-01-17 DIAGNOSIS — I49.3 ASYMPTOMATIC PVCS: ICD-10-CM

## 2023-01-17 DIAGNOSIS — I25.810 CORONARY ARTERY DISEASE INVOLVING AUTOLOGOUS VEIN CORONARY BYPASS GRAFT WITHOUT ANGINA PECTORIS: ICD-10-CM

## 2023-01-17 DIAGNOSIS — I34.0 NONRHEUMATIC MITRAL VALVE REGURGITATION: ICD-10-CM

## 2023-01-17 DIAGNOSIS — E78.00 PURE HYPERCHOLESTEROLEMIA: ICD-10-CM

## 2023-01-17 DIAGNOSIS — N18.30 CRF (CHRONIC RENAL FAILURE), STAGE 3 (MODERATE) (HCC): ICD-10-CM

## 2023-01-17 DIAGNOSIS — I10 PRIMARY HYPERTENSION: Primary | ICD-10-CM

## 2023-01-17 PROCEDURE — 99213 OFFICE O/P EST LOW 20 MIN: CPT | Performed by: FAMILY MEDICINE

## 2023-01-17 PROCEDURE — 1036F TOBACCO NON-USER: CPT | Performed by: FAMILY MEDICINE

## 2023-01-17 PROCEDURE — G8427 DOCREV CUR MEDS BY ELIG CLIN: HCPCS | Performed by: FAMILY MEDICINE

## 2023-01-17 PROCEDURE — G8420 CALC BMI NORM PARAMETERS: HCPCS | Performed by: FAMILY MEDICINE

## 2023-01-17 PROCEDURE — 1123F ACP DISCUSS/DSCN MKR DOCD: CPT | Performed by: FAMILY MEDICINE

## 2023-01-17 SDOH — ECONOMIC STABILITY: FOOD INSECURITY: WITHIN THE PAST 12 MONTHS, THE FOOD YOU BOUGHT JUST DIDN'T LAST AND YOU DIDN'T HAVE MONEY TO GET MORE.: NEVER TRUE

## 2023-01-17 SDOH — ECONOMIC STABILITY: FOOD INSECURITY: WITHIN THE PAST 12 MONTHS, YOU WORRIED THAT YOUR FOOD WOULD RUN OUT BEFORE YOU GOT MONEY TO BUY MORE.: NEVER TRUE

## 2023-01-17 ASSESSMENT — ENCOUNTER SYMPTOMS
NAUSEA: 0
CONSTIPATION: 0
ABDOMINAL PAIN: 0
COUGH: 0
EYE PAIN: 0
BACK PAIN: 0
SORE THROAT: 0
CHEST TIGHTNESS: 0
BLOOD IN STOOL: 0
TROUBLE SWALLOWING: 0
SHORTNESS OF BREATH: 0

## 2023-01-17 ASSESSMENT — PATIENT HEALTH QUESTIONNAIRE - PHQ9
SUM OF ALL RESPONSES TO PHQ9 QUESTIONS 1 & 2: 0
SUM OF ALL RESPONSES TO PHQ QUESTIONS 1-9: 0
SUM OF ALL RESPONSES TO PHQ QUESTIONS 1-9: 0
1. LITTLE INTEREST OR PLEASURE IN DOING THINGS: 0
SUM OF ALL RESPONSES TO PHQ QUESTIONS 1-9: 0
SUM OF ALL RESPONSES TO PHQ QUESTIONS 1-9: 0
2. FEELING DOWN, DEPRESSED OR HOPELESS: 0

## 2023-01-17 ASSESSMENT — SOCIAL DETERMINANTS OF HEALTH (SDOH): HOW HARD IS IT FOR YOU TO PAY FOR THE VERY BASICS LIKE FOOD, HOUSING, MEDICAL CARE, AND HEATING?: NOT HARD AT ALL

## 2023-01-17 NOTE — PROGRESS NOTES
Subjective:      Patient ID: Vincent Downs is a 80 y.o. male. Ashd  post  cabg        Bph  stable         Crf  noted             Hypertension  This is a chronic problem. The current episode started more than 1 year ago. The problem has been resolved since onset. The problem is controlled. Pertinent negatives include no chest pain, headaches, palpitations, peripheral edema or shortness of breath. The current treatment provides significant improvement. There are no compliance problems. Hyperlipidemia  This is a chronic problem. The current episode started more than 1 year ago. The problem is controlled. Pertinent negatives include no chest pain or shortness of breath. Current antihyperlipidemic treatment includes statins. The current treatment provides significant improvement of lipids. There are no compliance problems. Past Medical History:   Diagnosis Date    ANJANA (acute kidney injury) ([address]     ANJANA (acute kidney injury) ([address] 2/4/2022    Arrhythmia 1/22/2019    Arthritis     BPH (benign prostatic hypertrophy)     CAD (coronary artery disease)     Cerebrovascular disease     Gastroenteritis due to norovirus 2/5/2022    Hyperlipidemia     Hypertension     Mitral regurgitation     PVC (premature ventricular contraction) 1/22/2019    Small bowel obstruction ([address] 8/30/2020    TIA (transient ischemic attack) 2009    post surgery resolved      Review of Systems   Constitutional:  Negative for fatigue and fever. HENT:  Negative for congestion, ear pain, postnasal drip, sore throat and trouble swallowing. Eyes:  Negative for pain. Respiratory:  Negative for cough, chest tightness and shortness of breath. Cardiovascular:  Negative for chest pain, palpitations and leg swelling. Gastrointestinal:  Negative for abdominal pain, blood in stool, constipation and nausea. Genitourinary:  Negative for difficulty urinating, frequency and urgency.    Musculoskeletal:  Negative for arthralgias, back pain, joint swelling and neck stiffness. Skin:  Negative for rash. Neurological:  Negative for dizziness, weakness and headaches. Hematological:  Negative for adenopathy. Does not bruise/bleed easily. Psychiatric/Behavioral:  Negative for behavioral problems, dysphoric mood and sleep disturbance. /78 (Site: Right Upper Arm, Position: Sitting, Cuff Size: Medium Adult)   Pulse 76   Resp 12   Ht 5' 7\" (1.702 m)   Wt 141 lb 2 oz (64 kg)   BMI 22.10 kg/m²    Objective:   Physical Exam  Vitals and nursing note reviewed. Constitutional:       Appearance: He is well-developed. HENT:      Head: Normocephalic and atraumatic. Right Ear: External ear normal.      Left Ear: External ear normal.      Nose: Nose normal.   Eyes:      Conjunctiva/sclera: Conjunctivae normal.      Pupils: Pupils are equal, round, and reactive to light. Comments: Fundi nl   Neck:      Thyroid: No thyromegaly. Cardiovascular:      Rate and Rhythm: Normal rate and regular rhythm. Heart sounds: Murmur (2/6 korey) heard. Pulmonary:      Effort: Pulmonary effort is normal.      Breath sounds: Normal breath sounds. No wheezing or rales. Abdominal:      General: Bowel sounds are normal.      Palpations: Abdomen is soft. There is no mass. Tenderness: There is no abdominal tenderness. Musculoskeletal:         General: Normal range of motion. Cervical back: Normal range of motion and neck supple. Lymphadenopathy:      Cervical: No cervical adenopathy. Skin:     General: Skin is warm and dry. Findings: No rash. Neurological:      Mental Status: He is alert and oriented to person, place, and time. Cranial Nerves: No cranial nerve deficit. Deep Tendon Reflexes: Reflexes are normal and symmetric. Assessment:        ICD-10-CM    1. Primary hypertension  I10       2. CRF (chronic renal failure), stage 3 (moderate) ([facility])  N18.30       3.  Nonrheumatic mitral valve regurgitation  I34.0       4. 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  I25.810       5. Pure hypercholesterolemia  E78.00       6. Asymptomatic PVCs  I49.3              Plan:      Current Outpatient Medications   Medication Sig Dispense Refill    tamsulosin (FLOMAX) 0.4 MG capsule TAKE 1 CAPSULE BY MOUTH EVERY EVENING 90 capsule 1    simvastatin (ZOCOR) 40 MG tablet TAKE 1 TABLET BY MOUTH EVERY NIGHT 90 tablet 1    lisinopril-hydroCHLOROthiazide (PRINZIDE;ZESTORETIC) 20-12.5 MG per tablet TAKE 1 TABLET BY MOUTH EVERY DAY 90 tablet 1    carvedilol (COREG) 12.5 MG tablet TAKE 1 TABLET BY MOUTH TWICE DAILY 180 tablet 1    acetaminophen (TYLENOL) 325 MG tablet Take 650 mg by mouth every 6 hours as needed for Pain      aspirin 81 MG EC tablet Take 81 mg by mouth daily. multivitamin (THERAGRAN) per tablet Take 1 tablet by mouth daily. No current facility-administered medications for this visit. No orders of the defined types were placed in this encounter. No orders of the defined types were placed in this encounter.         Chinmay Garner MD

## 2023-03-20 NOTE — TELEPHONE ENCOUNTER
Date of last visit:  1/17/2023  Date of next visit:  Visit date not found    Requested Prescriptions     Pending Prescriptions Disp Refills    carvedilol (COREG) 12.5 MG tablet [Pharmacy Med Name: CARVEDILOL 12.5MG TABLETS] 180 tablet 1     Sig: TAKE 1 TABLET BY MOUTH TWICE DAILY

## 2023-03-21 RX ORDER — CARVEDILOL 12.5 MG/1
TABLET ORAL
Qty: 180 TABLET | Refills: 1 | Status: SHIPPED | OUTPATIENT
Start: 2023-03-21

## 2023-04-05 NOTE — TELEPHONE ENCOUNTER
Date of last visit:  8/17/2020  Date of next visit:  Visit date not found    Requested Prescriptions     Pending Prescriptions Disp Refills    carvedilol (COREG) 12.5 MG tablet [Pharmacy Med Name: CARVEDILOL 12.5MG TABLETS] 180 tablet 1     Sig: TAKE 1 TABLET BY MOUTH TWICE DAILY N/A

## 2023-05-03 DIAGNOSIS — E78.00 PURE HYPERCHOLESTEROLEMIA: Primary | ICD-10-CM

## 2023-05-03 DIAGNOSIS — I49.3 ASYMPTOMATIC PVCS: ICD-10-CM

## 2023-05-03 DIAGNOSIS — N40.1 BENIGN NON-NODULAR PROSTATIC HYPERPLASIA WITH LOWER URINARY TRACT SYMPTOMS: ICD-10-CM

## 2023-05-03 DIAGNOSIS — I10 ESSENTIAL HYPERTENSION: ICD-10-CM

## 2023-05-03 NOTE — TELEPHONE ENCOUNTER
Date of last visit:  1/17/2023  Date of next visit:  Visit date not found    Requested Prescriptions     Pending Prescriptions Disp Refills    simvastatin (ZOCOR) 40 MG tablet [Pharmacy Med Name: SIMVASTATIN 40MG TABLETS] 90 tablet 1     Sig: TAKE 1 TABLET BY MOUTH EVERY NIGHT

## 2023-05-04 RX ORDER — SIMVASTATIN 40 MG
TABLET ORAL
Qty: 90 TABLET | Refills: 1 | Status: SHIPPED | OUTPATIENT
Start: 2023-05-04

## 2023-05-04 RX ORDER — LISINOPRIL AND HYDROCHLOROTHIAZIDE 20; 12.5 MG/1; MG/1
TABLET ORAL
Qty: 90 TABLET | Refills: 0 | Status: SHIPPED | OUTPATIENT
Start: 2023-05-04

## 2023-06-01 DIAGNOSIS — N40.1 BENIGN NON-NODULAR PROSTATIC HYPERPLASIA WITH LOWER URINARY TRACT SYMPTOMS: ICD-10-CM

## 2023-06-01 NOTE — TELEPHONE ENCOUNTER
Date of last visit:  1/17/2023  Date of next visit:  Visit date not found    Requested Prescriptions     Pending Prescriptions Disp Refills    tamsulosin (FLOMAX) 0.4 MG capsule [Pharmacy Med Name: TAMSULOSIN 0.4MG CAPSULES] 90 capsule 1     Sig: TAKE 1 CAPSULE BY MOUTH EVERY EVENING

## 2023-06-02 RX ORDER — TAMSULOSIN HYDROCHLORIDE 0.4 MG/1
CAPSULE ORAL
Qty: 90 CAPSULE | Refills: 1 | Status: SHIPPED | OUTPATIENT
Start: 2023-06-02

## 2023-08-01 NOTE — TELEPHONE ENCOUNTER
----- Message from Angie Hagen MD sent at 11/26/2017 11:52 PM EST -----  Labs with sugar  Up again but call ? If  Done  Fasting and  Creat  Up some . Does  He drink much water?
Pt informed. He stated that he probably does not drink as much water as he probably should. He did the labs NON-fasting. Asking if anything else needs to be done.
stop by  For  Fasting  Glu and  If  Up [id number] and  Still need  Echo and holter
No.

## 2023-08-03 DIAGNOSIS — I10 ESSENTIAL HYPERTENSION: ICD-10-CM

## 2023-08-03 RX ORDER — LISINOPRIL AND HYDROCHLOROTHIAZIDE 20; 12.5 MG/1; MG/1
TABLET ORAL
Qty: 90 TABLET | Refills: 0 | Status: SHIPPED | OUTPATIENT
Start: 2023-08-03

## 2023-08-08 ENCOUNTER — NURSE ONLY (OUTPATIENT)
Dept: LAB | Age: 88
End: 2023-08-08

## 2023-08-08 DIAGNOSIS — I10 ESSENTIAL HYPERTENSION: ICD-10-CM

## 2023-08-08 DIAGNOSIS — I49.3 ASYMPTOMATIC PVCS: ICD-10-CM

## 2023-08-08 LAB
ALBUMIN SERPL BCG-MCNC: 4.2 G/DL (ref 3.5–5.1)
ALP SERPL-CCNC: 77 U/L (ref 38–126)
ALT SERPL W/O P-5'-P-CCNC: 18 U/L (ref 11–66)
ANION GAP SERPL CALC-SCNC: 11 MEQ/L (ref 8–16)
AST SERPL-CCNC: 23 U/L (ref 5–40)
BASOPHILS ABSOLUTE: 0.1 THOU/MM3 (ref 0–0.1)
BASOPHILS NFR BLD AUTO: 0.9 %
BILIRUB SERPL-MCNC: 0.6 MG/DL (ref 0.3–1.2)
BUN SERPL-MCNC: 39 MG/DL (ref 7–22)
CALCIUM SERPL-MCNC: 9.4 MG/DL (ref 8.5–10.5)
CHLORIDE SERPL-SCNC: 103 MEQ/L (ref 98–111)
CHOLEST SERPL-MCNC: 113 MG/DL (ref 100–199)
CO2 SERPL-SCNC: 24 MEQ/L (ref 23–33)
CREAT SERPL-MCNC: 1.8 MG/DL (ref 0.4–1.2)
DEPRECATED RDW RBC AUTO: 47.9 FL (ref 35–45)
EOSINOPHIL NFR BLD AUTO: 8.1 %
EOSINOPHILS ABSOLUTE: 0.5 THOU/MM3 (ref 0–0.4)
ERYTHROCYTE [DISTWIDTH] IN BLOOD BY AUTOMATED COUNT: 13.9 % (ref 11.5–14.5)
GFR SERPL CREATININE-BSD FRML MDRD: 35 ML/MIN/1.73M2
GLUCOSE SERPL-MCNC: 136 MG/DL (ref 70–108)
HCT VFR BLD AUTO: 44.8 % (ref 42–52)
HDLC SERPL-MCNC: 41 MG/DL
HGB BLD-MCNC: 14 GM/DL (ref 14–18)
IMM GRANULOCYTES # BLD AUTO: 0.03 THOU/MM3 (ref 0–0.07)
IMM GRANULOCYTES NFR BLD AUTO: 0.5 %
LDLC SERPL CALC-MCNC: 44 MG/DL
LYMPHOCYTES ABSOLUTE: 0.5 THOU/MM3 (ref 1–4.8)
LYMPHOCYTES NFR BLD AUTO: 8.4 %
MAGNESIUM SERPL-MCNC: 2.2 MG/DL (ref 1.6–2.4)
MCH RBC QN AUTO: 29.5 PG (ref 26–33)
MCHC RBC AUTO-ENTMCNC: 31.3 GM/DL (ref 32.2–35.5)
MCV RBC AUTO: 94.3 FL (ref 80–94)
MONOCYTES ABSOLUTE: 0.4 THOU/MM3 (ref 0.4–1.3)
MONOCYTES NFR BLD AUTO: 5.7 %
NEUTROPHILS NFR BLD AUTO: 76.4 %
NRBC BLD AUTO-RTO: 0 /100 WBC
PLATELET # BLD AUTO: 234 THOU/MM3 (ref 130–400)
PMV BLD AUTO: 10.8 FL (ref 9.4–12.4)
POTASSIUM SERPL-SCNC: 5.5 MEQ/L (ref 3.5–5.2)
PROT SERPL-MCNC: 7.6 G/DL (ref 6.1–8)
RBC # BLD AUTO: 4.75 MILL/MM3 (ref 4.7–6.1)
SEGMENTED NEUTROPHILS ABSOLUTE COUNT: 4.8 THOU/MM3 (ref 1.8–7.7)
SODIUM SERPL-SCNC: 138 MEQ/L (ref 135–145)
TRIGL SERPL-MCNC: 140 MG/DL (ref 0–199)
TSH SERPL DL<=0.005 MIU/L-ACNC: 0.83 UIU/ML (ref 0.4–4.2)
WBC # BLD AUTO: 6.3 THOU/MM3 (ref 4.8–10.8)

## 2023-08-09 ENCOUNTER — TELEPHONE (OUTPATIENT)
Dept: FAMILY MEDICINE CLINIC | Age: 88
End: 2023-08-09

## 2023-08-09 NOTE — TELEPHONE ENCOUNTER
----- Message from Silva Stern MD sent at 8/9/2023  5:47 AM EDT -----  Keep appt Friday  please call     Potassium high so watch potassium food      Will probably change lisinopril hct when see in the office  on 8-11-23 as creat slight up still

## 2023-08-10 SDOH — ECONOMIC STABILITY: INCOME INSECURITY: HOW HARD IS IT FOR YOU TO PAY FOR THE VERY BASICS LIKE FOOD, HOUSING, MEDICAL CARE, AND HEATING?: NOT HARD AT ALL

## 2023-08-10 SDOH — ECONOMIC STABILITY: FOOD INSECURITY: WITHIN THE PAST 12 MONTHS, THE FOOD YOU BOUGHT JUST DIDN'T LAST AND YOU DIDN'T HAVE MONEY TO GET MORE.: NEVER TRUE

## 2023-08-10 SDOH — ECONOMIC STABILITY: HOUSING INSECURITY
IN THE LAST 12 MONTHS, WAS THERE A TIME WHEN YOU DID NOT HAVE A STEADY PLACE TO SLEEP OR SLEPT IN A SHELTER (INCLUDING NOW)?: NO

## 2023-08-10 SDOH — ECONOMIC STABILITY: TRANSPORTATION INSECURITY
IN THE PAST 12 MONTHS, HAS LACK OF TRANSPORTATION KEPT YOU FROM MEETINGS, WORK, OR FROM GETTING THINGS NEEDED FOR DAILY LIVING?: NO

## 2023-08-10 SDOH — ECONOMIC STABILITY: FOOD INSECURITY: WITHIN THE PAST 12 MONTHS, YOU WORRIED THAT YOUR FOOD WOULD RUN OUT BEFORE YOU GOT MONEY TO BUY MORE.: NEVER TRUE

## 2023-08-11 ENCOUNTER — OFFICE VISIT (OUTPATIENT)
Dept: FAMILY MEDICINE CLINIC | Age: 88
End: 2023-08-11

## 2023-08-11 VITALS
HEIGHT: 67 IN | DIASTOLIC BLOOD PRESSURE: 62 MMHG | SYSTOLIC BLOOD PRESSURE: 118 MMHG | HEART RATE: 84 BPM | BODY MASS INDEX: 21.43 KG/M2 | RESPIRATION RATE: 16 BRPM | WEIGHT: 136.5 LBS

## 2023-08-11 DIAGNOSIS — I10 PRIMARY HYPERTENSION: Primary | ICD-10-CM

## 2023-08-11 DIAGNOSIS — N40.1 BENIGN PROSTATIC HYPERPLASIA WITH URINARY FREQUENCY: ICD-10-CM

## 2023-08-11 DIAGNOSIS — E78.00 PURE HYPERCHOLESTEROLEMIA: ICD-10-CM

## 2023-08-11 DIAGNOSIS — I34.0 NONRHEUMATIC MITRAL VALVE REGURGITATION: ICD-10-CM

## 2023-08-11 DIAGNOSIS — N18.30 CRF (CHRONIC RENAL FAILURE), STAGE 3 (MODERATE) (HCC): ICD-10-CM

## 2023-08-11 DIAGNOSIS — R35.0 BENIGN PROSTATIC HYPERPLASIA WITH URINARY FREQUENCY: ICD-10-CM

## 2023-08-11 DIAGNOSIS — I25.810 CORONARY ARTERY DISEASE INVOLVING AUTOLOGOUS VEIN CORONARY BYPASS GRAFT WITHOUT ANGINA PECTORIS: ICD-10-CM

## 2023-08-11 ASSESSMENT — ENCOUNTER SYMPTOMS: ORTHOPNEA: 0

## 2023-08-11 NOTE — PROGRESS NOTES
EC tablet Take 1 tablet by mouth daily      multivitamin (THERAGRAN) per tablet Take 1 tablet by mouth daily       No current facility-administered medications for this visit. No orders of the defined types were placed in this encounter.      Orders Placed This Encounter   Procedures    BUN & Creatinine     Standing Status:   Future     Standing Expiration Date:   8/10/2024    Electrolyte Panel     Standing Status:   Future     Standing Expiration Date:   8/10/2024            Recheck in  2  weeks as change  diet and  if potassium still up  the  decrease  lisinopril  Abiodun Rowland MD

## 2023-08-29 DIAGNOSIS — I10 PRIMARY HYPERTENSION: ICD-10-CM

## 2023-08-29 LAB
ANION GAP SERPL CALC-SCNC: 11 MEQ/L (ref 8–16)
BUN SERPL-MCNC: 49 MG/DL (ref 7–22)
CHLORIDE SERPL-SCNC: 107 MEQ/L (ref 98–111)
CO2 SERPL-SCNC: 23 MEQ/L (ref 23–33)
CREAT SERPL-MCNC: 1.8 MG/DL (ref 0.4–1.2)
GFR SERPL CREATININE-BSD FRML MDRD: 35 ML/MIN/1.73M2
POTASSIUM SERPL-SCNC: 4.4 MEQ/L (ref 3.5–5.2)
SODIUM SERPL-SCNC: 141 MEQ/L (ref 135–145)

## 2023-08-30 ENCOUNTER — TELEPHONE (OUTPATIENT)
Dept: FAMILY MEDICINE CLINIC | Age: 88
End: 2023-08-30

## 2023-08-30 DIAGNOSIS — N18.30 CRF (CHRONIC RENAL FAILURE), STAGE 3 (MODERATE) (HCC): ICD-10-CM

## 2023-08-30 DIAGNOSIS — I10 PRIMARY HYPERTENSION: ICD-10-CM

## 2023-08-30 DIAGNOSIS — I10 PRIMARY HYPERTENSION: Primary | ICD-10-CM

## 2023-08-30 RX ORDER — LISINOPRIL 10 MG/1
10 TABLET ORAL DAILY
Qty: 30 TABLET | Refills: 2 | Status: SHIPPED | OUTPATIENT
Start: 2023-08-30 | End: 2023-09-27

## 2023-08-30 RX ORDER — LISINOPRIL 10 MG/1
10 TABLET ORAL DAILY
Qty: 90 TABLET | Refills: 1 | OUTPATIENT
Start: 2023-08-30

## 2023-08-30 NOTE — TELEPHONE ENCOUNTER
----- Message from Ozzie Edward MD sent at 8/30/2023  6:38 AM EDT -----  Potassium is better  creat still 1.8    Stop the lisinopril hct 20-12.5 and go to lisinopril 10 mg only as sent in scrpt    Stay off chips and nuts  as potassium ok     Labs in 3 weeks to recheck and bp check in 10 days with new dose of lisinopril as should help with bun and creat    Please call

## 2023-08-30 NOTE — TELEPHONE ENCOUNTER
Date of last visit:  8/11/2023  Date of next visit:  Visit date not found    Requested Prescriptions     Pending Prescriptions Disp Refills    lisinopril (PRINIVIL;ZESTRIL) 10 MG tablet [Pharmacy Med Name: LISINOPRIL 10MG TABLETS] 90 tablet 1     Sig: TAKE 1 TABLET BY MOUTH DAILY

## 2023-09-11 ENCOUNTER — NURSE ONLY (OUTPATIENT)
Dept: FAMILY MEDICINE CLINIC | Age: 88
End: 2023-09-11

## 2023-09-11 VITALS — HEART RATE: 68 BPM | DIASTOLIC BLOOD PRESSURE: 64 MMHG | SYSTOLIC BLOOD PRESSURE: 118 MMHG

## 2023-09-11 DIAGNOSIS — I10 PRIMARY HYPERTENSION: Primary | ICD-10-CM

## 2023-09-14 RX ORDER — CARVEDILOL 12.5 MG/1
TABLET ORAL
Qty: 180 TABLET | Refills: 1 | Status: SHIPPED | OUTPATIENT
Start: 2023-09-14

## 2023-09-14 NOTE — TELEPHONE ENCOUNTER
The pharmacy is requesting a refill of the below medication which has been pended for you:     Requested Prescriptions     Pending Prescriptions Disp Refills    carvedilol (COREG) 12.5 MG tablet [Pharmacy Med Name: CARVEDILOL 12.5MG TABLETS] 180 tablet 1     Sig: TAKE 1 TABLET BY MOUTH TWICE DAILY       Last Appointment Date: 8/11/2023  Next Appointment Date: Visit date not found    Allergies   Allergen Reactions    Pcn [Penicillins] Anaphylaxis    Ibuprofen     Percocet [Oxycodone-Acetaminophen] Nausea And Vomiting

## 2023-09-21 ENCOUNTER — NURSE ONLY (OUTPATIENT)
Dept: LAB | Age: 88
End: 2023-09-21

## 2023-09-21 DIAGNOSIS — I10 PRIMARY HYPERTENSION: ICD-10-CM

## 2023-09-21 DIAGNOSIS — N18.30 CRF (CHRONIC RENAL FAILURE), STAGE 3 (MODERATE) (HCC): ICD-10-CM

## 2023-09-21 LAB
ANION GAP SERPL CALC-SCNC: 11 MEQ/L (ref 8–16)
BUN SERPL-MCNC: 37 MG/DL (ref 7–22)
CHLORIDE SERPL-SCNC: 107 MEQ/L (ref 98–111)
CO2 SERPL-SCNC: 22 MEQ/L (ref 23–33)
CREAT SERPL-MCNC: 1.5 MG/DL (ref 0.4–1.2)
GFR SERPL CREATININE-BSD FRML MDRD: 44 ML/MIN/1.73M2
POTASSIUM SERPL-SCNC: 4.1 MEQ/L (ref 3.5–5.2)
SODIUM SERPL-SCNC: 140 MEQ/L (ref 135–145)

## 2023-09-22 ENCOUNTER — TELEPHONE (OUTPATIENT)
Dept: FAMILY MEDICINE CLINIC | Age: 88
End: 2023-09-22

## 2023-09-22 NOTE — TELEPHONE ENCOUNTER
----- Message from Gretta Aviles MD sent at 9/22/2023  5:57 AM EDT -----  Labs better with less potassium intake and medication change . Just do a bp check and bring his machine for comparison.   Creat is now  better

## 2023-09-26 DIAGNOSIS — N18.30 CRF (CHRONIC RENAL FAILURE), STAGE 3 (MODERATE) (HCC): ICD-10-CM

## 2023-09-26 DIAGNOSIS — I10 PRIMARY HYPERTENSION: ICD-10-CM

## 2023-09-27 RX ORDER — LISINOPRIL 10 MG/1
10 TABLET ORAL DAILY
Qty: 90 TABLET | Refills: 1 | Status: SHIPPED | OUTPATIENT
Start: 2023-09-27

## 2023-10-06 ENCOUNTER — TELEPHONE (OUTPATIENT)
Dept: FAMILY MEDICINE CLINIC | Age: 88
End: 2023-10-06

## 2023-10-06 DIAGNOSIS — I10 ESSENTIAL HYPERTENSION: ICD-10-CM

## 2023-10-06 DIAGNOSIS — N18.30 CRF (CHRONIC RENAL FAILURE), STAGE 3 (MODERATE) (HCC): Primary | ICD-10-CM

## 2023-10-06 RX ORDER — FUROSEMIDE 20 MG/1
TABLET ORAL
Qty: 20 TABLET | Refills: 0 | Status: SHIPPED | OUTPATIENT
Start: 2023-10-06

## 2023-10-06 NOTE — TELEPHONE ENCOUNTER
Patient called with complaints of Bilateral Feet/Leg Edema. He was taken off HCTZ due to his Kidney Function and Potassium Level. Per verbal order from Dr Gianluca Grewal: Lasix 20mg PO Daily x3 days then 1 tablet every 3 days as needed for Swelling. Date of last visit:  8/11/2023  Date of next visit:  Visit date not found    Requested Prescriptions     Signed Prescriptions Disp Refills    furosemide (LASIX) 20 MG tablet 20 tablet 0     Sig: Take 1 tablet, PO x 3 days then every 3 days as needed for Edema. Authorizing Provider: Corina Stone     Ordering User: Karmen Lindquist informed by Phone.

## 2023-10-12 ENCOUNTER — NURSE ONLY (OUTPATIENT)
Dept: LAB | Age: 88
End: 2023-10-12

## 2023-10-12 DIAGNOSIS — I10 ESSENTIAL HYPERTENSION: ICD-10-CM

## 2023-10-12 DIAGNOSIS — N18.30 CRF (CHRONIC RENAL FAILURE), STAGE 3 (MODERATE) (HCC): ICD-10-CM

## 2023-10-12 LAB
ANION GAP SERPL CALC-SCNC: 15 MEQ/L (ref 8–16)
BUN SERPL-MCNC: 37 MG/DL (ref 7–22)
CALCIUM SERPL-MCNC: 8.9 MG/DL (ref 8.5–10.5)
CHLORIDE SERPL-SCNC: 108 MEQ/L (ref 98–111)
CO2 SERPL-SCNC: 20 MEQ/L (ref 23–33)
CREAT SERPL-MCNC: 1.7 MG/DL (ref 0.4–1.2)
GFR SERPL CREATININE-BSD FRML MDRD: 38 ML/MIN/1.73M2
GLUCOSE SERPL-MCNC: 157 MG/DL (ref 70–108)
MAGNESIUM SERPL-MCNC: 1.8 MG/DL (ref 1.6–2.4)
POTASSIUM SERPL-SCNC: 4.2 MEQ/L (ref 3.5–5.2)
SODIUM SERPL-SCNC: 143 MEQ/L (ref 135–145)

## 2023-11-08 RX ORDER — SIMVASTATIN 40 MG
TABLET ORAL
Qty: 90 TABLET | Refills: 1 | Status: SHIPPED | OUTPATIENT
Start: 2023-11-08

## 2023-11-08 NOTE — TELEPHONE ENCOUNTER
Date of last visit:  8/11/2023  Date of next visit:  Visit date not found    Requested Prescriptions     Pending Prescriptions Disp Refills    simvastatin (ZOCOR) 40 MG tablet [Pharmacy Med Name: SIMVASTATIN 40MG TABLETS] 90 tablet 1     Sig: TAKE 1 TABLET BY MOUTH EVERY NIGHT

## 2023-11-24 DIAGNOSIS — N40.1 BENIGN NON-NODULAR PROSTATIC HYPERPLASIA WITH LOWER URINARY TRACT SYMPTOMS: ICD-10-CM

## 2023-11-27 NOTE — TELEPHONE ENCOUNTER
Date of last visit:  8/11/2023  Date of next visit:  Visit date not found    Requested Prescriptions     Pending Prescriptions Disp Refills    tamsulosin (FLOMAX) 0.4 MG capsule [Pharmacy Med Name: TAMSULOSIN 0.4MG CAPSULES] 90 capsule 1     Sig: TAKE 1 CAPSULE BY MOUTH EVERY EVENING

## 2023-11-28 RX ORDER — TAMSULOSIN HYDROCHLORIDE 0.4 MG/1
CAPSULE ORAL
Qty: 90 CAPSULE | Refills: 1 | Status: SHIPPED | OUTPATIENT
Start: 2023-11-28

## 2023-12-05 RX ORDER — FUROSEMIDE 20 MG/1
TABLET ORAL
Qty: 30 TABLET | Refills: 0 | Status: SHIPPED | OUTPATIENT
Start: 2023-12-05

## 2023-12-05 NOTE — TELEPHONE ENCOUNTER
Date of last visit:  8/11/2023  Date of next visit:  Visit date not found    Requested Prescriptions     Pending Prescriptions Disp Refills    furosemide (LASIX) 20 MG tablet [Pharmacy Med Name: FUROSEMIDE 20MG TABLETS] 20 tablet 0     Sig: TAKE 1 TABLET BY MOUTH EVERY 3 DAYS FOR 3 DAYS AS NEEDED FOR SWELLING

## 2023-12-14 ENCOUNTER — HOSPITAL ENCOUNTER (EMERGENCY)
Age: 88
Discharge: HOME | End: 2023-12-14
Payer: MEDICARE

## 2023-12-14 VITALS
SYSTOLIC BLOOD PRESSURE: 184 MMHG | WEIGHT: 142 LBS | RESPIRATION RATE: 16 BRPM | TEMPERATURE: 97.6 F | BODY MASS INDEX: 22.24 KG/M2 | DIASTOLIC BLOOD PRESSURE: 97 MMHG | HEART RATE: 82 BPM | OXYGEN SATURATION: 100 %

## 2023-12-14 DIAGNOSIS — R60.9 PERIPHERAL EDEMA: Primary | ICD-10-CM

## 2023-12-14 DIAGNOSIS — I83.009 VENOUS ULCER (HCC): ICD-10-CM

## 2023-12-14 DIAGNOSIS — L97.909 VENOUS ULCER (HCC): ICD-10-CM

## 2023-12-14 PROCEDURE — 99213 OFFICE O/P EST LOW 20 MIN: CPT

## 2023-12-14 RX ORDER — SULFAMETHOXAZOLE AND TRIMETHOPRIM 800; 160 MG/1; MG/1
1 TABLET ORAL 2 TIMES DAILY
Qty: 14 TABLET | Refills: 0 | Status: SHIPPED | OUTPATIENT
Start: 2023-12-14 | End: 2023-12-21

## 2023-12-14 ASSESSMENT — PAIN DESCRIPTION - ORIENTATION: ORIENTATION: LEFT;RIGHT

## 2023-12-14 ASSESSMENT — PAIN - FUNCTIONAL ASSESSMENT: PAIN_FUNCTIONAL_ASSESSMENT: NONE - DENIES PAIN

## 2023-12-14 ASSESSMENT — PAIN DESCRIPTION - PAIN TYPE: TYPE: ACUTE PAIN

## 2023-12-14 ASSESSMENT — PAIN DESCRIPTION - LOCATION: LOCATION: ANKLE

## 2023-12-14 ASSESSMENT — PAIN DESCRIPTION - DESCRIPTORS: DESCRIPTORS: BURNING;ITCHING

## 2023-12-14 NOTE — ED PROVIDER NOTES
[address]  Urgent Care Encounter       CHIEF COMPLAINT       Chief Complaint   Patient presents with    Rash     Bilat. Ankles, seeping       Nurses Notes reviewed and I agree except as noted in the HPI. HISTORY OF PRESENT ILLNESS   Teresa Marks is a 80 y.o. male who presents with complaints of bilateral lower leg edema, drainage and sores. Pt reports symptoms have been going on for the last few days. Pt reports using bacitracin, hydrocortisone cream, and band aids. Pt reports he is on water pill every 3rd day. The history is provided by the patient. REVIEW OF SYSTEMS     Review of Systems   Cardiovascular:  Positive for leg swelling. Musculoskeletal:         Bilateral ankle swelling and seeping / rash   All other systems reviewed and are negative. PAST MEDICAL HISTORY         Diagnosis Date    ANJANA (acute kidney injury) ([address])     ANJANA (acute kidney injury) ([address]) 2/4/2022    Arrhythmia 1/22/2019    Arthritis     BPH (benign prostatic hypertrophy)     CAD (coronary artery disease)     Cerebrovascular disease     Gastroenteritis due to norovirus 2/5/2022    Hyperlipidemia     Hypertension     Mitral regurgitation     PVC (premature ventricular contraction) 1/22/2019    Small bowel obstruction ([address]) 8/30/2020    TIA (transient ischemic attack) 2009    post surgery resolved       SURGICALHISTORY     Patient  has a past surgical history that includes Coronary artery bypass graft (01/01/2009); Cataract removal (Right, 07/01/2015); hernia repair (N/A, 09/02/2020); and Small intestine surgery (09/02/2020).     CURRENT MEDICATIONS       Discharge Medication List as of 12/14/2023 11:41 AM        CONTINUE these medications which have NOT CHANGED    Details   furosemide (LASIX) 20 MG tablet TAKE 1 TABLET BY MOUTH EVERY 3 DAYS FOR 3 DAYS AS NEEDED FOR SWELLING, Disp-30 tablet, R-0Normal      tamsulosin (FLOMAX) 0.4 MG capsule TAKE 1 CAPSULE BY MOUTH EVERY EVENING, Disp-90 capsule,

## 2023-12-26 ENCOUNTER — OFFICE VISIT (OUTPATIENT)
Dept: FAMILY MEDICINE CLINIC | Age: 88
End: 2023-12-26

## 2023-12-26 ENCOUNTER — NURSE ONLY (OUTPATIENT)
Dept: LAB | Age: 88
End: 2023-12-26

## 2023-12-26 VITALS
DIASTOLIC BLOOD PRESSURE: 64 MMHG | RESPIRATION RATE: 14 BRPM | HEIGHT: 67 IN | SYSTOLIC BLOOD PRESSURE: 122 MMHG | WEIGHT: 149 LBS | HEART RATE: 72 BPM | BODY MASS INDEX: 23.39 KG/M2

## 2023-12-26 DIAGNOSIS — L03.115 CELLULITIS OF RIGHT LEG: ICD-10-CM

## 2023-12-26 DIAGNOSIS — N18.32 CHRONIC RENAL FAILURE (CRF), STAGE 3B (HCC): ICD-10-CM

## 2023-12-26 DIAGNOSIS — N18.30 CRF (CHRONIC RENAL FAILURE), STAGE 3 (MODERATE) (HCC): ICD-10-CM

## 2023-12-26 DIAGNOSIS — R60.0 EDEMA OF RIGHT LOWER EXTREMITY: Primary | ICD-10-CM

## 2023-12-26 DIAGNOSIS — I50.9 ACUTE CONGESTIVE HEART FAILURE, UNSPECIFIED HEART FAILURE TYPE (HCC): ICD-10-CM

## 2023-12-26 DIAGNOSIS — R60.0 EDEMA OF BOTH LOWER LEGS DUE TO PERIPHERAL VENOUS INSUFFICIENCY: ICD-10-CM

## 2023-12-26 DIAGNOSIS — I87.2 EDEMA OF BOTH LOWER LEGS DUE TO PERIPHERAL VENOUS INSUFFICIENCY: ICD-10-CM

## 2023-12-26 DIAGNOSIS — I13.0 HYPERTENSIVE HEART AND CHRONIC KIDNEY DISEASE WITH HEART FAILURE AND STAGE 1 THROUGH STAGE 4 CHRONIC KIDNEY DISEASE, OR UNSPECIFIED CHRONIC KIDNEY DISEASE (HCC): ICD-10-CM

## 2023-12-26 LAB
ANION GAP SERPL CALC-SCNC: 10 MEQ/L (ref 8–16)
BASOPHILS ABSOLUTE: 0.1 THOU/MM3 (ref 0–0.1)
BASOPHILS NFR BLD AUTO: 0.9 %
BUN SERPL-MCNC: 51 MG/DL (ref 7–22)
CALCIUM SERPL-MCNC: 8.8 MG/DL (ref 8.5–10.5)
CHLORIDE SERPL-SCNC: 103 MEQ/L (ref 98–111)
CO2 SERPL-SCNC: 22 MEQ/L (ref 23–33)
CREAT SERPL-MCNC: 2.6 MG/DL (ref 0.4–1.2)
DEPRECATED RDW RBC AUTO: 52.1 FL (ref 35–45)
EOSINOPHIL NFR BLD AUTO: 4.9 %
EOSINOPHILS ABSOLUTE: 0.3 THOU/MM3 (ref 0–0.4)
ERYTHROCYTE [DISTWIDTH] IN BLOOD BY AUTOMATED COUNT: 14.9 % (ref 11.5–14.5)
GFR SERPL CREATININE-BSD FRML MDRD: 23 ML/MIN/1.73M2
GLUCOSE SERPL-MCNC: 183 MG/DL (ref 70–108)
HCT VFR BLD AUTO: 44.8 % (ref 42–52)
HGB BLD-MCNC: 13.4 GM/DL (ref 14–18)
IMM GRANULOCYTES # BLD AUTO: 0.03 THOU/MM3 (ref 0–0.07)
IMM GRANULOCYTES NFR BLD AUTO: 0.5 %
LYMPHOCYTES ABSOLUTE: 0.5 THOU/MM3 (ref 1–4.8)
LYMPHOCYTES NFR BLD AUTO: 8.5 %
MCH RBC QN AUTO: 28.5 PG (ref 26–33)
MCHC RBC AUTO-ENTMCNC: 29.9 GM/DL (ref 32.2–35.5)
MCV RBC AUTO: 95.1 FL (ref 80–94)
MONOCYTES ABSOLUTE: 0.3 THOU/MM3 (ref 0.4–1.3)
MONOCYTES NFR BLD AUTO: 5.3 %
NEUTROPHILS NFR BLD AUTO: 79.9 %
NRBC BLD AUTO-RTO: 0 /100 WBC
PLATELET # BLD AUTO: 208 THOU/MM3 (ref 130–400)
PMV BLD AUTO: 12.3 FL (ref 9.4–12.4)
POTASSIUM SERPL-SCNC: 5.2 MEQ/L (ref 3.5–5.2)
RBC # BLD AUTO: 4.71 MILL/MM3 (ref 4.7–6.1)
SEGMENTED NEUTROPHILS ABSOLUTE COUNT: 5.1 THOU/MM3 (ref 1.8–7.7)
SODIUM SERPL-SCNC: 135 MEQ/L (ref 135–145)
WBC # BLD AUTO: 6.4 THOU/MM3 (ref 4.8–10.8)

## 2023-12-26 PROCEDURE — 99213 OFFICE O/P EST LOW 20 MIN: CPT | Performed by: FAMILY MEDICINE

## 2023-12-26 PROCEDURE — 1036F TOBACCO NON-USER: CPT | Performed by: FAMILY MEDICINE

## 2023-12-26 PROCEDURE — G8420 CALC BMI NORM PARAMETERS: HCPCS | Performed by: FAMILY MEDICINE

## 2023-12-26 PROCEDURE — G8427 DOCREV CUR MEDS BY ELIG CLIN: HCPCS | Performed by: FAMILY MEDICINE

## 2023-12-26 PROCEDURE — 1123F ACP DISCUSS/DSCN MKR DOCD: CPT | Performed by: FAMILY MEDICINE

## 2023-12-26 RX ORDER — LISINOPRIL 5 MG/1
5 TABLET ORAL DAILY
Qty: 30 TABLET | Refills: 5 | Status: SHIPPED | OUTPATIENT
Start: 2023-12-26

## 2023-12-26 RX ORDER — FUROSEMIDE 20 MG/1
TABLET ORAL
Qty: 30 TABLET | Refills: 0 | Status: SHIPPED
Start: 2023-12-26

## 2023-12-26 NOTE — TELEPHONE ENCOUNTER
Date of last visit:  12/26/2023  Date of next visit:  1/2/2024    Requested Prescriptions     Pending Prescriptions Disp Refills    lisinopril (PRINIVIL;ZESTRIL) 5 MG tablet [Pharmacy Med Name: LISINOPRIL 5MG TABLETS] 90 tablet      Sig: TAKE 1 TABLET BY MOUTH DAILY

## 2023-12-27 RX ORDER — LISINOPRIL 5 MG/1
5 TABLET ORAL DAILY
Qty: 90 TABLET | OUTPATIENT
Start: 2023-12-27

## 2023-12-29 ENCOUNTER — HOSPITAL ENCOUNTER (OUTPATIENT)
Age: 88
End: 2023-12-29
Attending: FAMILY MEDICINE
Payer: MEDICARE

## 2023-12-29 ENCOUNTER — HOSPITAL ENCOUNTER (OUTPATIENT)
Dept: INTERVENTIONAL RADIOLOGY/VASCULAR | Age: 88
End: 2023-12-29
Attending: FAMILY MEDICINE
Payer: MEDICARE

## 2023-12-29 VITALS
WEIGHT: 149 LBS | DIASTOLIC BLOOD PRESSURE: 64 MMHG | SYSTOLIC BLOOD PRESSURE: 122 MMHG | HEIGHT: 67 IN | BODY MASS INDEX: 23.39 KG/M2

## 2023-12-29 DIAGNOSIS — R60.0 EDEMA OF RIGHT LOWER EXTREMITY: ICD-10-CM

## 2023-12-29 DIAGNOSIS — I13.0 HYPERTENSIVE HEART AND CHRONIC KIDNEY DISEASE WITH HEART FAILURE AND STAGE 1 THROUGH STAGE 4 CHRONIC KIDNEY DISEASE, OR UNSPECIFIED CHRONIC KIDNEY DISEASE (HCC): ICD-10-CM

## 2023-12-29 DIAGNOSIS — N18.32 CHRONIC RENAL FAILURE (CRF), STAGE 3B (HCC): ICD-10-CM

## 2023-12-29 DIAGNOSIS — I50.9 ACUTE CONGESTIVE HEART FAILURE, UNSPECIFIED HEART FAILURE TYPE (HCC): ICD-10-CM

## 2023-12-29 LAB
ECHO AR MAX VEL PISA: 4.1 M/S
ECHO AV AREA PEAK VELOCITY: 0.8 CM2
ECHO AV AREA VTI: 0.8 CM2
ECHO AV AREA/BSA PEAK VELOCITY: 0.4 CM2/M2
ECHO AV AREA/BSA VTI: 0.4 CM2/M2
ECHO AV CUSP MM: 1.2 CM
ECHO AV MEAN GRADIENT: 8 MMHG
ECHO AV MEAN VELOCITY: 1.4 M/S
ECHO AV PEAK GRADIENT: 13 MMHG
ECHO AV PEAK VELOCITY: 1.8 M/S
ECHO AV REGURGITANT PHT: 504 MS
ECHO AV VELOCITY RATIO: 0.28
ECHO AV VTI: 42 CM
ECHO BSA: 1.79 M2
ECHO LA AREA 2C: 22.2 CM2
ECHO LA AREA 4C: 21.8 CM2
ECHO LA DIAMETER INDEX: 2.58 CM/M2
ECHO LA DIAMETER: 4.6 CM
ECHO LA MAJOR AXIS: 5.9 CM
ECHO LA MINOR AXIS: 5.8 CM
ECHO LA VOL BP: 68 ML (ref 18–58)
ECHO LA VOL MOD A2C: 70 ML (ref 18–58)
ECHO LA VOL MOD A4C: 64 ML (ref 18–58)
ECHO LA VOL/BSA BIPLANE: 38 ML/M2 (ref 16–34)
ECHO LA VOLUME INDEX MOD A2C: 39 ML/M2 (ref 16–34)
ECHO LA VOLUME INDEX MOD A4C: 36 ML/M2 (ref 16–34)
ECHO LV E' LATERAL VELOCITY: 6 CM/S
ECHO LV E' SEPTAL VELOCITY: 5 CM/S
ECHO LV EDV A2C: 142 ML
ECHO LV EDV A4C: 107 ML
ECHO LV EDV INDEX A4C: 60 ML/M2
ECHO LV EDV NDEX A2C: 80 ML/M2
ECHO LV EJECTION FRACTION A2C: 37 %
ECHO LV EJECTION FRACTION A4C: 37 %
ECHO LV EJECTION FRACTION BIPLANE: 36 % (ref 55–100)
ECHO LV ESV A2C: 90 ML
ECHO LV ESV A4C: 68 ML
ECHO LV ESV INDEX A2C: 51 ML/M2
ECHO LV ESV INDEX A4C: 38 ML/M2
ECHO LV FRACTIONAL SHORTENING: 18 % (ref 28–44)
ECHO LV INTERNAL DIMENSION DIASTOLE INDEX: 2.53 CM/M2
ECHO LV INTERNAL DIMENSION DIASTOLIC: 4.5 CM (ref 4.2–5.9)
ECHO LV INTERNAL DIMENSION SYSTOLIC INDEX: 2.08 CM/M2
ECHO LV INTERNAL DIMENSION SYSTOLIC: 3.7 CM
ECHO LV ISOVOLUMETRIC RELAXATION TIME (IVRT): 74 MS
ECHO LV IVSD: 1.3 CM (ref 0.6–1)
ECHO LV MASS 2D: 186.4 G (ref 88–224)
ECHO LV MASS INDEX 2D: 104.7 G/M2 (ref 49–115)
ECHO LV POSTERIOR WALL DIASTOLIC: 1 CM (ref 0.6–1)
ECHO LV RELATIVE WALL THICKNESS RATIO: 0.44
ECHO LVOT AREA: 2.8 CM2
ECHO LVOT AV VTI INDEX: 0.28
ECHO LVOT DIAM: 1.9 CM
ECHO LVOT MEAN GRADIENT: 1 MMHG
ECHO LVOT PEAK GRADIENT: 1 MMHG
ECHO LVOT PEAK VELOCITY: 0.5 M/S
ECHO LVOT STROKE VOLUME INDEX: 18.6 ML/M2
ECHO LVOT SV: 33.2 ML
ECHO LVOT VTI: 11.7 CM
ECHO MV A VELOCITY: 0.78 M/S
ECHO MV E DECELERATION TIME (DT): 247 MS
ECHO MV E VELOCITY: 1.83 M/S
ECHO MV E/A RATIO: 2.35
ECHO MV E/E' LATERAL: 30.5
ECHO MV E/E' RATIO (AVERAGED): 33.55
ECHO MV REGURGITANT ALIASING (NYQUIST) VELOCITY: 31 CM/S
ECHO MV REGURGITANT PEAK GRADIENT: 104 MMHG
ECHO MV REGURGITANT PEAK VELOCITY: 5.1 M/S
ECHO MV REGURGITANT RADIUS PISA: 0.6 CM
ECHO MV REGURGITANT VTIA: 180 CM
ECHO PULMONARY ARTERY END DIASTOLIC PRESSURE: 7 MMHG
ECHO PULMONARY ARTERY SYSTOLIC PRESSURE (PASP): 55 MMHG
ECHO PV MAX VELOCITY: 0.5 M/S
ECHO PV PEAK GRADIENT: 1 MMHG
ECHO PV REGURGITANT MAX VELOCITY: 1.3 M/S
ECHO RV INTERNAL DIMENSION: 3.5 CM
ECHO RV TAPSE: 1.5 CM (ref 1.7–?)
ECHO TV E WAVE: 0.8 M/S
ECHO TV REGURGITANT MAX VELOCITY: 3.31 M/S
ECHO TV REGURGITANT PEAK GRADIENT: 44 MMHG

## 2023-12-29 PROCEDURE — 93306 TTE W/DOPPLER COMPLETE: CPT

## 2023-12-29 PROCEDURE — 93306 TTE W/DOPPLER COMPLETE: CPT | Performed by: NUCLEAR MEDICINE

## 2023-12-29 PROCEDURE — 93971 EXTREMITY STUDY: CPT

## 2024-01-02 ENCOUNTER — NURSE ONLY (OUTPATIENT)
Dept: LAB | Age: 89
End: 2024-01-02

## 2024-01-02 ENCOUNTER — TELEPHONE (OUTPATIENT)
Dept: FAMILY MEDICINE CLINIC | Age: 89
End: 2024-01-02

## 2024-01-02 ENCOUNTER — TELEPHONE (OUTPATIENT)
Dept: CARDIOLOGY CLINIC | Age: 89
End: 2024-01-02

## 2024-01-02 ENCOUNTER — OFFICE VISIT (OUTPATIENT)
Dept: FAMILY MEDICINE CLINIC | Age: 89
End: 2024-01-02

## 2024-01-02 VITALS
RESPIRATION RATE: 16 BRPM | SYSTOLIC BLOOD PRESSURE: 128 MMHG | BODY MASS INDEX: 23.39 KG/M2 | OXYGEN SATURATION: 76 % | DIASTOLIC BLOOD PRESSURE: 74 MMHG | HEART RATE: 52 BPM | HEIGHT: 67 IN | WEIGHT: 149 LBS

## 2024-01-02 DIAGNOSIS — I87.311 STASIS EDEMA WITH ULCER, RIGHT (HCC): Primary | ICD-10-CM

## 2024-01-02 DIAGNOSIS — R60.9 EDEMA, UNSPECIFIED TYPE: ICD-10-CM

## 2024-01-02 DIAGNOSIS — R60.0 EDEMA OF RIGHT LOWER EXTREMITY: ICD-10-CM

## 2024-01-02 DIAGNOSIS — N18.32 CHRONIC RENAL FAILURE (CRF), STAGE 3B (HCC): ICD-10-CM

## 2024-01-02 DIAGNOSIS — I08.0 MITRAL REGURGITATION AND AORTIC STENOSIS: ICD-10-CM

## 2024-01-02 DIAGNOSIS — I13.0 HYPERTENSIVE HEART AND CHRONIC KIDNEY DISEASE WITH HEART FAILURE AND STAGE 1 THROUGH STAGE 4 CHRONIC KIDNEY DISEASE, OR UNSPECIFIED CHRONIC KIDNEY DISEASE (HCC): ICD-10-CM

## 2024-01-02 DIAGNOSIS — L97.919 STASIS EDEMA WITH ULCER, RIGHT (HCC): Primary | ICD-10-CM

## 2024-01-02 DIAGNOSIS — I35.0 AORTIC STENOSIS, SEVERE: ICD-10-CM

## 2024-01-02 LAB
ANION GAP SERPL CALC-SCNC: 9 MEQ/L (ref 8–16)
BUN SERPL-MCNC: 38 MG/DL (ref 7–22)
CALCIUM SERPL-MCNC: 9 MG/DL (ref 8.5–10.5)
CHLORIDE SERPL-SCNC: 107 MEQ/L (ref 98–111)
CO2 SERPL-SCNC: 23 MEQ/L (ref 23–33)
CREAT SERPL-MCNC: 1.4 MG/DL (ref 0.4–1.2)
GFR SERPL CREATININE-BSD FRML MDRD: 48 ML/MIN/1.73M2
GLUCOSE SERPL-MCNC: 164 MG/DL (ref 70–108)
POTASSIUM SERPL-SCNC: 5.3 MEQ/L (ref 3.5–5.2)
SODIUM SERPL-SCNC: 139 MEQ/L (ref 135–145)

## 2024-01-02 PROCEDURE — 99213 OFFICE O/P EST LOW 20 MIN: CPT | Performed by: FAMILY MEDICINE

## 2024-01-02 PROCEDURE — 1123F ACP DISCUSS/DSCN MKR DOCD: CPT | Performed by: FAMILY MEDICINE

## 2024-01-02 PROCEDURE — G8427 DOCREV CUR MEDS BY ELIG CLIN: HCPCS | Performed by: FAMILY MEDICINE

## 2024-01-02 PROCEDURE — 1036F TOBACCO NON-USER: CPT | Performed by: FAMILY MEDICINE

## 2024-01-02 PROCEDURE — G8420 CALC BMI NORM PARAMETERS: HCPCS | Performed by: FAMILY MEDICINE

## 2024-01-02 ASSESSMENT — ENCOUNTER SYMPTOMS
EYE PAIN: 0
BLOOD IN STOOL: 0
SHORTNESS OF BREATH: 0
CONSTIPATION: 0
CHEST TIGHTNESS: 0
TROUBLE SWALLOWING: 0
NAUSEA: 0
SORE THROAT: 0
COUGH: 0
BACK PAIN: 0
ABDOMINAL PAIN: 0

## 2024-01-02 ASSESSMENT — PATIENT HEALTH QUESTIONNAIRE - PHQ9
SUM OF ALL RESPONSES TO PHQ QUESTIONS 1-9: 0
1. LITTLE INTEREST OR PLEASURE IN DOING THINGS: 0
SUM OF ALL RESPONSES TO PHQ QUESTIONS 1-9: 0
2. FEELING DOWN, DEPRESSED OR HOPELESS: 0
SUM OF ALL RESPONSES TO PHQ9 QUESTIONS 1 & 2: 0
SUM OF ALL RESPONSES TO PHQ QUESTIONS 1-9: 0
SUM OF ALL RESPONSES TO PHQ QUESTIONS 1-9: 0

## 2024-01-02 NOTE — PROGRESS NOTES
Subjective:      Patient ID: Adal Verde is a 89 y.o. male.    Wound Check  He was originally treated more than 14 days ago. Previous treatment included wound cleansing or irrigation. The maximum temperature noted was less than 100.4 F. There has been no drainage from the wound. The redness has improved. The swelling has improved. The pain has improved. He has no difficulty moving the affected extremity or digit.     Past Medical History:   Diagnosis Date    ANJANA (acute kidney injury) ([facility])     ANJANA (acute kidney injury) ([facility]) 2/4/2022    Arrhythmia 1/22/2019    Arthritis     BPH (benign prostatic hypertrophy)     CAD (coronary artery disease)     Cerebrovascular disease     Gastroenteritis due to norovirus 2/5/2022    Hyperlipidemia     Hypertension     Mitral regurgitation     PVC (premature ventricular contraction) 1/22/2019    Small bowel obstruction ([facility]) 8/30/2020    TIA (transient ischemic attack) 2009    post surgery resolved      Review of Systems   Constitutional:  Negative for fatigue and fever.   HENT:  Negative for congestion, ear pain, postnasal drip, sore throat and trouble swallowing.    Eyes:  Negative for pain.   Respiratory:  Negative for cough, chest tightness and shortness of breath.    Cardiovascular:  Negative for chest pain, palpitations and leg swelling.   Gastrointestinal:  Negative for abdominal pain, blood in stool, constipation and nausea.   Genitourinary:  Negative for difficulty urinating, frequency and urgency.   Musculoskeletal:  Negative for arthralgias, back pain, joint swelling and neck stiffness.   Skin:  Negative for rash.   Neurological:  Negative for dizziness, weakness and headaches.   Hematological:  Negative for adenopathy. Does not bruise/bleed easily.   Psychiatric/Behavioral:  Negative for behavioral problems, dysphoric mood and sleep disturbance.      /74 (Site: Right Upper Arm, Position: Sitting, Cuff Size: Medium Adult)   Pulse 52   Resp 16   Ht 1.702 m (5'

## 2024-01-02 NOTE — TELEPHONE ENCOUNTER
MOM to return call to office   There are 2 messages on Patient.     Is it ok to send the Referral to Dr Martinez?

## 2024-01-02 NOTE — TELEPHONE ENCOUNTER
----- Message from Nikita Rey MD sent at 12/31/2023 12:29 PM EST -----   Doppler  showed  no  dvt

## 2024-01-02 NOTE — TELEPHONE ENCOUNTER
rocedure Modifiers Provider Requested Approved   REF12 - AMB REFERRAL TO CARDIOLOGY none Brett Martinez MD 1 1     Diagnosis Information    Diagnosis   I35.0 (ICD-10-CM) - Aortic stenosis, severe   I08.0 (ICD-10-CM) - Mitral regurgitation and aortic stenosis     LM for patient to call office back.   If it works for patient please schedule on 1/3/2024 in the afternoon.

## 2024-01-02 NOTE — TELEPHONE ENCOUNTER
----- Message from Nikita Rey MD sent at 12/30/2023  5:24 AM EST -----  Would like him to see dr conner as cardiac echo with aortic valve mod to severe aortic stenosis and the mitral valve with mod to severe mitral regurgitation    Please cll if ok to make the referral

## 2024-01-02 NOTE — TELEPHONE ENCOUNTER
----- Message from Nikita Rey MD sent at 12/30/2023  5:26 AM EST -----   Also venous doppler with no dvt

## 2024-01-03 ENCOUNTER — TELEPHONE (OUTPATIENT)
Dept: CARDIOLOGY CLINIC | Age: 89
End: 2024-01-03

## 2024-01-03 ENCOUNTER — OFFICE VISIT (OUTPATIENT)
Dept: CARDIOLOGY CLINIC | Age: 89
End: 2024-01-03
Payer: MEDICARE

## 2024-01-03 VITALS
BODY MASS INDEX: 23.39 KG/M2 | WEIGHT: 149 LBS | HEIGHT: 67 IN | HEART RATE: 70 BPM | SYSTOLIC BLOOD PRESSURE: 124 MMHG | DIASTOLIC BLOOD PRESSURE: 82 MMHG

## 2024-01-03 DIAGNOSIS — R93.1 ABNORMAL ECHOCARDIOGRAM: Primary | ICD-10-CM

## 2024-01-03 DIAGNOSIS — I34.0 SEVERE MITRAL INSUFFICIENCY: ICD-10-CM

## 2024-01-03 DIAGNOSIS — I50.9 ACUTE CONGESTIVE HEART FAILURE, UNSPECIFIED HEART FAILURE TYPE (HCC): ICD-10-CM

## 2024-01-03 PROCEDURE — 1036F TOBACCO NON-USER: CPT | Performed by: INTERNAL MEDICINE

## 2024-01-03 PROCEDURE — G8484 FLU IMMUNIZE NO ADMIN: HCPCS | Performed by: INTERNAL MEDICINE

## 2024-01-03 PROCEDURE — 1123F ACP DISCUSS/DSCN MKR DOCD: CPT | Performed by: INTERNAL MEDICINE

## 2024-01-03 PROCEDURE — G8427 DOCREV CUR MEDS BY ELIG CLIN: HCPCS | Performed by: INTERNAL MEDICINE

## 2024-01-03 PROCEDURE — 93000 ELECTROCARDIOGRAM COMPLETE: CPT | Performed by: INTERNAL MEDICINE

## 2024-01-03 PROCEDURE — 99205 OFFICE O/P NEW HI 60 MIN: CPT | Performed by: INTERNAL MEDICINE

## 2024-01-03 PROCEDURE — G8420 CALC BMI NORM PARAMETERS: HCPCS | Performed by: INTERNAL MEDICINE

## 2024-01-03 NOTE — TELEPHONE ENCOUNTER
MICHAEL on 1/9 with Dr. Martinez due to moderate to severe aortic stenosis and MR.  Please follow.

## 2024-01-03 NOTE — TELEPHONE ENCOUNTER
Patient sees Dr Martinez on 1/3/24      Pre-TAVR workup:   Referring Provider:  Dr. Clement    History:     ECHO: Obtained on 12/29/23 with the below results:     Left Ventricle: Moderately reduced left ventricular systolic function with a visually estimated EF of 40 - 45%. Left ventricle size is normal. Normal wall thickness. Moderate global hypokinesis present. Normal diastolic function.    Aortic Valve: Moderately thickened cusp. Moderately calcified cusp. Mild regurgitation. Moderate to severe stenosis of the aortic valve. AV mean gradient is 8 mmHg. AV area by continuity VTI is 0.8 cm2.    Mitral Valve: Findings consistent with myxomatous degeneration. Moderate to severe regurgitation.    Left Atrium: Left atrium is mildly dilated.    Pulmonary Arteries: Moderate pulmonary hypertension present.    Image quality is adequate.       EKG: Obtained on 2/5/22 resulting in Sinus rhythm with Premature atrial complexes  Anterior infarct (cited on or before 06-AUG-2020)  Abnormal ECG  When compared with ECG of 31-AUG-2020 09:14,  No significant change was found.    Cardiac Catheterization:  Needs cardiac catheterization.    Dental Clearance: teeth vs dentures?    Labs:

## 2024-01-03 NOTE — PROGRESS NOTES
[facility] [place] [facility]  [facility] CARDIOLOGY  [address]  [address]  [facility] [address]  Dept: [phone]  Dept Fax: [phone]  Loc: [phone]    Visit Date: 1/3/2024    Mr. Verde is a 89 y.o. male  who presented for:  Chief Complaint   Patient presents with    New Patient    Valvular Heart Disease     Moderate to severe AS and MR       HPI:   HPI   89 yo M who presents for evaluation of MR and AS.  Hx of CABG x 5.  Ef 40-45%, NATALIIA 0.8 cm2, moderate to severe MR.  He has sob with stairs.  Seems to be new in the last couple months.  He has chronic LE edema bilaterally.  No chest pain or angina. He just started using a cane.  He has venous ulcerations bilaterally and wrapping it all the time.  Cr 1.4.  He urinates.   71 years.  Daughter helps at home.  Wife cooks, cleans, and does laundry.  He is fatigued.  He is not sleepy.  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  He is still driving.  No teeth.  No cancers.  Lives with son.     Referring Provider:  Dr. Clement     History:     ECHO: Obtained on 12/29/23 with the below results:     Left Ventricle: Moderately reduced left ventricular systolic function with a visually estimated EF of 40 - 45%. Left ventricle size is normal. Normal wall thickness. Moderate global hypokinesis present. Normal diastolic function.    Aortic Valve: Moderately thickened cusp. Moderately calcified cusp. Mild regurgitation. Moderate to severe stenosis of the aortic valve. AV mean gradient is 8 mmHg. AV area by continuity VTI is 0.8 cm2.    Mitral Valve: Findings consistent with myxomatous degeneration. Moderate to severe regurgitation.    Left Atrium: Left atrium is mildly dilated.    Pulmonary Arteries: Moderate pulmonary hypertension present.    Image quality is adequate.        EKG: Obtained on 2/5/22 resulting in Sinus rhythm with Premature atrial complexes  Anterior infarct (cited on or before 06-AUG-2020)  Abnormal

## 2024-01-03 NOTE — PROGRESS NOTES
NP, referred by Dr. Rey.   Patient states he has open sores on both legs and they are treating him with a cream.   He has shortness of breath with exertion.   Denies having any dizziness.   He states he had bypass at [facility] 13 years ago.       EKG completed.

## 2024-01-03 NOTE — TELEPHONE ENCOUNTER
Spoke to patient and appt scheduled today at 2:00pm.   Patient confirmed appt date, time and location.

## 2024-01-03 NOTE — TELEPHONE ENCOUNTER
I reached out to wife on HIPAA.   She states she will have him call us back.   OK to add on to today, 1/3 if it works for patient.

## 2024-01-05 SDOH — HEALTH STABILITY: PHYSICAL HEALTH: ON AVERAGE, HOW MANY DAYS PER WEEK DO YOU ENGAGE IN MODERATE TO STRENUOUS EXERCISE (LIKE A BRISK WALK)?: 0 DAYS

## 2024-01-05 ASSESSMENT — PATIENT HEALTH QUESTIONNAIRE - PHQ9
SUM OF ALL RESPONSES TO PHQ9 QUESTIONS 1 & 2: 0
2. FEELING DOWN, DEPRESSED OR HOPELESS: 0
SUM OF ALL RESPONSES TO PHQ QUESTIONS 1-9: 0
SUM OF ALL RESPONSES TO PHQ QUESTIONS 1-9: 0
1. LITTLE INTEREST OR PLEASURE IN DOING THINGS: 0
SUM OF ALL RESPONSES TO PHQ QUESTIONS 1-9: 0
SUM OF ALL RESPONSES TO PHQ QUESTIONS 1-9: 0

## 2024-01-05 ASSESSMENT — LIFESTYLE VARIABLES
HOW MANY STANDARD DRINKS CONTAINING ALCOHOL DO YOU HAVE ON A TYPICAL DAY: PATIENT DOES NOT DRINK
HOW OFTEN DO YOU HAVE A DRINK CONTAINING ALCOHOL: NEVER
HOW OFTEN DO YOU HAVE SIX OR MORE DRINKS ON ONE OCCASION: 1
HOW OFTEN DO YOU HAVE A DRINK CONTAINING ALCOHOL: 1
HOW MANY STANDARD DRINKS CONTAINING ALCOHOL DO YOU HAVE ON A TYPICAL DAY: 0

## 2024-01-08 ENCOUNTER — OFFICE VISIT (OUTPATIENT)
Dept: FAMILY MEDICINE CLINIC | Age: 89
End: 2024-01-08

## 2024-01-08 VITALS
HEIGHT: 67 IN | DIASTOLIC BLOOD PRESSURE: 58 MMHG | HEART RATE: 72 BPM | RESPIRATION RATE: 16 BRPM | SYSTOLIC BLOOD PRESSURE: 106 MMHG | BODY MASS INDEX: 22.76 KG/M2 | WEIGHT: 145 LBS

## 2024-01-08 DIAGNOSIS — I35.0 NONRHEUMATIC AORTIC VALVE STENOSIS: ICD-10-CM

## 2024-01-08 DIAGNOSIS — N18.30 CRF (CHRONIC RENAL FAILURE), STAGE 3 (MODERATE) (HCC): ICD-10-CM

## 2024-01-08 DIAGNOSIS — E78.00 PURE HYPERCHOLESTEROLEMIA: ICD-10-CM

## 2024-01-08 DIAGNOSIS — L97.901 VENOUS STASIS ULCER LIMITED TO BREAKDOWN OF SKIN WITHOUT VARICOSE VEINS, UNSPECIFIED SITE (HCC): ICD-10-CM

## 2024-01-08 DIAGNOSIS — I13.0 HYPERTENSIVE HEART AND CHRONIC KIDNEY DISEASE WITH HEART FAILURE AND STAGE 1 THROUGH STAGE 4 CHRONIC KIDNEY DISEASE, OR UNSPECIFIED CHRONIC KIDNEY DISEASE (HCC): ICD-10-CM

## 2024-01-08 DIAGNOSIS — I34.0 NONRHEUMATIC MITRAL VALVE REGURGITATION: ICD-10-CM

## 2024-01-08 DIAGNOSIS — I10 PRIMARY HYPERTENSION: ICD-10-CM

## 2024-01-08 DIAGNOSIS — I87.2 VENOUS STASIS ULCER LIMITED TO BREAKDOWN OF SKIN WITHOUT VARICOSE VEINS, UNSPECIFIED SITE (HCC): ICD-10-CM

## 2024-01-08 DIAGNOSIS — I25.810 CORONARY ARTERY DISEASE INVOLVING AUTOLOGOUS VEIN CORONARY BYPASS GRAFT WITHOUT ANGINA PECTORIS: ICD-10-CM

## 2024-01-08 DIAGNOSIS — I87.2 VENOUS INSUFFICIENCY OF BOTH LOWER EXTREMITIES: ICD-10-CM

## 2024-01-08 DIAGNOSIS — Z00.00 MEDICARE ANNUAL WELLNESS VISIT, SUBSEQUENT: Primary | ICD-10-CM

## 2024-01-08 PROCEDURE — G0438 PPPS, INITIAL VISIT: HCPCS | Performed by: FAMILY MEDICINE

## 2024-01-08 PROCEDURE — 99213 OFFICE O/P EST LOW 20 MIN: CPT | Performed by: FAMILY MEDICINE

## 2024-01-08 PROCEDURE — 1036F TOBACCO NON-USER: CPT | Performed by: FAMILY MEDICINE

## 2024-01-08 PROCEDURE — G8420 CALC BMI NORM PARAMETERS: HCPCS | Performed by: FAMILY MEDICINE

## 2024-01-08 PROCEDURE — G8427 DOCREV CUR MEDS BY ELIG CLIN: HCPCS | Performed by: FAMILY MEDICINE

## 2024-01-08 PROCEDURE — 1123F ACP DISCUSS/DSCN MKR DOCD: CPT | Performed by: FAMILY MEDICINE

## 2024-01-08 RX ORDER — SODIUM CHLORIDE 9 MG/ML
25 INJECTION, SOLUTION INTRAVENOUS PRN
Status: DISCONTINUED | OUTPATIENT
Start: 2024-01-08 | End: 2024-01-09 | Stop reason: HOSPADM

## 2024-01-08 RX ORDER — SODIUM CHLORIDE 0.9 % (FLUSH) 0.9 %
5-40 SYRINGE (ML) INJECTION PRN
Status: DISCONTINUED | OUTPATIENT
Start: 2024-01-08 | End: 2024-01-09 | Stop reason: HOSPADM

## 2024-01-08 RX ORDER — SODIUM CHLORIDE 9 MG/ML
INJECTION, SOLUTION INTRAVENOUS CONTINUOUS
Status: DISCONTINUED | OUTPATIENT
Start: 2024-01-08 | End: 2024-01-09 | Stop reason: HOSPADM

## 2024-01-08 RX ORDER — SODIUM CHLORIDE 0.9 % (FLUSH) 0.9 %
5-40 SYRINGE (ML) INJECTION EVERY 12 HOURS SCHEDULED
Status: DISCONTINUED | OUTPATIENT
Start: 2024-01-08 | End: 2024-01-09 | Stop reason: HOSPADM

## 2024-01-08 ASSESSMENT — ENCOUNTER SYMPTOMS
CONSTIPATION: 0
NAUSEA: 0
COUGH: 0
BACK PAIN: 0
BLOOD IN STOOL: 0
CHEST TIGHTNESS: 0
EYE PAIN: 0
SHORTNESS OF BREATH: 0
TROUBLE SWALLOWING: 0
SORE THROAT: 0
ABDOMINAL PAIN: 0

## 2024-01-08 NOTE — PROGRESS NOTES
Medicare Annual Wellness Visit    Adal Verde is here for Medicare AWV    Assessment & Plan          ICD-10-CM    1. Medicare annual wellness visit, subsequent  Z00.00       2. Nonrheumatic mitral valve regurgitation  I34.0 ID OFFICE OUTPATIENT VISIT 15 MINUTES [79490]      3. Hypertensive heart and chronic kidney disease with heart failure and stage 1 through stage 4 chronic kidney disease, or unspecified chronic kidney disease (HCC)  I13.0 ID OFFICE OUTPATIENT VISIT 15 MINUTES [[id number]]      4. CRF (chronic renal failure), stage 3 (moderate) (HCC)  N18.30 ID OFFICE OUTPATIENT VISIT 15 MINUTES [[id number]]      5. Primary hypertension  I10 ID OFFICE OUTPATIENT VISIT 15 MINUTES [[id number]]      6. Pure hypercholesterolemia  E78.00 ID OFFICE OUTPATIENT VISIT 15 MINUTES [[id number]]      7. Nonrheumatic aortic valve stenosis  I35.0       8. 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  I25.810       9. Venous insufficiency of both lower extremities  I87.2       10. Venous stasis ulcer limited to breakdown of skin without varicose veins, unspecified site (HCC)  I87.2 ID OFFICE OUTPATIENT VISIT 15 MINUTES [[id number]]    L97.901              PLAN    Current Outpatient Medications   Medication Sig Dispense Refill    silver sulfADIAZINE (SILVADENE) 1 % cream Apply topically daily. 50 g 1    furosemide (LASIX) 20 MG tablet TAKE 1/2 TABLET BY MOuth  daily 30 tablet 0    lisinopril (PRINIVIL;ZESTRIL) 5 MG tablet Take 1 tablet by mouth daily 30 tablet 5    tamsulosin (FLOMAX) 0.4 MG capsule TAKE 1 CAPSULE BY MOUTH EVERY EVENING 90 capsule 1    simvastatin (ZOCOR) 40 MG tablet TAKE 1 TABLET BY MOUTH EVERY NIGHT 90 tablet 1    carvedilol (COREG) 12.5 MG tablet TAKE 1 TABLET BY MOUTH TWICE DAILY 180 tablet 1    acetaminophen (TYLENOL) 325 MG tablet Take 2 tablets by mouth every 6 hours as needed for Pain      aspirin 81 MG EC tablet Take 1 tablet by mouth daily      multivitamin (THERAGRAN) per

## 2024-01-09 ENCOUNTER — APPOINTMENT (OUTPATIENT)
Age: 89
End: 2024-01-09
Attending: INTERNAL MEDICINE
Payer: MEDICARE

## 2024-01-09 ENCOUNTER — HOSPITAL ENCOUNTER (OUTPATIENT)
Age: 89
Setting detail: OUTPATIENT SURGERY
Discharge: HOME | End: 2024-01-09
Attending: INTERNAL MEDICINE | Admitting: INTERNAL MEDICINE
Payer: MEDICARE

## 2024-01-09 ENCOUNTER — TELEPHONE (OUTPATIENT)
Dept: CARDIOLOGY CLINIC | Age: 89
End: 2024-01-09

## 2024-01-09 VITALS
TEMPERATURE: 96.5 F | OXYGEN SATURATION: 98 % | HEIGHT: 67 IN | BODY MASS INDEX: 22.76 KG/M2 | HEART RATE: 64 BPM | SYSTOLIC BLOOD PRESSURE: 141 MMHG | WEIGHT: 145 LBS | DIASTOLIC BLOOD PRESSURE: 66 MMHG | RESPIRATION RATE: 16 BRPM

## 2024-01-09 DIAGNOSIS — I34.0 MITRAL VALVE INSUFFICIENCY, UNSPECIFIED ETIOLOGY: ICD-10-CM

## 2024-01-09 DIAGNOSIS — S81.802A WOUNDS, MULTIPLE OPEN, LOWER EXTREMITY, LEFT, INITIAL ENCOUNTER: ICD-10-CM

## 2024-01-09 DIAGNOSIS — I34.0 SEVERE MITRAL INSUFFICIENCY: ICD-10-CM

## 2024-01-09 DIAGNOSIS — R93.1 ABNORMAL ECHOCARDIOGRAM: ICD-10-CM

## 2024-01-09 DIAGNOSIS — N18.9 CHRONIC KIDNEY DISEASE, UNSPECIFIED CKD STAGE: ICD-10-CM

## 2024-01-09 DIAGNOSIS — I50.9 ACUTE CONGESTIVE HEART FAILURE, UNSPECIFIED HEART FAILURE TYPE (HCC): ICD-10-CM

## 2024-01-09 DIAGNOSIS — R60.9 EDEMA, UNSPECIFIED TYPE: Primary | ICD-10-CM

## 2024-01-09 DIAGNOSIS — S81.801A OPEN LEG WOUND, RIGHT, INITIAL ENCOUNTER: ICD-10-CM

## 2024-01-09 DIAGNOSIS — I08.0 MITRAL REGURGITATION AND AORTIC STENOSIS: ICD-10-CM

## 2024-01-09 LAB
ECHO AV MEAN GRADIENT: 8 MMHG
ECHO AV MEAN VELOCITY: 1.3 M/S
ECHO AV PEAK GRADIENT: 13 MMHG
ECHO AV PEAK VELOCITY: 1.8 M/S
ECHO AV VTI: 43.4 CM
ECHO BSA: 1.76 M2
ECHO MV MAX VELOCITY: 1.5 M/S
ECHO MV MEAN GRADIENT: 3 MMHG
ECHO MV MEAN VELOCITY: 0.7 M/S
ECHO MV PEAK GRADIENT: 9 MMHG
ECHO MV VTI: 39.8 CM

## 2024-01-09 PROCEDURE — 2709999900 HC NON-CHARGEABLE SUPPLY: Performed by: INTERNAL MEDICINE

## 2024-01-09 PROCEDURE — 93325 DOPPLER ECHO COLOR FLOW MAPG: CPT

## 2024-01-09 PROCEDURE — 6370000000 HC RX 637 (ALT 250 FOR IP)

## 2024-01-09 PROCEDURE — 6360000002 HC RX W HCPCS: Performed by: INTERNAL MEDICINE

## 2024-01-09 PROCEDURE — 99152 MOD SED SAME PHYS/QHP 5/>YRS: CPT | Performed by: INTERNAL MEDICINE

## 2024-01-09 PROCEDURE — 93320 DOPPLER ECHO COMPLETE: CPT | Performed by: INTERNAL MEDICINE

## 2024-01-09 PROCEDURE — 93312 ECHO TRANSESOPHAGEAL: CPT | Performed by: INTERNAL MEDICINE

## 2024-01-09 PROCEDURE — 7100000010 HC PHASE II RECOVERY - FIRST 15 MIN: Performed by: INTERNAL MEDICINE

## 2024-01-09 PROCEDURE — 2580000003 HC RX 258: Performed by: INTERNAL MEDICINE

## 2024-01-09 PROCEDURE — 93325 DOPPLER ECHO COLOR FLOW MAPG: CPT | Performed by: INTERNAL MEDICINE

## 2024-01-09 PROCEDURE — 7100000011 HC PHASE II RECOVERY - ADDTL 15 MIN: Performed by: INTERNAL MEDICINE

## 2024-01-09 RX ORDER — FENTANYL CITRATE 50 UG/ML
INJECTION, SOLUTION INTRAMUSCULAR; INTRAVENOUS PRN
Status: DISCONTINUED | OUTPATIENT
Start: 2024-01-09 | End: 2024-01-09 | Stop reason: ALTCHOICE

## 2024-01-09 RX ORDER — MIDAZOLAM HYDROCHLORIDE 1 MG/ML
INJECTION INTRAMUSCULAR; INTRAVENOUS PRN
Status: DISCONTINUED | OUTPATIENT
Start: 2024-01-09 | End: 2024-01-09 | Stop reason: ALTCHOICE

## 2024-01-09 RX ADMIN — SODIUM CHLORIDE: 9 INJECTION, SOLUTION INTRAVENOUS at 09:47

## 2024-01-09 ASSESSMENT — PAIN - FUNCTIONAL ASSESSMENT
PAIN_FUNCTIONAL_ASSESSMENT: NONE - DENIES PAIN
PAIN_FUNCTIONAL_ASSESSMENT: 0-10
PAIN_FUNCTIONAL_ASSESSMENT: 0-10

## 2024-01-09 ASSESSMENT — PAIN DESCRIPTION - DESCRIPTORS: DESCRIPTORS: ACHING

## 2024-01-09 NOTE — TELEPHONE ENCOUNTER
Severe, symptomatic, degenerative mitral regurgitation, NYHA III    Given the STS scores, age, frailty, comorbidities, and the imaging findings, the patient is currently prohibitive risk for surgical MVR.  Discussed transcatheter mitral valve repair (MitraClip) with the patient/family regarding risks, benefits, alternatives to the procedure.  The patient understands the associated visit with CT surgeon and also understands the need for L/RHC +/- PCI.  The patient is FULL CODE. The POA is listed in the chart.  We will schedule the above as appropriate.  Once complete and appropriate based on the findings, a procedure date will be aligned at [facility], and we will notify the patient of further instructions.      We had a long discussion with myself, family, patient, and structural heart coordinators.  The family and patient were explained the risks/benefits/alternatives of the procedure, how the procedure works (need for general anesthesia, intubation, MICHAEL), the work-up, post-procedural care, and all of the possible complications of the procedure, including, but not limited to vascular injury, debilitating stroke, cardiac perforation, device related thrombosis, device embolization, bleeding, need for open heart surgery, and death.  The patient/family would like to pursue MitraClip at our facility and we will work towards this goal.        The patient/family understand that should there be any findings or issues that arise during the evaluation that would preclude MitraClip, that this will need to be evaluated prior to proceeding with a percutaneous approach.  Further, a unified heart team approach will be performed prior to proceeding with MitraClip which includes the patient's primary care givers, cardiologist, and the entire structural heart team (interventionalist, CT surgeons, structural heart NP).       The patient and family appeared to understand everything, all of their questions were answered to their satisfaction

## 2024-01-09 NOTE — PROGRESS NOTES
Recovery mode. Denies discomfort,. Dr. Martinez discussed findings and plan of care with patient and . Discharge instructions provided, understanding verbalized.

## 2024-01-09 NOTE — H&P
[facility]  Sedation/Analgesia History & Physical    Pt Name: Adal Verde  Account number: [id number]  MRN: [id number]  YOB: 1933  Provider Performing Procedure: Brett Martinez MD MD  Referring Provider: Brett Martinez MD   Primary Care Physician: Nikita Rey MD  Date: 1/9/2024    PRE-PROCEDURE    Code Status: FULL CODE  Brief History/Pre-Procedure Diagnosis:   AS  Moderate to severe MR  NYHA III    Consent: : I have discussed with the patient risks, benefits, and alternatives (and relevant risks, benefits, and side effects related to alternatives or not receiving care), and likelihood of the success.   The patient and/or representative appear to understand and agree to proceed.  The discussion encompasses risks, benefits, and side effects related to the alternatives and the risks related to not receiving the proposed care, treatment, and services.     The indication, risks and benefits of the procedure and possible therapeutic consequences and alternatives were discussed with the patient. The patient was given the opportunity to ask questions and to have them answered to his/her satisfaction. Risks of the procedure include but are not limited to the following: Bleeding, hematoma including retroperitoneal hemmorhage, infection, pain and discomfort, injury to the aorta and other blood vessels, rhythm disturbance, low blood pressure, myocardial infarction, stroke, kidney damage/failure, myocardial perforation, allergic reactions to sedatives/contrast material, loss of pulse/vascular compromise requiring surgery, aneurysm/pseudoaneurysm formation, possible loss of a limb/hand/leg, needing blood transfusion, requiring emergent open heart surgery or emergent coronary intervention, the need for intubation/respiratory support, the requirement for defibrillation/cardioversion, and death. Alternatives to and omission of the suggested procedure were discussed. The patient had no

## 2024-01-09 NOTE — TELEPHONE ENCOUNTER
Pre-MitraClip Assessment    Orders received from Dr. Martinez to obtain a CV/CT Surgery appointment, MICHAEL, PFT, 6 minute walk test, Cardiac Catheterization, Dental Clearance and CHF appointment for optimization.    CT/CV Surgery appointment scheduled on 1/16/24 at 1045.    MICHAEL completed on 1/9/24.  PFT/6 Minute walk test scheduled on 1/30/24 at 1700/1730  Cardiac Catheterization to be scheduled with Dr. Martinez when cleared by nephrology.  CHF appointment scheduled on 1/24/24 at 1000.    Pt has dentures.     Orders received from Dr. Martinez for referral to CHF, wound clinic and nephrology.     Gale, can you schedule this patient's  PFT/6 min walk test?      We will hold off on cardiac cath until pt is seen by nephrology. Referral order placed.     Opal, can you check prior auth for heart cath?     Dr. Martinez, please agree.

## 2024-01-12 ENCOUNTER — HOSPITAL ENCOUNTER (OUTPATIENT)
Dept: WOUND CARE | Age: 89
Discharge: HOME | End: 2024-01-12
Attending: NURSE PRACTITIONER
Payer: MEDICARE

## 2024-01-12 ENCOUNTER — OFFICE VISIT (OUTPATIENT)
Dept: CARDIOLOGY CLINIC | Age: 89
End: 2024-01-12
Payer: MEDICARE

## 2024-01-12 VITALS
HEART RATE: 72 BPM | DIASTOLIC BLOOD PRESSURE: 78 MMHG | OXYGEN SATURATION: 99 % | WEIGHT: 149.2 LBS | SYSTOLIC BLOOD PRESSURE: 156 MMHG | HEIGHT: 67 IN | BODY MASS INDEX: 23.42 KG/M2

## 2024-01-12 VITALS
RESPIRATION RATE: 16 BRPM | HEART RATE: 68 BPM | SYSTOLIC BLOOD PRESSURE: 167 MMHG | OXYGEN SATURATION: 96 % | DIASTOLIC BLOOD PRESSURE: 69 MMHG | TEMPERATURE: 96.7 F

## 2024-01-12 DIAGNOSIS — L97.929 VENOUS STASIS ULCER OF LEFT LOWER EXTREMITY (HCC): ICD-10-CM

## 2024-01-12 DIAGNOSIS — I83.019 VENOUS STASIS ULCER OF RIGHT LOWER EXTREMITY (HCC): Primary | ICD-10-CM

## 2024-01-12 DIAGNOSIS — I83.029 VENOUS STASIS ULCER OF LEFT LOWER EXTREMITY (HCC): ICD-10-CM

## 2024-01-12 DIAGNOSIS — R60.0 EDEMA OF BOTH LOWER LEGS: ICD-10-CM

## 2024-01-12 DIAGNOSIS — L97.919 VENOUS STASIS ULCER OF RIGHT LOWER EXTREMITY (HCC): Primary | ICD-10-CM

## 2024-01-12 DIAGNOSIS — I50.22 CHRONIC SYSTOLIC CONGESTIVE HEART FAILURE, NYHA CLASS 2 (HCC): ICD-10-CM

## 2024-01-12 DIAGNOSIS — S80.829A BLISTER (NONTHERMAL), UNSPECIFIED LOWER LEG, INITIAL ENCOUNTER: ICD-10-CM

## 2024-01-12 DIAGNOSIS — I34.0 SEVERE MITRAL INSUFFICIENCY: Primary | ICD-10-CM

## 2024-01-12 PROCEDURE — G8427 DOCREV CUR MEDS BY ELIG CLIN: HCPCS | Performed by: NURSE PRACTITIONER

## 2024-01-12 PROCEDURE — 99214 OFFICE O/P EST MOD 30 MIN: CPT | Performed by: NURSE PRACTITIONER

## 2024-01-12 PROCEDURE — G8484 FLU IMMUNIZE NO ADMIN: HCPCS | Performed by: NURSE PRACTITIONER

## 2024-01-12 PROCEDURE — 1123F ACP DISCUSS/DSCN MKR DOCD: CPT | Performed by: NURSE PRACTITIONER

## 2024-01-12 PROCEDURE — 99214 OFFICE O/P EST MOD 30 MIN: CPT

## 2024-01-12 PROCEDURE — 1036F TOBACCO NON-USER: CPT | Performed by: NURSE PRACTITIONER

## 2024-01-12 PROCEDURE — G8420 CALC BMI NORM PARAMETERS: HCPCS | Performed by: NURSE PRACTITIONER

## 2024-01-12 RX ORDER — LIDOCAINE 40 MG/G
CREAM TOPICAL ONCE
Status: CANCELLED | OUTPATIENT
Start: 2024-01-12 | End: 2024-01-12

## 2024-01-12 RX ORDER — CLOBETASOL PROPIONATE 0.5 MG/G
OINTMENT TOPICAL ONCE
Status: CANCELLED | OUTPATIENT
Start: 2024-01-12 | End: 2024-01-12

## 2024-01-12 RX ORDER — TRIAMCINOLONE ACETONIDE 1 MG/G
OINTMENT TOPICAL ONCE
Status: CANCELLED | OUTPATIENT
Start: 2024-01-12 | End: 2024-01-12

## 2024-01-12 RX ORDER — FUROSEMIDE 20 MG/1
20 TABLET ORAL DAILY
Qty: 90 TABLET | Refills: 3 | Status: SHIPPED | OUTPATIENT
Start: 2024-01-12

## 2024-01-12 RX ORDER — LIDOCAINE HYDROCHLORIDE 40 MG/ML
SOLUTION TOPICAL ONCE
Status: CANCELLED | OUTPATIENT
Start: 2024-01-12 | End: 2024-01-12

## 2024-01-12 RX ORDER — LIDOCAINE HYDROCHLORIDE 20 MG/ML
JELLY TOPICAL ONCE
Status: CANCELLED | OUTPATIENT
Start: 2024-01-12 | End: 2024-01-12

## 2024-01-12 RX ORDER — TRIAMCINOLONE ACETONIDE 1 MG/G
OINTMENT TOPICAL ONCE
OUTPATIENT
Start: 2024-01-12 | End: 2024-01-12

## 2024-01-12 RX ORDER — GINSENG 100 MG
CAPSULE ORAL ONCE
Status: CANCELLED | OUTPATIENT
Start: 2024-01-12 | End: 2024-01-12

## 2024-01-12 RX ORDER — LIDOCAINE 40 MG/G
CREAM TOPICAL ONCE
OUTPATIENT
Start: 2024-01-12 | End: 2024-01-12

## 2024-01-12 RX ORDER — BACITRACIN ZINC AND POLYMYXIN B SULFATE 500; 1000 [USP'U]/G; [USP'U]/G
OINTMENT TOPICAL ONCE
OUTPATIENT
Start: 2024-01-12 | End: 2024-01-12

## 2024-01-12 RX ORDER — BETAMETHASONE DIPROPIONATE 0.5 MG/G
CREAM TOPICAL ONCE
Status: CANCELLED | OUTPATIENT
Start: 2024-01-12 | End: 2024-01-12

## 2024-01-12 RX ORDER — SODIUM CHLOR/HYPOCHLOROUS ACID 0.033 %
SOLUTION, IRRIGATION IRRIGATION ONCE
OUTPATIENT
Start: 2024-01-12 | End: 2024-01-12

## 2024-01-12 RX ORDER — IBUPROFEN 200 MG
TABLET ORAL ONCE
Status: CANCELLED | OUTPATIENT
Start: 2024-01-12 | End: 2024-01-12

## 2024-01-12 RX ORDER — GENTAMICIN SULFATE 1 MG/G
OINTMENT TOPICAL ONCE
Status: CANCELLED | OUTPATIENT
Start: 2024-01-12 | End: 2024-01-12

## 2024-01-12 RX ORDER — LIDOCAINE 50 MG/G
OINTMENT TOPICAL ONCE
Status: CANCELLED | OUTPATIENT
Start: 2024-01-12 | End: 2024-01-12

## 2024-01-12 RX ORDER — GINSENG 100 MG
CAPSULE ORAL ONCE
OUTPATIENT
Start: 2024-01-12 | End: 2024-01-12

## 2024-01-12 RX ORDER — BACITRACIN ZINC AND POLYMYXIN B SULFATE 500; 1000 [USP'U]/G; [USP'U]/G
OINTMENT TOPICAL ONCE
Status: CANCELLED | OUTPATIENT
Start: 2024-01-12 | End: 2024-01-12

## 2024-01-12 RX ORDER — BETAMETHASONE DIPROPIONATE 0.5 MG/G
CREAM TOPICAL ONCE
OUTPATIENT
Start: 2024-01-12 | End: 2024-01-12

## 2024-01-12 RX ORDER — CLOBETASOL PROPIONATE 0.5 MG/G
OINTMENT TOPICAL ONCE
OUTPATIENT
Start: 2024-01-12 | End: 2024-01-12

## 2024-01-12 RX ORDER — IBUPROFEN 200 MG
TABLET ORAL ONCE
OUTPATIENT
Start: 2024-01-12 | End: 2024-01-12

## 2024-01-12 RX ORDER — LIDOCAINE HYDROCHLORIDE 20 MG/ML
JELLY TOPICAL ONCE
OUTPATIENT
Start: 2024-01-12 | End: 2024-01-12

## 2024-01-12 RX ORDER — LIDOCAINE HYDROCHLORIDE 40 MG/ML
SOLUTION TOPICAL ONCE
OUTPATIENT
Start: 2024-01-12 | End: 2024-01-12

## 2024-01-12 RX ORDER — LIDOCAINE 50 MG/G
OINTMENT TOPICAL ONCE
OUTPATIENT
Start: 2024-01-12 | End: 2024-01-12

## 2024-01-12 RX ORDER — GENTAMICIN SULFATE 1 MG/G
OINTMENT TOPICAL ONCE
OUTPATIENT
Start: 2024-01-12 | End: 2024-01-12

## 2024-01-12 RX ORDER — SODIUM CHLOR/HYPOCHLOROUS ACID 0.033 %
SOLUTION, IRRIGATION IRRIGATION ONCE
Status: CANCELLED | OUTPATIENT
Start: 2024-01-12 | End: 2024-01-12

## 2024-01-12 ASSESSMENT — ENCOUNTER SYMPTOMS
ABDOMINAL DISTENTION: 0
COUGH: 0
VOMITING: 0
SHORTNESS OF BREATH: 1
NAUSEA: 0

## 2024-01-12 NOTE — PLAN OF CARE
Problem: Wound:  Goal: Will show signs of wound healing; wound closure and no evidence of infection  Description: Will show signs of wound healing; wound closure and no evidence of infection  Outcome: Not Progressing   Pt. Seen today for bilateral leg wounds see AVS for new orders. Follow up in 3 days.  Care plan reviewed with patient and family.  Patient and family verbalize understanding of the plan of care and contribute to goal setting.

## 2024-01-12 NOTE — PATIENT INSTRUCTIONS
Visit Discharge/Physician Orders:  - Mechanical Debridement was completed today by using a saline and gauze.   - elevate to reduce swelling  - cut back on your sodium as directed by CHF clinic  - we will order compression to use once legs are healed  - ok to leave dressing on until home health starts unless draining through    Home Care: we will refer [facility] health    Wound Location: bilateral legs    Dressing orders:     1) Gather wound care supplies and arrange on clean table.     2) Wash your hands with soap and water or use alcohol based hand  for 20 seconds (sing \"Happy Birthday\" twice).    3) Cleanse wounds with normal saline or wound cleanser and gauze. Pat dry with clean gauze.    4) Apply alginate and drawtex to the open areas, cover with roll gauze, and wrap the legs with ace wraps from the base of the toes to the bend of the knee. Change every other day    Keep all dressings clean & dry.    Follow up visit:   2 Weeks 01/26/2024 at 9am    Supplies:    Duration of dressings: 30 days    We have sent your supply order to the following company:  per home health  If you don't receive the items you were expecting or don't know what the items are that you received, call the company where the order was sent.   If you are unable to obtain wound supplies, continue to use the supplies you have available until you are able to reach us. It is most important to keep the wound covered at all times.  It is YOUR responsibility to make sure that supplies are re-ordered before you run out. Re-order telephone numbers are included in each package.     Keep next scheduled appointment. Please give 24 hour notice if unable to keep appointment. [phone]    If you experience any of the following, please call the Wound Care Service during business hours: Monday through Friday 8:00 am - 4:30 pm  ([phone]).   *Increase in pain   *Temperature over 101   *Increase in drainage from your wound or a foul

## 2024-01-12 NOTE — PATIENT INSTRUCTIONS
You may receive a survey regarding the care you received during your visit.  Your input is valuable to us.  We encourage you to complete and return your survey.  We hope you will choose us in the future for your healthcare needs.    Your nurses today were Shannan.  Office hours:   Mon-Thurs 8-4:30  Friday 8-12  Phone: [phone]    Continue:  Continue current medications  Daily weights and record  Fluid restriction of 2 Liters per day  Limit sodium in diet to around 0068-2887 mg/day  Monitor BP  Activity as tolerated     Call the Heart Failure Clinic for any of the following symptoms:   Weight gain of 2-3 pounds in 1 day or 5 pounds in 1 week  Increased shortness of breath  Shortness of breath while laying down  Cough  Chest pain  Swelling in feet, ankles or legs  Bloating in abdomen  Fatigue

## 2024-01-12 NOTE — PROGRESS NOTES
Wound Care Supplies      Supply Company:     [employer] [address] [place] [address] f: [phone] f: [phone] p: [phone] [email or web address]      Ordering Center:   [facility]  [address] [address]  [phone]  WOUND CARE Dept: [phone]   FAX NUMBER [phone]    Patient Information:      Zeke Verde  [address] [facility] [address]   [phone]   : 1933  AGE: 89 y.o.     GENDER: male   EPISODE DATE: 2024    Insurance:      PRIMARY INSURANCE:  Plan: MEDICARE PART A AND B  Coverage: MEDICARE  Effective Date: 2015  Group Number: [unfilled]  Subscriber Number: [id number] - (Medicare)    Payer/Plan Subscr  Sex Relation Sub. Ins. ID Effective Group Num   1. MEDICARE - [place]* ZEKE VERDE 1933 Male Self [id number] 1/1/15                                    [address] [address]   2. [facility]* ZEKE VERDE 1933 Male Self [id number] plan i                                   [address]       Patient Wound Information:      Problem List Items Addressed This Visit          Other    Venous stasis ulcer of right lower extremity (HCC) - Primary    Venous stasis ulcer of left lower extremity (HCC)       WOUNDS REQUIRING DRESSING SUPPLIES:     Wound 20 Coccyx Mid;Upper Stage 1 pressure ulcer- mid coccyx red (nonblanchable) (Active)   Number of days: 1227       Wound 24 Leg Left #1 (Active)   Wound Image   24 1041   Wound Etiology Venous 24 1041   Dressing Status Intact;Old drainage noted 24 1041   Wound Cleansed Cleansed with saline 24 1041   Wound Length (cm) 19 cm 24 1041   Wound Width (cm) 24.5 cm 24 1041   Wound Depth (cm) 0.1 cm 24 1041   Wound Surface Area (cm^2) 465.5 cm^2 24 1041   Wound Volume (cm^3) 46.55 cm^3 24 1041   Wound Assessment Granulation tissue;Pale granulation tissue;Slough;Epithelialization;Fluid filled blister 24 1041 
[facility]  Consult and Procedure Note      Adal Verde  MEDICAL RECORD NUMBER:  [id number]  AGE: 89 y.o.   GENDER: male  : 1933  EPISODE DATE:  2024  Referring Provider: bilateral lower extremity wounds  Reason for Referral: Dr. Martinez    SUBJECTIVE:     Chief Complaint   Patient presents with    Wound Check     Bilateral legs         HISTORY OF PRESENT ILLNESS      Adal Verde is a 89 y.o. male who presents today for wound/ulcer evaluation. Patient is new to the wound center. Wound duration: several months    Patient seen today as an add on patient due to findings for multiple wounds to bilateral lower extremities on evaluation at [facility] clinic today.  Patient states that wounds have been present for several months,  has been following with Dr. Rey.  He states that he has utilized antibiotic ointment, oral antibiotics and silvadene cream to wounds with no improvement.  States that wounds significantly worsened recently when left open to air.  He presents today with his wife and daughter who have been helping care for his wounds.  He reports that he has severe mitral valve insufficiency, is currently being worked up for possible intervention by Dr. Martinez. Patient is hopeful for this to be scheduled in the next 6 weeks.  He reports history of CABG in  at which he did have vein harvesting from his legs.  He states that he has compression socks at home but has never worn as they are difficult to place.  He denies any further needs or concerns.    PAST MEDICAL HISTORY             Diagnosis Date    ANJANA (acute kidney injury) (HCC)     ANJANA (acute kidney injury) ([facility]) 2022    Arrhythmia 2019    Arthritis     BPH (benign prostatic hypertrophy)     CAD (coronary artery disease)     Cerebral artery occlusion with cerebral infarction (HCC)     Cerebrovascular disease     Gastroenteritis due to norovirus 2022    Hyperlipidemia     Hypertension     Mitral regurgitation     
can Not be on home health or have had a Medicare part A stay in the past 24 hours.    Patient Wound(s) Debrided: [x] Yes if yes please add date 01/12/2024   [] No    Debribement Type: Mechanical     Patient currently being seen by Home Health: [] Yes   [x] No     Compression Type: Circaid Juxtalite, HD, 30-40 mm/Hg, BILATERAL lower legs     Provider Information:      PROVIDER'S NAME: Mala Davis CNP     NPI: [id number]

## 2024-01-16 ENCOUNTER — OFFICE VISIT (OUTPATIENT)
Dept: CARDIOTHORACIC SURGERY | Age: 89
End: 2024-01-16
Payer: MEDICARE

## 2024-01-16 VITALS
BODY MASS INDEX: 22.76 KG/M2 | SYSTOLIC BLOOD PRESSURE: 132 MMHG | DIASTOLIC BLOOD PRESSURE: 70 MMHG | HEART RATE: 68 BPM | OXYGEN SATURATION: 96 % | WEIGHT: 145 LBS | HEIGHT: 67 IN

## 2024-01-16 DIAGNOSIS — I34.0 MITRAL VALVE INSUFFICIENCY, UNSPECIFIED ETIOLOGY: Primary | ICD-10-CM

## 2024-01-16 PROCEDURE — 99205 OFFICE O/P NEW HI 60 MIN: CPT | Performed by: PHYSICIAN ASSISTANT

## 2024-01-16 PROCEDURE — G8420 CALC BMI NORM PARAMETERS: HCPCS | Performed by: PHYSICIAN ASSISTANT

## 2024-01-16 PROCEDURE — G8484 FLU IMMUNIZE NO ADMIN: HCPCS | Performed by: PHYSICIAN ASSISTANT

## 2024-01-16 PROCEDURE — 1036F TOBACCO NON-USER: CPT | Performed by: PHYSICIAN ASSISTANT

## 2024-01-16 PROCEDURE — 1123F ACP DISCUSS/DSCN MKR DOCD: CPT | Performed by: PHYSICIAN ASSISTANT

## 2024-01-16 PROCEDURE — G8427 DOCREV CUR MEDS BY ELIG CLIN: HCPCS | Performed by: PHYSICIAN ASSISTANT

## 2024-01-16 NOTE — PROGRESS NOTES
CT surgery New Patient Office Appointment    1/16/2024 11:01 AM    Patient's Name/Date of Birth: Adal Verde / 6/28/1933 (89 y.o.)    PCP: Nikita Rey MD    Date: January 16, 2024     CC:     Chief Complaint   Patient presents with    New Patient     Ref Martinez-  Mitraclip Eval       HPI  We had the pleasure of seeing Adal Verde in the office today, as you know this is a very pleasant 89 y.o. year old male with a history of severe mitral regurgitation seen on MICHAEL done recently.  He has symptoms of GAINES and fatigue.  Both symptoms continue to progress as they occur early with mild exertion.  He is S/P 5V CABG at [facility] around 2009 the  states.    PastMedical History:  Adal  has a past medical history of ANJANA (acute kidney injury) (HCC), ANJANA (acute kidney injury) (HCC), Arrhythmia, Arthritis, BPH (benign prostatic hypertrophy), CAD (coronary artery disease), Cerebral artery occlusion with cerebral infarction (HCC), Cerebrovascular disease, Gastroenteritis due to norovirus, Hyperlipidemia, Hypertension, Mitral regurgitation, PVC (premature ventricular contraction), Small bowel obstruction (HCC), and TIA (transient ischemic attack).    Past Surgical History:  The patient  has a past surgical history that includes Coronary artery bypass graft (01/01/2009); Cataract removal (Right, 07/01/2015); hernia repair (N/A, 09/02/2020); Small intestine surgery (09/02/2020); Cardiac surgery; and transesophageal echocardiogram (N/A, 1/9/2024).    Allergies:  The patient is allergic to pcn [penicillins], ibuprofen, and percocet [oxycodone-acetaminophen].    Medications:    Current Outpatient Medications:     furosemide (LASIX) 20 MG tablet, Take 1 tablet by mouth daily, Disp: 90 tablet, Rfl: 3    lisinopril (PRINIVIL;ZESTRIL) 5 MG tablet, Take 1 tablet by mouth daily, Disp: 30 tablet, Rfl: 5    tamsulosin (FLOMAX) 0.4 MG capsule, TAKE 1 CAPSULE BY MOUTH EVERY EVENING, Disp: 90 capsule, Rfl: 1    simvastatin

## 2024-01-16 NOTE — PATIENT INSTRUCTIONS
You may receive a survey regarding the care you received during your visit.  Your input is valuable to us.  We encourage you to complete and return your survey.  We hope you will choose us in the future for your healthcare needs.    Thank you for choosing [facility]!        Your Medical Assistant Today:  Maria Guadalupe DE LEON   Your Provider for Today: Kodi Quintanilla PA-C  Ph. [phone] opt 1

## 2024-01-23 ENCOUNTER — TELEMEDICINE (OUTPATIENT)
Dept: FAMILY MEDICINE CLINIC | Age: 89
End: 2024-01-23

## 2024-01-23 DIAGNOSIS — I10 PRIMARY HYPERTENSION: Primary | ICD-10-CM

## 2024-01-23 DIAGNOSIS — I87.2 VENOUS STASIS ULCER LIMITED TO BREAKDOWN OF SKIN WITHOUT VARICOSE VEINS, UNSPECIFIED SITE (HCC): ICD-10-CM

## 2024-01-23 DIAGNOSIS — Z95.1 HX OF CORONARY ARTERY BYPASS GRAFT: ICD-10-CM

## 2024-01-23 DIAGNOSIS — N18.32 CHRONIC RENAL FAILURE (CRF), STAGE 3B (HCC): ICD-10-CM

## 2024-01-23 DIAGNOSIS — I34.0 NONRHEUMATIC MITRAL VALVE REGURGITATION: ICD-10-CM

## 2024-01-23 DIAGNOSIS — L97.901 VENOUS STASIS ULCER LIMITED TO BREAKDOWN OF SKIN WITHOUT VARICOSE VEINS, UNSPECIFIED SITE (HCC): ICD-10-CM

## 2024-01-23 PROCEDURE — 1036F TOBACCO NON-USER: CPT | Performed by: FAMILY MEDICINE

## 2024-01-23 PROCEDURE — G8427 DOCREV CUR MEDS BY ELIG CLIN: HCPCS | Performed by: FAMILY MEDICINE

## 2024-01-23 PROCEDURE — G8420 CALC BMI NORM PARAMETERS: HCPCS | Performed by: FAMILY MEDICINE

## 2024-01-23 PROCEDURE — 99213 OFFICE O/P EST LOW 20 MIN: CPT | Performed by: FAMILY MEDICINE

## 2024-01-23 ASSESSMENT — ENCOUNTER SYMPTOMS
ABDOMINAL PAIN: 0
BLOOD IN STOOL: 0
SORE THROAT: 0
SHORTNESS OF BREATH: 0
EYE PAIN: 0
CONSTIPATION: 0
TROUBLE SWALLOWING: 0
COUGH: 0
CHEST TIGHTNESS: 0
BACK PAIN: 0
NAUSEA: 0

## 2024-01-23 NOTE — PROGRESS NOTES
Adal agreed to Video Chat/Exam in presence of Dr Rey and myself. Verified who was present in room with Adal. Adal informed the e-mail address used to [employer] cannot be used to contact the Provider, if they are any questions or concerns they need to call the office directly. Adal stated understanding.

## 2024-01-23 NOTE — PROGRESS NOTES
Adal Verde, was evaluated through a synchronous (real-time) audio-video encounter. The patient (or guardian if applicable) is aware that this is a billable service, which includes applicable co-pays. This Virtual Visit was conducted with patient's (and/or legal guardian's) consent. Patient identification was verified, and a caregiver was present when appropriate.   The patient was located at Home: [address] [place] [id number]  Provider was located at Facility (Appt Dept): [address] [place] [id number]      Adal Verde (:  1933) is a Established patient, presenting virtually for evaluation of the following:    Assessment & Plan          ICD-10-CM    1. Primary hypertension  I10       2. Chronic renal failure (CRF), stage 3b ([facility])  N18.32       3. Nonrheumatic mitral valve regurgitation  I34.0       4. Hx of coronary artery bypass graft  Z95.1       5. Venous stasis ulcer limited to breakdown of skin without varicose veins, unspecified site ([facility])  I87.2     L97.901                Plan    Current Outpatient Medications   Medication Sig Dispense Refill    furosemide (LASIX) 20 MG tablet Take 1 tablet by mouth daily 90 tablet 3    lisinopril (PRINIVIL;ZESTRIL) 5 MG tablet Take 1 tablet by mouth daily 30 tablet 5    tamsulosin (FLOMAX) 0.4 MG capsule TAKE 1 CAPSULE BY MOUTH EVERY EVENING 90 capsule 1    simvastatin (ZOCOR) 40 MG tablet TAKE 1 TABLET BY MOUTH EVERY NIGHT 90 tablet 1    carvedilol (COREG) 12.5 MG tablet TAKE 1 TABLET BY MOUTH TWICE DAILY 180 tablet 1    acetaminophen (TYLENOL) 325 MG tablet Take 2 tablets by mouth every 6 hours as needed for Pain      aspirin 81 MG EC tablet Take 1 tablet by mouth daily      multivitamin (THERAGRAN) per tablet Take 1 tablet by mouth daily       No current facility-administered medications for this visit.        Legs are being checked every other day with wound changes via home health but ultimately will not clear until mitral valve repair.  Has

## 2024-01-24 ENCOUNTER — TELEPHONE (OUTPATIENT)
Dept: WOUND CARE | Age: 89
End: 2024-01-24

## 2024-01-25 ENCOUNTER — OFFICE VISIT (OUTPATIENT)
Dept: NEPHROLOGY | Age: 89
End: 2024-01-25
Payer: MEDICARE

## 2024-01-25 VITALS
SYSTOLIC BLOOD PRESSURE: 153 MMHG | DIASTOLIC BLOOD PRESSURE: 69 MMHG | WEIGHT: 150 LBS | HEIGHT: 67 IN | OXYGEN SATURATION: 97 % | BODY MASS INDEX: 23.54 KG/M2 | HEART RATE: 68 BPM

## 2024-01-25 DIAGNOSIS — I12.9 HYPERTENSIVE RENAL DISEASE, STAGE 1 THROUGH STAGE 4 OR UNSPECIFIED CHRONIC KIDNEY DISEASE: ICD-10-CM

## 2024-01-25 DIAGNOSIS — N18.31 CHRONIC KIDNEY DISEASE, STAGE 3A (HCC): Primary | ICD-10-CM

## 2024-01-25 DIAGNOSIS — E87.5 HYPERKALEMIA: ICD-10-CM

## 2024-01-25 PROCEDURE — 1036F TOBACCO NON-USER: CPT | Performed by: INTERNAL MEDICINE

## 2024-01-25 PROCEDURE — G8427 DOCREV CUR MEDS BY ELIG CLIN: HCPCS | Performed by: INTERNAL MEDICINE

## 2024-01-25 PROCEDURE — 1123F ACP DISCUSS/DSCN MKR DOCD: CPT | Performed by: INTERNAL MEDICINE

## 2024-01-25 PROCEDURE — G8484 FLU IMMUNIZE NO ADMIN: HCPCS | Performed by: INTERNAL MEDICINE

## 2024-01-25 PROCEDURE — 99204 OFFICE O/P NEW MOD 45 MIN: CPT | Performed by: INTERNAL MEDICINE

## 2024-01-25 PROCEDURE — G8420 CALC BMI NORM PARAMETERS: HCPCS | Performed by: INTERNAL MEDICINE

## 2024-01-25 NOTE — PROGRESS NOTES
[facility] [place] [facility]  [facility]  [address]  [address]  [facility] [address]  Dept: [phone]  Loc: [phone]  Outpatient Consult Note  1/25/2024 1:58 PM        Pt Name:    Adal Verde  YOB: 1933  Primary Care Physician:  Nikita Rey MD     Chief Complaint:   Chief Complaint   Patient presents with    New Patient     Dr. Martinez heart cath clearance and CKD        Background Information/HPI   The patient is a 89 y.o. white malepatient who was sent in for evaluation of elevated creatinine.  Patient reports history of hypertension, CAD. Pt is here for clearance for valve replacement. No hx DM. PMH hx CAD s/p CABG (2009) at [facility]. No Hx PPM or AICD. Ex- smoking. No cancer. Denies any hx kidney stones. Has some leg swelling and takes lasix.  No urinary complaints such as dysuria or hematuria. No hx NSAID use. Retired from [employer]. He is scheduled for PFTs next week.  Patient has underlying CKD.  His potassium levels recently have been running borderline high.  He reports he is eating low potassium diet.  He is here with his wife.  Patient asked why he needs clearance from all the doctors for his upcoming heart procedure.  Discussed in detail.  I informed him that he has elevated creatinine, what that means and how upcoming heart procedure can affect his kidney function including potassium levels.  This was discussed in detail.  He had multiple questions which were answered.  See below.      Allergies:  Pcn [penicillins], Ibuprofen, and Percocet [oxycodone-acetaminophen]        Past Medical History:  Past Medical History:   Diagnosis Date    ANJANA (acute kidney injury) ([facility])     ANJANA (acute kidney injury) (Formerly [facility]) 02/04/2022    Arrhythmia 01/22/2019    Arthritis     BPH (benign prostatic hypertrophy)     CAD (coronary artery disease)     Cerebral artery occlusion with cerebral infarction (HCC)     Cerebrovascular disease

## 2024-01-26 ENCOUNTER — HOSPITAL ENCOUNTER (OUTPATIENT)
Dept: WOUND CARE | Age: 89
Discharge: HOME | End: 2024-01-26
Attending: NURSE PRACTITIONER
Payer: MEDICARE

## 2024-01-26 VITALS
RESPIRATION RATE: 16 BRPM | TEMPERATURE: 97.2 F | SYSTOLIC BLOOD PRESSURE: 148 MMHG | DIASTOLIC BLOOD PRESSURE: 66 MMHG | OXYGEN SATURATION: 95 % | HEART RATE: 78 BPM

## 2024-01-26 DIAGNOSIS — I83.019 VENOUS STASIS ULCER OF RIGHT LOWER EXTREMITY (HCC): ICD-10-CM

## 2024-01-26 DIAGNOSIS — L97.929 VENOUS STASIS ULCER OF LEFT LOWER EXTREMITY (HCC): ICD-10-CM

## 2024-01-26 DIAGNOSIS — I83.029 VENOUS STASIS ULCER OF LEFT LOWER EXTREMITY (HCC): ICD-10-CM

## 2024-01-26 DIAGNOSIS — I87.2 CHRONIC VENOUS STASIS DERMATITIS OF BOTH LOWER EXTREMITIES: Primary | ICD-10-CM

## 2024-01-26 DIAGNOSIS — L97.919 VENOUS STASIS ULCER OF RIGHT LOWER EXTREMITY (HCC): ICD-10-CM

## 2024-01-26 PROCEDURE — 99214 OFFICE O/P EST MOD 30 MIN: CPT | Performed by: NURSE PRACTITIONER

## 2024-01-26 PROCEDURE — 29580 STRAPPING UNNA BOOT: CPT

## 2024-01-26 RX ORDER — TRIAMCINOLONE ACETONIDE 1 MG/G
OINTMENT TOPICAL ONCE
OUTPATIENT
Start: 2024-01-26 | End: 2024-01-26

## 2024-01-26 RX ORDER — CLOBETASOL PROPIONATE 0.5 MG/G
OINTMENT TOPICAL ONCE
OUTPATIENT
Start: 2024-01-26 | End: 2024-01-26

## 2024-01-26 RX ORDER — GENTAMICIN SULFATE 1 MG/G
OINTMENT TOPICAL ONCE
OUTPATIENT
Start: 2024-01-26 | End: 2024-01-26

## 2024-01-26 RX ORDER — LIDOCAINE HYDROCHLORIDE 40 MG/ML
SOLUTION TOPICAL ONCE
OUTPATIENT
Start: 2024-01-26 | End: 2024-01-26

## 2024-01-26 RX ORDER — AMMONIUM LACTATE 12 G/100G
LOTION TOPICAL
Qty: 1 EACH | Refills: 1 | Status: SHIPPED | OUTPATIENT
Start: 2024-01-26

## 2024-01-26 RX ORDER — LIDOCAINE 40 MG/G
CREAM TOPICAL ONCE
OUTPATIENT
Start: 2024-01-26 | End: 2024-01-26

## 2024-01-26 RX ORDER — LIDOCAINE 50 MG/G
OINTMENT TOPICAL ONCE
OUTPATIENT
Start: 2024-01-26 | End: 2024-01-26

## 2024-01-26 RX ORDER — IBUPROFEN 200 MG
TABLET ORAL ONCE
OUTPATIENT
Start: 2024-01-26 | End: 2024-01-26

## 2024-01-26 RX ORDER — LIDOCAINE HYDROCHLORIDE 20 MG/ML
JELLY TOPICAL ONCE
OUTPATIENT
Start: 2024-01-26 | End: 2024-01-26

## 2024-01-26 RX ORDER — BETAMETHASONE DIPROPIONATE 0.5 MG/G
CREAM TOPICAL ONCE
OUTPATIENT
Start: 2024-01-26 | End: 2024-01-26

## 2024-01-26 RX ORDER — BACITRACIN ZINC AND POLYMYXIN B SULFATE 500; 1000 [USP'U]/G; [USP'U]/G
OINTMENT TOPICAL ONCE
OUTPATIENT
Start: 2024-01-26 | End: 2024-01-26

## 2024-01-26 RX ORDER — SODIUM CHLOR/HYPOCHLOROUS ACID 0.033 %
SOLUTION, IRRIGATION IRRIGATION ONCE
OUTPATIENT
Start: 2024-01-26 | End: 2024-01-26

## 2024-01-26 RX ORDER — GINSENG 100 MG
CAPSULE ORAL ONCE
OUTPATIENT
Start: 2024-01-26 | End: 2024-01-26

## 2024-01-26 ASSESSMENT — PAIN DESCRIPTION - ORIENTATION: ORIENTATION: RIGHT;LEFT

## 2024-01-26 ASSESSMENT — PAIN DESCRIPTION - DESCRIPTORS: DESCRIPTORS: ACHING

## 2024-01-26 ASSESSMENT — PAIN DESCRIPTION - LOCATION: LOCATION: LEG

## 2024-01-26 ASSESSMENT — PAIN SCALES - GENERAL: PAINLEVEL_OUTOF10: 5

## 2024-01-26 ASSESSMENT — PAIN - FUNCTIONAL ASSESSMENT: PAIN_FUNCTIONAL_ASSESSMENT: PREVENTS OR INTERFERES SOME ACTIVE ACTIVITIES AND ADLS

## 2024-01-26 NOTE — PLAN OF CARE
Problem: Wound:  Goal: Will show signs of wound healing; wound closure and no evidence of infection  Description: Will show signs of wound healing; wound closure and no evidence of infection  Outcome: Progressing   Pt. Seen today for bilateral legs see AVS for new orders. Follow up in 3 weeks.  Care plan reviewed with patient and wife.  Patient and wife verbalize understanding of the plan of care and contribute to goal setting.

## 2024-01-26 NOTE — PROGRESS NOTES
Unna Boot Application   Below Knee    NAME:  Adal Verde  YOB: 1933  MEDICAL RECORD NUMBER:  [id number]  DATE:  1/26/2024    Unna boot: Appied primary and secondary dressing as ordered.  Applied Unna roll from toes to knee overlapping each time.   Applied ace wrap or coban from toes to below the knee.   Secured with tape and/or metal clips covered with tape.   Instructed patient/caregiver to keep dressing dry and intact. DO NOT REMOVE DRESSING.   Instructed pt/family/caregiver to report excessive draining, loose bandage, wet dressing, severe pain or tingling in toes.  Applied Unna Boot dressing below the knee to right lower leg.  Applied Unna Boot dressing below the knee to left lower leg.    Unna Boot(s) were applied per  Guidelines.     Electronically signed by Elias Perez RN on 1/26/2024 at 1:53 PM     
[facility]  [address]  [address]   [place], [place] [address]  Telephone: [phone]     FAX [phone]    [facility] agencies: [facility] Phone # [phone], Fax # [phone]      Patient Instructions   Visit Discharge/Physician Orders:  - Mechanical Debridement was completed today by using a saline and gauze.   - elevate to reduce swelling  - cut back on your sodium as directed by CHF clinic  - we will call and check on the order for the compression  - prescription sent to the pharmacy for lac-hydrin lotion use to intact skin    [facility]    Wound Location: bilateral legs    Dressing orders:     1) Gather wound care supplies and arrange on clean table.     2) Wash your hands with soap and water or use alcohol based hand  for 20 seconds (sing \"Happy Birthday\" twice).    3) Cleanse wounds with normal saline or wound cleanser and gauze. Pat dry with clean gauze.    4) Remove old unna boots to both lower legs. Wash legs with warm soapy water. Pat dry. Cleanse wound with normal saline or wound cleanser and gauze. Pat dry with clean gauze.  Apply unna boots to both lower legs from base of toes to 1-2 inches below bend of knee. Apply alginate over the unna boot layer in the area of the wounds. Wrap both legs with coban (50% compression) starting at the base of the toes to 1-2 inches below the knee. Change twice weekly.      If unna boot becomes too tight- raise/elevate legs above the level of the heart for 15-20 minutes if swelling does not go down then carefully cut off unna boot and call home health or go to local ER or family physician. If unna boot becomes wet or starts to roll down then carefully remove unna boot and call home health.      Keep all dressings clean & dry.    Follow up visit:   3 Weeks 02/15/2024 at 9am    Supplies:    Duration of dressings: 30 days    We have sent your supply order to the following 
we will call and check on the order for the compression  - prescription sent to the pharmacy for lac-hydrin lotion use to intact skin    Home Care: we [facility] refer [facility]    Wound Location: bilateral legs    Dressing orders:     1) Gather wound care supplies and arrange on clean table.     2) Wash your hands with soap and water or use alcohol based hand  for 20 seconds (sing \"Happy Birthday\" twice).    3) Cleanse wounds with normal saline or wound cleanser and gauze. Pat dry with clean gauze.    4) Remove old unna boots to both lower legs. Wash legs with warm soapy water. Pat dry. Cleanse wound with normal saline or wound cleanser and gauze. Pat dry with clean gauze.  Apply unna boots to both lower legs from base of toes to 1-2 inches below bend of knee. Apply alginate over the unna boot layer in the area of the wounds. Wrap both legs with coban (50% compression) starting at the base of the toes to 1-2 inches below the knee. Change twice weekly.      If unna boot becomes too tight- raise/elevate legs above the level of the heart for 15-20 minutes if swelling does not go down then carefully cut off unna boot and call home health or go to local ER or family physician. If unna boot becomes wet or starts to roll down then carefully remove unna boot and call home health.      Keep all dressings clean & dry.    Follow up visit:   3 Weeks 02/15/2024 at 9am    Supplies:    Duration of dressings: 30 days    We have sent your supply order to the following company:  [facility] home [facility]  If you don't receive the items you were expecting or don't know what the items are that you received, call the company where the order was sent.   If you are unable to obtain wound supplies, continue to use the supplies you have available until you are able to reach us. It is most important to keep the wound covered at all times.  It is YOUR responsibility to make sure that supplies are re-ordered before you run out. Re-order

## 2024-01-26 NOTE — PATIENT INSTRUCTIONS
Visit Discharge/Physician Orders:  - Mechanical Debridement was completed today by using a saline and gauze.   - elevate to reduce swelling  - cut back on your sodium as directed by CHF clinic  - we will call and check on the order for the compression  - prescription sent to the pharmacy for lac-hydrin lotion use to intact skin    Home Care: we will refer [facility]    Wound Location: bilateral legs    Dressing orders:     1) Gather wound care supplies and arrange on clean table.     2) Wash your hands with soap and water or use alcohol based hand  for 20 seconds (sing \"Happy Birthday\" twice).    3) Cleanse wounds with normal saline or wound cleanser and gauze. Pat dry with clean gauze.    4) Remove old unna boots to both lower legs. Wash legs with warm soapy water. Pat dry. Cleanse wound with normal saline or wound cleanser and gauze. Pat dry with clean gauze.  Apply unna boots to both lower legs from base of toes to 1-2 inches below bend of knee. Apply alginate over the unna boot layer in the area of the wounds. Wrap both legs with coban (50% compression) starting at the base of the toes to 1-2 inches below the knee. Change twice weekly.      If unna boot becomes too tight- raise/elevate legs above the level of the heart for 15-20 minutes if swelling does not go down then carefully cut off unna boot and call home health or go to local ER or family physician. If unna boot becomes wet or starts to roll down then carefully remove unna boot and call home health.      Keep all dressings clean & dry.    Follow up visit:   3 Weeks 02/15/2024 at 9am    Supplies:    Duration of dressings: 30 days    We have sent your supply order to the following company:  [facility]  If you don't receive the items you were expecting or don't know what the items are that you received, call the company where the order was sent.   If you are unable to obtain wound supplies, continue to use the supplies you have

## 2024-01-29 ENCOUNTER — TELEPHONE (OUTPATIENT)
Dept: WOUND CARE | Age: 89
End: 2024-01-29

## 2024-01-29 ENCOUNTER — NURSE ONLY (OUTPATIENT)
Dept: LAB | Age: 89
End: 2024-01-29

## 2024-01-29 DIAGNOSIS — L97.929 VENOUS STASIS ULCER OF LEFT LOWER EXTREMITY (HCC): ICD-10-CM

## 2024-01-29 DIAGNOSIS — I83.019 VENOUS STASIS ULCER OF RIGHT LOWER EXTREMITY (HCC): Primary | ICD-10-CM

## 2024-01-29 DIAGNOSIS — I34.0 SEVERE MITRAL INSUFFICIENCY: ICD-10-CM

## 2024-01-29 DIAGNOSIS — L97.919 VENOUS STASIS ULCER OF RIGHT LOWER EXTREMITY (HCC): Primary | ICD-10-CM

## 2024-01-29 DIAGNOSIS — I83.029 VENOUS STASIS ULCER OF LEFT LOWER EXTREMITY (HCC): ICD-10-CM

## 2024-01-29 LAB
ANION GAP SERPL CALC-SCNC: 12 MEQ/L (ref 8–16)
BUN SERPL-MCNC: 37 MG/DL (ref 7–22)
CHLORIDE SERPL-SCNC: 104 MEQ/L (ref 98–111)
CO2 SERPL-SCNC: 24 MEQ/L (ref 23–33)
CREAT SERPL-MCNC: 1.6 MG/DL (ref 0.4–1.2)
GFR SERPL CREATININE-BSD FRML MDRD: 41 ML/MIN/1.73M2
GLUCOSE SERPL-MCNC: 109 MG/DL (ref 70–108)
POTASSIUM SERPL-SCNC: 4.6 MEQ/L (ref 3.5–5.2)
SODIUM SERPL-SCNC: 140 MEQ/L (ref 135–145)

## 2024-01-29 NOTE — TELEPHONE ENCOUNTER
[facility] not [facility]. [facility] missed order and will send it through as urgent.

## 2024-01-30 ENCOUNTER — HOSPITAL ENCOUNTER (OUTPATIENT)
Dept: PULMONOLOGY | Age: 89
Discharge: HOME | End: 2024-01-30
Attending: INTERNAL MEDICINE
Payer: MEDICARE

## 2024-01-30 DIAGNOSIS — I08.0 MITRAL REGURGITATION AND AORTIC STENOSIS: ICD-10-CM

## 2024-01-30 PROCEDURE — 94726 PLETHYSMOGRAPHY LUNG VOLUMES: CPT

## 2024-01-30 PROCEDURE — 94060 EVALUATION OF WHEEZING: CPT

## 2024-01-30 PROCEDURE — 94618 PULMONARY STRESS TESTING: CPT

## 2024-01-30 PROCEDURE — 94729 DIFFUSING CAPACITY: CPT

## 2024-01-30 NOTE — PROCEDURES
6 Minute Walk Test    Gender Male   Height 164 cm     Age 89 y.o.      Weight 65 kg  Race    Blood Pressure 144/71    Medication taken before the test (dose and time): Drug:    furosemide (LASIX) 20 MG tablet Take 1 tablet by mouth daily    lisinopril (PRINIVIL;ZESTRIL) 5 MG tablet Take 1 tablet by mouth daily    tamsulosin (FLOMAX) 0.4 MG capsule TAKE 1 CAPSULE BY MOUTH EVERY EVENING    simvastatin (ZOCOR) 40 MG tablet TAKE 1 TABLET BY MOUTH EVERY NIGHT    carvedilol (COREG) 12.5 MG tablet TAKE 1 TABLET BY MOUTH TWICE DAILY    acetaminophen (TYLENOL) 325 MG tablet Take 2 tablets by mouth every 6 hours as needed for Pain    aspirin 81 MG EC tablet Take 1 tablet by mouth daily    multivitamin (THERAGRAN) per tablet Take 1 tablet by mouth daily      All meds last taken 1/29/24 11 pm    Patient was instructed and shown how to perform the 6 Minute Walk Test. Patient understood by repeating instructions back to the therapist. Patient was seated at the starting position at least 10 minutes before the test started. Contraindications to test was assessed and patient did not have any contraindications. Baseline assessment was completed. Patient began test and only standard phrases of encouragement was given during testing. At the 6-minute wanda patient was told to stop and post assessment was completed.     Supplemental oxygen during the test: Participant is not on home oxygen therapy.      Stopped or paused before 6 minutes?  [] No [x] Yes, reason: leg discomfort     Any symptoms at end of exercise: [] none [] angina [] dizziness                                                             [x] hip, leg, or calf pain.      Total distance walked in 6 minutes: 400 feet     Baseline End of Test Position of Patient   Time 1155AM 1201 PM    Heart Rate in beats per minute 70  88  Sitting   Domenica Scale for Dyspnea (scale 0-10) 0 0 Sitting   Domenica Scale for Fatigue (scale 0-10) 5 5 Sitting   SpO2 in percent 95% 96% Sitting

## 2024-02-01 ENCOUNTER — NURSE ONLY (OUTPATIENT)
Dept: LAB | Age: 89
End: 2024-02-01

## 2024-02-01 DIAGNOSIS — I12.9 HYPERTENSIVE RENAL DISEASE, STAGE 1 THROUGH STAGE 4 OR UNSPECIFIED CHRONIC KIDNEY DISEASE: ICD-10-CM

## 2024-02-01 DIAGNOSIS — N18.31 CHRONIC KIDNEY DISEASE, STAGE 3A (HCC): ICD-10-CM

## 2024-02-01 DIAGNOSIS — E87.5 HYPERKALEMIA: ICD-10-CM

## 2024-02-01 LAB
ANION GAP SERPL CALC-SCNC: 10 MEQ/L (ref 8–16)
BACTERIA: ABNORMAL
BILIRUB UR QL STRIP: NEGATIVE
BUN SERPL-MCNC: 35 MG/DL (ref 7–22)
CALCIUM SERPL-MCNC: 8.7 MG/DL (ref 8.5–10.5)
CASTS #/AREA URNS LPF: ABNORMAL /LPF
CASTS #/AREA URNS LPF: ABNORMAL /LPF
CHARACTER UR: CLEAR
CHARCOAL URNS QL MICRO: ABNORMAL
CHLORIDE SERPL-SCNC: 102 MEQ/L (ref 98–111)
CO2 SERPL-SCNC: 25 MEQ/L (ref 23–33)
COLOR UR: YELLOW
CREAT SERPL-MCNC: 1.5 MG/DL (ref 0.4–1.2)
CREAT UR-MCNC: 90.5 MG/DL
CRYSTALS URNS QL MICRO: ABNORMAL
EPITHELIAL CELLS, UA: ABNORMAL /HPF
GFR SERPL CREATININE-BSD FRML MDRD: 44 ML/MIN/1.73M2
GLUCOSE SERPL-MCNC: 177 MG/DL (ref 70–108)
GLUCOSE UR QL STRIP.AUTO: NEGATIVE MG/DL
HGB UR QL STRIP.AUTO: NEGATIVE
KETONES UR QL STRIP.AUTO: NEGATIVE
LEUKOCYTE ESTERASE UR QL STRIP.AUTO: ABNORMAL
NITRITE UR QL STRIP.AUTO: NEGATIVE
PH UR STRIP.AUTO: 5.5 [PH] (ref 5–9)
POTASSIUM SERPL-SCNC: 4.5 MEQ/L (ref 3.5–5.2)
PROT UR STRIP.AUTO-MCNC: 30 MG/DL
PROT UR-MCNC: 60.9 MG/DL
PROT/CREAT 24H UR: 0.67 MG/G{CREAT}
RBC #/AREA URNS HPF: ABNORMAL /HPF
RENAL EPI CELLS #/AREA URNS HPF: ABNORMAL /[HPF]
SODIUM SERPL-SCNC: 137 MEQ/L (ref 135–145)
SP GR UR REFRACT.AUTO: 1.02 (ref 1–1.03)
UROBILINOGEN UR QL STRIP.AUTO: 0.2 EU/DL (ref 0–1)
WBC #/AREA URNS HPF: ABNORMAL /HPF
YEAST LIKE FUNGI URNS QL MICRO: ABNORMAL

## 2024-02-02 ENCOUNTER — HOSPITAL ENCOUNTER (OUTPATIENT)
Dept: ULTRASOUND IMAGING | Age: 89
Discharge: HOME | End: 2024-02-02
Attending: INTERNAL MEDICINE
Payer: MEDICARE

## 2024-02-02 DIAGNOSIS — E87.5 HYPERKALEMIA: ICD-10-CM

## 2024-02-02 DIAGNOSIS — N18.31 CHRONIC KIDNEY DISEASE, STAGE 3A (HCC): ICD-10-CM

## 2024-02-02 DIAGNOSIS — I12.9 HYPERTENSIVE RENAL DISEASE, STAGE 1 THROUGH STAGE 4 OR UNSPECIFIED CHRONIC KIDNEY DISEASE: ICD-10-CM

## 2024-02-02 PROCEDURE — 76770 US EXAM ABDO BACK WALL COMP: CPT

## 2024-02-02 NOTE — RESULT ENCOUNTER NOTE
Prostate is enlarged and causing pressure on the bladder. Pls inform patient, I recommend he sees urology. Refer to [facility]. Dx: Enlarged prostate.

## 2024-02-05 ENCOUNTER — TELEPHONE (OUTPATIENT)
Dept: NEPHROLOGY | Age: 89
End: 2024-02-05

## 2024-02-05 NOTE — TELEPHONE ENCOUNTER
Pt wants to hold off on the referral, he has a lot of health problems that he is dealing with right now. He states that he is going to let his PCP handle any referral that is needed.

## 2024-02-05 NOTE — TELEPHONE ENCOUNTER
----- Message from Hosea Ruiz MD sent at 2/2/2024  6:50 PM EST -----  Prostate is enlarged and causing pressure on the bladder. Pls inform patient, I recommend he sees urology. Refer to [facility]. Dx: Enlarged prostate.

## 2024-02-05 NOTE — TELEPHONE ENCOUNTER
Left message asking pt to please call the office to go over US results and the referral.    Referral pending

## 2024-02-09 ENCOUNTER — OFFICE VISIT (OUTPATIENT)
Dept: CARDIOLOGY CLINIC | Age: 89
End: 2024-02-09
Payer: MEDICARE

## 2024-02-09 VITALS
OXYGEN SATURATION: 98 % | WEIGHT: 147.6 LBS | HEIGHT: 65 IN | SYSTOLIC BLOOD PRESSURE: 148 MMHG | DIASTOLIC BLOOD PRESSURE: 68 MMHG | BODY MASS INDEX: 24.59 KG/M2 | HEART RATE: 64 BPM

## 2024-02-09 DIAGNOSIS — I50.22 CHRONIC SYSTOLIC CONGESTIVE HEART FAILURE, NYHA CLASS 2 (HCC): ICD-10-CM

## 2024-02-09 DIAGNOSIS — R60.0 EDEMA OF BOTH LOWER LEGS: ICD-10-CM

## 2024-02-09 DIAGNOSIS — I34.0 SEVERE MITRAL INSUFFICIENCY: Primary | ICD-10-CM

## 2024-02-09 PROCEDURE — 1123F ACP DISCUSS/DSCN MKR DOCD: CPT | Performed by: NURSE PRACTITIONER

## 2024-02-09 PROCEDURE — 1036F TOBACCO NON-USER: CPT | Performed by: NURSE PRACTITIONER

## 2024-02-09 PROCEDURE — 99214 OFFICE O/P EST MOD 30 MIN: CPT | Performed by: NURSE PRACTITIONER

## 2024-02-09 PROCEDURE — G8484 FLU IMMUNIZE NO ADMIN: HCPCS | Performed by: NURSE PRACTITIONER

## 2024-02-09 PROCEDURE — G8420 CALC BMI NORM PARAMETERS: HCPCS | Performed by: NURSE PRACTITIONER

## 2024-02-09 PROCEDURE — G8427 DOCREV CUR MEDS BY ELIG CLIN: HCPCS | Performed by: NURSE PRACTITIONER

## 2024-02-09 ASSESSMENT — ENCOUNTER SYMPTOMS
NAUSEA: 0
ABDOMINAL DISTENTION: 0
SHORTNESS OF BREATH: 1
COUGH: 0
VOMITING: 0

## 2024-02-09 NOTE — PATIENT INSTRUCTIONS
You may receive a survey regarding the care you received during your visit.  Your input is valuable to us.  We encourage you to complete and return your survey.  We hope you will choose us in the future for your healthcare needs.    Your nurses today were Shannan.  Office hours:   Mon-Thurs 8-4:30  Friday 8-12  Phone: [phone]    Continue:  Continue current medications  Daily weights and record  Fluid restriction of 2 Liters per day  Limit sodium in diet to around 0461-4731 mg/day  Monitor BP  Activity as tolerated     Call the Heart Failure Clinic for any of the following symptoms:   Weight gain of 2-3 pounds in 1 day or 5 pounds in 1 week  Increased shortness of breath  Shortness of breath while laying down  Cough  Chest pain  Swelling in feet, ankles or legs  Bloating in abdomen  Fatigue        No changes today     Continue diet/fluid adherence  Continue daily wts.  F/U w/ Cardiology  F/U in clinic in 6 week f/u

## 2024-02-09 NOTE — PROGRESS NOTES
higher sodium foods to avoid and better low sodium food options.   Patient verbalizes understanding of plan of care using teach back method, and is agreeable to the treatment plan.       Electronically signed by TAHIR Buchanan CNP on 2/9/2024 at 9:42 AM

## 2024-02-14 NOTE — TELEPHONE ENCOUNTER
Dr. Ruiz,     Most recent set of labs:    Can you please review?  Pt still being worked up for Mitraclip procedure.  He needs L/R HC prior to procedure as well.  Is he clear from your standpoint and if so, what can we do for renal protection prior to heart cath?        Thank you!

## 2024-02-14 NOTE — TELEPHONE ENCOUNTER
Ok with heart cath  Pls have patient hold lisinopril and lasix for 3 days starting day of heart cath

## 2024-02-15 ENCOUNTER — HOSPITAL ENCOUNTER (OUTPATIENT)
Dept: WOUND CARE | Age: 89
Discharge: HOME | End: 2024-02-15
Attending: NURSE PRACTITIONER
Payer: MEDICARE

## 2024-02-15 VITALS
SYSTOLIC BLOOD PRESSURE: 161 MMHG | HEART RATE: 60 BPM | OXYGEN SATURATION: 99 % | DIASTOLIC BLOOD PRESSURE: 72 MMHG | TEMPERATURE: 97.3 F | RESPIRATION RATE: 18 BRPM

## 2024-02-15 DIAGNOSIS — I83.029 VENOUS STASIS ULCER OF LEFT LOWER EXTREMITY (HCC): Primary | ICD-10-CM

## 2024-02-15 DIAGNOSIS — L97.919 VENOUS STASIS ULCER OF RIGHT LOWER EXTREMITY (HCC): ICD-10-CM

## 2024-02-15 DIAGNOSIS — L97.929 VENOUS STASIS ULCER OF LEFT LOWER EXTREMITY (HCC): Primary | ICD-10-CM

## 2024-02-15 DIAGNOSIS — I83.019 VENOUS STASIS ULCER OF RIGHT LOWER EXTREMITY (HCC): ICD-10-CM

## 2024-02-15 PROCEDURE — 99213 OFFICE O/P EST LOW 20 MIN: CPT | Performed by: NURSE PRACTITIONER

## 2024-02-15 PROCEDURE — 29580 STRAPPING UNNA BOOT: CPT

## 2024-02-15 RX ORDER — LIDOCAINE HYDROCHLORIDE 20 MG/ML
JELLY TOPICAL ONCE
OUTPATIENT
Start: 2024-02-15 | End: 2024-02-15

## 2024-02-15 RX ORDER — LIDOCAINE HYDROCHLORIDE 40 MG/ML
SOLUTION TOPICAL ONCE
OUTPATIENT
Start: 2024-02-15 | End: 2024-02-15

## 2024-02-15 RX ORDER — TRIAMCINOLONE ACETONIDE 1 MG/G
OINTMENT TOPICAL ONCE
OUTPATIENT
Start: 2024-02-15 | End: 2024-02-15

## 2024-02-15 RX ORDER — LIDOCAINE 40 MG/G
CREAM TOPICAL ONCE
OUTPATIENT
Start: 2024-02-15 | End: 2024-02-15

## 2024-02-15 RX ORDER — LIDOCAINE 50 MG/G
OINTMENT TOPICAL ONCE
OUTPATIENT
Start: 2024-02-15 | End: 2024-02-15

## 2024-02-15 RX ORDER — SODIUM CHLOR/HYPOCHLOROUS ACID 0.033 %
SOLUTION, IRRIGATION IRRIGATION ONCE
OUTPATIENT
Start: 2024-02-15 | End: 2024-02-15

## 2024-02-15 RX ORDER — BACITRACIN ZINC AND POLYMYXIN B SULFATE 500; 1000 [USP'U]/G; [USP'U]/G
OINTMENT TOPICAL ONCE
OUTPATIENT
Start: 2024-02-15 | End: 2024-02-15

## 2024-02-15 RX ORDER — BETAMETHASONE DIPROPIONATE 0.5 MG/G
CREAM TOPICAL ONCE
OUTPATIENT
Start: 2024-02-15 | End: 2024-02-15

## 2024-02-15 RX ORDER — CLOBETASOL PROPIONATE 0.5 MG/G
OINTMENT TOPICAL ONCE
OUTPATIENT
Start: 2024-02-15 | End: 2024-02-15

## 2024-02-15 RX ORDER — GINSENG 100 MG
CAPSULE ORAL ONCE
OUTPATIENT
Start: 2024-02-15 | End: 2024-02-15

## 2024-02-15 RX ORDER — GENTAMICIN SULFATE 1 MG/G
OINTMENT TOPICAL ONCE
OUTPATIENT
Start: 2024-02-15 | End: 2024-02-15

## 2024-02-15 RX ORDER — IBUPROFEN 200 MG
TABLET ORAL ONCE
OUTPATIENT
Start: 2024-02-15 | End: 2024-02-15

## 2024-02-15 NOTE — TELEPHONE ENCOUNTER
Gale, can we schedule this heart cath with Dr. Martinez?  Cr 1.5, pt will need prehydration per Dr. Martinez.      Dr. Martinez, please agree.

## 2024-02-15 NOTE — TELEPHONE ENCOUNTER
L&R Cath scheduled for 3/1/24 at 1:00pm, patient to arrive at 7:00am. Juana in cath lab notified. Order faxed. On schedule. Instructions scanned in.     Patient notified. Instructions mailed.

## 2024-02-15 NOTE — PLAN OF CARE
Problem: Wound:  Goal: Will show signs of wound healing; wound closure and no evidence of infection  Description: Will show signs of wound healing; wound closure and no evidence of infection  Outcome: Progressing   Seen today for bilateral legs. Left leg is healed. See AVS for discharge   Care plan reviewed with patient and daughter.  Patient and daughter verbalize understanding of the plan of care and contribute to goal setting.

## 2024-02-15 NOTE — PATIENT INSTRUCTIONS
Visit Discharge/Physician Orders:  - Mechanical Debridement was completed today by using a saline and gauze.   - elevate to reduce swelling  - cut back on your sodium as directed by [facility] clinic  - lac-hydrin lotion use to intact skin  -left leg-apply Velcro compression in the morning and remove at night     Home Care: [facility]    Wound Location: right leg-unna boot           Left leg-healed     Dressing orders:     1) Gather wound care supplies and arrange on clean table.     2) Wash your hands with soap and water or use alcohol based hand  for 20 seconds (sing \"Happy Birthday\" twice).    3) Cleanse wounds with normal saline or wound cleanser and gauze. Pat dry with clean gauze.    4) Remove old unna boot to right lower leg. Wash leg with warm soapy water. Pat dry. Cleanse wound with normal saline or wound cleanser and gauze. Pat dry with clean gauze.  Apply unna boot to right leg from base of toes to 1-2 inches below bend of knee. Apply triad to the open area on the right leg. Wrap right leg with coban (50% compression) starting at the base of the toes to 1-2 inches below the knee. Change twice weekly.      If unna boot becomes too tight- raise/elevate legs above the level of the heart for 15-20 minutes if swelling does not go down then carefully cut off unna boot and call home health or go to local ER or family physician. If unna boot becomes wet or starts to roll down then carefully remove unna boot and call home health.      Keep all dressings clean & dry.    Follow up visit:   2 Weeks Thursday February 29th at 9:00 am     Supplies:    Duration of dressings: 30 days    We have sent your supply order to the following company:  [facility] home [facility]  If you don't receive the items you were expecting or don't know what the items are that you received, call the company where the order was sent.   If you are unable to obtain wound supplies, continue to use the supplies you have available until you are

## 2024-02-15 NOTE — PROGRESS NOTES
Unna Boot Application   Below Knee    NAME:  Adal Verde  YOB: 1933  MEDICAL RECORD NUMBER:  [id number]  DATE:  2/15/2024    Unna boot: Applied moisturizing agent to dry skin as needed.   Appied primary and secondary dressing as ordered.  Applied Unna roll from toes to knee overlapping each time.   Applied ace wrap or coban from toes to below the knee.   Instructed patient/caregiver to keep dressing dry and intact. DO NOT REMOVE DRESSING.   Instructed pt/family/caregiver to report excessive draining, loose bandage, wet dressing, severe pain or tingling in toes.  Applied Unna Boot dressing below the knee to right lower leg.    Unna Boot(s) were applied per  Guidelines.     Electronically signed by Carmen Wooten RN on 2/15/2024 at 10:54 AM     
[facility]  Consult and Procedure Note      Adal Verde  MEDICAL RECORD NUMBER:  [id number]  AGE: 89 y.o.   GENDER: male  : 1933  EPISODE DATE:  2/15/2024  Referring Provider: bilateral lower extremity wounds  Reason for Referral: Dr. Martinez    SUBJECTIVE:     Chief Complaint   Patient presents with    Wound Check     Bilateral legs      HISTORY OF PRESENT ILLNESS      Adal Verde is a 89 y.o. male who presents today for wound/ulcer re-evaluation. Patient is established. Wound duration: several months    Patient seen today for re-evaluation of bilateral lower extremity wounds.  Patient states that wounds have been present for several months,  had been following with Dr. Rey prior to referral to clinic.  He states that he had utilized antibiotic ointment, oral antibiotics and silvadene cream to wounds with no improvement prompting referral to clinic.  He notes severe mitral valve insufficiency, is currently being worked up for possible intervention by Dr. Martinez.  He reports history of CABG in  at which he did have vein harvesting from his legs.  He states that he has compression socks at home but has never worn as they are difficult to place.  Juxtalite compression garments were ordered as easier compression option.  Current wound care includes unna boot changed twice weekly by home health.  He denies any concerns with wound care as ordered.  States that he feels as though wounds have significantly improved since start of unna boot.  He denies any further needs or concerns.    PAST MEDICAL HISTORY             Diagnosis Date    ANJANA (acute kidney injury) ([facility])     ANJANA (acute kidney injury) ([facility]) 2022    Arrhythmia 2019    Arthritis     BPH (benign prostatic hypertrophy)     CAD (coronary artery disease)     Cerebral artery occlusion with cerebral infarction ([facility])     Cerebrovascular disease     Gastroenteritis due to norovirus 2022    Hyperlipidemia     Hypertension 
0856   Wound Volume (cm^3) 0 cm^3 02/15/24 0856   Wound Healing % 100 02/15/24 0856   Wound Assessment Epithelialization;Slough;Granulation tissue;Pale granulation tissue 01/26/24 0904   Drainage Amount Moderate (25-50%) 01/26/24 0904   Drainage Description Serous;Yellow 01/26/24 0904   Odor None 01/26/24 0904   Afia-wound Assessment Blanchable erythema;Dry/flaky;Intact;Hyperpigmented;Maceration 01/26/24 0904   Margins Attached edges 01/26/24 0904   Wound Thickness Description not for Pressure Injury Full thickness 01/26/24 0904   Number of days: 34       Wound 01/12/24 Leg Right #2 (Active)   Wound Image   02/15/24 0856   Wound Etiology Venous 01/26/24 0904   Dressing Status Intact;Old drainage noted 01/26/24 0904   Wound Cleansed Cleansed with saline 01/26/24 0904   Dressing/Treatment Alginate;Coban/self-adherent bandages 01/26/24 0904   Wound Length (cm) 1.3 cm 02/15/24 0856   Wound Width (cm) 2.9 cm 02/15/24 0856   Wound Depth (cm) 0.05 cm 02/15/24 0856   Wound Surface Area (cm^2) 3.77 cm^2 02/15/24 0856   Change in Wound Size % (l*w) 99.38 02/15/24 0856   Wound Volume (cm^3) 0.1885 cm^3 02/15/24 0856   Wound Healing % 100 02/15/24 0856   Wound Assessment Granulation tissue 02/15/24 0856   Drainage Amount Small (< 25%) 02/15/24 0856   Drainage Description Serous 02/15/24 0856   Odor None 02/15/24 0856   Afia-wound Assessment Fragile;Dry/flaky 02/15/24 0856   Margins Attached edges 02/15/24 0856   Wound Thickness Description not for Pressure Injury Full thickness 02/15/24 0856   Number of days: 34     Incision 09/02/20 Abdomen Lower (Active)   Number of days: 1260       Incision 09/02/20 Abdomen Mid (Active)   Number of days: 1260       Patient seen and treated on 2/15/2024    By PROVIDER'S NAME: Mala Davis CNP     NPI: [id number]

## 2024-02-16 NOTE — TELEPHONE ENCOUNTER
Received call back from patient.  Discussed appt rescheduled with LILLY Fraser.  Verbalized understanding.

## 2024-02-20 ENCOUNTER — TELEPHONE (OUTPATIENT)
Dept: WOUND CARE | Age: 89
End: 2024-02-20

## 2024-02-20 NOTE — TELEPHONE ENCOUNTER
Stefani called from . Stated patients left leg has two open spots and what would we like to do? Spoke with Mala Davis CNP she stated to apply the unna boot and apply alginate to the open areas. Stefani given these instructions, voices understanding.

## 2024-02-22 ENCOUNTER — OFFICE VISIT (OUTPATIENT)
Dept: NEPHROLOGY | Age: 89
End: 2024-02-22
Payer: MEDICARE

## 2024-02-22 VITALS
OXYGEN SATURATION: 96 % | TEMPERATURE: 67 F | WEIGHT: 144 LBS | BODY MASS INDEX: 23.99 KG/M2 | HEIGHT: 65 IN | SYSTOLIC BLOOD PRESSURE: 143 MMHG | DIASTOLIC BLOOD PRESSURE: 70 MMHG

## 2024-02-22 DIAGNOSIS — N28.1 RENAL CYST: ICD-10-CM

## 2024-02-22 DIAGNOSIS — N18.31 CHRONIC KIDNEY DISEASE, STAGE 3A (HCC): Primary | ICD-10-CM

## 2024-02-22 DIAGNOSIS — I12.9 HYPERTENSIVE RENAL DISEASE, STAGE 1 THROUGH STAGE 4 OR UNSPECIFIED CHRONIC KIDNEY DISEASE: ICD-10-CM

## 2024-02-22 PROCEDURE — 99213 OFFICE O/P EST LOW 20 MIN: CPT | Performed by: INTERNAL MEDICINE

## 2024-02-22 PROCEDURE — G8420 CALC BMI NORM PARAMETERS: HCPCS | Performed by: INTERNAL MEDICINE

## 2024-02-22 PROCEDURE — 1036F TOBACCO NON-USER: CPT | Performed by: INTERNAL MEDICINE

## 2024-02-22 PROCEDURE — G8484 FLU IMMUNIZE NO ADMIN: HCPCS | Performed by: INTERNAL MEDICINE

## 2024-02-22 PROCEDURE — G8427 DOCREV CUR MEDS BY ELIG CLIN: HCPCS | Performed by: INTERNAL MEDICINE

## 2024-02-22 PROCEDURE — 1123F ACP DISCUSS/DSCN MKR DOCD: CPT | Performed by: INTERNAL MEDICINE

## 2024-02-22 NOTE — PROGRESS NOTES
[facility] [place] [facility]  [facility]  [address]  [address]  [place] [address]  Dept: [phone]  Loc: [phone]  Office Progress Note  2/22/2024 1:14 PM      Pt Name:    Adal Verde  YOB: 1933  Primary Care Physician:  Nikita Rey MD     Chief Complaint:   Chief Complaint   Patient presents with    Chronic Kidney Disease     Stage 3        Background Information/Interval History:   The patient is a 89 y.o. white malepatient with hypertension, CAD. Pt is here for clearance for valve replacement. PMH hx CAD s/p CABG (2009) at [facility]. Ex- smoking.  Retired from [employer]. He is scheduled for PFTs next week.  Patient has underlying CKD.  has chronic leg swelling.     Pt is here for follow-up. Feels okay. Here with daughter Dorothea. No leg sweling. No hematuria. He reports leg swelling has improved. No chest pain or  shortness of breath at rest. Has some shortness of breath with exertion.      Past History:  Past Medical History:   Diagnosis Date    ANJANA (acute kidney injury) ([facility])     ANJANA (acute kidney injury) ([facility]) 02/04/2022    Arrhythmia 01/22/2019    Arthritis     BPH (benign prostatic hypertrophy)     CAD (coronary artery disease)     Cerebral artery occlusion with cerebral infarction ([facility])     Cerebrovascular disease     Gastroenteritis due to norovirus 02/05/2022    Hyperlipidemia     Hypertension     Mitral regurgitation     PVC (premature ventricular contraction) 01/22/2019    Small bowel obstruction ([facility]) 08/30/2020    TIA (transient ischemic attack) 2009    post surgery resolved     Past Surgical History:   Procedure Laterality Date    CARDIAC SURGERY      CATARACT REMOVAL Right 07/01/2015    Dr Mariano     CORONARY ARTERY BYPASS GRAFT  01/01/2009    HERNIA REPAIR N/A 09/02/2020    ROBOTIC LEFT INGUINAL HERNIA REPAIR WITH MESH,ROBOTIC EXPLORATORY LAPAROSCOPY CONVERT TO OPEN, SMALL BOWEL RESECTION performed by Renata TOM

## 2024-02-28 ENCOUNTER — PREP FOR PROCEDURE (OUTPATIENT)
Dept: CARDIOLOGY | Age: 89
End: 2024-02-28

## 2024-02-28 PROBLEM — R07.9 CHEST PAIN: Status: ACTIVE | Noted: 2024-02-28

## 2024-02-28 RX ORDER — SODIUM CHLORIDE 9 MG/ML
INJECTION, SOLUTION INTRAVENOUS CONTINUOUS
Status: CANCELLED | OUTPATIENT
Start: 2024-02-28

## 2024-02-28 RX ORDER — SODIUM CHLORIDE 0.9 % (FLUSH) 0.9 %
5-40 SYRINGE (ML) INJECTION PRN
Status: CANCELLED | OUTPATIENT
Start: 2024-02-28

## 2024-02-28 RX ORDER — NITROGLYCERIN 0.4 MG/1
0.4 TABLET SUBLINGUAL EVERY 5 MIN PRN
Status: CANCELLED | OUTPATIENT
Start: 2024-02-28

## 2024-02-28 RX ORDER — SODIUM CHLORIDE 9 MG/ML
INJECTION, SOLUTION INTRAVENOUS PRN
Status: CANCELLED | OUTPATIENT
Start: 2024-02-28

## 2024-02-28 RX ORDER — SODIUM CHLORIDE 0.9 % (FLUSH) 0.9 %
5-40 SYRINGE (ML) INJECTION EVERY 12 HOURS SCHEDULED
Status: CANCELLED | OUTPATIENT
Start: 2024-02-28

## 2024-02-28 RX ORDER — ASPIRIN 325 MG
325 TABLET ORAL ONCE
Status: CANCELLED | OUTPATIENT
Start: 2024-02-28 | End: 2024-02-28

## 2024-02-29 ENCOUNTER — HOSPITAL ENCOUNTER (OUTPATIENT)
Dept: WOUND CARE | Age: 89
Discharge: HOME | End: 2024-02-29
Attending: NURSE PRACTITIONER
Payer: MEDICARE

## 2024-02-29 VITALS
OXYGEN SATURATION: 95 % | HEART RATE: 69 BPM | DIASTOLIC BLOOD PRESSURE: 77 MMHG | TEMPERATURE: 97.1 F | RESPIRATION RATE: 18 BRPM | SYSTOLIC BLOOD PRESSURE: 165 MMHG

## 2024-02-29 DIAGNOSIS — I83.029 VENOUS STASIS ULCER OF LEFT LOWER EXTREMITY (HCC): ICD-10-CM

## 2024-02-29 DIAGNOSIS — L97.919 VENOUS STASIS ULCER OF RIGHT LOWER EXTREMITY (HCC): Primary | ICD-10-CM

## 2024-02-29 DIAGNOSIS — I83.019 VENOUS STASIS ULCER OF RIGHT LOWER EXTREMITY (HCC): Primary | ICD-10-CM

## 2024-02-29 DIAGNOSIS — I87.2 CHRONIC VENOUS STASIS DERMATITIS OF BOTH LOWER EXTREMITIES: ICD-10-CM

## 2024-02-29 DIAGNOSIS — L97.929 VENOUS STASIS ULCER OF LEFT LOWER EXTREMITY (HCC): ICD-10-CM

## 2024-02-29 PROCEDURE — 99213 OFFICE O/P EST LOW 20 MIN: CPT | Performed by: NURSE PRACTITIONER

## 2024-02-29 PROCEDURE — 99213 OFFICE O/P EST LOW 20 MIN: CPT

## 2024-02-29 NOTE — PROGRESS NOTES
[facility]  Consult and Procedure Note      Adal Verde  MEDICAL RECORD NUMBER:  [id number]  AGE: 89 y.o.   GENDER: male  : 1933  EPISODE DATE:  2024  Referring Provider: bilateral lower extremity wounds  Reason for Referral: Dr. Martinez    SUBJECTIVE:     Chief Complaint   Patient presents with    Wound Check     Right leg       HISTORY OF PRESENT ILLNESS      Adal Verde is a 89 y.o. male who presents today for wound/ulcer re-evaluation. Patient is established. Wound duration: several months    Patient seen today for re-evaluation of bilateral lower extremity wounds.  Patient states that wounds have been present for several months, had been following with Dr. Rey prior to referral to clinic.  He states that he had utilized antibiotic ointment, oral antibiotics and silvadene cream to wounds with no improvement prompting referral to clinic.  He notes severe mitral valve insufficiency, is currently being worked up for possible intervention by Dr. Martinez.  He reports history of CABG in  at which he did have vein harvesting from his legs.  He states that he has compression socks at home but has never worn as they are difficult to place.  Juxtalite compression garments were ordered as easier compression option.  Current wound care includes unna boot changed twice weekly by home health.  He denies any concerns with wound care as ordered.   He denies any further needs or concerns.    PAST MEDICAL HISTORY             Diagnosis Date    ANJANA (acute kidney injury) ([facility])     ANJANA (acute kidney injury) ([facility]) 2022    Arrhythmia 2019    Arthritis     BPH (benign prostatic hypertrophy)     CAD (coronary artery disease)     Cerebral artery occlusion with cerebral infarction ([facility])     Cerebrovascular disease     Gastroenteritis due to norovirus 2022    Hyperlipidemia     Hypertension     Mitral regurgitation     PVC (premature ventricular contraction) 2019    Small bowel

## 2024-02-29 NOTE — PROGRESS NOTES
[facility]  [address]  [address]   [place], [place] [address]  Telephone: [phone]     FAX [phone]    Home health agencies: [facility] Phone # [phone], Fax # [phone]        Patient Instructions   Visit Discharge/Physician Orders:  - Mechanical Debridement was completed today by using a saline and gauze.   - elevate legs to reduce swelling  - cut back on your sodium as directed by [facility] clinic  - lac-hydrin lotion use to intact skin  -apply Velcro compression in the morning and remove at night (ok to wear your normal sock with it)   -compression is recommended for life  -call us with any issues     Home Care: [facility]- ok to discharge from wound care     Wound Location: Bilateral legs -healed         Keep all dressings clean & dry.    Follow up visit: as needed    Supplies:    Duration of dressings: 30 days    We have sent your supply order to the following company:  per [facility]  If you don't receive the items you were expecting or don't know what the items are that you received, call the company where the order was sent.   If you are unable to obtain wound supplies, continue to use the supplies you have available until you are able to reach us. It is most important to keep the wound covered at all times.  It is YOUR responsibility to make sure that supplies are re-ordered before you run out. Re-order telephone numbers are included in each package.     Keep next scheduled appointment. Please give 24 hour notice if unable to keep appointment. [phone]    If you experience any of the following, please call the Wound Care Service during business hours: Monday through Friday 8:00 am - 4:30 pm  ([phone]).   *Increase in pain   *Temperature over 101   *Increase in drainage from your wound or a foul odor   *Uncontrolled swelling   *Need for compression bandage changes due to slippage, breakthrough drainage    If you need medical attention

## 2024-02-29 NOTE — PATIENT INSTRUCTIONS
Visit Discharge/Physician Orders:  - Mechanical Debridement was completed today by using a saline and gauze.   - elevate legs to reduce swelling  - cut back on your sodium as directed by [facility] clinic  - lac-hydrin lotion use to intact skin  -apply Velcro compression in the morning and remove at night (ok to wear your normal sock with it)   -compression is recommended for life  -call us with any issues     Home Care: [facility]- ok to discharge from wound care     Wound Location: Bilateral legs -healed         Keep all dressings clean & dry.    Follow up visit: as needed    Supplies:    Duration of dressings: 30 days    We have sent your supply order to the following company:  per [facility]  If you don't receive the items you were expecting or don't know what the items are that you received, call the company where the order was sent.   If you are unable to obtain wound supplies, continue to use the supplies you have available until you are able to reach us. It is most important to keep the wound covered at all times.  It is YOUR responsibility to make sure that supplies are re-ordered before you run out. Re-order telephone numbers are included in each package.     Keep next scheduled appointment. Please give 24 hour notice if unable to keep appointment. [phone]    If you experience any of the following, please call the Wound Care Service during business hours: Monday through Friday 8:00 am - 4:30 pm  ([phone]).   *Increase in pain   *Temperature over 101   *Increase in drainage from your wound or a foul odor   *Uncontrolled swelling   *Need for compression bandage changes due to slippage, breakthrough drainage    If you need medical attention outside of business hours, please contact your Primary Care Doctor or go to the nearest emergency room.

## 2024-02-29 NOTE — PLAN OF CARE
Problem: Wound:  Goal: Will show signs of wound healing; wound closure and no evidence of infection  Description: Will show signs of wound healing; wound closure and no evidence of infection  Outcome: Progressing   Seen today for bilateral legs. See AVS for discharge   Care plan reviewed with patient and daughter.  Patient and daughter verbalize understanding of the plan of care and contribute to goal setting.

## 2024-02-29 NOTE — PROGRESS NOTES
NPO after midnight  Bring drivers license and insurance information  Wear comfortable clean clothes  Shower morning of and night before with liquid antibacterial soap  Remove jewelry   May have to stay overnight if have PTCA/stent  Bring medications in original bottles  Made aware of visitors limit to 2 at a time  Follow all instructions given by your physician  Please notify doctor office if you need to cancel or reschedule your procedure   needed at discharge

## 2024-03-01 ENCOUNTER — HOSPITAL ENCOUNTER (OUTPATIENT)
Age: 89
Setting detail: SURGERY ADMIT
Discharge: HOME | End: 2024-03-01
Attending: INTERNAL MEDICINE | Admitting: INTERNAL MEDICINE
Payer: MEDICARE

## 2024-03-01 VITALS
DIASTOLIC BLOOD PRESSURE: 50 MMHG | RESPIRATION RATE: 19 BRPM | WEIGHT: 133.82 LBS | OXYGEN SATURATION: 95 % | HEIGHT: 65 IN | BODY MASS INDEX: 22.3 KG/M2 | HEART RATE: 76 BPM | SYSTOLIC BLOOD PRESSURE: 130 MMHG | TEMPERATURE: 98 F

## 2024-03-01 DIAGNOSIS — I34.0 SEVERE MITRAL REGURGITATION: Primary | ICD-10-CM

## 2024-03-01 DIAGNOSIS — I34.0 MITRAL REGURGITATION: ICD-10-CM

## 2024-03-01 LAB
ABO: NORMAL
ANION GAP SERPL CALC-SCNC: 14 MEQ/L (ref 8–16)
ANTIBODY SCREEN: NORMAL
APTT PPP: 31.9 SECONDS (ref 22–38)
BDY SITE: ABNORMAL
BDY SITE: NORMAL
BUN SERPL-MCNC: 42 MG/DL (ref 7–22)
CALCIUM SERPL-MCNC: 9.1 MG/DL (ref 8.5–10.5)
CHLORIDE SERPL-SCNC: 103 MEQ/L (ref 98–111)
CHOLEST SERPL-MCNC: 97 MG/DL (ref 100–199)
CO2 SERPL-SCNC: 23 MEQ/L (ref 23–33)
COLLECTED BY:: ABNORMAL
COLLECTED BY:: NORMAL
CREAT SERPL-MCNC: 1.6 MG/DL (ref 0.4–1.2)
DEPRECATED RDW RBC AUTO: 55.2 FL (ref 35–45)
EKG ATRIAL RATE: 63 BPM
EKG P AXIS: 61 DEGREES
EKG P-R INTERVAL: 208 MS
EKG Q-T INTERVAL: 464 MS
EKG QRS DURATION: 94 MS
EKG QTC CALCULATION (BAZETT): 474 MS
EKG R AXIS: 111 DEGREES
EKG T AXIS: -33 DEGREES
EKG VENTRICULAR RATE: 63 BPM
ERYTHROCYTE [DISTWIDTH] IN BLOOD BY AUTOMATED COUNT: 16.4 % (ref 11.5–14.5)
GFR SERPL CREATININE-BSD FRML MDRD: 41 ML/MIN/1.73M2
GLUCOSE SERPL-MCNC: 159 MG/DL (ref 70–108)
HCT VFR BLD AUTO: 42.8 % (ref 42–52)
HDLC SERPL-MCNC: 40 MG/DL
HGB BLD-MCNC: 13.2 GM/DL (ref 14–18)
INR PPP: 1.24 (ref 0.85–1.13)
LDLC SERPL CALC-MCNC: 41 MG/DL
MCH RBC QN AUTO: 28.6 PG (ref 26–33)
MCHC RBC AUTO-ENTMCNC: 30.8 GM/DL (ref 32.2–35.5)
MCV RBC AUTO: 92.8 FL (ref 80–94)
PLATELET # BLD AUTO: 210 THOU/MM3 (ref 130–400)
PMV BLD AUTO: 11.8 FL (ref 9.4–12.4)
POTASSIUM SERPL-SCNC: 4.6 MEQ/L (ref 3.5–5.2)
RBC # BLD AUTO: 4.61 MILL/MM3 (ref 4.7–6.1)
RH FACTOR: NORMAL
SAO2 % BLD: 55 % (ref 94–97)
SAO2 % BLD: 97 % (ref 94–97)
SODIUM SERPL-SCNC: 140 MEQ/L (ref 135–145)
TRIGL SERPL-MCNC: 80 MG/DL (ref 0–199)
WBC # BLD AUTO: 5.9 THOU/MM3 (ref 4.8–10.8)

## 2024-03-01 PROCEDURE — 85610 PROTHROMBIN TIME: CPT

## 2024-03-01 PROCEDURE — C1760 CLOSURE DEV, VASC: HCPCS | Performed by: INTERNAL MEDICINE

## 2024-03-01 PROCEDURE — 7100000010 HC PHASE II RECOVERY - FIRST 15 MIN: Performed by: INTERNAL MEDICINE

## 2024-03-01 PROCEDURE — 2500000003 HC RX 250 WO HCPCS: Performed by: INTERNAL MEDICINE

## 2024-03-01 PROCEDURE — 80061 LIPID PANEL: CPT

## 2024-03-01 PROCEDURE — 80048 BASIC METABOLIC PNL TOTAL CA: CPT

## 2024-03-01 PROCEDURE — 2709999900 HC NON-CHARGEABLE SUPPLY: Performed by: INTERNAL MEDICINE

## 2024-03-01 PROCEDURE — C1769 GUIDE WIRE: HCPCS | Performed by: INTERNAL MEDICINE

## 2024-03-01 PROCEDURE — 99152 MOD SED SAME PHYS/QHP 5/>YRS: CPT | Performed by: INTERNAL MEDICINE

## 2024-03-01 PROCEDURE — C1894 INTRO/SHEATH, NON-LASER: HCPCS | Performed by: INTERNAL MEDICINE

## 2024-03-01 PROCEDURE — 7100000011 HC PHASE II RECOVERY - ADDTL 15 MIN: Performed by: INTERNAL MEDICINE

## 2024-03-01 PROCEDURE — 86850 RBC ANTIBODY SCREEN: CPT

## 2024-03-01 PROCEDURE — 2580000003 HC RX 258: Performed by: NURSE PRACTITIONER

## 2024-03-01 PROCEDURE — 85027 COMPLETE CBC AUTOMATED: CPT

## 2024-03-01 PROCEDURE — 6360000002 HC RX W HCPCS: Performed by: INTERNAL MEDICINE

## 2024-03-01 PROCEDURE — 93461 R&L HRT ART/VENTRICLE ANGIO: CPT | Performed by: INTERNAL MEDICINE

## 2024-03-01 PROCEDURE — G0269 OCCLUSIVE DEVICE IN VEIN ART: HCPCS | Performed by: INTERNAL MEDICINE

## 2024-03-01 PROCEDURE — 93005 ELECTROCARDIOGRAM TRACING: CPT | Performed by: NURSE PRACTITIONER

## 2024-03-01 PROCEDURE — 86901 BLOOD TYPING SEROLOGIC RH(D): CPT

## 2024-03-01 PROCEDURE — 93010 ELECTROCARDIOGRAM REPORT: CPT | Performed by: INTERNAL MEDICINE

## 2024-03-01 PROCEDURE — 85730 THROMBOPLASTIN TIME PARTIAL: CPT

## 2024-03-01 PROCEDURE — 6360000004 HC RX CONTRAST MEDICATION: Performed by: INTERNAL MEDICINE

## 2024-03-01 PROCEDURE — 99153 MOD SED SAME PHYS/QHP EA: CPT | Performed by: INTERNAL MEDICINE

## 2024-03-01 PROCEDURE — 86900 BLOOD TYPING SEROLOGIC ABO: CPT

## 2024-03-01 PROCEDURE — 36415 COLL VENOUS BLD VENIPUNCTURE: CPT

## 2024-03-01 PROCEDURE — 82810 BLOOD GASES O2 SAT ONLY: CPT

## 2024-03-01 RX ORDER — ATROPINE SULFATE 0.4 MG/ML
0.5 INJECTION, SOLUTION INTRAVENOUS
Status: DISCONTINUED | OUTPATIENT
Start: 2024-03-01 | End: 2024-03-01 | Stop reason: HOSPADM

## 2024-03-01 RX ORDER — SODIUM CHLORIDE 0.9 % (FLUSH) 0.9 %
5-40 SYRINGE (ML) INJECTION PRN
Status: DISCONTINUED | OUTPATIENT
Start: 2024-03-01 | End: 2024-03-01 | Stop reason: HOSPADM

## 2024-03-01 RX ORDER — FENTANYL CITRATE 50 UG/ML
INJECTION, SOLUTION INTRAMUSCULAR; INTRAVENOUS PRN
Status: DISCONTINUED | OUTPATIENT
Start: 2024-03-01 | End: 2024-03-01 | Stop reason: HOSPADM

## 2024-03-01 RX ORDER — MIDAZOLAM HYDROCHLORIDE 1 MG/ML
INJECTION INTRAMUSCULAR; INTRAVENOUS PRN
Status: DISCONTINUED | OUTPATIENT
Start: 2024-03-01 | End: 2024-03-01 | Stop reason: HOSPADM

## 2024-03-01 RX ORDER — SODIUM CHLORIDE 9 MG/ML
INJECTION, SOLUTION INTRAVENOUS CONTINUOUS
Status: DISCONTINUED | OUTPATIENT
Start: 2024-03-01 | End: 2024-03-01 | Stop reason: HOSPADM

## 2024-03-01 RX ORDER — ACETAMINOPHEN 325 MG/1
650 TABLET ORAL EVERY 4 HOURS PRN
Status: DISCONTINUED | OUTPATIENT
Start: 2024-03-01 | End: 2024-03-01 | Stop reason: HOSPADM

## 2024-03-01 RX ORDER — SODIUM CHLORIDE 9 MG/ML
INJECTION, SOLUTION INTRAVENOUS PRN
Status: DISCONTINUED | OUTPATIENT
Start: 2024-03-01 | End: 2024-03-01 | Stop reason: HOSPADM

## 2024-03-01 RX ORDER — SODIUM CHLORIDE 0.9 % (FLUSH) 0.9 %
5-40 SYRINGE (ML) INJECTION EVERY 12 HOURS SCHEDULED
Status: DISCONTINUED | OUTPATIENT
Start: 2024-03-01 | End: 2024-03-01 | Stop reason: HOSPADM

## 2024-03-01 RX ORDER — NITROGLYCERIN 0.4 MG/1
0.4 TABLET SUBLINGUAL EVERY 5 MIN PRN
Status: DISCONTINUED | OUTPATIENT
Start: 2024-03-01 | End: 2024-03-01 | Stop reason: HOSPADM

## 2024-03-01 RX ORDER — ASPIRIN 325 MG
325 TABLET ORAL ONCE
Status: DISCONTINUED | OUTPATIENT
Start: 2024-03-01 | End: 2024-03-01 | Stop reason: HOSPADM

## 2024-03-01 RX ADMIN — SODIUM CHLORIDE: 9 INJECTION, SOLUTION INTRAVENOUS at 07:26

## 2024-03-01 ASSESSMENT — PAIN SCALES - GENERAL: PAINLEVEL_OUTOF10: 0

## 2024-03-01 NOTE — H&P
[facility]  Sedation/Analgesia History & Physical    Pt Name: Adal Verde  Account number: [id number]  MRN: [id number]  YOB: 1933  Provider Performing Procedure: Brett Martinez MD MD  Referring Provider: Brett Martinez MD   Primary Care Physician: Nikita Rey MD  Date: 3/1/2024    PRE-PROCEDURE    Code Status: FULL CODE  Brief History/Pre-Procedure Diagnosis:   SevereMR     Consent: : I have discussed with the patient risks, benefits, and alternatives (and relevant risks, benefits, and side effects related to alternatives or not receiving care), and likelihood of the success.   The patient and/or representative appear to understand and agree to proceed.  The discussion encompasses risks, benefits, and side effects related to the alternatives and the risks related to not receiving the proposed care, treatment, and services.     The indication, risks and benefits of the procedure and possible therapeutic consequences and alternatives were discussed with the patient. The patient was given the opportunity to ask questions and to have them answered to his/her satisfaction. Risks of the procedure include but are not limited to the following: Bleeding, hematoma including retroperitoneal hemmorhage, infection, pain and discomfort, injury to the aorta and other blood vessels, rhythm disturbance, low blood pressure, myocardial infarction, stroke, kidney damage/failure, myocardial perforation, allergic reactions to sedatives/contrast material, loss of pulse/vascular compromise requiring surgery, aneurysm/pseudoaneurysm formation, possible loss of a limb/hand/leg, needing blood transfusion, requiring emergent open heart surgery or emergent coronary intervention, the need for intubation/respiratory support, the requirement for defibrillation/cardioversion, and death. Alternatives to and omission of the suggested procedure were discussed. The patient had no further questions and  lactate (LAC-HYDRIN) 12 % lotion Apply topically as needed to intact skin of both legs. 1/26/24   Mala Davis APRN - CNP   furosemide (LASIX) 20 MG tablet Take 1 tablet by mouth daily 1/12/24   Bria Bell APRN - CNP   lisinopril (PRINIVIL;ZESTRIL) 5 MG tablet Take 1 tablet by mouth daily 12/26/23   Nikita Rey MD   tamsulosin (FLOMAX) 0.4 MG capsule TAKE 1 CAPSULE BY MOUTH EVERY EVENING 11/28/23   Nikita Rey MD   simvastatin (ZOCOR) 40 MG tablet TAKE 1 TABLET BY MOUTH EVERY NIGHT 11/8/23   Nikita Rey MD   carvedilol (COREG) 12.5 MG tablet TAKE 1 TABLET BY MOUTH TWICE DAILY 9/14/23   Nikita Rey MD   acetaminophen (TYLENOL) 325 MG tablet Take 2 tablets by mouth every 6 hours as needed for Pain    ProviderNehemiah MD   aspirin 81 MG EC tablet Take 1 tablet by mouth daily    ProviderNehemiah MD   multivitamin (THERAGRAN) per tablet Take 1 tablet by mouth daily    Provider, MD Nehemiah     Additional information:       VITAL SIGNS   Vitals:    03/01/24 0720   BP: (!) 141/59   Pulse: 65   Resp: 19   Temp:    SpO2:        PHYSICAL:   General: No acute distress  HEENT:  Unremarkable for age  Neck: without increased JVD, carotid pulses 2+ bilaterally without bruits  Heart: RRR, S1 & S2 WNL, S4 gallop, 3/6 HSM   NYHA: 2  Lungs: Clear to auscultation    Abdomen: BS present, without HSM, masses, or tenderness    Extremities: without C,C,E.  Pulses 2+ bilaterally  Mental Status: Alert & Oriented        PLANNED PROCEDURE   [x]Cath  [x]PCI                []Pacemaker/AICD  []MICHAEL             []Cardioversion []Peripheral angiography/PTA  [x]Other: C     SEDATION  Planned agent:[x]Midazolam []Meperidine [x]Sublimaze []Morphine  []Diazepam  []Other:     ASA Classification:  []1 []2 [x]3 []4 []5  Class 1: A normal healthy patient  Class 2: Pt with mild to moderate systemic disease  Class 3: Severe systemic disease or disturbance  Class 4: Severe systemic disorders

## 2024-03-01 NOTE — PLAN OF CARE
Problem: Chronic Conditions and Co-morbidities  Goal: Patient's chronic conditions and co-morbidity symptoms are monitored and maintained or improved  Outcome: Progressing  Flowsheets (Taken 3/1/2024 0715)  Care Plan - Patient's Chronic Conditions and Co-Morbidity Symptoms are Monitored and Maintained or Improved: Monitor and assess patient's chronic conditions and comorbid symptoms for stability, deterioration, or improvement     Problem: Safety - Adult  Goal: Free from fall injury  Outcome: Progressing     Problem: Cardiovascular - Adult  Goal: Maintains optimal cardiac output and hemodynamic stability  Outcome: Progressing  Flowsheets (Taken 3/1/2024 0715)  Maintains optimal cardiac output and hemodynamic stability: Monitor blood pressure and heart rate     Problem: Discharge Planning  Goal: Discharge to home or other facility with appropriate resources  Outcome: Progressing  Flowsheets (Taken 3/1/2024 0715)  Discharge to home or other facility with appropriate resources: Identify barriers to discharge with patient and caregiver     Problem: Pain  Goal: Verbalizes/displays adequate comfort level or baseline comfort level  Outcome: Progressing   Care plan reviewed with patient.  Patient verbalizes understanding of the plan of care and contributes to goal setting.

## 2024-03-01 NOTE — DISCHARGE INSTRUCTIONS
Discharge Instructions for Femoral Heart Catheterization  Take it easy for 3-4 days, limit vigorous activity (contact sports) for 2 weeks  No driving for 2 days  No lifting over 5 lbs for 1 week, this is a half gallon of milk  May shower after 24 hours  May remove dressing and apply band-aid after 24 hours.   Apply a clean band-aid daily for 5 days over the incision.  No creams, ointments, or powders near site for 2 weeks.  No hot tub, swimming or tub bath for 1 week  Gently clean your site daily using soap and water while standing in the shower. Dry thoroughly.  10. Monitor site for infection- redness, increased pain, hardness or drainage at site, elevated temperature, poor healing of incision, fever, chills.   11. If bleeding from incision, apply pressure and call 911 immediately.

## 2024-03-01 NOTE — PROGRESS NOTES
0700 Patient admitted to 2E05  ambulatory for Heart Cath.  Patient NPO. Patient accompanied by wife.  Vital signs obtained.   Assessment and data collection intiated.   Oriented to room.  Policies and procedures for 2E explained.   All questions answered with no further questions at this time.   Fall prevention and safety precautions discussed with patient.

## 2024-03-01 NOTE — PROGRESS NOTES
1430 Care taken over from cath lab, right groin stable . Patient reinstructed on bedrest, instructed to keep legs straight, not to cross legs, not to lift head off of pillow, not to laugh hard, if coughs to guard site with hand, voices understanding.

## 2024-03-02 LAB — ECHO BSA: 1.67 M2

## 2024-03-05 ENCOUNTER — OFFICE VISIT (OUTPATIENT)
Dept: CARDIOLOGY CLINIC | Age: 89
End: 2024-03-05
Payer: MEDICARE

## 2024-03-05 ENCOUNTER — TELEPHONE (OUTPATIENT)
Dept: CARDIOLOGY CLINIC | Age: 89
End: 2024-03-05

## 2024-03-05 VITALS
DIASTOLIC BLOOD PRESSURE: 66 MMHG | HEART RATE: 76 BPM | OXYGEN SATURATION: 97 % | WEIGHT: 144 LBS | HEIGHT: 65 IN | SYSTOLIC BLOOD PRESSURE: 146 MMHG | BODY MASS INDEX: 23.99 KG/M2

## 2024-03-05 DIAGNOSIS — I34.0 SEVERE MITRAL INSUFFICIENCY: Primary | ICD-10-CM

## 2024-03-05 DIAGNOSIS — I34.0 SEVERE MITRAL REGURGITATION: ICD-10-CM

## 2024-03-05 DIAGNOSIS — I34.0 SEVERE MITRAL INSUFFICIENCY: ICD-10-CM

## 2024-03-05 DIAGNOSIS — Z95.818 S/P MITRAL VALVE CLIP IMPLANTATION: ICD-10-CM

## 2024-03-05 DIAGNOSIS — R60.0 EDEMA OF BOTH LOWER LEGS: ICD-10-CM

## 2024-03-05 DIAGNOSIS — Z98.890 S/P MITRAL VALVE CLIP IMPLANTATION: ICD-10-CM

## 2024-03-05 DIAGNOSIS — I50.22 CHRONIC SYSTOLIC CONGESTIVE HEART FAILURE, NYHA CLASS 2 (HCC): Primary | ICD-10-CM

## 2024-03-05 PROCEDURE — G8484 FLU IMMUNIZE NO ADMIN: HCPCS | Performed by: NURSE PRACTITIONER

## 2024-03-05 PROCEDURE — 1123F ACP DISCUSS/DSCN MKR DOCD: CPT | Performed by: NURSE PRACTITIONER

## 2024-03-05 PROCEDURE — G8427 DOCREV CUR MEDS BY ELIG CLIN: HCPCS | Performed by: NURSE PRACTITIONER

## 2024-03-05 PROCEDURE — 1036F TOBACCO NON-USER: CPT | Performed by: NURSE PRACTITIONER

## 2024-03-05 PROCEDURE — G8420 CALC BMI NORM PARAMETERS: HCPCS | Performed by: NURSE PRACTITIONER

## 2024-03-05 PROCEDURE — 99214 OFFICE O/P EST MOD 30 MIN: CPT | Performed by: NURSE PRACTITIONER

## 2024-03-05 RX ORDER — FUROSEMIDE 20 MG/1
20 TABLET ORAL 2 TIMES DAILY
Qty: 180 TABLET | Refills: 3 | Status: SHIPPED | OUTPATIENT
Start: 2024-03-05

## 2024-03-05 ASSESSMENT — ENCOUNTER SYMPTOMS
COUGH: 0
VOMITING: 0
SHORTNESS OF BREATH: 1
ABDOMINAL DISTENTION: 0
NAUSEA: 0

## 2024-03-05 NOTE — TELEPHONE ENCOUNTER
Orders received from Dr. Martinez to schedule the patient for a MitraClip procedure, have the patient hold all medications the morning of the MitraClip procedure and bring the medications in the bottles to the hospital.    MitraClip: 3/19/24 at 0730 with an arrival of 0530  Discharge home with wife and daughter Garry.     Post MitraClip instructions per Dr. Martinez: have the patient be seen in the Structural Heart Clinic 7 days post MitraClip, follow up with the Heart Failure Clinic post,  obtain a CBC, BMP and ECHO prior to the 30 day post MitraClip follow up appointment.    7 day post MitraClip appointment:3/26/24 at 1015  CHF Clinic follow up appointment 4/4/23 at 1100  CBC/BMP/ECHO:4/19/24 at   30 day post MitraClip appointment:4/23/24 at 1030    Dr. Martinez, please agree.     Maria Guadalupe, can you check prior auth?

## 2024-03-05 NOTE — PROGRESS NOTES
taking lasix 20mg twice a day    Blood work in two weeks    Continue diet/fluid adherence  Continue daily wts.  F/U w/ Cardiology  F/U in clinic post procedure      Tolerating above noted HF meds, no ill side effects noted. Will continue to monitor kidney function and electrolytes. Will optimize as tolerated.   Pt is compliant w/ medications.    Total visit time of 30 minutes has been spent with patient on education of symptoms, management, medication, and plan of care; as well as review of chart: labs, ECHO, radiology reports, etc.   I personally spent more then 50% of the appt time face to face with the patient.  Daily weights  Fluid restriction of 2 Liters per day  Limit sodium in diet to around 7817-6670 mg/day  Monitor BP  Activity as tolerated     Patient was instructed to call the Heart Failure Clinic for any changes in symptoms as noted in AVS.      No follow-ups on file. or sooner if needed     Patient given educational materials - see patient instructions.   We discussed the importance of weighing oneself and recording daily. We also discussed the importance of a low sodium diet, higher sodium foods to avoid and better low sodium food options.   Patient verbalizes understanding of plan of care using teach back method, and is agreeable to the treatment plan.       Electronically signed by TAHIR Buchanan CNP on 3/5/2024 at 11:00 AM

## 2024-03-05 NOTE — TELEPHONE ENCOUNTER
All instructions reviewed with Juvencio and his wife face to face.  Phone call placed to daughter Garry and instructions reviewed.   All questions answered.

## 2024-03-05 NOTE — PATIENT INSTRUCTIONS
You may receive a survey regarding the care you received during your visit.  Your input is valuable to us.  We encourage you to complete and return your survey.  We hope you will choose us in the future for your healthcare needs.    Your nurses today were Shannan.  Office hours:   Mon-Thurs 8-4:30  Friday 8-12  Phone: [phone]    Continue:  Continue current medications  Daily weights and record  Fluid restriction of 2 Liters per day  Limit sodium in diet to around 9915-5380 mg/day  Monitor BP  Activity as tolerated     Call the [facility] for any of the following symptoms:   Weight gain of 2-3 pounds in 1 day or 5 pounds in 1 week  Increased shortness of breath  Shortness of breath while laying down  Cough  Chest pain  Swelling in feet, ankles or legs  Bloating in abdomen  Fatigue          Start taking lasix 20mg twice a day    Blood work in two weeks    Continue diet/fluid adherence  Continue daily wts.  F/U w/ Cardiology  F/U in clinic post procedure

## 2024-03-07 NOTE — TELEPHONE ENCOUNTER
Primary Account    Zeke Verde  Type: Personal/Family  Service Area: [facility]  Jessica Status: Verified  Total SP Bal Due: $273.97  Payment History Score   4   Coverages    1) E-MEDICARE/MEDICARE PART A AND B  Cvg status: E-Verified  Effective from: 6/1/1998  Subscriber: ZEKE VERDE  Last verified date: 3/1/2024  2) E-[facility] MEDICARE SUPP  Cvg status: E-Verified  Effective from: 1/1/2023  Subscriber: ZEKE VERDE  Last verified date: 3/1/2024     Medicare Primary - [place]EN[place]

## 2024-03-13 NOTE — TELEPHONE ENCOUNTER
Date of last visit:  1/23/2024  Date of next visit:  Visit date not found    Requested Prescriptions     Pending Prescriptions Disp Refills    carvedilol (COREG) 12.5 MG tablet [Pharmacy Med Name: CARVEDILOL 12.5MG TABLETS] 180 tablet 1     Sig: TAKE 1 TABLET BY MOUTH TWICE DAILY

## 2024-03-14 RX ORDER — CARVEDILOL 12.5 MG/1
TABLET ORAL
Qty: 180 TABLET | Refills: 1 | Status: SHIPPED | OUTPATIENT
Start: 2024-03-14

## 2024-03-15 ENCOUNTER — NURSE ONLY (OUTPATIENT)
Dept: LAB | Age: 89
End: 2024-03-15

## 2024-03-15 ENCOUNTER — TELEPHONE (OUTPATIENT)
Dept: CARDIOLOGY CLINIC | Age: 89
End: 2024-03-15

## 2024-03-15 DIAGNOSIS — I34.0 SEVERE MITRAL INSUFFICIENCY: ICD-10-CM

## 2024-03-15 DIAGNOSIS — I50.22 CHRONIC SYSTOLIC CONGESTIVE HEART FAILURE, NYHA CLASS 2 (HCC): ICD-10-CM

## 2024-03-15 DIAGNOSIS — R60.0 EDEMA OF BOTH LOWER LEGS: ICD-10-CM

## 2024-03-15 LAB
ANION GAP SERPL CALC-SCNC: 15 MEQ/L (ref 8–16)
BUN SERPL-MCNC: 46 MG/DL (ref 7–22)
CALCIUM SERPL-MCNC: 9.5 MG/DL (ref 8.5–10.5)
CHLORIDE SERPL-SCNC: 97 MEQ/L (ref 98–111)
CO2 SERPL-SCNC: 26 MEQ/L (ref 23–33)
CREAT SERPL-MCNC: 1.8 MG/DL (ref 0.4–1.2)
GFR SERPL CREATININE-BSD FRML MDRD: 35 ML/MIN/1.73M2
GLUCOSE SERPL-MCNC: 143 MG/DL (ref 70–108)
POTASSIUM SERPL-SCNC: 4.5 MEQ/L (ref 3.5–5.2)
SODIUM SERPL-SCNC: 138 MEQ/L (ref 135–145)

## 2024-03-15 RX ORDER — FUROSEMIDE 20 MG/1
20 TABLET ORAL DAILY
Qty: 90 TABLET | Refills: 3 | Status: SHIPPED | OUTPATIENT
Start: 2024-03-15

## 2024-03-15 NOTE — TELEPHONE ENCOUNTER
----- Message from TAHIR Buchanan - CNP sent at 3/15/2024 11:04 AM EDT -----  Labs reviewed - renal function within baseline but some decline - decrease lasix to 20 daily until procedure next week.

## 2024-03-18 ENCOUNTER — ANESTHESIA EVENT (OUTPATIENT)
Age: 89
DRG: 307 | End: 2024-03-18
Payer: MEDICARE

## 2024-03-18 RX ORDER — SODIUM CHLORIDE 0.9 % (FLUSH) 0.9 %
5-40 SYRINGE (ML) INJECTION PRN
Status: CANCELLED | OUTPATIENT
Start: 2024-03-18

## 2024-03-18 RX ORDER — SODIUM CHLORIDE 0.9 % (FLUSH) 0.9 %
5-40 SYRINGE (ML) INJECTION EVERY 12 HOURS SCHEDULED
Status: CANCELLED | OUTPATIENT
Start: 2024-03-18

## 2024-03-18 RX ORDER — SODIUM CHLORIDE 9 MG/ML
INJECTION, SOLUTION INTRAVENOUS CONTINUOUS
Status: CANCELLED | OUTPATIENT
Start: 2024-03-18

## 2024-03-18 RX ORDER — SODIUM CHLORIDE 9 MG/ML
INJECTION, SOLUTION INTRAVENOUS PRN
Status: CANCELLED | OUTPATIENT
Start: 2024-03-18

## 2024-03-19 ENCOUNTER — HOSPITAL ENCOUNTER (INPATIENT)
Age: 89
LOS: 2 days | Discharge: HOME | DRG: 307 | End: 2024-03-21
Attending: INTERNAL MEDICINE | Admitting: INTERNAL MEDICINE
Payer: MEDICARE

## 2024-03-19 ENCOUNTER — ANESTHESIA (OUTPATIENT)
Age: 89
DRG: 307 | End: 2024-03-19
Payer: MEDICARE

## 2024-03-19 DIAGNOSIS — I34.0 SEVERE MITRAL REGURGITATION: ICD-10-CM

## 2024-03-19 DIAGNOSIS — I50.22 CHRONIC SYSTOLIC CONGESTIVE HEART FAILURE, NYHA CLASS 2 (HCC): ICD-10-CM

## 2024-03-19 DIAGNOSIS — R60.0 EDEMA OF BOTH LOWER LEGS: ICD-10-CM

## 2024-03-19 DIAGNOSIS — I34.0 SEVERE MITRAL INSUFFICIENCY: ICD-10-CM

## 2024-03-19 LAB
ABO: NORMAL
ALBUMIN SERPL BCG-MCNC: 3.8 G/DL (ref 3.5–5.1)
ALP SERPL-CCNC: 101 U/L (ref 38–126)
ALT SERPL W/O P-5'-P-CCNC: 18 U/L (ref 11–66)
ANION GAP SERPL CALC-SCNC: 13 MEQ/L (ref 8–16)
ANTIBODY SCREEN: NORMAL
APTT PPP: 34.5 SECONDS (ref 22–38)
AST SERPL-CCNC: 29 U/L (ref 5–40)
BILIRUB SERPL-MCNC: 0.6 MG/DL (ref 0.3–1.2)
BUN SERPL-MCNC: 51 MG/DL (ref 7–22)
CALCIUM SERPL-MCNC: 9.2 MG/DL (ref 8.5–10.5)
CHLORIDE SERPL-SCNC: 101 MEQ/L (ref 98–111)
CO2 SERPL-SCNC: 24 MEQ/L (ref 23–33)
CREAT SERPL-MCNC: 1.8 MG/DL (ref 0.4–1.2)
DEPRECATED RDW RBC AUTO: 54.7 FL (ref 35–45)
ECHO BSA: 1.63 M2
EKG ATRIAL RATE: 70 BPM
EKG P AXIS: 60 DEGREES
EKG P-R INTERVAL: 248 MS
EKG Q-T INTERVAL: 436 MS
EKG QRS DURATION: 96 MS
EKG QTC CALCULATION (BAZETT): 470 MS
EKG R AXIS: 96 DEGREES
EKG T AXIS: 19 DEGREES
EKG VENTRICULAR RATE: 70 BPM
ERYTHROCYTE [DISTWIDTH] IN BLOOD BY AUTOMATED COUNT: 16 % (ref 11.5–14.5)
GFR SERPL CREATININE-BSD FRML MDRD: 35 ML/MIN/1.73M2
GLUCOSE SERPL-MCNC: 129 MG/DL (ref 70–108)
HCT VFR BLD AUTO: 44.3 % (ref 42–52)
HGB BLD-MCNC: 13.9 GM/DL (ref 14–18)
INR PPP: 1.13 (ref 0.85–1.13)
KCT BLD-ACNC: 277 SECONDS (ref 1–150)
MCH RBC QN AUTO: 29 PG (ref 26–33)
MCHC RBC AUTO-ENTMCNC: 31.4 GM/DL (ref 32.2–35.5)
MCV RBC AUTO: 92.5 FL (ref 80–94)
NT-PROBNP SERPL IA-MCNC: ABNORMAL PG/ML (ref 0–449)
PLATELET # BLD AUTO: 255 THOU/MM3 (ref 130–400)
PMV BLD AUTO: 10.7 FL (ref 9.4–12.4)
POTASSIUM SERPL-SCNC: 4.2 MEQ/L (ref 3.5–5.2)
PROT SERPL-MCNC: 8 G/DL (ref 6.1–8)
RBC # BLD AUTO: 4.79 MILL/MM3 (ref 4.7–6.1)
RH FACTOR: NORMAL
SODIUM SERPL-SCNC: 138 MEQ/L (ref 135–145)
WBC # BLD AUTO: 6.3 THOU/MM3 (ref 4.8–10.8)

## 2024-03-19 PROCEDURE — 2140000000 HC CCU INTERMEDIATE R&B

## 2024-03-19 PROCEDURE — 6360000002 HC RX W HCPCS: Performed by: NURSE ANESTHETIST, CERTIFIED REGISTERED

## 2024-03-19 PROCEDURE — 2580000003 HC RX 258: Performed by: PHYSICIAN ASSISTANT

## 2024-03-19 PROCEDURE — 2709999900 HC NON-CHARGEABLE SUPPLY: Performed by: INTERNAL MEDICINE

## 2024-03-19 PROCEDURE — 7100000000 HC PACU RECOVERY - FIRST 15 MIN: Performed by: INTERNAL MEDICINE

## 2024-03-19 PROCEDURE — 33418 REPAIR TCAT MITRAL VALVE: CPT | Performed by: INTERNAL MEDICINE

## 2024-03-19 PROCEDURE — 3700000000 HC ANESTHESIA ATTENDED CARE: Performed by: INTERNAL MEDICINE

## 2024-03-19 PROCEDURE — C1893 INTRO/SHEATH, FIXED,NON-PEEL: HCPCS | Performed by: INTERNAL MEDICINE

## 2024-03-19 PROCEDURE — 2500000003 HC RX 250 WO HCPCS: Performed by: INTERNAL MEDICINE

## 2024-03-19 PROCEDURE — C1894 INTRO/SHEATH, NON-LASER: HCPCS | Performed by: INTERNAL MEDICINE

## 2024-03-19 PROCEDURE — C1769 GUIDE WIRE: HCPCS | Performed by: INTERNAL MEDICINE

## 2024-03-19 PROCEDURE — 86923 COMPATIBILITY TEST ELECTRIC: CPT

## 2024-03-19 PROCEDURE — 85027 COMPLETE CBC AUTOMATED: CPT

## 2024-03-19 PROCEDURE — C1889 IMPLANT/INSERT DEVICE, NOC: HCPCS | Performed by: INTERNAL MEDICINE

## 2024-03-19 PROCEDURE — 93005 ELECTROCARDIOGRAM TRACING: CPT | Performed by: PHYSICIAN ASSISTANT

## 2024-03-19 PROCEDURE — 83880 ASSAY OF NATRIURETIC PEPTIDE: CPT

## 2024-03-19 PROCEDURE — B24BZZ4 ULTRASONOGRAPHY OF HEART WITH AORTA, TRANSESOPHAGEAL: ICD-10-PCS | Performed by: INTERNAL MEDICINE

## 2024-03-19 PROCEDURE — 86900 BLOOD TYPING SEROLOGIC ABO: CPT

## 2024-03-19 PROCEDURE — 85610 PROTHROMBIN TIME: CPT

## 2024-03-19 PROCEDURE — C1725 CATH, TRANSLUMIN NON-LASER: HCPCS | Performed by: INTERNAL MEDICINE

## 2024-03-19 PROCEDURE — 6360000002 HC RX W HCPCS: Performed by: PHYSICIAN ASSISTANT

## 2024-03-19 PROCEDURE — 2500000003 HC RX 250 WO HCPCS: Performed by: NURSE ANESTHETIST, CERTIFIED REGISTERED

## 2024-03-19 PROCEDURE — 7100000001 HC PACU RECOVERY - ADDTL 15 MIN: Performed by: INTERNAL MEDICINE

## 2024-03-19 PROCEDURE — 85730 THROMBOPLASTIN TIME PARTIAL: CPT

## 2024-03-19 PROCEDURE — 3700000001 HC ADD 15 MINUTES (ANESTHESIA): Performed by: INTERNAL MEDICINE

## 2024-03-19 PROCEDURE — 86901 BLOOD TYPING SEROLOGIC RH(D): CPT

## 2024-03-19 PROCEDURE — 80053 COMPREHEN METABOLIC PANEL: CPT

## 2024-03-19 PROCEDURE — 85347 COAGULATION TIME ACTIVATED: CPT

## 2024-03-19 PROCEDURE — 93010 ELECTROCARDIOGRAM REPORT: CPT | Performed by: INTERNAL MEDICINE

## 2024-03-19 PROCEDURE — 6370000000 HC RX 637 (ALT 250 FOR IP): Performed by: INTERNAL MEDICINE

## 2024-03-19 PROCEDURE — 86850 RBC ANTIBODY SCREEN: CPT

## 2024-03-19 PROCEDURE — 2580000003 HC RX 258: Performed by: INTERNAL MEDICINE

## 2024-03-19 PROCEDURE — 02UG3JZ SUPPLEMENT MITRAL VALVE WITH SYNTHETIC SUBSTITUTE, PERCUTANEOUS APPROACH: ICD-10-PCS | Performed by: INTERNAL MEDICINE

## 2024-03-19 PROCEDURE — 36415 COLL VENOUS BLD VENIPUNCTURE: CPT

## 2024-03-19 DEVICE — G4 CLIP DELIVERY SYSTEM
Type: IMPLANTABLE DEVICE | Status: FUNCTIONAL
Brand: MITRACLIP™

## 2024-03-19 DEVICE — STEERABLE GUIDE CATHETER
Type: IMPLANTABLE DEVICE | Status: FUNCTIONAL
Brand: MITRACLIP

## 2024-03-19 RX ORDER — ACETAMINOPHEN 325 MG/1
650 TABLET ORAL EVERY 4 HOURS PRN
Status: DISCONTINUED | OUTPATIENT
Start: 2024-03-19 | End: 2024-03-21 | Stop reason: HOSPADM

## 2024-03-19 RX ORDER — NALOXONE HYDROCHLORIDE 0.4 MG/ML
INJECTION, SOLUTION INTRAMUSCULAR; INTRAVENOUS; SUBCUTANEOUS PRN
Status: DISCONTINUED | OUTPATIENT
Start: 2024-03-19 | End: 2024-03-21 | Stop reason: HOSPADM

## 2024-03-19 RX ORDER — TAMSULOSIN HYDROCHLORIDE 0.4 MG/1
0.4 CAPSULE ORAL EVERY EVENING
Status: DISCONTINUED | OUTPATIENT
Start: 2024-03-19 | End: 2024-03-21 | Stop reason: HOSPADM

## 2024-03-19 RX ORDER — SODIUM CHLORIDE 0.9 % (FLUSH) 0.9 %
5-40 SYRINGE (ML) INJECTION PRN
Status: DISCONTINUED | OUTPATIENT
Start: 2024-03-19 | End: 2024-03-21 | Stop reason: HOSPADM

## 2024-03-19 RX ORDER — MULTIVITAMIN WITH IRON
1 TABLET ORAL DAILY
Status: DISCONTINUED | OUTPATIENT
Start: 2024-03-19 | End: 2024-03-21 | Stop reason: HOSPADM

## 2024-03-19 RX ORDER — HEPARIN SODIUM 1000 [USP'U]/ML
INJECTION, SOLUTION INTRAVENOUS; SUBCUTANEOUS PRN
Status: DISCONTINUED | OUTPATIENT
Start: 2024-03-19 | End: 2024-03-19 | Stop reason: SDUPTHER

## 2024-03-19 RX ORDER — PHENYLEPHRINE HCL IN 0.9% NACL 1 MG/10 ML
SYRINGE (ML) INTRAVENOUS PRN
Status: DISCONTINUED | OUTPATIENT
Start: 2024-03-19 | End: 2024-03-19 | Stop reason: SDUPTHER

## 2024-03-19 RX ORDER — SODIUM CHLORIDE 9 MG/ML
INJECTION, SOLUTION INTRAVENOUS PRN
Status: DISCONTINUED | OUTPATIENT
Start: 2024-03-19 | End: 2024-03-21 | Stop reason: HOSPADM

## 2024-03-19 RX ORDER — SODIUM CHLORIDE 9 MG/ML
INJECTION, SOLUTION INTRAVENOUS CONTINUOUS
Status: DISCONTINUED | OUTPATIENT
Start: 2024-03-19 | End: 2024-03-19 | Stop reason: ALTCHOICE

## 2024-03-19 RX ORDER — SODIUM CHLORIDE 0.9 % (FLUSH) 0.9 %
5-40 SYRINGE (ML) INJECTION PRN
Status: DISCONTINUED | OUTPATIENT
Start: 2024-03-19 | End: 2024-03-19 | Stop reason: SDUPTHER

## 2024-03-19 RX ORDER — HYDRALAZINE HYDROCHLORIDE 20 MG/ML
10 INJECTION INTRAMUSCULAR; INTRAVENOUS EVERY 10 MIN PRN
Status: DISCONTINUED | OUTPATIENT
Start: 2024-03-19 | End: 2024-03-21 | Stop reason: HOSPADM

## 2024-03-19 RX ORDER — HYDRALAZINE HYDROCHLORIDE 20 MG/ML
10 INJECTION INTRAMUSCULAR; INTRAVENOUS
Status: DISCONTINUED | OUTPATIENT
Start: 2024-03-19 | End: 2024-03-21 | Stop reason: HOSPADM

## 2024-03-19 RX ORDER — ATROPINE SULFATE 0.4 MG/ML
0.5 INJECTION, SOLUTION INTRAVENOUS
Status: ACTIVE | OUTPATIENT
Start: 2024-03-19 | End: 2024-03-20

## 2024-03-19 RX ORDER — PROTAMINE SULFATE 10 MG/ML
INJECTION, SOLUTION INTRAVENOUS PRN
Status: DISCONTINUED | OUTPATIENT
Start: 2024-03-19 | End: 2024-03-19 | Stop reason: SDUPTHER

## 2024-03-19 RX ORDER — FUROSEMIDE 20 MG/1
20 TABLET ORAL DAILY
Status: DISCONTINUED | OUTPATIENT
Start: 2024-03-19 | End: 2024-03-21

## 2024-03-19 RX ORDER — SODIUM CHLORIDE 0.9 % (FLUSH) 0.9 %
5-40 SYRINGE (ML) INJECTION EVERY 12 HOURS SCHEDULED
Status: DISCONTINUED | OUTPATIENT
Start: 2024-03-19 | End: 2024-03-19

## 2024-03-19 RX ORDER — SODIUM CHLORIDE 0.9 % (FLUSH) 0.9 %
5-40 SYRINGE (ML) INJECTION EVERY 12 HOURS SCHEDULED
Status: DISCONTINUED | OUTPATIENT
Start: 2024-03-19 | End: 2024-03-21 | Stop reason: HOSPADM

## 2024-03-19 RX ORDER — ASPIRIN 81 MG/1
81 TABLET ORAL DAILY
Status: DISCONTINUED | OUTPATIENT
Start: 2024-03-20 | End: 2024-03-21 | Stop reason: HOSPADM

## 2024-03-19 RX ORDER — FENTANYL CITRATE 50 UG/ML
50 INJECTION, SOLUTION INTRAMUSCULAR; INTRAVENOUS
Status: ACTIVE | OUTPATIENT
Start: 2024-03-19 | End: 2024-03-19

## 2024-03-19 RX ORDER — ROCURONIUM BROMIDE 10 MG/ML
INJECTION, SOLUTION INTRAVENOUS PRN
Status: DISCONTINUED | OUTPATIENT
Start: 2024-03-19 | End: 2024-03-19 | Stop reason: SDUPTHER

## 2024-03-19 RX ORDER — CHLORHEXIDINE GLUCONATE 4 G/100ML
SOLUTION TOPICAL ONCE
Status: DISCONTINUED | OUTPATIENT
Start: 2024-03-19 | End: 2024-03-21 | Stop reason: HOSPADM

## 2024-03-19 RX ORDER — ONDANSETRON 2 MG/ML
INJECTION INTRAMUSCULAR; INTRAVENOUS PRN
Status: DISCONTINUED | OUTPATIENT
Start: 2024-03-19 | End: 2024-03-19 | Stop reason: SDUPTHER

## 2024-03-19 RX ORDER — VASOPRESSIN 20 U/ML
INJECTION PARENTERAL PRN
Status: DISCONTINUED | OUTPATIENT
Start: 2024-03-19 | End: 2024-03-19 | Stop reason: SDUPTHER

## 2024-03-19 RX ORDER — LIDOCAINE HYDROCHLORIDE 20 MG/ML
INJECTION, SOLUTION EPIDURAL; INFILTRATION; INTRACAUDAL; PERINEURAL PRN
Status: DISCONTINUED | OUTPATIENT
Start: 2024-03-19 | End: 2024-03-19 | Stop reason: SDUPTHER

## 2024-03-19 RX ORDER — CARVEDILOL 6.25 MG/1
12.5 TABLET ORAL 2 TIMES DAILY
Status: DISCONTINUED | OUTPATIENT
Start: 2024-03-19 | End: 2024-03-21 | Stop reason: HOSPADM

## 2024-03-19 RX ORDER — CLOPIDOGREL BISULFATE 75 MG/1
75 TABLET ORAL DAILY
Status: DISCONTINUED | OUTPATIENT
Start: 2024-03-20 | End: 2024-03-21 | Stop reason: HOSPADM

## 2024-03-19 RX ORDER — ACETAMINOPHEN 325 MG/1
650 TABLET ORAL EVERY 6 HOURS PRN
Status: DISCONTINUED | OUTPATIENT
Start: 2024-03-19 | End: 2024-03-19 | Stop reason: ALTCHOICE

## 2024-03-19 RX ORDER — DEXAMETHASONE SODIUM PHOSPHATE 10 MG/ML
INJECTION, EMULSION INTRAMUSCULAR; INTRAVENOUS PRN
Status: DISCONTINUED | OUTPATIENT
Start: 2024-03-19 | End: 2024-03-19 | Stop reason: SDUPTHER

## 2024-03-19 RX ORDER — ASPIRIN 325 MG
325 TABLET ORAL ONCE
Status: COMPLETED | OUTPATIENT
Start: 2024-03-19 | End: 2024-03-19

## 2024-03-19 RX ORDER — EPHEDRINE SULFATE/0.9% NACL/PF 25 MG/5 ML
SYRINGE (ML) INTRAVENOUS PRN
Status: DISCONTINUED | OUTPATIENT
Start: 2024-03-19 | End: 2024-03-19 | Stop reason: SDUPTHER

## 2024-03-19 RX ORDER — ONDANSETRON 2 MG/ML
4 INJECTION INTRAMUSCULAR; INTRAVENOUS EVERY 6 HOURS PRN
Status: DISCONTINUED | OUTPATIENT
Start: 2024-03-19 | End: 2024-03-21 | Stop reason: HOSPADM

## 2024-03-19 RX ORDER — ONDANSETRON 2 MG/ML
4 INJECTION INTRAMUSCULAR; INTRAVENOUS
Status: ACTIVE | OUTPATIENT
Start: 2024-03-19 | End: 2024-03-20

## 2024-03-19 RX ORDER — CLOPIDOGREL 300 MG/1
600 TABLET, FILM COATED ORAL ONCE
Status: COMPLETED | OUTPATIENT
Start: 2024-03-19 | End: 2024-03-19

## 2024-03-19 RX ORDER — POLYETHYLENE GLYCOL 3350 17 G/17G
17 POWDER, FOR SOLUTION ORAL DAILY PRN
Status: DISCONTINUED | OUTPATIENT
Start: 2024-03-19 | End: 2024-03-21 | Stop reason: HOSPADM

## 2024-03-19 RX ORDER — PROPOFOL 10 MG/ML
INJECTION, EMULSION INTRAVENOUS PRN
Status: DISCONTINUED | OUTPATIENT
Start: 2024-03-19 | End: 2024-03-19 | Stop reason: SDUPTHER

## 2024-03-19 RX ORDER — BISACODYL 5 MG/1
5 TABLET, DELAYED RELEASE ORAL DAILY PRN
Status: DISCONTINUED | OUTPATIENT
Start: 2024-03-19 | End: 2024-03-21 | Stop reason: HOSPADM

## 2024-03-19 RX ORDER — SODIUM CHLORIDE 9 MG/ML
INJECTION, SOLUTION INTRAVENOUS CONTINUOUS
Status: DISCONTINUED | OUTPATIENT
Start: 2024-03-19 | End: 2024-03-21 | Stop reason: HOSPADM

## 2024-03-19 RX ORDER — SODIUM CHLORIDE 9 MG/ML
INJECTION, SOLUTION INTRAVENOUS PRN
Status: DISCONTINUED | OUTPATIENT
Start: 2024-03-19 | End: 2024-03-19 | Stop reason: SDUPTHER

## 2024-03-19 RX ORDER — LABETALOL HYDROCHLORIDE 5 MG/ML
10 INJECTION, SOLUTION INTRAVENOUS
Status: DISCONTINUED | OUTPATIENT
Start: 2024-03-19 | End: 2024-03-21 | Stop reason: HOSPADM

## 2024-03-19 RX ORDER — FENTANYL CITRATE 50 UG/ML
INJECTION, SOLUTION INTRAMUSCULAR; INTRAVENOUS PRN
Status: DISCONTINUED | OUTPATIENT
Start: 2024-03-19 | End: 2024-03-19 | Stop reason: SDUPTHER

## 2024-03-19 RX ADMIN — PROTAMINE SULFATE 50 MG: 10 INJECTION, SOLUTION INTRAVENOUS at 09:26

## 2024-03-19 RX ADMIN — CLOPIDOGREL BISULFATE 600 MG: 300 TABLET, FILM COATED ORAL at 11:51

## 2024-03-19 RX ADMIN — WATER 2000 MG: 1 INJECTION INTRAMUSCULAR; INTRAVENOUS; SUBCUTANEOUS at 06:56

## 2024-03-19 RX ADMIN — HEPARIN SODIUM 3000 UNITS: 1000 INJECTION INTRAVENOUS; SUBCUTANEOUS at 09:14

## 2024-03-19 RX ADMIN — PROPOFOL 20 MG: 10 INJECTION, EMULSION INTRAVENOUS at 09:09

## 2024-03-19 RX ADMIN — EPHEDRINE SULFATE 10 MG: 5 INJECTION INTRAVENOUS at 08:01

## 2024-03-19 RX ADMIN — SUGAMMADEX 200 MG: 100 INJECTION, SOLUTION INTRAVENOUS at 09:28

## 2024-03-19 RX ADMIN — ROCURONIUM BROMIDE 20 MG: 10 INJECTION INTRAVENOUS at 08:22

## 2024-03-19 RX ADMIN — ASPIRIN 325 MG: 325 TABLET ORAL at 11:51

## 2024-03-19 RX ADMIN — SODIUM CHLORIDE: 9 INJECTION, SOLUTION INTRAVENOUS at 13:36

## 2024-03-19 RX ADMIN — EPHEDRINE SULFATE 10 MG: 5 INJECTION INTRAVENOUS at 08:35

## 2024-03-19 RX ADMIN — VASOPRESSIN 2 UNITS: 20 INJECTION INTRAVENOUS at 08:55

## 2024-03-19 RX ADMIN — FENTANYL CITRATE 50 MCG: 50 INJECTION, SOLUTION INTRAMUSCULAR; INTRAVENOUS at 07:45

## 2024-03-19 RX ADMIN — VASOPRESSIN 2 UNITS: 20 INJECTION INTRAVENOUS at 09:05

## 2024-03-19 RX ADMIN — CARVEDILOL 12.5 MG: 6.25 TABLET, FILM COATED ORAL at 11:51

## 2024-03-19 RX ADMIN — SODIUM CHLORIDE, PRESERVATIVE FREE 10 ML: 5 INJECTION INTRAVENOUS at 19:42

## 2024-03-19 RX ADMIN — PROPOFOL 50 MG: 10 INJECTION, EMULSION INTRAVENOUS at 07:45

## 2024-03-19 RX ADMIN — FUROSEMIDE 20 MG: 20 TABLET ORAL at 11:51

## 2024-03-19 RX ADMIN — ONDANSETRON 4 MG: 2 INJECTION INTRAMUSCULAR; INTRAVENOUS at 09:28

## 2024-03-19 RX ADMIN — TAMSULOSIN HYDROCHLORIDE 0.4 MG: 0.4 CAPSULE ORAL at 19:42

## 2024-03-19 RX ADMIN — VASOPRESSIN 2 UNITS: 20 INJECTION INTRAVENOUS at 08:56

## 2024-03-19 RX ADMIN — HEPARIN SODIUM 8000 UNITS: 1000 INJECTION INTRAVENOUS; SUBCUTANEOUS at 08:33

## 2024-03-19 RX ADMIN — LIDOCAINE HYDROCHLORIDE 100 MG: 20 INJECTION, SOLUTION EPIDURAL; INFILTRATION; INTRACAUDAL; PERINEURAL at 07:45

## 2024-03-19 RX ADMIN — DEXAMETHASONE SODIUM PHOSPHATE 4 MG: 10 INJECTION, EMULSION INTRAMUSCULAR; INTRAVENOUS at 08:10

## 2024-03-19 RX ADMIN — Medication 100 MCG: at 08:53

## 2024-03-19 RX ADMIN — Medication 1 TABLET: at 11:51

## 2024-03-19 RX ADMIN — EPHEDRINE SULFATE 5 MG: 5 INJECTION INTRAVENOUS at 08:53

## 2024-03-19 RX ADMIN — ROCURONIUM BROMIDE 50 MG: 10 INJECTION INTRAVENOUS at 07:45

## 2024-03-19 RX ADMIN — SODIUM CHLORIDE: 9 INJECTION, SOLUTION INTRAVENOUS at 06:29

## 2024-03-19 RX ADMIN — SODIUM CHLORIDE: 9 INJECTION, SOLUTION INTRAVENOUS at 11:32

## 2024-03-19 RX ADMIN — HEPARIN SODIUM 2000 UNITS: 1000 INJECTION INTRAVENOUS; SUBCUTANEOUS at 08:29

## 2024-03-19 RX ADMIN — CARVEDILOL 12.5 MG: 6.25 TABLET, FILM COATED ORAL at 19:41

## 2024-03-19 ASSESSMENT — PAIN - FUNCTIONAL ASSESSMENT: PAIN_FUNCTIONAL_ASSESSMENT: NONE - DENIES PAIN

## 2024-03-19 ASSESSMENT — PAIN SCALES - GENERAL: PAINLEVEL_OUTOF10: 0

## 2024-03-19 NOTE — ANESTHESIA PRE PROCEDURE
Department of Anesthesiology  Preprocedure Note       Name:  Adal Verde   Age:  89 y.o.  :  1933                                          MRN:  [id number]         Date:  3/19/2024      Surgeon: Surgeon(s):  Brett Martinez MD    Procedure: Procedure(s):  Transcatheter mitral valve repair    Medications prior to admission:   Prior to Admission medications    Medication Sig Start Date End Date Taking? Authorizing Provider   furosemide (LASIX) 20 MG tablet Take 1 tablet by mouth daily 3/15/24   Bria Bell APRN - CNP   carvedilol (COREG) 12.5 MG tablet TAKE 1 TABLET BY MOUTH TWICE DAILY 3/14/24   Nikita Rey MD   ammonium lactate (LAC-HYDRIN) 12 % lotion Apply topically as needed to intact skin of both legs. 24   Mala Davis APRN - CNP   lisinopril (PRINIVIL;ZESTRIL) 5 MG tablet Take 1 tablet by mouth daily 23   Nikita Rey MD   tamsulosin (FLOMAX) 0.4 MG capsule TAKE 1 CAPSULE BY MOUTH EVERY EVENING 23   Nikita Rey MD   simvastatin (ZOCOR) 40 MG tablet TAKE 1 TABLET BY MOUTH EVERY NIGHT 23   Nikita Rey MD   acetaminophen (TYLENOL) 325 MG tablet Take 2 tablets by mouth every 6 hours as needed for Pain    Nehemiah He MD   aspirin 81 MG EC tablet Take 1 tablet by mouth daily    Nehemiah He MD   multivitamin (THERAGRAN) per tablet Take 1 tablet by mouth daily    ProviderNehemiah MD       Current medications:    Current Facility-Administered Medications   Medication Dose Route Frequency Provider Last Rate Last Admin   • 0.9 % sodium chloride infusion   IntraVENous Continuous Fabiola Vines PA-C 75 mL/hr at 24 0629 New Bag at 24 0629   • sodium chloride flush 0.9 % injection 5-40 mL  5-40 mL IntraVENous 2 times per day Fabiola Vines PA-C       • sodium chloride flush 0.9 % injection 5-40 mL  5-40 mL IntraVENous PRN Fabiola Vines PA-C       • 0.9 % sodium chloride infusion   IntraVENous

## 2024-03-19 NOTE — H&P
[facility]  Sedation/Analgesia History & Physical    Pt Name: Adal Verde  Account number: [id number]  MRN: [id number]  YOB: 1933  Provider Performing Procedure: Brett Martinez MD MD  Referring Provider: Brett Martinez MD   Primary Care Physician: Nikita Rey MD  Date: 3/19/2024    PRE-PROCEDURE    Code Status: FULL CODE  Brief History/Pre-Procedure Diagnosis:   Severe MR  NYHA III CHF    Consent: : I have discussed with the patient risks, benefits, and alternatives (and relevant risks, benefits, and side effects related to alternatives or not receiving care), and likelihood of the success.   The patient and/or representative appear to understand and agree to proceed.  The discussion encompasses risks, benefits, and side effects related to the alternatives and the risks related to not receiving the proposed care, treatment, and services.     The indication, risks and benefits of the procedure and possible therapeutic consequences and alternatives were discussed with the patient. The patient was given the opportunity to ask questions and to have them answered to his/her satisfaction. Risks of the procedure include but are not limited to the following: Bleeding, hematoma including retroperitoneal hemmorhage, infection, pain and discomfort, injury to the aorta and other blood vessels, rhythm disturbance, low blood pressure, myocardial infarction, stroke, kidney damage/failure, myocardial perforation, allergic reactions to sedatives/contrast material, loss of pulse/vascular compromise requiring surgery, aneurysm/pseudoaneurysm formation, possible loss of a limb/hand/leg, needing blood transfusion, requiring emergent open heart surgery or emergent coronary intervention, the need for intubation/respiratory support, the requirement for defibrillation/cardioversion, and death. Alternatives to and omission of the suggested procedure were discussed. The patient had no further  APRFRANCESCO - CNP   carvedilol (COREG) 12.5 MG tablet TAKE 1 TABLET BY MOUTH TWICE DAILY 3/14/24   Nikita Rey MD   ammonium lactate (LAC-HYDRIN) 12 % lotion Apply topically as needed to intact skin of both legs. 1/26/24   Mala Davis, APRN - CNP   lisinopril (PRINIVIL;ZESTRIL) 5 MG tablet Take 1 tablet by mouth daily 12/26/23   Nikita Rey MD   tamsulosin (FLOMAX) 0.4 MG capsule TAKE 1 CAPSULE BY MOUTH EVERY EVENING 11/28/23   Nikita Rey MD   simvastatin (ZOCOR) 40 MG tablet TAKE 1 TABLET BY MOUTH EVERY NIGHT 11/8/23   Nikita Rey MD   acetaminophen (TYLENOL) 325 MG tablet Take 2 tablets by mouth every 6 hours as needed for Pain    ProviderNehemiah MD   aspirin 81 MG EC tablet Take 1 tablet by mouth daily    ProviderNehemiah MD   multivitamin (THERAGRAN) per tablet Take 1 tablet by mouth daily    Provider, MD Nehemiah     Additional information:       VITAL SIGNS   Vitals:    03/19/24 0605   BP: 126/70   Pulse: 67   Resp: 20   Temp:    SpO2:        PHYSICAL:   General: No acute distress  HEENT:  Unremarkable for age  Neck: without increased JVD, carotid pulses 2+ bilaterally without bruits  Heart: RRR, S1 & S2 WNL, S4 gallop, 3/6 HSM  NYHA: 3  Lungs: Clear to auscultation    Abdomen: BS present, without HSM, masses, or tenderness    Extremities: without C,C,E.  Pulses 2+ bilaterally  Mental Status: Alert & Oriented        PLANNED PROCEDURE   []Cath  []PCI                []Pacemaker/AICD  []MICHAEL             []Cardioversion []Peripheral angiography/PTA  [x]Other: MitraClip     SEDATION  Planned agent:[x]Midazolam []Meperidine [x]Sublimaze []Morphine  []Diazepam  []Other:     ASA Classification:  []1 []2 [x]3 []4 []5  Class 1: A normal healthy patient  Class 2: Pt with mild to moderate systemic disease  Class 3: Severe systemic disease or disturbance  Class 4: Severe systemic disorders that are already life threatening.  Class 5: Moribund pt with little chances of

## 2024-03-19 NOTE — ANESTHESIA POSTPROCEDURE EVALUATION
Department of Anesthesiology  Postprocedure Note    Patient: Adal Verde  MRN: [id number]  YOB: 1933  Date of evaluation: 3/19/2024    Procedure Summary       Date: 03/19/24 Room / Location: [facility] HYBRID OR / [facility] CARDIAC CATH LAB    Anesthesia Start: 0737 Anesthesia Stop: 0959    Procedure: Transcatheter mitral valve repair Diagnosis:       Severe mitral regurgitation      (S/p MitraClip)    Providers: Brett Martinez MD Responsible Provider: Nazario Payton MD    Anesthesia Type: general ASA Status: 4            Anesthesia Type: No value filed.    Jen Phase I: Jen Score: 10    Jen Phase II:      Anesthesia Post Evaluation    Patient location during evaluation: PACU  Patient participation: complete - patient participated  Level of consciousness: awake and alert  Airway patency: patent  Nausea & Vomiting: no nausea  Cardiovascular status: blood pressure returned to baseline and hemodynamically stable  Respiratory status: acceptable and spontaneous ventilation  Hydration status: euvolemic  Pain management: adequate    There were no known notable events for this encounter.

## 2024-03-19 NOTE — PROGRESS NOTES
0540 Patient admitted to 2E14  Via Wheelchair for Mitral clip.  Patient NPO. Patient accompanied by wife.  Vital signs obtained.   Assessment and data collection intiated.   Oriented to room.  Policies and procedures for 2E explained.   All questions answered with no further questions at this time.   Fall prevention and safety precautions discussed with patient.

## 2024-03-19 NOTE — PROGRESS NOTES
0948  - pt arrives to pacu, respirations unlabored on room air, pt denies pain, VSS    1000 - art line removed    1015 -report called to Yadira ABDI    1018 - pt meets criteria for discharge from pacu at this time, pt family sent to room    1048 - pt transported to 3B26 in stable condition

## 2024-03-19 NOTE — CARE COORDINATION
Case Management Assessment  Initial Evaluation    Date/Time of Evaluation: 3/19/2024 1:07 PM  Assessment Completed by: Yadira Valdivia RN    If patient is discharged prior to next notation, then this note serves as note for discharge by case management.    Patient Name: Adal Verde                   YOB: 1933  Diagnosis: Severe mitral regurgitation [I34.0]                   Date / Time: 3/19/2024  5:29 AM  Location: [address] [place] [address]     Patient Admission Status: Inpatient   Readmission Risk Low 0-14, Mod 15-19), High > 20: Readmission Risk Score: 14.6    Current PCP: Nikita Rey MD  PCP verified by CM? Yes    Chart Reviewed: Yes      History Provided by: Patient  Patient Orientation: Alert and Oriented    Patient Cognition: Alert    Hospitalization in the last 30 days (Readmission):  No    If yes, Readmission Assessment in  Navigator will be completed.    Advance Directives:      Code Status: Full Code   Patient's Primary Decision Maker is: Patient Declined (Legal Next of Kin Remains as Decision Maker)      Discharge Planning:    Patient lives with: Spouse/Significant Other, Children Type of Home: House  Primary Care Giver: Self  Patient Support Systems include: Spouse/Significant Other, Children   Current Financial resources: Medicare, Other (Comment) (Secondary: [facility])  Current community resources: None  Current services prior to admission: Durable Medical Equipment            Current DME: Cane            Type of Home Care services:  None    ADLS  Prior functional level: Independent in ADLs/IADLs  Current functional level: Independent in ADLs/IADLs    Family can provide assistance at DC: Yes  Would you like Case Management to discuss the discharge plan with any other family members/significant others, and if so, who? No  Plans to Return to Present Housing: Yes  Other Identified Issues/Barriers to RETURNING to current housing: n/a  Potential Assistance needed at discharge: N/A

## 2024-03-19 NOTE — ANESTHESIA PROCEDURE NOTES
Arterial Line:    An arterial line was placed using surface landmarks, in the OR for the following indication(s): continuous blood pressure monitoring and blood sampling needed.    A 20 gauge (size) (length), Arrow (type) catheter was placed, Seldinger technique used, into the left radial artery, secured by tape and Tegaderm.  Anesthesia type: General    Events:  patient tolerated procedure well with no complications.  Anesthesiologist: Nazario Payton MD  Performed: Anesthesiologist   Preanesthetic Checklist  Completed: patient identified, IV checked, site marked, risks and benefits discussed, surgical/procedural consents, equipment checked, pre-op evaluation, timeout performed, anesthesia consent given, oxygen available, monitors applied/VS acknowledged, fire risk safety assessment completed and verbalized and blood product R/B/A discussed and consented

## 2024-03-19 NOTE — ANESTHESIA PROCEDURE NOTES
Procedure Performed: MICHAEL       Start Time:        End Time:      Preanesthesia Checklist:  Patient identified, IV assessed, risks and benefits discussed, monitors and equipment assessed, procedure being performed at surgeon's request and anesthesia consent obtained.    General Procedure Information  Diagnostic Indications for Echo:  defect repair evaluation, hemodynamic monitoring and assessment of valve function  Physician Requesting Echo: Brett Martinez MD  CPT Code:  47694  Location performed:  OR  Intubated  Bite block placed  Heart visualized  Probe Insertion:  Easy  Probe Type:  3D  Modalities:  Pulse wave Doppler, 3D, color flow mapping and continuous wave Doppler    Echocardiographic and Doppler Measurements    Ventricles    Right Ventricle:  Cavity size normal.    Left Ventricle:  Cavity size normal.  Global Function mildly impaired.  Ejection Fraction 45%.      Ventricular Regional Function:  1- Basal Anteroseptal:  hypokinetic  2- Basal Anterior:  hypokinetic  3- Basal Anterolateral:  hypokinetic  4- Basal Inferolateral:  hypokinetic  5- Basal Inferior:  hypokinetic  6- Basal Inferoseptal:  hypokinetic  7- Mid Anteroseptal:  hypokinetic  8- Mid Anterior:  hypokinetic  9- Mid Anterolateral:  hypokinetic  10- Mid Inferolateral:  hypokinetic  11- Mid Inferior:  hypokinetic  12- Mid Inferoseptal:  hypokinetic  13- Apical Anterior:  hypokinetic  14- Apical Lateral:  hypokinetic  15- Apical Inferior:  hypokinetic  16- Apical Septal:  hypokinetic  17- Florence:  hypokinetic      Valves    Aortic Valve:  Annulus normal.  Stenosis mild.  Regurgitation moderate.  Leaflets calcified.  Leaflet motions restricted.      Mitral Valve:  Annulus normal.  Stenosis not present.  Regurgitation severe.  Leaflets calcified.  Leaflet motions restricted.      Other Valve Findings:       Mitral valve evaluated at 0, 60, 90, 120 degrees and with 3D.  Thickened leaflets, decreased leaflet compliance leading to functional MR with 2

## 2024-03-19 NOTE — PLAN OF CARE
Problem: Chronic Conditions and Co-morbidities  Goal: Patient's chronic conditions and co-morbidity symptoms are monitored and maintained or improved  Outcome: Progressing  Flowsheets (Taken 3/19/2024 0558)  Care Plan - Patient's Chronic Conditions and Co-Morbidity Symptoms are Monitored and Maintained or Improved: Monitor and assess patient's chronic conditions and comorbid symptoms for stability, deterioration, or improvement     Problem: Cardiovascular - Adult  Goal: Maintains optimal cardiac output and hemodynamic stability  Outcome: Progressing  Flowsheets (Taken 3/19/2024 0558)  Maintains optimal cardiac output and hemodynamic stability:   Monitor blood pressure and heart rate   Monitor urine output and notify Licensed Independent Practitioner for values outside of normal range     Problem: Discharge Planning  Goal: Discharge to home or other facility with appropriate resources  Outcome: Progressing  Flowsheets (Taken 3/19/2024 0558)  Discharge to home or other facility with appropriate resources: Identify barriers to discharge with patient and caregiver     Problem: Pain  Goal: Verbalizes/displays adequate comfort level or baseline comfort level  Outcome: Progressing     Problem: Safety - Adult  Goal: Free from fall injury  Outcome: Progressing   Care plan reviewed with patient.  Patient verbalizes understanding of the plan of care and contributes to goal setting.

## 2024-03-20 ENCOUNTER — APPOINTMENT (OUTPATIENT)
Age: 89
DRG: 307 | End: 2024-03-20
Attending: INTERNAL MEDICINE
Payer: MEDICARE

## 2024-03-20 LAB
ANION GAP SERPL CALC-SCNC: 10 MEQ/L (ref 8–16)
APTT PPP: 29.6 SECONDS (ref 22–38)
BUN SERPL-MCNC: 52 MG/DL (ref 7–22)
CALCIUM SERPL-MCNC: 8.5 MG/DL (ref 8.5–10.5)
CHLORIDE SERPL-SCNC: 101 MEQ/L (ref 98–111)
CO2 SERPL-SCNC: 24 MEQ/L (ref 23–33)
CREAT SERPL-MCNC: 2 MG/DL (ref 0.4–1.2)
DEPRECATED RDW RBC AUTO: 56.6 FL (ref 35–45)
ECHO AR MAX VEL PISA: 3.9 M/S
ECHO AV CUSP MM: 1.3 CM
ECHO AV PEAK GRADIENT: 12 MMHG
ECHO AV PEAK VELOCITY: 1.7 M/S
ECHO AV REGURGITANT PHT: 464 MS
ECHO AV VELOCITY RATIO: 0.35
ECHO BSA: 1.63 M2
ECHO EST RA PRESSURE: 15 MMHG
ECHO LA AREA 2C: 26.8 CM2
ECHO LA AREA 4C: 20.9 CM2
ECHO LA DIAMETER INDEX: 2.76 CM/M2
ECHO LA DIAMETER: 4.5 CM
ECHO LA MAJOR AXIS: 5.7 CM
ECHO LA MINOR AXIS: 5.7 CM
ECHO LA VOL BP: 79 ML (ref 18–58)
ECHO LA VOL MOD A2C: 100 ML (ref 18–58)
ECHO LA VOL MOD A4C: 62 ML (ref 18–58)
ECHO LA VOL/BSA BIPLANE: 48 ML/M2 (ref 16–34)
ECHO LA VOLUME INDEX MOD A2C: 61 ML/M2 (ref 16–34)
ECHO LA VOLUME INDEX MOD A4C: 38 ML/M2 (ref 16–34)
ECHO LV E' LATERAL VELOCITY: 5 CM/S
ECHO LV E' SEPTAL VELOCITY: 4 CM/S
ECHO LV EDV A2C: 153 ML
ECHO LV EDV A4C: 127 ML
ECHO LV EDV INDEX A4C: 78 ML/M2
ECHO LV EDV NDEX A2C: 94 ML/M2
ECHO LV EJECTION FRACTION A2C: 28 %
ECHO LV EJECTION FRACTION A4C: 31 %
ECHO LV EJECTION FRACTION BIPLANE: 27 % (ref 55–100)
ECHO LV ESV A2C: 110 ML
ECHO LV ESV A4C: 88 ML
ECHO LV ESV INDEX A2C: 67 ML/M2
ECHO LV ESV INDEX A4C: 54 ML/M2
ECHO LV FRACTIONAL SHORTENING: 11 % (ref 28–44)
ECHO LV GLOBAL LONGITUDINAL STRAIN (GLS): -7.4 %
ECHO LV GLOBAL LONGITUDINAL STRAIN (GLS): -7.7 %
ECHO LV GLOBAL LONGITUDINAL STRAIN (GLS): -8 %
ECHO LV GLOBAL LONGITUDINAL STRAIN (GLS): -8.9 %
ECHO LV INTERNAL DIMENSION DIASTOLE INDEX: 2.7 CM/M2
ECHO LV INTERNAL DIMENSION DIASTOLIC: 4.4 CM (ref 4.2–5.9)
ECHO LV INTERNAL DIMENSION SYSTOLIC INDEX: 2.39 CM/M2
ECHO LV INTERNAL DIMENSION SYSTOLIC: 3.9 CM
ECHO LV IVSD: 1.7 CM (ref 0.6–1)
ECHO LV MASS 2D: 253.4 G (ref 88–224)
ECHO LV MASS INDEX 2D: 155.5 G/M2 (ref 49–115)
ECHO LV POSTERIOR WALL DIASTOLIC: 1.2 CM (ref 0.6–1)
ECHO LV RELATIVE WALL THICKNESS RATIO: 0.55
ECHO LVOT PEAK GRADIENT: 1 MMHG
ECHO LVOT PEAK VELOCITY: 0.6 M/S
ECHO MV A VELOCITY: 1.37 M/S
ECHO MV E DECELERATION TIME (DT): 338 MS
ECHO MV E VELOCITY: 2.12 M/S
ECHO MV E/A RATIO: 1.55
ECHO MV E/E' LATERAL: 42.4
ECHO MV E/E' RATIO (AVERAGED): 47.7
ECHO MV MAX VELOCITY: 2.3 M/S
ECHO MV MEAN GRADIENT: 10 MMHG
ECHO MV MEAN VELOCITY: 1.5 M/S
ECHO MV PEAK GRADIENT: 22 MMHG
ECHO MV REGURGITANT PEAK GRADIENT: 117 MMHG
ECHO MV REGURGITANT PEAK VELOCITY: 5.4 M/S
ECHO MV VTI: 73.4 CM
ECHO PULMONARY ARTERY END DIASTOLIC PRESSURE: 6 MMHG
ECHO PV MAX VELOCITY: 0.7 M/S
ECHO PV PEAK GRADIENT: 2 MMHG
ECHO PV REGURGITANT MAX VELOCITY: 1.2 M/S
ECHO RIGHT VENTRICULAR SYSTOLIC PRESSURE (RVSP): 62 MMHG
ECHO RV INTERNAL DIMENSION: 3.1 CM
ECHO TV E WAVE: 0.6 M/S
ECHO TV REGURGITANT MAX VELOCITY: 3.43 M/S
ECHO TV REGURGITANT PEAK GRADIENT: 47 MMHG
EKG ATRIAL RATE: 68 BPM
EKG P AXIS: 41 DEGREES
EKG P-R INTERVAL: 240 MS
EKG Q-T INTERVAL: 436 MS
EKG QRS DURATION: 96 MS
EKG QTC CALCULATION (BAZETT): 463 MS
EKG R AXIS: 30 DEGREES
EKG T AXIS: 3 DEGREES
EKG VENTRICULAR RATE: 68 BPM
ERYTHROCYTE [DISTWIDTH] IN BLOOD BY AUTOMATED COUNT: 16.3 % (ref 11.5–14.5)
GFR SERPL CREATININE-BSD FRML MDRD: 31 ML/MIN/1.73M2
GLUCOSE SERPL-MCNC: 142 MG/DL (ref 70–108)
HCT VFR BLD AUTO: 39.3 % (ref 42–52)
HGB BLD-MCNC: 12.1 GM/DL (ref 14–18)
INR PPP: 1.15 (ref 0.85–1.13)
MCH RBC QN AUTO: 29 PG (ref 26–33)
MCHC RBC AUTO-ENTMCNC: 30.8 GM/DL (ref 32.2–35.5)
MCV RBC AUTO: 94.2 FL (ref 80–94)
PLATELET # BLD AUTO: 203 THOU/MM3 (ref 130–400)
PMV BLD AUTO: 10.8 FL (ref 9.4–12.4)
POTASSIUM SERPL-SCNC: 4.8 MEQ/L (ref 3.5–5.2)
RBC # BLD AUTO: 4.17 MILL/MM3 (ref 4.7–6.1)
SODIUM SERPL-SCNC: 135 MEQ/L (ref 135–145)
WBC # BLD AUTO: 8.3 THOU/MM3 (ref 4.8–10.8)

## 2024-03-20 PROCEDURE — 80048 BASIC METABOLIC PNL TOTAL CA: CPT

## 2024-03-20 PROCEDURE — 93306 TTE W/DOPPLER COMPLETE: CPT

## 2024-03-20 PROCEDURE — 2140000000 HC CCU INTERMEDIATE R&B

## 2024-03-20 PROCEDURE — 85730 THROMBOPLASTIN TIME PARTIAL: CPT

## 2024-03-20 PROCEDURE — 93005 ELECTROCARDIOGRAM TRACING: CPT | Performed by: INTERNAL MEDICINE

## 2024-03-20 PROCEDURE — 99222 1ST HOSP IP/OBS MODERATE 55: CPT | Performed by: NURSE PRACTITIONER

## 2024-03-20 PROCEDURE — 85610 PROTHROMBIN TIME: CPT

## 2024-03-20 PROCEDURE — 36415 COLL VENOUS BLD VENIPUNCTURE: CPT

## 2024-03-20 PROCEDURE — 2580000003 HC RX 258: Performed by: INTERNAL MEDICINE

## 2024-03-20 PROCEDURE — 6370000000 HC RX 637 (ALT 250 FOR IP): Performed by: INTERNAL MEDICINE

## 2024-03-20 PROCEDURE — 6360000002 HC RX W HCPCS: Performed by: NURSE PRACTITIONER

## 2024-03-20 PROCEDURE — 2580000003 HC RX 258: Performed by: ANESTHESIOLOGY

## 2024-03-20 PROCEDURE — 93306 TTE W/DOPPLER COMPLETE: CPT | Performed by: INTERNAL MEDICINE

## 2024-03-20 PROCEDURE — 6370000000 HC RX 637 (ALT 250 FOR IP): Performed by: NURSE PRACTITIONER

## 2024-03-20 PROCEDURE — 93010 ELECTROCARDIOGRAM REPORT: CPT | Performed by: INTERNAL MEDICINE

## 2024-03-20 PROCEDURE — 85027 COMPLETE CBC AUTOMATED: CPT

## 2024-03-20 RX ORDER — FUROSEMIDE 10 MG/ML
40 INJECTION INTRAMUSCULAR; INTRAVENOUS ONCE
Status: COMPLETED | OUTPATIENT
Start: 2024-03-20 | End: 2024-03-20

## 2024-03-20 RX ORDER — LANOLIN ALCOHOL/MO/W.PET/CERES
3 CREAM (GRAM) TOPICAL NIGHTLY PRN
Status: DISCONTINUED | OUTPATIENT
Start: 2024-03-20 | End: 2024-03-21 | Stop reason: HOSPADM

## 2024-03-20 RX ADMIN — FUROSEMIDE 40 MG: 10 INJECTION, SOLUTION INTRAMUSCULAR; INTRAVENOUS at 11:58

## 2024-03-20 RX ADMIN — CARVEDILOL 12.5 MG: 6.25 TABLET, FILM COATED ORAL at 19:32

## 2024-03-20 RX ADMIN — SODIUM CHLORIDE, PRESERVATIVE FREE 10 ML: 5 INJECTION INTRAVENOUS at 09:51

## 2024-03-20 RX ADMIN — FUROSEMIDE 20 MG: 20 TABLET ORAL at 09:52

## 2024-03-20 RX ADMIN — CLOPIDOGREL BISULFATE 75 MG: 75 TABLET ORAL at 09:47

## 2024-03-20 RX ADMIN — ASPIRIN 81 MG: 81 TABLET, COATED ORAL at 09:47

## 2024-03-20 RX ADMIN — Medication 3 MG: at 22:49

## 2024-03-20 RX ADMIN — CARVEDILOL 12.5 MG: 6.25 TABLET, FILM COATED ORAL at 09:53

## 2024-03-20 RX ADMIN — SODIUM CHLORIDE, PRESERVATIVE FREE 10 ML: 5 INJECTION INTRAVENOUS at 19:33

## 2024-03-20 RX ADMIN — TAMSULOSIN HYDROCHLORIDE 0.4 MG: 0.4 CAPSULE ORAL at 18:01

## 2024-03-20 RX ADMIN — Medication 1 TABLET: at 09:53

## 2024-03-20 NOTE — PROGRESS NOTES
Inpatient Cardiac Rehabilitation Consult    Received consult for Phase II Cardiac Rehabilitation.  Patient needs cardiac rehab due to TMVR  on 3/19/24.  Importance of Cardiac Rehab discussed with patient.  Reviewed cardiac rehab class times.  Patient questions answered.  Pt declined cardiac rehab. Stated he is going to exercise on his own.

## 2024-03-20 NOTE — PROGRESS NOTES
Pharmacy Medication History Note      List of current medications patient is taking is complete.    Source of information: patient, dispense report    Changes made to medication list:  Medications removed (include reason, ex. therapy complete or physician discontinued):  none    Medications added/doses adjusted:  none    Other notes (ex. Recent course of antibiotics, Coumadin dosing):  Denies use of other OTC or herbal medications.    Allergies reviewed    Electronically signed by Alma Barker on 3/20/2024 at 10:45 AM

## 2024-03-20 NOTE — PROGRESS NOTES
Pt was in bed as his wife and son were with him. He was dealing with Severe Mitral Regurgitation. He talked throughout the visit. He was encouraged and anointed. It was highly appreciated.    03/20/24 1356   Encounter Summary   Encounter Overview/Reason  Initial Encounter   Service Provided For: Patient and family together   Referral/Consult From: [facility]   Support System Spouse;Children   Last Encounter  03/20/24  (Anointed.)   Complexity of Encounter Moderate   Begin Time 1000   End Time  1015   Total Time Calculated 15 min   Spiritual/Emotional needs   Type Spiritual Support   Rituals, Rites and Sacraments   Type Anointing   Assessment/Intervention/Outcome   Assessment Hopeful   Intervention Empowerment   Outcome Encouraged;Engaged in conversation

## 2024-03-20 NOTE — PROGRESS NOTES
Structural Heart/Cardiology Progress Note      Name:  Adal eVrde  YOB: 1933  Medical Record Number:  [id number]    Date of Admission:  3/19/2024      Admitting physician: Brett Martinez MD    Hospital Problem List:  Patient Active Problem List   Diagnosis    CAD (coronary artery disease)    Hyperlipidemia    Hypertension    Benign prostatic hyperplasia    Mitral regurgitation    Asymptomatic PVCs    Nausea vomiting and diarrhea    CRF (chronic renal failure), stage 3 (moderate) (HCC)    Hypertensive heart and chronic kidney disease with heart failure and stage 1 through stage 4 chronic kidney disease, or unspecified chronic kidney disease (HCC)    Nonrheumatic aortic valve stenosis    Chronic venous stasis dermatitis of both lower extremities    Chest pain    Severe mitral regurgitation       Procedures: MitraClip on 3/19/2024; Successful, Transcatheter Mitral Valve Repair with MitraClip NTW at A1-P1     Subjective:   3/20/2024:  Underwent successful MitraClip on 3/19/2024. He tolerated the procedure well. Was successfully extubated per protocol and admitted to  for further evaluation and post procedural care. He remained stable overnight. VSS and HR stable. No chest discomfort or dyspnea. R groin site without bleeding or hematoma. Tolerating diet; no N/V. Tolerating up to BR and OOB to chair. No lightheadedness or dizziness; no syncope or near syncope.       Discharge physical examination:   Vitals:    03/20/24 1215   BP: 124/61   Pulse: 66   Resp: 16   Temp: 98 °F (36.7 °C)   SpO2: 94%     General Appearance: alert ,conversing, in no acute distress, resting in bed with HOB nearly flat; head up on 1 pillow.   Cardiovascular: normal rate, regular rhythm, normal S1 and S2, no murmurs, rubs, clicks, or gallops  Pulmonary/Chest: clear to auscultation bilaterally- no wheezes, rales or rhonchi, normal air movement, no respiratory distress, RA  Neurological: alert, oriented, normal speech, no focal  lotion  Commonly known as: LAC-HYDRIN  Apply topically as needed to intact skin of both legs.     aspirin 81 MG EC tablet     carvedilol 12.5 MG tablet  Commonly known as: COREG  TAKE 1 TABLET BY MOUTH TWICE DAILY     furosemide 20 MG tablet  Commonly known as: LASIX  Take 1 tablet by mouth daily     lisinopril 5 MG tablet  Commonly known as: PRINIVIL;ZESTRIL  Take 1 tablet by mouth daily     multivitamin per tablet     simvastatin 40 MG tablet  Commonly known as: ZOCOR  TAKE 1 TABLET BY MOUTH EVERY NIGHT     tamsulosin 0.4 MG capsule  Commonly known as: FLOMAX  TAKE 1 CAPSULE BY MOUTH EVERY EVENING            ASSESSMENT:  Severe MR; s/p TMVR 3/19/2024   - stable post procedure   - R groin without bleeding or hematoma; woggle out   - VSS   - HR and rhythm stable   - no chest discomfort or dyspnea   - no evidence of decompensated HF   - echo results pending    - IVC dilated per Dr. 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

## 2024-03-20 NOTE — CARE COORDINATION
3/20/24, 2:57 PM EDT    DISCHARGE ON GOING EVALUATION    [facility] day: 1  Location: [facility]26/026-A Reason for admit: Severe mitral regurgitation [I34.0]   Procedure:   3/19 MitraClip procedure with Dr. Martinez.   3/20 ECHO: in process  Barriers to Discharge: Cardio following. Encouraging PO intake. Did receive 1 x dose IV lasix today.   PCP: Nikita Rey MD  Readmission Risk Score: 18.3%  Patient Goals/Plan/Treatment Preferences: Plans home with wife, denied needs.

## 2024-03-20 NOTE — PROGRESS NOTES
Pharmacy Inpatient Medication Counseling Note    Counseled patient on the following new medications started since admission utilizing teach-back method. Reviewed indication, directions, and most common side effects.    Plavix- counseled on indications, how to take, and bleeding risk. Told pt not to stop taking Plavix without talking to his prescriber.    Patient voiced understanding.

## 2024-03-21 VITALS
BODY MASS INDEX: 21.61 KG/M2 | OXYGEN SATURATION: 98 % | DIASTOLIC BLOOD PRESSURE: 63 MMHG | WEIGHT: 129.7 LBS | TEMPERATURE: 97.3 F | RESPIRATION RATE: 18 BRPM | HEIGHT: 65 IN | HEART RATE: 62 BPM | SYSTOLIC BLOOD PRESSURE: 111 MMHG

## 2024-03-21 LAB
ANION GAP SERPL CALC-SCNC: 14 MEQ/L (ref 8–16)
BUN SERPL-MCNC: 55 MG/DL (ref 7–22)
CALCIUM SERPL-MCNC: 8.4 MG/DL (ref 8.5–10.5)
CHLORIDE SERPL-SCNC: 104 MEQ/L (ref 98–111)
CO2 SERPL-SCNC: 24 MEQ/L (ref 23–33)
CREAT SERPL-MCNC: 1.8 MG/DL (ref 0.4–1.2)
EKG ATRIAL RATE: 67 BPM
EKG P AXIS: 47 DEGREES
EKG P-R INTERVAL: 222 MS
EKG Q-T INTERVAL: 444 MS
EKG QRS DURATION: 96 MS
EKG QTC CALCULATION (BAZETT): 469 MS
EKG R AXIS: 78 DEGREES
EKG T AXIS: 44 DEGREES
EKG VENTRICULAR RATE: 67 BPM
GFR SERPL CREATININE-BSD FRML MDRD: 35 ML/MIN/1.73M2
GLUCOSE SERPL-MCNC: 121 MG/DL (ref 70–108)
MAGNESIUM SERPL-MCNC: 2 MG/DL (ref 1.6–2.4)
NT-PROBNP SERPL IA-MCNC: ABNORMAL PG/ML (ref 0–449)
POTASSIUM SERPL-SCNC: 4.4 MEQ/L (ref 3.5–5.2)
POTASSIUM SERPL-SCNC: 4.4 MEQ/L (ref 3.5–5.2)
SODIUM SERPL-SCNC: 142 MEQ/L (ref 135–145)

## 2024-03-21 PROCEDURE — 83880 ASSAY OF NATRIURETIC PEPTIDE: CPT

## 2024-03-21 PROCEDURE — 93010 ELECTROCARDIOGRAM REPORT: CPT | Performed by: INTERNAL MEDICINE

## 2024-03-21 PROCEDURE — 83735 ASSAY OF MAGNESIUM: CPT

## 2024-03-21 PROCEDURE — 99239 HOSP IP/OBS DSCHRG MGMT >30: CPT | Performed by: NURSE PRACTITIONER

## 2024-03-21 PROCEDURE — 80048 BASIC METABOLIC PNL TOTAL CA: CPT

## 2024-03-21 PROCEDURE — 36415 COLL VENOUS BLD VENIPUNCTURE: CPT

## 2024-03-21 PROCEDURE — 6370000000 HC RX 637 (ALT 250 FOR IP): Performed by: NURSE PRACTITIONER

## 2024-03-21 PROCEDURE — 93005 ELECTROCARDIOGRAM TRACING: CPT | Performed by: INTERNAL MEDICINE

## 2024-03-21 PROCEDURE — 6370000000 HC RX 637 (ALT 250 FOR IP): Performed by: INTERNAL MEDICINE

## 2024-03-21 PROCEDURE — 2580000003 HC RX 258: Performed by: INTERNAL MEDICINE

## 2024-03-21 RX ORDER — FUROSEMIDE 40 MG/1
40 TABLET ORAL DAILY
Qty: 10 TABLET | Refills: 0 | Status: SHIPPED | OUTPATIENT
Start: 2024-03-22 | End: 2024-03-26 | Stop reason: ALTCHOICE

## 2024-03-21 RX ORDER — CLOPIDOGREL BISULFATE 75 MG/1
75 TABLET ORAL DAILY
Qty: 30 TABLET | Refills: 3 | Status: SHIPPED | OUTPATIENT
Start: 2024-03-22

## 2024-03-21 RX ORDER — FUROSEMIDE 20 MG/1
20 TABLET ORAL DAILY
Qty: 90 TABLET | Refills: 3 | Status: SHIPPED
Start: 2024-03-27

## 2024-03-21 RX ORDER — FUROSEMIDE 40 MG/1
40 TABLET ORAL DAILY
Status: DISCONTINUED | OUTPATIENT
Start: 2024-03-21 | End: 2024-03-21 | Stop reason: HOSPADM

## 2024-03-21 RX ORDER — LISINOPRIL 5 MG/1
5 TABLET ORAL DAILY
Status: DISCONTINUED | OUTPATIENT
Start: 2024-03-21 | End: 2024-03-21 | Stop reason: HOSPADM

## 2024-03-21 RX ORDER — POTASSIUM CHLORIDE 20 MEQ/1
10 TABLET, EXTENDED RELEASE ORAL ONCE
Status: COMPLETED | OUTPATIENT
Start: 2024-03-21 | End: 2024-03-21

## 2024-03-21 RX ORDER — AMMONIUM LACTATE 12 G/100G
LOTION TOPICAL PRN
Status: DISCONTINUED | OUTPATIENT
Start: 2024-03-21 | End: 2024-03-21 | Stop reason: HOSPADM

## 2024-03-21 RX ADMIN — CLOPIDOGREL BISULFATE 75 MG: 75 TABLET ORAL at 08:59

## 2024-03-21 RX ADMIN — LISINOPRIL 5 MG: 5 TABLET ORAL at 08:59

## 2024-03-21 RX ADMIN — POTASSIUM CHLORIDE 10 MEQ: 1500 TABLET, EXTENDED RELEASE ORAL at 08:59

## 2024-03-21 RX ADMIN — ASPIRIN 81 MG: 81 TABLET, COATED ORAL at 08:58

## 2024-03-21 RX ADMIN — CARVEDILOL 12.5 MG: 6.25 TABLET, FILM COATED ORAL at 08:59

## 2024-03-21 RX ADMIN — SODIUM CHLORIDE, PRESERVATIVE FREE 10 ML: 5 INJECTION INTRAVENOUS at 09:00

## 2024-03-21 RX ADMIN — FUROSEMIDE 40 MG: 40 TABLET ORAL at 08:59

## 2024-03-21 RX ADMIN — Medication 1 TABLET: at 08:59

## 2024-03-21 NOTE — PROGRESS NOTES
CLINICAL PHARMACY: DISCHARGE MED RECONCILIATION/REVIEW    [facility] Select Patient?: Yes  Total # of Interventions Recommended: 0   -   Total # Interventions Accepted: 0  Intervention Severity:   - Level 1 Intervention Present?: No   - Level 2 #: 0   - Level 3 #: 0   Time Spent (min): 15    Additional Documentation:

## 2024-03-21 NOTE — DISCHARGE SUMMARY
Structural Heart/Cardiology Discharge Summary       Name:  Adal Verde  YOB: 1933  Medical Record Number:  [id number]    Date of Admission:  3/19/2024  Date of Discharge:  3/21/2024    Admitting physician: Brett Martinez MD  Discharge to: Home  Condition at d/c: Stable  Time spent on discharge: > 30 minutes    Hospital Problem List:  Patient Active Problem List   Diagnosis    CAD (coronary artery disease)    Hyperlipidemia    Hypertension    Benign prostatic hyperplasia    Mitral regurgitation    Asymptomatic PVCs    Nausea vomiting and diarrhea    CRF (chronic renal failure), stage 3 (moderate) (HCC)    Hypertensive heart and chronic kidney disease with heart failure and stage 1 through stage 4 chronic kidney disease, or unspecified chronic kidney disease (HCC)    Nonrheumatic aortic valve stenosis    Chronic venous stasis dermatitis of both lower extremities    Chest pain    Severe mitral regurgitation       Procedures: MitraClip on 3/19/2024  Transcatheter mitral valve repair     Pre Procedure Diagnosis    Severe mitral regurgitation [I34.0]  NYHA III CHF  Prohibitive risk for open heart surgery    Post Procedure Diagnosis    S/p MitraClip      Indications    Severe mitral regurgitation [I34.0 (ICD-10-CM)]     Link to Procedure Log    Procedure Log     PACS Images     Show images for Cardiac procedure  Cath/EP Waveforms    Cath/EP waveform scan report            Conclusion         Successful, Transcatheter Mitral Valve Repair with MitraClip NTW at A1-P1     Recommendations    1.  Bedrest.  2.  Optimal medical therapy.  3.  Risk factor management.  4.  Continue diuresis.  5.  Extubate per anesthesia protocol.  6.  Guideline-directed therapy for CHF.  7.  DAPT.  8.  Cardiac rehab.  9.  Figure-of-eight stitch to remove in the a.m.  10.  Followup in [facility] post procedure.     Complications    Complications documented before study signed (3/19/2024  9:48 AM)     No complications  were associated with this study.   Documented by Brett Martinez MD - 3/19/2024  9:37 AM        Procedure Details    After informed consent was obtained from the  patient, he was brought to the hybrid operating room, prepped in sterile  fashion.  Pre-procedure antibiotics were given.  Cardiothoracic surgery was available for any emergent issues.  The patient was sedated and intubated per Dr. Payton.  Please see  his note for further details.  MICHAEL probe was then inserted per Dr. Payton.   No left atrial or left atrial appendage thrombus was seen.  Anatomy was  deemed appropriate to proceed with the procedure.  After infiltration of  the right inguinal region with 2% lidocaine using micropuncture and  modified Seldinger technique under fluoroscopic guidance, I was able to  insert a 18-Georgian in the right femoral vein.  I then used a standard  [place] VersaCross trans-septal catheter per 's recommendation that was preshaped  and inserted over a 0.035 Supra Core wire into the SVC and exchanged out  for [employer] transseptal wire needle.  Then using MICHAEL guidance performed a  superior-posterior transseptal puncture with height approximately 4.5 to  5 cm above the mitral annulus.  Once I was across the left atrium,  full-dose heparin IV was given.  ACT was confirmed to be above 250  seconds.  I then prepped a standard steerable guiding catheter per  's recommendations.  I then exchanged out the VersaCross for  an 18-Georgian sheath over the [place] transseptal wire and inserted  steerable guiding catheter with significant minus on the guide and advanced  into the left atrium.  I removed the dilator and the wire  under direct aspiration.  There was at least a 2 cm guide within the  left atrium.    We then prepped a standard MitraClip NTW system per  's recommendations.  I then inserted this under wet-to-wet  connection into the steerable guiding catheter appropriately keying the  device.  Once the

## 2024-03-21 NOTE — PLAN OF CARE
Problem: Chronic Conditions and Co-morbidities  Goal: Patient's chronic conditions and co-morbidity symptoms are monitored and maintained or improved  3/21/2024 1023 by Yadira Mix RN  Outcome: Progressing  Flowsheets (Taken 3/21/2024 0659 by John Dutton RN)  Care Plan - Patient's Chronic Conditions and Co-Morbidity Symptoms are Monitored and Maintained or Improved: Monitor and assess patient's chronic conditions and comorbid symptoms for stability, deterioration, or improvement     Problem: Cardiovascular - Adult  Goal: Maintains optimal cardiac output and hemodynamic stability  3/21/2024 1023 by Yadira Mix RN  Outcome: Progressing  Flowsheets (Taken 3/21/2024 0659 by John Dutton RN)  Maintains optimal cardiac output and hemodynamic stability: Monitor blood pressure and heart rate     Problem: Discharge Planning  Goal: Discharge to home or other facility with appropriate resources  3/21/2024 1023 by Yadira Mix RN  Outcome: Progressing  Flowsheets (Taken 3/21/2024 0659 by John Dutton RN)  Discharge to home or other facility with appropriate resources: Identify barriers to discharge with patient and caregiver  Note: From home with wife     Problem: Pain  Goal: Verbalizes/displays adequate comfort level or baseline comfort level  3/21/2024 1023 by Yadira Mix RN  Outcome: Progressing  Flowsheets (Taken 3/21/2024 0659 by John Dutton RN)  Verbalizes/displays adequate comfort level or baseline comfort level:   Encourage patient to monitor pain and request assistance   Assess pain using appropriate pain scale  Note: Patient educated on pain rating scale of 0-10 and verbalized pain goal of 0. Non-pharmacological measures implemented throughout shift to help achieve mutually met pain goal such as rest, repositioning, and emotional support. Pain rating obtaining prior to pain medications being administered along with assessing patients orientation and arousal and

## 2024-03-21 NOTE — PLAN OF CARE
Problem: Chronic Conditions and Co-morbidities  Goal: Patient's chronic conditions and co-morbidity symptoms are monitored and maintained or improved  Outcome: Progressing  Flowsheets (Taken 3/21/2024 0659)  Care Plan - Patient's Chronic Conditions and Co-Morbidity Symptoms are Monitored and Maintained or Improved: Monitor and assess patient's chronic conditions and comorbid symptoms for stability, deterioration, or improvement     Problem: Cardiovascular - Adult  Goal: Maintains optimal cardiac output and hemodynamic stability  Outcome: Progressing  Flowsheets (Taken 3/21/2024 0659)  Maintains optimal cardiac output and hemodynamic stability: Monitor blood pressure and heart rate     Problem: Discharge Planning  Goal: Discharge to home or other facility with appropriate resources  Outcome: Progressing  Flowsheets (Taken 3/21/2024 0659)  Discharge to home or other facility with appropriate resources: Identify barriers to discharge with patient and caregiver     Problem: Pain  Goal: Verbalizes/displays adequate comfort level or baseline comfort level  Outcome: Progressing  Flowsheets (Taken 3/21/2024 0659)  Verbalizes/displays adequate comfort level or baseline comfort level:   Encourage patient to monitor pain and request assistance   Assess pain using appropriate pain scale     Problem: Safety - Adult  Goal: Free from fall injury  Outcome: Progressing  Flowsheets (Taken 3/21/2024 0659)  Free From Fall Injury: Instruct family/caregiver on patient safety     Problem: ABCDS Injury Assessment  Goal: Absence of physical injury  Outcome: Progressing  Flowsheets (Taken 3/21/2024 0659)  Absence of Physical Injury: Implement safety measures based on patient assessment     Care plan reviewed with patient.  Patient verbalizes understanding of the care plan and contributed to goal setting.

## 2024-03-21 NOTE — PROGRESS NOTES
Comprehensive Nutrition Assessment    Type and Reason for Visit:  Initial, Consult, Patient Education (Heart-Healthy, Low-Sodium Diet Education)    Nutrition Recommendations/Plan:   Continue Multivitamin daily.  Continue current diet.  Pt declines all ONS during LOS.     Malnutrition Assessment:  Malnutrition Status:  At risk for malnutrition (Comment) (03/21/24 1156)    Context:  Acute Illness     Findings of the 6 clinical characteristics of malnutrition:  Energy Intake:  No significant decrease in energy intake (pt reports good appetite and intake of meals PTA and since admit)  Weight Loss:  Unable to assess (pt with CHF/edema shifts noted)     Body Fat Loss:  Unable to assess (Difficult to assess d/t advanced age- pt thin per his usual)     Muscle Mass Loss:  Unable to assess (Difficult to assess d/t advanced age- pt thin per his usual)    Fluid Accumulation:  Unable to assess (pt with CHF/edema shifts noted)     Strength:  Not Performed    Nutrition Assessment: Pt. adequately nourished on admit AEB pt report of good appetite and intake of meals PTA and since admit. Pt denies need for ONS during LOS.  At risk for nutrition compromise r/t s/p transcatheter Mitral Valve Repair with MitraClip on 3/19, advanced age,  and underlying medical condition (Hx: CAD, CVD, HLD, HTN, SBO, TIA,  CHF, CKD). Will continue to monitor for changes in nutritional status.        Nutrition Related Findings:    Pt. Report/Treatments/Miscellaneous: Pt seen- he reports good appetite PTA consuming three meals and snacks PTA. Pt reports consuming % of all meals since admission and declines all ONS during LOS d/t disliking them and eating well. Pt denies N/V. Pt reports edema/fluid weight loss over the past few months- on Lasix at home and at present.   GI Status: No BM yet.  Pertinent Labs: BUN 55, Cr. 1.8, Glucose 121  Pertinent Meds: Lasix, Multivitamin  Wound Type: None       Current Nutrition Intake & Therapies:    Average  Meal Intake: %  Average Supplements Intake:  (pt declines all ONS during LOS)  ADULT DIET; Regular    Anthropometric Measures:  Height: 165.1 cm (5' 5\")  Ideal Body Weight (IBW): 136 lbs (62 kg)    Admission Body Weight: 57.9 kg (127 lb 10.3 oz) (3/19; unable to assess- legs wrapped)  Current Body Weight: 58.8 kg (129 lb 11.2 oz) (3/21; unable to assess- legs wrapped)  Current BMI (kg/m2): 21.6  Usual Body Weight:  (~140 lbs per pt report. Per EMR: 149 lb 3.2 oz on 1/12/24;    149 lbs 8 oz on 12/18/24; 136 lbs 8 oz on 8/11/23)   BMI Categories: Underweight (BMI less than 22) age over 65    Estimated Daily Nutrient Needs:  Energy Requirements Based On: Kcal/kg  Weight Used for Energy Requirements: Current (58.8 kg)  Energy (kcal/day): 6650-8544 kcal/day (28-30 kcal/kg)  Weight Used for Protein Requirements: Current (58.8 kg on 3/21)  Protein (g/day): 70+ g/day (1.2+ g/kg)    Nutrition Diagnosis:   Food & Nutrition-related knowledge deficit related to food and nutrition related knowledge deficit as evidenced by other (comment) (pt report of no previous diet education)    Nutrition Interventions:   Food and/or Nutrient Delivery: Continue Current Diet, Vitamin Supplement  Nutrition Education/Counseling: Education completed (Completed Heart-Healthy, Low-Sodium Diet Education on 3/21/24. RD contact information provided.)  Coordination of Nutrition Care: Continue to monitor while inpatient    Goals:  Previous Goal Met: Progressing toward Goal(s)  Goals: PO intake 75% or greater, by next RD assessment    Nutrition Monitoring and Evaluation:   Food/Nutrient Intake Outcomes: Diet Advancement/Tolerance, Food and Nutrient Intake, Vitamin/Mineral Intake  Physical Signs/Symptoms Outcomes: Biochemical Data, Weight, Skin, Nutrition Focused Physical Findings, GI Status, Fluid Status or Edema    Discharge Planning:    Too soon to determine     Shelia Bloom MS, RD, LD  Contact: [phone]

## 2024-03-21 NOTE — CARE COORDINATION
3/21/24, 1:35 PM EDT    Patient goals/plan/ treatment preferences discussed by  and .  Patient goals/plan/ treatment preferences reviewed with patient/ family.  Patient/ family verbalize understanding of discharge plan and are in agreement with goal/plan/treatment preferences.  Understanding was demonstrated using the teach back method.  AVS provided by RN at time of discharge, which includes all necessary medical information pertaining to the patients current course of illness, treatment, post-discharge goals of care, and treatment preferences. [employer] rep in room fitting patient. Plans to discharge home after.     Services At/After Discharge: DME       IMM Letter  IMM Letter given to Patient/Family/Significant other/Guardian/POA/by:: Yadira ABDI CM  IMM Letter date given:: 03/19/24  IMM Letter time given:: 1250

## 2024-03-21 NOTE — PROGRESS NOTES
Discussed discharge summary with patient and patients wife. Instructed patient about medications & follow up appointments. Patient denies any additional questions. Patient was discharged with all belongings. Patient picking up medications on way down to vehicle. No distress noted. Patient discharged to home with wife. Taken down to the vehicle by transporter per wheelchair. Educated Mitraclip site care

## 2024-03-21 NOTE — CARE COORDINATION
3/21/24  10:11 AM  Message with LV referral left for Ghassan at [facility]. Referral and orders also faxed.

## 2024-03-21 NOTE — DISCHARGE INSTRUCTIONS
Always tell the doctor (or medical technician) that you have an implanted heart valve.  Failure to do so may result in damage to the heart valve that could result in death.   Before an MRI (magnetic resonance imaging) procedure, always notify the doctor (or medical technician) that you have an implanted heart valve.      What symptoms or health problems do you need to look out for after you leave the hospital? Call your physician if you have any of these symptoms: (Phone Number: [phone])  Weight pound gain of 3 pounds in one day or 5 pounds in one week. Weight gain is NOT normal.    Swelling of the legs, ankles, or feet  Increasing shortness of breath  Change in the color or temperature of your lower legs and feet  Develop abdominal pain or unrelieved nausea and/or vomiting  Develop any redness, incision, or limited movement of your arms or legs  Signs of infection: redness, incision hot to the touch, fevers greater than 101 degrees, or colored drainage from your incision  Seroma is an accumulation of fluid in the tissues at the groin surgical site. Symptoms may include: a lump near the groin surgical site, clear fluid draining from the site, redness, warmth, or swelling.

## 2024-03-22 ENCOUNTER — TELEPHONE (OUTPATIENT)
Dept: CARDIOLOGY CLINIC | Age: 89
End: 2024-03-22

## 2024-03-22 ENCOUNTER — CARE COORDINATION (OUTPATIENT)
Dept: CASE MANAGEMENT | Age: 89
End: 2024-03-22

## 2024-03-22 DIAGNOSIS — Z95.818 S/P MITRAL VALVE CLIP IMPLANTATION: Primary | ICD-10-CM

## 2024-03-22 DIAGNOSIS — Z98.890 S/P MITRAL VALVE CLIP IMPLANTATION: Primary | ICD-10-CM

## 2024-03-22 DIAGNOSIS — I34.0 SEVERE MITRAL REGURGITATION: ICD-10-CM

## 2024-03-22 DIAGNOSIS — I34.0 SEVERE MITRAL REGURGITATION: Primary | ICD-10-CM

## 2024-03-22 NOTE — CARE COORDINATION
Care Transitions Initial Follow Up Call    Call within 2 business days of discharge: Yes    Patient Current Location:  Home: [address] [place] [id number]    Care Transition Nurse contacted the patient by telephone to perform post hospital discharge assessment. Verified name and  with patient as identifiers. Provided introduction to self, and explanation of the Care Transition Nurse role.     Patient: Adal Verde Patient : 1933   MRN: [id number]  Reason for Admission: s/p TMVR  Discharge Date: 3/21/24 RARS: Readmission Risk Score: 19.8      Last Discharge Facility       Date Complaint Diagnosis Description Type Department Provider    3/19/24  Severe mitral regurgitation ... Admission (Discharged) Brett Tyler MD            Challenges to be reviewed by the provider   Additional needs identified to be addressed with provider: No  none               Method of communication with provider: none.    Juvencio returned call, said he is doing ok.  Doesn't like the Life Vest much but is tolerating it.  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  Denies chest pain, fever, chills, dyspnea, dizziness, n/v/d.  Reviewed post op instructions on AVS, voiced understanding.  Reviewed medications/changes.   Instructed on the importance of weighing daily, in the morning after urinating, same amount of clothing and that if he has weight gain of 2-3 lbs or more overnight OR 5 lbs or more in 3 days to call his physician immediately and report.  Monitor for increased swelling of LE's.  Said his legs no longer having any swelling.  Offered RPM, accepted.  Appetite and fluid intake is good.  Will see PCP and surgeon next week.  No other issues to report.  Denies any other needs.  No other questions or concerns at this time.  Will continue to follow.    Care Transition Nurse reviewed discharge instructions, medical action plan, and red flags with patient who verbalized understanding. The patient was

## 2024-03-22 NOTE — TELEPHONE ENCOUNTER
ECHO scheduled for 3/20/25 at 11:00am.     Future appts printed and will be given to patient at 30 day appt.

## 2024-03-22 NOTE — TELEPHONE ENCOUNTER
Orders received from Dr. Martinez to schedule the patient for her 1 year post MitraClip (3/19/24) follow up appointment with an ECHO, CBC, and BMP.    Appointment is scheduled with Eun for 3/25/25 at 1000.  ECHO scheduled for 3/20/25 at ___.    Gale, can you schedule this echo?    Dr. Martinez, please agree.

## 2024-03-26 ENCOUNTER — OFFICE VISIT (OUTPATIENT)
Dept: CARDIOTHORACIC SURGERY | Age: 89
End: 2024-03-26
Payer: MEDICARE

## 2024-03-26 VITALS
BODY MASS INDEX: 21.79 KG/M2 | HEIGHT: 65 IN | SYSTOLIC BLOOD PRESSURE: 142 MMHG | HEART RATE: 71 BPM | WEIGHT: 130.8 LBS | OXYGEN SATURATION: 97 % | DIASTOLIC BLOOD PRESSURE: 72 MMHG

## 2024-03-26 DIAGNOSIS — Z98.890 S/P MITRAL VALVE CLIP IMPLANTATION: Primary | ICD-10-CM

## 2024-03-26 DIAGNOSIS — Z95.818 S/P MITRAL VALVE CLIP IMPLANTATION: Primary | ICD-10-CM

## 2024-03-26 PROCEDURE — G8427 DOCREV CUR MEDS BY ELIG CLIN: HCPCS | Performed by: PHYSICIAN ASSISTANT

## 2024-03-26 PROCEDURE — G8484 FLU IMMUNIZE NO ADMIN: HCPCS | Performed by: PHYSICIAN ASSISTANT

## 2024-03-26 PROCEDURE — G8420 CALC BMI NORM PARAMETERS: HCPCS | Performed by: PHYSICIAN ASSISTANT

## 2024-03-26 PROCEDURE — 99214 OFFICE O/P EST MOD 30 MIN: CPT | Performed by: PHYSICIAN ASSISTANT

## 2024-03-26 PROCEDURE — 1036F TOBACCO NON-USER: CPT | Performed by: PHYSICIAN ASSISTANT

## 2024-03-26 PROCEDURE — 1111F DSCHRG MED/CURRENT MED MERGE: CPT | Performed by: PHYSICIAN ASSISTANT

## 2024-03-26 PROCEDURE — 1124F ACP DISCUSS-NO DSCNMKR DOCD: CPT | Performed by: PHYSICIAN ASSISTANT

## 2024-03-26 NOTE — PROGRESS NOTES
Structural Heart/Cardiology Follow up    3/26/2024 10:45 AM    Patient's Name/Date of Birth: Adal Verde / 6/28/1933 (89 y.o.)    PCP: Nikita Rey MD    Date: March 26, 2024     CC:    Chief Complaint   Patient presents with    Follow-up     S/p Mitraclip-  03.19.2024        HPI  We had the pleasure of seeing Adal Verde in the office today, as you know this is a very pleasant 89 y.o. year old male with a history of mitral regurgitation who underwent a successful MitraClip on 3/19/24.  The pt had drop in EF to 30 - 35% from 40 - 45%,  5 beat self-limiting run of VT post procedure and received life vest prior to discharge.  Lasix was increased to 40 mg daily from 20 mg prior to procedure.    PastMedical History:  Adal  has a past medical history of ANJANA (acute kidney injury) (HCC), ANJANA (acute kidney injury) (HCC), Arrhythmia, Arthritis, BPH (benign prostatic hypertrophy), CAD (coronary artery disease), Cerebral artery occlusion with cerebral infarction (HCC), Cerebrovascular disease, Gastroenteritis due to norovirus, Hyperlipidemia, Hypertension, Mitral regurgitation, PVC (premature ventricular contraction), Small bowel obstruction (HCC), and TIA (transient ischemic attack).    Past Surgical History:  The patient  has a past surgical history that includes Coronary artery bypass graft (01/01/2009); Cataract removal (Right, 07/01/2015); hernia repair (N/A, 09/02/2020); Small intestine surgery (09/02/2020); Cardiac surgery (03/20/2024); transesophageal echocardiogram (N/A, 01/09/2024); Cardiac procedure (N/A, 03/01/2024); and Cardiac procedure (N/A, 3/19/2024).    Allergies:  The patient is allergic to pcn [penicillins], ibuprofen, and percocet [oxycodone-acetaminophen].    Medications:    Current Outpatient Medications:     furosemide (LASIX) 40 MG tablet, Take 1 tablet by mouth daily Take 40 mg daily until seen in office on 3/26/24. Will then determine daily dosage from that time., Disp: 10 tablet, Rfl:

## 2024-03-26 NOTE — PATIENT INSTRUCTIONS
You may receive a survey regarding the care you received during your visit.  Your input is valuable to us.  We encourage you to complete and return your survey.  We hope you will choose us in the future for your healthcare needs.    Thank you for choosing [facility]!    Your Medical Assistant Today:  Maria Guadalupe DE LEON   Your Provider for Today: Peña Quintanilla PA-C  Ph. [phone]        Decrease the lasix to the 20mg daily

## 2024-03-27 ENCOUNTER — OFFICE VISIT (OUTPATIENT)
Dept: FAMILY MEDICINE CLINIC | Age: 89
End: 2024-03-27

## 2024-03-27 ENCOUNTER — CARE COORDINATION (OUTPATIENT)
Dept: CASE MANAGEMENT | Age: 89
End: 2024-03-27

## 2024-03-27 VITALS
HEIGHT: 65 IN | RESPIRATION RATE: 16 BRPM | HEART RATE: 64 BPM | SYSTOLIC BLOOD PRESSURE: 120 MMHG | BODY MASS INDEX: 22.01 KG/M2 | WEIGHT: 132.13 LBS | DIASTOLIC BLOOD PRESSURE: 64 MMHG

## 2024-03-27 DIAGNOSIS — I25.810 CORONARY ARTERY DISEASE INVOLVING AUTOLOGOUS VEIN CORONARY BYPASS GRAFT WITHOUT ANGINA PECTORIS: ICD-10-CM

## 2024-03-27 DIAGNOSIS — I10 PRIMARY HYPERTENSION: Primary | ICD-10-CM

## 2024-03-27 DIAGNOSIS — Z09 HOSPITAL DISCHARGE FOLLOW-UP: ICD-10-CM

## 2024-03-27 DIAGNOSIS — N18.30 CRF (CHRONIC RENAL FAILURE), STAGE 3 (MODERATE) (HCC): ICD-10-CM

## 2024-03-27 DIAGNOSIS — I50.22 CHRONIC SYSTOLIC (CONGESTIVE) HEART FAILURE (HCC): ICD-10-CM

## 2024-03-27 DIAGNOSIS — E78.00 PURE HYPERCHOLESTEROLEMIA: ICD-10-CM

## 2024-03-27 DIAGNOSIS — I34.0 NONRHEUMATIC MITRAL VALVE REGURGITATION: ICD-10-CM

## 2024-03-27 DIAGNOSIS — I87.2 CHRONIC VENOUS STASIS DERMATITIS OF BOTH LOWER EXTREMITIES: ICD-10-CM

## 2024-03-27 PROCEDURE — 99496 TRANSJ CARE MGMT HIGH F2F 7D: CPT | Performed by: FAMILY MEDICINE

## 2024-03-27 ASSESSMENT — ENCOUNTER SYMPTOMS
NAUSEA: 0
TROUBLE SWALLOWING: 0
COUGH: 0
ABDOMINAL PAIN: 0
CONSTIPATION: 0
EYE PAIN: 0
CHEST TIGHTNESS: 0
SORE THROAT: 0
BLOOD IN STOOL: 0
BACK PAIN: 0
SHORTNESS OF BREATH: 0

## 2024-03-27 NOTE — PROGRESS NOTES
Post-Discharge Transitional Care Follow Up      Adal Verde   YOB: 1933    Date of Office Visit:  3/27/2024  Date of Hospital Admission: 3/19/24  Date of Hospital Discharge: 3/21/24  Readmission Risk Score (high >=14%. Medium >=10%):Readmission Risk Score: 19.8      Care management risk score Rising risk (score 2-5) and Complex Care (Scores >=6): No Risk Score On File     Non face to face  following discharge, date last encounter closed (first attempt may have been earlier): 03/22/2024     Call initiated 2 business days of discharge: Yes     IMPRESSION      ICD-10-CM    1. Primary hypertension  I10       2. Chronic systolic (congestive) heart failure  I50.22       3. Chronic venous stasis dermatitis of both lower extremities  I87.2       4. CRF (chronic renal failure), stage 3 (moderate) ([facility])  N18.30       5. Nonrheumatic mitral valve regurgitation  I34.0       6. 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  I25.810       7. Pure hypercholesterolemia  E78.00              PLAN    Current Outpatient Medications   Medication Sig Dispense Refill    furosemide (LASIX) 20 MG tablet Take 1 tablet by mouth daily Do not take until after seen in office on 3/26/24 - will then determine if dose remains at 40 mg or returns to 20 mg daily. (Patient taking differently: Take 1 tablet by mouth daily) 90 tablet 3    clopidogrel (PLAVIX) 75 MG tablet Take 1 tablet by mouth daily 30 tablet 3    carvedilol (COREG) 12.5 MG tablet TAKE 1 TABLET BY MOUTH TWICE DAILY 180 tablet 1    ammonium lactate (LAC-HYDRIN) 12 % lotion Apply topically as needed to intact skin of both legs. 1 each 1    lisinopril (PRINIVIL;ZESTRIL) 5 MG tablet Take 1 tablet by mouth daily 30 tablet 5    tamsulosin (FLOMAX) 0.4 MG capsule TAKE 1 CAPSULE BY MOUTH EVERY EVENING 90 capsule 1    simvastatin (ZOCOR) 40 MG tablet TAKE 1 TABLET BY MOUTH EVERY NIGHT 90 tablet 1    acetaminophen (TYLENOL) 325

## 2024-03-27 NOTE — PROGRESS NOTES
Remote Patient Order Discontinued    Received request from Whitney Edward RN   to discontinue order for remote patient monitoring of CHF and HTN and order completed.

## 2024-03-27 NOTE — CARE COORDINATION
Care Transitions Follow Up Call    Patient Current Location:  Home: [address] [place] [address] [place]  [facility] [id number]    Care Transition Nurse contacted the patient by telephone to follow up after admission on 3/19/24.  Verified name and  with patient as identifiers.    Patient: Adal Verde  Patient : 1933   MRN: [id number]  Reason for Admission: s/p TMVR  Discharge Date: 3/21/24 RARS: Readmission Risk Score: 19.8      Needs to be reviewed by the provider   Additional needs identified to be addressed with provider: No  none             Method of communication with provider: none.    Incoming call from Juvencio, said he really hated to call but got the RPM kit and feels it is just too much for him right now.  Wants to return kit.  He is wearing the Life Vest, has BP cuff and scale.  Will self monitor as before.  Saw surgeon yesterday and PCP today.  Surgeon did decrease Lasix to 20 mg as prior to surgery.  Denies chest pain, dyspnea, fever, chills, dizziness, edema, n/v/d.  Eating, drinking, sleeping good.  B&B normal.  Will see CHF clinic next week.  No other issues to report.  Denies any other needs.  No other questions or concerns at this time.  Will continue to follow.  Appreciative of calls.    Addressed changes since last contact:  medications-Lasix decreased 20 mg  Discussed follow-up appointments. If no appointment was previously scheduled, appointment scheduling offered: Yes.   Is follow up appointment scheduled within 7 days of discharge? Yes.    Follow Up  Future Appointments   Date Time Provider Department Center   2024 11:00 AM Bria Bell APRN - CNP N [facility] CHF [facility] [place]   2024 11:00 AM STR ECHO 1 STRZ PATI STR Rad/Card   2024 10:30 AM HemEun ramsey APRN - CNP N [facility] Heart [facility] [place]   2024  2:20 PM Hosea Ruiz MD N [facility] [place]   3/20/2025 11:00 AM STR ECHO 1 STRZ PATI STR Rad/Card   3/25/2025 10:00 AM HemEun ramsey APRN - CNP N [facility] Heart [facility] [place]     External

## 2024-03-28 LAB
EKG ATRIAL RATE: 67 BPM
EKG P AXIS: 47 DEGREES
EKG P-R INTERVAL: 222 MS
EKG Q-T INTERVAL: 444 MS
EKG QRS DURATION: 96 MS
EKG QTC CALCULATION (BAZETT): 469 MS
EKG R AXIS: 78 DEGREES
EKG T AXIS: 44 DEGREES
EKG VENTRICULAR RATE: 67 BPM

## 2024-04-04 ENCOUNTER — OFFICE VISIT (OUTPATIENT)
Dept: CARDIOLOGY CLINIC | Age: 89
End: 2024-04-04
Payer: MEDICARE

## 2024-04-04 VITALS
OXYGEN SATURATION: 97 % | WEIGHT: 135 LBS | BODY MASS INDEX: 22.49 KG/M2 | DIASTOLIC BLOOD PRESSURE: 70 MMHG | HEART RATE: 76 BPM | HEIGHT: 65 IN | SYSTOLIC BLOOD PRESSURE: 118 MMHG

## 2024-04-04 DIAGNOSIS — I42.8 NONISCHEMIC CARDIOMYOPATHY (HCC): ICD-10-CM

## 2024-04-04 DIAGNOSIS — Z91.89 AT RISK FOR FLUID VOLUME OVERLOAD: ICD-10-CM

## 2024-04-04 DIAGNOSIS — Z98.890 S/P MITRAL VALVE CLIP IMPLANTATION: Primary | ICD-10-CM

## 2024-04-04 DIAGNOSIS — Z95.818 S/P MITRAL VALVE CLIP IMPLANTATION: Primary | ICD-10-CM

## 2024-04-04 DIAGNOSIS — I50.22 CHRONIC SYSTOLIC CONGESTIVE HEART FAILURE, NYHA CLASS 2 (HCC): ICD-10-CM

## 2024-04-04 PROCEDURE — 99214 OFFICE O/P EST MOD 30 MIN: CPT | Performed by: NURSE PRACTITIONER

## 2024-04-04 PROCEDURE — G8420 CALC BMI NORM PARAMETERS: HCPCS | Performed by: NURSE PRACTITIONER

## 2024-04-04 PROCEDURE — 1036F TOBACCO NON-USER: CPT | Performed by: NURSE PRACTITIONER

## 2024-04-04 PROCEDURE — 1111F DSCHRG MED/CURRENT MED MERGE: CPT | Performed by: NURSE PRACTITIONER

## 2024-04-04 PROCEDURE — 1124F ACP DISCUSS-NO DSCNMKR DOCD: CPT | Performed by: NURSE PRACTITIONER

## 2024-04-04 PROCEDURE — G8427 DOCREV CUR MEDS BY ELIG CLIN: HCPCS | Performed by: NURSE PRACTITIONER

## 2024-04-04 RX ORDER — FUROSEMIDE 20 MG/1
20 TABLET ORAL DAILY
Qty: 90 TABLET | Refills: 3 | Status: SHIPPED
Start: 2024-04-04

## 2024-04-04 ASSESSMENT — ENCOUNTER SYMPTOMS
ABDOMINAL DISTENTION: 0
NAUSEA: 0
VOMITING: 0
COUGH: 0
SHORTNESS OF BREATH: 1

## 2024-04-04 NOTE — PROGRESS NOTES
not improve    Lab reviewed - K 4.4 Cr 1.8 mag 2.0 hgb 13.2    ECHO MICHAEL 2023: severe MR, mild AI, Severely dilated LA, mid dilated RA,   ECHO post clip - Bi-atrial enlargement, s/p Clip, mean gradient of 10, mild to mod MR, RVSP 62, RV dilation w/ mildly reduced systolic function   CATH 2024: patent grafts x 5 - LVEDP 21, wedge 24 RA 14    Blood work next week when you come in for the ECHO    No medication changes today     Continue diet/fluid adherence  Continue daily wts.  F/U w/ Cardiology  F/U in clinic 8 weeks      Tolerating above noted HF meds, no ill side effects noted. Will continue to monitor kidney function and electrolytes. Will optimize as tolerated.   Pt is compliant w/ medications.    Total visit time of 30 minutes has been spent with patient on education of symptoms, management, medication, and plan of care; as well as review of chart: labs, ECHO, radiology reports, etc.   I personally spent more then 50% of the appt time face to face with the patient.  Daily weights  Fluid restriction of 2 Liters per day  Limit sodium in diet to around 1298-3622 mg/day  Monitor BP  Activity as tolerated     Patient was instructed to call the Heart Failure Clinic for any changes in symptoms as noted in AVS.      Return in about 8 weeks (around 5/30/2024). or sooner if needed     Patient given educational materials - see patient instructions.   We discussed the importance of weighing oneself and recording daily. We also discussed the importance of a low sodium diet, higher sodium foods to avoid and better low sodium food options.   Patient verbalizes understanding of plan of care using teach back method, and is agreeable to the treatment plan.       Electronically signed by TAHIR Buchanan CNP on 4/4/2024 at 11:58 AM

## 2024-04-04 NOTE — PATIENT INSTRUCTIONS
You may receive a survey regarding the care you received during your visit.  Your input is valuable to us.  We encourage you to complete and return your survey.  We hope you will choose us in the future for your healthcare needs.    Your nurses today were Shannan.  Office hours:   Mon-Thurs 8-4:30  Friday 8-12  Phone: [phone]    Continue:  Continue current medications  Daily weights and record  Fluid restriction of 2 Liters per day  Limit sodium in diet to around 1923-4574 mg/day  Monitor BP  Activity as tolerated     Call the Heart Failure Clinic for any of the following symptoms:   Weight gain of 2-3 pounds in 1 day or 5 pounds in 1 week  Increased shortness of breath  Shortness of breath while laying down  Cough  Chest pain  Swelling in feet, ankles or legs  Bloating in abdomen  Fatigue      Blood work next week when you come in for the ECHO    No medication changes today     Continue diet/fluid adherence  Continue daily wts.  F/U w/ Cardiology  F/U in clinic 8 weeks

## 2024-04-05 ENCOUNTER — CARE COORDINATION (OUTPATIENT)
Dept: CASE MANAGEMENT | Age: 89
End: 2024-04-05

## 2024-04-05 NOTE — CARE COORDINATION
Care Transitions Follow Up Call    Patient Current Location:  Home: [address] [place] [id number]    Care Transition Nurse contacted the patient by telephone to follow up after admission on 3/19/24.  Verified name and  with patient as identifiers.    Patient: Adal Verde  Patient : [id number]   MRN: [id number]  Reason for Admission: s/p TMVR  Discharge Date: 3/21/24 RARS: Readmission Risk Score: 19.8      Needs to be reviewed by the provider   Additional needs identified to be addressed with provider: No  none             Method of communication with provider: none.    Juvencio returned call, said he is doing very well.  Went to grandparents day at his great granddaughter's school today.  Had to do a lot of walking but managed well.  Said his legs are still weak but didn't get SOB.  Continues to wear Life Vest.  BP, wt stable.  Denies chest pain, dyspnea, fever, chills, dizziness.  Saw CHF clinic yesterday, no changes.  Will get Echo in a couple of weeks.  Eating, drinking, sleeping ok.  No other issues to report.  Denies any other needs.  No other questions or concerns at this time.  Will continue to follow.    Addressed changes since last contact:  none  Discussed follow-up appointments. If no appointment was previously scheduled, appointment scheduling offered: Yes.   Is follow up appointment scheduled within 7 days of discharge? Yes.    Follow Up  Future Appointments   Date Time Provider Department Center   2024 11:00 AM STR ECHO 1 [facility] PATI STR Rad/Card   2024 10:30 AM Eun Mccoy APRN - CNP [facility] Heart [facility] [place]   2024  2:30 PM Bria Bell APRN - CNP [facility] CHF [facility] [place]   2024  2:20 PM Hosea Ruiz MD [facility] [place] [facility] [place]   3/20/2025 11:00 AM STR ECHO 1 [facility] PATI STR Rad/Card   3/25/2025 10:00 AM HemEun ramsey APRN - CNP [facility] [place]     External follow up appointment(s): heidy    Care Transition Nurse reviewed medical action plan and red flags with

## 2024-04-11 ENCOUNTER — CARE COORDINATION (OUTPATIENT)
Dept: CASE MANAGEMENT | Age: 89
End: 2024-04-11

## 2024-04-11 NOTE — CARE COORDINATION
Phone    4/19/2024 11:00 AM (Arrive by 10:45 AM) STR ECHO 1 Cardiology [phone]    4/23/2024 10:30 AM Eun Mccoy APRN - CNP Cardiology [phone]    5/16/2024 2:30 PM Bria Bell APRN - CNP Cardiology [phone]    8/22/2024 2:20 PM Hosea Ruiz MD Nephrology [phone]    3/20/2025 11:00 AM (Arrive by 10:45 AM) STR ECHO 1 Cardiology [phone]    3/25/2025 10:00 AM Eun Mccoy APRN - CNP Cardiology [phone]            Care Transition Nurse provided contact information.  Plan for follow-up call in 11-14 days based on severity of symptoms and risk factors.  Plan for next call: symptom management-new or worsening symptoms  follow-up appointment-review f/u 4/23  Final call      Whitney Edward RN

## 2024-04-18 ENCOUNTER — NURSE ONLY (OUTPATIENT)
Dept: LAB | Age: 89
End: 2024-04-18

## 2024-04-18 DIAGNOSIS — I34.0 SEVERE MITRAL INSUFFICIENCY: ICD-10-CM

## 2024-04-18 DIAGNOSIS — Z98.890 S/P MITRAL VALVE CLIP IMPLANTATION: ICD-10-CM

## 2024-04-18 DIAGNOSIS — Z95.818 S/P MITRAL VALVE CLIP IMPLANTATION: ICD-10-CM

## 2024-04-18 LAB
ANION GAP SERPL CALC-SCNC: 14 MEQ/L (ref 8–16)
BUN SERPL-MCNC: 52 MG/DL (ref 7–22)
CALCIUM SERPL-MCNC: 8.9 MG/DL (ref 8.5–10.5)
CHLORIDE SERPL-SCNC: 100 MEQ/L (ref 98–111)
CO2 SERPL-SCNC: 23 MEQ/L (ref 23–33)
CREAT SERPL-MCNC: 1.8 MG/DL (ref 0.4–1.2)
DEPRECATED RDW RBC AUTO: 55.1 FL (ref 35–45)
ERYTHROCYTE [DISTWIDTH] IN BLOOD BY AUTOMATED COUNT: 16.2 % (ref 11.5–14.5)
GFR SERPL CREATININE-BSD FRML MDRD: 35 ML/MIN/1.73M2
GLUCOSE SERPL-MCNC: 140 MG/DL (ref 70–108)
HCT VFR BLD AUTO: 41.4 % (ref 42–52)
HGB BLD-MCNC: 13.2 GM/DL (ref 14–18)
MCH RBC QN AUTO: 29.7 PG (ref 26–33)
MCHC RBC AUTO-ENTMCNC: 31.9 GM/DL (ref 32.2–35.5)
MCV RBC AUTO: 93.2 FL (ref 80–94)
PLATELET # BLD AUTO: 226 THOU/MM3 (ref 130–400)
PMV BLD AUTO: 10.7 FL (ref 9.4–12.4)
POTASSIUM SERPL-SCNC: 4.6 MEQ/L (ref 3.5–5.2)
RBC # BLD AUTO: 4.44 MILL/MM3 (ref 4.7–6.1)
SODIUM SERPL-SCNC: 137 MEQ/L (ref 135–145)
WBC # BLD AUTO: 6 THOU/MM3 (ref 4.8–10.8)

## 2024-04-19 ENCOUNTER — HOSPITAL ENCOUNTER (OUTPATIENT)
Age: 89
End: 2024-04-19
Attending: INTERNAL MEDICINE
Payer: MEDICARE

## 2024-04-19 VITALS
HEIGHT: 65 IN | BODY MASS INDEX: 22.49 KG/M2 | SYSTOLIC BLOOD PRESSURE: 118 MMHG | WEIGHT: 135 LBS | DIASTOLIC BLOOD PRESSURE: 70 MMHG

## 2024-04-19 DIAGNOSIS — I34.0 SEVERE MITRAL INSUFFICIENCY: ICD-10-CM

## 2024-04-19 DIAGNOSIS — Z98.890 S/P MITRAL VALVE CLIP IMPLANTATION: ICD-10-CM

## 2024-04-19 DIAGNOSIS — Z95.818 S/P MITRAL VALVE CLIP IMPLANTATION: ICD-10-CM

## 2024-04-19 LAB
ECHO AO ASC DIAM: 3 CM
ECHO AO ASCENDING AORTA INDEX: 1.8 CM/M2
ECHO AR MAX VEL PISA: 4.7 M/S
ECHO AV AREA PEAK VELOCITY: 1 CM2
ECHO AV AREA VTI: 0.9 CM2
ECHO AV AREA/BSA PEAK VELOCITY: 0.6 CM2/M2
ECHO AV AREA/BSA VTI: 0.5 CM2/M2
ECHO AV CUSP MM: 1.6 CM
ECHO AV MEAN GRADIENT: 10 MMHG
ECHO AV MEAN VELOCITY: 1.4 M/S
ECHO AV PEAK GRADIENT: 17 MMHG
ECHO AV PEAK VELOCITY: 2.1 M/S
ECHO AV REGURGITANT PHT: 383 MS
ECHO AV VELOCITY RATIO: 0.29
ECHO AV VTI: 47.3 CM
ECHO BSA: 1.68 M2
ECHO IVC PROX: 2.5 CM
ECHO LA AREA 2C: 23.2 CM2
ECHO LA AREA 4C: 20.1 CM2
ECHO LA DIAMETER INDEX: 2.63 CM/M2
ECHO LA DIAMETER: 4.4 CM
ECHO LA MAJOR AXIS: 5.9 CM
ECHO LA MINOR AXIS: 6.1 CM
ECHO LA VOL BP: 62 ML (ref 18–58)
ECHO LA VOL MOD A2C: 72 ML (ref 18–58)
ECHO LA VOL MOD A4C: 53 ML (ref 18–58)
ECHO LA VOL/BSA BIPLANE: 37 ML/M2 (ref 16–34)
ECHO LA VOLUME INDEX MOD A2C: 43 ML/M2 (ref 16–34)
ECHO LA VOLUME INDEX MOD A4C: 32 ML/M2 (ref 16–34)
ECHO LV E' LATERAL VELOCITY: 4 CM/S
ECHO LV E' SEPTAL VELOCITY: 3 CM/S
ECHO LV EDV A2C: 155 ML
ECHO LV EDV A4C: 104 ML
ECHO LV EDV INDEX A4C: 62 ML/M2
ECHO LV EDV NDEX A2C: 93 ML/M2
ECHO LV EJECTION FRACTION A2C: 43 %
ECHO LV EJECTION FRACTION A4C: 41 %
ECHO LV EJECTION FRACTION BIPLANE: 43 % (ref 55–100)
ECHO LV ESV A2C: 88 ML
ECHO LV ESV A4C: 61 ML
ECHO LV ESV INDEX A2C: 53 ML/M2
ECHO LV ESV INDEX A4C: 37 ML/M2
ECHO LV FRACTIONAL SHORTENING: 16 % (ref 28–44)
ECHO LV INTERNAL DIMENSION DIASTOLE INDEX: 2.69 CM/M2
ECHO LV INTERNAL DIMENSION DIASTOLIC: 4.5 CM (ref 4.2–5.9)
ECHO LV INTERNAL DIMENSION SYSTOLIC INDEX: 2.28 CM/M2
ECHO LV INTERNAL DIMENSION SYSTOLIC: 3.8 CM
ECHO LV ISOVOLUMETRIC RELAXATION TIME (IVRT): 77 MS
ECHO LV IVSD: 1.6 CM (ref 0.6–1)
ECHO LV MASS 2D: 261.9 G (ref 88–224)
ECHO LV MASS INDEX 2D: 156.8 G/M2 (ref 49–115)
ECHO LV POSTERIOR WALL DIASTOLIC: 1.3 CM (ref 0.6–1)
ECHO LV RELATIVE WALL THICKNESS RATIO: 0.58
ECHO LVOT AREA: 3.1 CM2
ECHO LVOT AV VTI INDEX: 0.28
ECHO LVOT DIAM: 2 CM
ECHO LVOT MEAN GRADIENT: 1 MMHG
ECHO LVOT PEAK GRADIENT: 2 MMHG
ECHO LVOT PEAK VELOCITY: 0.6 M/S
ECHO LVOT STROKE VOLUME INDEX: 25.2 ML/M2
ECHO LVOT SV: 42.1 ML
ECHO LVOT VTI: 13.4 CM
ECHO MV A VELOCITY: 1.6 M/S
ECHO MV AREA VTI: 0.6 CM2
ECHO MV E DECELERATION TIME (DT): 437 MS
ECHO MV E VELOCITY: 2.12 M/S
ECHO MV E/A RATIO: 1.33
ECHO MV E/E' LATERAL: 53
ECHO MV E/E' RATIO (AVERAGED): 61.83
ECHO MV LVOT VTI INDEX: 5.22
ECHO MV MAX VELOCITY: 2.3 M/S
ECHO MV MEAN GRADIENT: 11 MMHG
ECHO MV MEAN VELOCITY: 1.6 M/S
ECHO MV PEAK GRADIENT: 22 MMHG
ECHO MV REGURGITANT PEAK GRADIENT: 139 MMHG
ECHO MV REGURGITANT PEAK VELOCITY: 5.9 M/S
ECHO MV VTI: 70 CM
ECHO PULMONARY ARTERY SYSTOLIC PRESSURE (PASP): 65 MMHG
ECHO PV MAX VELOCITY: 0.8 M/S
ECHO PV PEAK GRADIENT: 3 MMHG
ECHO RV INTERNAL DIMENSION: 3.2 CM
ECHO RV TAPSE: 1.5 CM (ref 1.7–?)
ECHO TV E WAVE: 0.3 M/S
ECHO TV REGURGITANT MAX VELOCITY: 3.97 M/S
ECHO TV REGURGITANT PEAK GRADIENT: 63 MMHG

## 2024-04-19 PROCEDURE — 93306 TTE W/DOPPLER COMPLETE: CPT

## 2024-04-19 PROCEDURE — 93306 TTE W/DOPPLER COMPLETE: CPT | Performed by: NUCLEAR MEDICINE

## 2024-04-22 ENCOUNTER — CARE COORDINATION (OUTPATIENT)
Dept: CASE MANAGEMENT | Age: 89
End: 2024-04-22

## 2024-04-22 DIAGNOSIS — I87.2 CHRONIC VENOUS STASIS DERMATITIS OF BOTH LOWER EXTREMITIES: ICD-10-CM

## 2024-04-22 RX ORDER — AMMONIUM LACTATE 12 G/100G
LOTION TOPICAL
Qty: 900 G | Refills: 2 | Status: SHIPPED | OUTPATIENT
Start: 2024-04-22

## 2024-04-22 NOTE — CARE COORDINATION
Care Transitions Note    Final Call      Patient Current Location:  Home: [address] [place] [address] [place] [address]    Care Transition Nurse contacted the patient by telephone. Verified name and  as identifiers.    Patient graduated from the Care Transitions program on 24.  Patient/family verbalizes confidence in the ability to self-manage at this time. has the ability to self manage at this time..      Advance Care Planning:   Does patient have an Advance Directive:  not on file .    Handoff:   Patient was not referred to the ACM team due to no additional needs identified.       Care Summary Note:  Juvencio returned call, said he is doing well.  Denies chest pain, dyspnea, fever, chills, dizziness.  BP, wt stable.  Will see cardio tomorrow, hoping he can stop wearing the vest.  Eating, drinking, sleeping good.  No issues with B&B.  No other issues to report.  Denies any other needs.  No other questions or concerns at this time.  Appreciated the calls.    Assessments:  Care Transitions Subsequent and Final Call    Subsequent and Final Calls  Do you have any ongoing symptoms?: No  Have your medications changed?: No  Do you have any questions related to your medications?: No  Do you currently have any active services?: No  Do you have any needs or concerns that I can assist you with?: No  Identified Barriers: Lack of Education  Care Transitions Interventions   Home Care Waiver: Declined        Transportation Support: Declined      DME Assistance: Declined   Disease Association: Completed      Other Interventions:              Upcoming Appointments:    Future Appointments         Provider Specialty Dept Phone    2024 10:30 AM Eun Mccoy APRN - CNP Cardiology [phone]    2024 2:30 PM Bria Bell APRN - CNP Cardiology [phone]    2024 2:20 PM Hosea Ruiz MD Nephrology [phone]    3/20/2025 11:00 AM (Arrive by 10:45 AM) STR ECHO 1 Cardiology [phone]    3/25/2025 10:00 AM

## 2024-04-22 NOTE — CARE COORDINATION
Care Transitions Note    Follow Up Call  -1st attempt    Attempted to reach patient for transitions of care follow up.  Unable to reach patient.      Outreach Attempts:   HIPAA compliant voicemail left for patient.       Follow Up Appointment:   Future Appointments         Provider Specialty Dept Phone    4/23/2024 10:30 AM Eun Mccoy APRN - CNP Cardiology [phone]    5/16/2024 2:30 PM Bria Bell APRN - CNP Cardiology [phone]    8/22/2024 2:20 PM Hosea Ruiz MD Nephrology [phone]    3/20/2025 11:00 AM (Arrive by 10:45 AM) STR ECHO 1 Cardiology [phone]    3/25/2025 10:00 AM Eun Mccoy APRN - CNP Cardiology [phone]            Plan for follow-up on next business day.  based on severity of symptoms and risk factors. Plan for next call: symptom management-new or worsening symptoms  follow-up appointment-review f/u 4/223  Final call    Whitney Edward RN

## 2024-04-23 ENCOUNTER — OFFICE VISIT (OUTPATIENT)
Dept: CARDIOLOGY CLINIC | Age: 89
End: 2024-04-23
Payer: MEDICARE

## 2024-04-23 ENCOUNTER — TELEPHONE (OUTPATIENT)
Dept: CARDIOLOGY CLINIC | Age: 89
End: 2024-04-23

## 2024-04-23 VITALS
HEART RATE: 74 BPM | SYSTOLIC BLOOD PRESSURE: 157 MMHG | DIASTOLIC BLOOD PRESSURE: 77 MMHG | BODY MASS INDEX: 21.83 KG/M2 | WEIGHT: 131 LBS | HEIGHT: 65 IN

## 2024-04-23 DIAGNOSIS — Z98.890 S/P MITRAL VALVE CLIP IMPLANTATION: Primary | ICD-10-CM

## 2024-04-23 DIAGNOSIS — Z95.818 S/P MITRAL VALVE CLIP IMPLANTATION: Primary | ICD-10-CM

## 2024-04-23 DIAGNOSIS — R60.0 EDEMA OF BOTH LOWER LEGS: ICD-10-CM

## 2024-04-23 DIAGNOSIS — I10 PRIMARY HYPERTENSION: ICD-10-CM

## 2024-04-23 DIAGNOSIS — I34.0 SEVERE MITRAL INSUFFICIENCY: ICD-10-CM

## 2024-04-23 DIAGNOSIS — I50.22 CHRONIC SYSTOLIC CONGESTIVE HEART FAILURE, NYHA CLASS 2 (HCC): ICD-10-CM

## 2024-04-23 DIAGNOSIS — I35.0 NONRHEUMATIC AORTIC VALVE STENOSIS: ICD-10-CM

## 2024-04-23 PROCEDURE — G8420 CALC BMI NORM PARAMETERS: HCPCS | Performed by: NURSE PRACTITIONER

## 2024-04-23 PROCEDURE — 99214 OFFICE O/P EST MOD 30 MIN: CPT | Performed by: NURSE PRACTITIONER

## 2024-04-23 PROCEDURE — 1036F TOBACCO NON-USER: CPT | Performed by: NURSE PRACTITIONER

## 2024-04-23 PROCEDURE — 1124F ACP DISCUSS-NO DSCNMKR DOCD: CPT | Performed by: NURSE PRACTITIONER

## 2024-04-23 PROCEDURE — G8427 DOCREV CUR MEDS BY ELIG CLIN: HCPCS | Performed by: NURSE PRACTITIONER

## 2024-04-23 PROCEDURE — 93000 ELECTROCARDIOGRAM COMPLETE: CPT | Performed by: NURSE PRACTITIONER

## 2024-04-23 NOTE — PATIENT INSTRUCTIONS
Continue current medications as prescribed.    Gradually increase your activity level.     Eat heart healthy diet.     Follow-up with your PCP as scheduled.    Follow-up with CHF clinic on 5/16/2024 as scheduled.     Follow-up with Eun CNP on 3/25/2024  as scheduled or sooner if need.

## 2024-04-23 NOTE — PROGRESS NOTES
Patient here for a 30 day post mitraclip     EKG done today     Denies chest pain, sob, palpitations, dizziness and KATLYN

## 2024-04-23 NOTE — PROGRESS NOTES
[facility] [place] [facility]  [facility]  [address]  [address]  [facility] [address]  Dept: [phone]  Dept Fax: [phone]  Loc: [phone]    Visit Date: 4/23/2024    Mr. Verde is a 89 y.o. male  who presented for: 30 day post Melanie Clip      Primary Cardiologist: Brett Martinez MD  Referring Provider:  Dr. Clement  Chief Complaint   Patient presents with    Follow-up       HPI:   HPI   CHF follow-up 4/4/2024:  TYPE HF: HFrEF 40-45% (60% 2020)  Cause: severe MR - nonischemic   Device:   HX: CABG x5 2011, frequent PVCs  Dry Wt:  149 on 1/12/24, 147 on 2/9/24, 144 on 3/5/34, 135 on 4/4/24  Concerns today: post melanie-clip visit. His weight is down 11lbs. He notes improvement with his GAINES. He denies leg swelling, bloating, and orthopnea. Urinating well on his lasix. Following his low Na/fluid diet.   GDMT:              ACE/ARB/ARNI - lisinopril 5 daily               BB - Coreg 12.5 BID              Diuretic - lasix 20mg daily  AA - hx of hyperkalemia   SGLT2 -    Vasodilator -   Other - ASA, plavix      HFrEF 30-35% 3/2024 (40-45% 1/2024)  (60% 2020)  Severe MR - s/p melanie-clip 3/2024 - mild to mod MR, mean gradient of 10  Drop in EF post clip - life vest on - repeat ECHO on 4/19  CAD s/p CABG x 5 2012  Asymptomatic frequent PVC's   Lower leg water blisters - resolved - wearing leg wraps   Hyperkalemia - pt practicing low k diet - WNL at this time - will cont to follow     Stable, pts post procedure ECHO showed IVC dilation/acute fluid overload, he was treated with 40mg of lasix in house, will re-evaluate after next ECHO in two weeks. Does not want to ad medications today. 8 week f/u to re-evaluate EF and medications.      GDMT:  no d/t CKD/hyperkalemia/hypotension - pt does not want to try any new medications at this time - will maybe consider it if EF does not improve      Melanie-Clip: 3/19/2024: Successful, Transcatheter Mitral Valve Repair with MitraClip NTW at A1-P1

## 2024-05-08 RX ORDER — SIMVASTATIN 40 MG
TABLET ORAL
Qty: 90 TABLET | Refills: 1 | Status: SHIPPED | OUTPATIENT
Start: 2024-05-08

## 2024-05-08 NOTE — TELEPHONE ENCOUNTER
Date of last visit:  3/27/2024  Date of next visit:  Visit date not found    Requested Prescriptions     Pending Prescriptions Disp Refills    simvastatin (ZOCOR) 40 MG tablet [Pharmacy Med Name: SIMVASTATIN 40MG TABLETS] 90 tablet 1     Sig: TAKE 1 TABLET BY MOUTH EVERY NIGHT

## 2024-05-16 ENCOUNTER — OFFICE VISIT (OUTPATIENT)
Dept: CARDIOLOGY CLINIC | Age: 89
End: 2024-05-16
Payer: MEDICARE

## 2024-05-16 VITALS
HEIGHT: 65 IN | DIASTOLIC BLOOD PRESSURE: 40 MMHG | WEIGHT: 128 LBS | SYSTOLIC BLOOD PRESSURE: 110 MMHG | BODY MASS INDEX: 21.33 KG/M2 | HEART RATE: 64 BPM | OXYGEN SATURATION: 98 %

## 2024-05-16 DIAGNOSIS — Z95.818 S/P MITRAL VALVE CLIP IMPLANTATION: ICD-10-CM

## 2024-05-16 DIAGNOSIS — I42.8 NONISCHEMIC CARDIOMYOPATHY (HCC): ICD-10-CM

## 2024-05-16 DIAGNOSIS — I50.22 CHRONIC SYSTOLIC CONGESTIVE HEART FAILURE, NYHA CLASS 2 (HCC): Primary | ICD-10-CM

## 2024-05-16 DIAGNOSIS — Z91.89 AT RISK FOR FLUID VOLUME OVERLOAD: ICD-10-CM

## 2024-05-16 DIAGNOSIS — Z98.890 S/P MITRAL VALVE CLIP IMPLANTATION: ICD-10-CM

## 2024-05-16 PROCEDURE — 1036F TOBACCO NON-USER: CPT | Performed by: NURSE PRACTITIONER

## 2024-05-16 PROCEDURE — 1124F ACP DISCUSS-NO DSCNMKR DOCD: CPT | Performed by: NURSE PRACTITIONER

## 2024-05-16 PROCEDURE — 99214 OFFICE O/P EST MOD 30 MIN: CPT | Performed by: NURSE PRACTITIONER

## 2024-05-16 PROCEDURE — G8427 DOCREV CUR MEDS BY ELIG CLIN: HCPCS | Performed by: NURSE PRACTITIONER

## 2024-05-16 PROCEDURE — G8420 CALC BMI NORM PARAMETERS: HCPCS | Performed by: NURSE PRACTITIONER

## 2024-05-16 ASSESSMENT — ENCOUNTER SYMPTOMS
SHORTNESS OF BREATH: 0
ABDOMINAL DISTENTION: 0
VOMITING: 0
NAUSEA: 0

## 2024-05-16 NOTE — PATIENT INSTRUCTIONS
You may receive a survey regarding the care you received during your visit.  Your input is valuable to us.  We encourage you to complete and return your survey.  We hope you will choose us in the future for your healthcare needs.    Your nurses today were Shannan.  Office hours:   Mon-Thurs 8-4:30  Friday 8-12  Phone: [phone]    Continue:  Continue current medications  Daily weights and record  Fluid restriction of 2 Liters per day  Limit sodium in diet to around 3021-6194 mg/day  Monitor BP  Activity as tolerated     Call the Heart Failure Clinic for any of the following symptoms:   Weight gain of -3 pounds in 1 day or 5 pounds in 1 week  Increased shortness of breath  Shortness of breath while laying down  Cough  Chest pain  Swelling in feet, ankles or legs  Bloating in abdomen  Fatigue        No medication changes today     Continue diet/fluid adherence  Continue daily wts.  F/U w/ Cardiology  F/U in clinic 3 months

## 2024-05-16 NOTE — PROGRESS NOTES
Heart Failure Clinic       Visit Date: 5/16/2024  Cardiologist:  Dr. Martinez  Primary Care Physician: Nikita Cha MD    Adal Verde is a 89 y.o. male who presents today for:  Chief Complaint   Patient presents with    Congestive Heart Failure       HPI:     TYPE HF: HFrEF 40-45% (60% 2020)  Cause: severe MR - nonischemic   Device:   HX: CABG x5 2011, frequent PVCs  Dry Wt:  149 on 1/12/24, 147 on 2/9/24, 144 on 3/5/34, 135 on 4/4/24, 128 on 5/16/24      Adal Verde is a 89 y.o. male who presents to the office for a f/u patient visit in the heart failure clinic.    Concerns today: 6 week f/u. His weight is down 7lbs. Urinating welling on his lasix. Denies leg swelling, bloating, orthopnea, PND, and SOB (does get some with steps). Good appetite but small portions.     Visit on 4/4/24: post armani-clip visit. His weight is down 11lbs. He notes improvement with his GAINES. He denies leg swelling, bloating, and orthopnea. Urinating well on his lasix. Following his low Na/fluid diet.     Visit on 3/5/24: 4 week f/u. Weight is down 3lbs from previous. He was cleared by wound care last week and continues to wear leg wraps. He is s/p heart cath showing that his grafts are patent with elevated pressures. He denies leg swelling, bloating and orthopnea. His SOB continues with steps. He has stopped eating freezer meals and is not adding salt to his food. He is around 2L/fluid a day.     Visit on 2/9/24: 4 week f/u. Has been seeing wound care for his legs. Doing well with it. On his Lasix 20 daily with improvement of his lower leg swelling. Urinating well. SOB continues with steps. Denies bloating, orthopnea, chest pain, or feeling light headedness and dizziness. He may be over his Na diet - wife is giving him freezer meals.     Visit on 1/12/24: here today as a new pt visit for structural HF. Pt was referred to Dr. Martinez by his PCP with known MR and questionable AS. Pt had noted worsening SOB over the last couple

## 2024-05-22 DIAGNOSIS — N40.1 BENIGN NON-NODULAR PROSTATIC HYPERPLASIA WITH LOWER URINARY TRACT SYMPTOMS: ICD-10-CM

## 2024-05-22 RX ORDER — TAMSULOSIN HYDROCHLORIDE 0.4 MG/1
CAPSULE ORAL
Qty: 90 CAPSULE | Refills: 1 | Status: SHIPPED | OUTPATIENT
Start: 2024-05-22

## 2024-05-22 NOTE — TELEPHONE ENCOUNTER
Date of last visit:  3/27/2024  Date of next visit:  Visit date not found    Requested Prescriptions     Pending Prescriptions Disp Refills    tamsulosin (FLOMAX) 0.4 MG capsule [Pharmacy Med Name: TAMSULOSIN 0.4MG CAPSULES] 90 capsule 1     Sig: TAKE 1 CAPSULE BY MOUTH EVERY EVENING

## 2024-06-26 ENCOUNTER — TELEPHONE (OUTPATIENT)
Dept: CARDIOLOGY CLINIC | Age: 89
End: 2024-06-26

## 2024-06-26 NOTE — TELEPHONE ENCOUNTER
This patient has completed 3 months of DAPT post Mitraclip (3/9/24). According to the guidelines Plavix is only recommended for 3 months post implant. After reviewing the chart it does not appear that there any other indication to continue Plavix. Dr. Martinez are you ok with stopping Plavix and continuing Aspirin 81 mg daily?

## 2024-06-27 DIAGNOSIS — I50.9 ACUTE CONGESTIVE HEART FAILURE, UNSPECIFIED HEART FAILURE TYPE (HCC): ICD-10-CM

## 2024-06-27 NOTE — TELEPHONE ENCOUNTER
Date of last visit:  3/27/2024  Date of next visit:  7/8/2024    Requested Prescriptions     Pending Prescriptions Disp Refills    lisinopril (PRINIVIL;ZESTRIL) 5 MG tablet [Pharmacy Med Name: LISINOPRIL 5MG TABLETS] 30 tablet 5     Sig: TAKE 1 TABLET BY MOUTH DAILY

## 2024-06-28 RX ORDER — LISINOPRIL 5 MG/1
5 TABLET ORAL DAILY
Qty: 30 TABLET | Refills: 5 | Status: SHIPPED | OUTPATIENT
Start: 2024-06-28

## 2024-07-08 ENCOUNTER — OFFICE VISIT (OUTPATIENT)
Dept: FAMILY MEDICINE CLINIC | Age: 89
End: 2024-07-08

## 2024-07-08 VITALS
BODY MASS INDEX: 21.54 KG/M2 | SYSTOLIC BLOOD PRESSURE: 118 MMHG | RESPIRATION RATE: 16 BRPM | HEIGHT: 65 IN | WEIGHT: 129.25 LBS | HEART RATE: 72 BPM | DIASTOLIC BLOOD PRESSURE: 68 MMHG

## 2024-07-08 DIAGNOSIS — I10 PRIMARY HYPERTENSION: Primary | ICD-10-CM

## 2024-07-08 DIAGNOSIS — I25.810 CORONARY ARTERY DISEASE INVOLVING AUTOLOGOUS VEIN CORONARY BYPASS GRAFT WITHOUT ANGINA PECTORIS: ICD-10-CM

## 2024-07-08 DIAGNOSIS — N18.30 CRF (CHRONIC RENAL FAILURE), STAGE 3 (MODERATE) (HCC): ICD-10-CM

## 2024-07-08 DIAGNOSIS — I50.22 CHRONIC SYSTOLIC (CONGESTIVE) HEART FAILURE (HCC): ICD-10-CM

## 2024-07-08 DIAGNOSIS — E78.00 PURE HYPERCHOLESTEROLEMIA: ICD-10-CM

## 2024-07-08 DIAGNOSIS — N40.0 BENIGN PROSTATIC HYPERPLASIA WITHOUT LOWER URINARY TRACT SYMPTOMS: ICD-10-CM

## 2024-07-08 PROBLEM — I13.0 HYPERTENSIVE HEART AND CHRONIC KIDNEY DISEASE WITH HEART FAILURE AND STAGE 1 THROUGH STAGE 4 CHRONIC KIDNEY DISEASE, OR UNSPECIFIED CHRONIC KIDNEY DISEASE (HCC): Status: RESOLVED | Noted: 2024-01-02 | Resolved: 2024-07-08

## 2024-07-08 PROCEDURE — 99213 OFFICE O/P EST LOW 20 MIN: CPT | Performed by: FAMILY MEDICINE

## 2024-07-08 PROCEDURE — G8420 CALC BMI NORM PARAMETERS: HCPCS | Performed by: FAMILY MEDICINE

## 2024-07-08 PROCEDURE — G8427 DOCREV CUR MEDS BY ELIG CLIN: HCPCS | Performed by: FAMILY MEDICINE

## 2024-07-08 PROCEDURE — 1036F TOBACCO NON-USER: CPT | Performed by: FAMILY MEDICINE

## 2024-07-08 ASSESSMENT — ENCOUNTER SYMPTOMS
CHEST TIGHTNESS: 0
EYE PAIN: 0
SORE THROAT: 0
BACK PAIN: 0
NAUSEA: 0
COUGH: 0
BLOOD IN STOOL: 0
CONSTIPATION: 0
ORTHOPNEA: 0
SHORTNESS OF BREATH: 0
ABDOMINAL PAIN: 0
TROUBLE SWALLOWING: 0

## 2024-07-08 NOTE — PROGRESS NOTES
Subjective   Patient ID: Adal Verde is a 89 y.o. male.      Venous  stasis ulcers  all  better       Mitral  clip and  better       Ashd     no  chest pain      Crf  stable     Hypertension  This is a chronic problem. The current episode started more than 1 year ago. The problem has been resolved since onset. The problem is controlled. Pertinent negatives include no chest pain, headaches, orthopnea, palpitations, peripheral edema or shortness of breath. The current treatment provides significant improvement. There are no compliance problems.    Hyperlipidemia  This is a chronic problem. The current episode started more than 1 year ago. Pertinent negatives include no chest pain, focal weakness, leg pain, myalgias or shortness of breath. The current treatment provides significant improvement of lipids. There are no compliance problems.      Past Medical History:   Diagnosis Date    ANJANA (acute kidney injury) ([facility])     ANJANA (acute kidney injury) ([facility]) 02/04/2022    Arrhythmia 01/22/2019    Arthritis     BPH (benign prostatic hypertrophy)     CAD (coronary artery disease)     Cerebral artery occlusion with cerebral infarction ([facility])     Cerebrovascular disease     Gastroenteritis due to norovirus 02/05/2022    Hyperlipidemia     Hypertension     Mitral regurgitation     PVC (premature ventricular contraction) 01/22/2019    Small bowel obstruction ([facility]) 08/30/2020    TIA (transient ischemic attack) 2009    post surgery resolved      Review of Systems   Constitutional:  Negative for fatigue and fever.   HENT:  Negative for congestion, ear pain, postnasal drip, sore throat and trouble swallowing.    Eyes:  Negative for pain.   Respiratory:  Negative for cough, chest tightness and shortness of breath.    Cardiovascular:  Negative for chest pain, palpitations, orthopnea and leg swelling.   Gastrointestinal:  Negative for abdominal pain, blood in stool, constipation and nausea.   Genitourinary:  Negative for difficulty

## 2024-08-21 ENCOUNTER — OFFICE VISIT (OUTPATIENT)
Dept: CARDIOLOGY CLINIC | Age: 89
End: 2024-08-21

## 2024-08-21 VITALS
WEIGHT: 131.4 LBS | BODY MASS INDEX: 21.89 KG/M2 | SYSTOLIC BLOOD PRESSURE: 136 MMHG | HEART RATE: 74 BPM | HEIGHT: 65 IN | DIASTOLIC BLOOD PRESSURE: 70 MMHG | OXYGEN SATURATION: 99 %

## 2024-08-21 DIAGNOSIS — Z91.89 AT RISK FOR FLUID VOLUME OVERLOAD: ICD-10-CM

## 2024-08-21 DIAGNOSIS — I42.8 NONISCHEMIC CARDIOMYOPATHY (HCC): ICD-10-CM

## 2024-08-21 DIAGNOSIS — Z95.818 S/P MITRAL VALVE CLIP IMPLANTATION: ICD-10-CM

## 2024-08-21 DIAGNOSIS — I50.22 CHRONIC SYSTOLIC CONGESTIVE HEART FAILURE, NYHA CLASS 2 (HCC): Primary | ICD-10-CM

## 2024-08-21 DIAGNOSIS — Z98.890 S/P MITRAL VALVE CLIP IMPLANTATION: ICD-10-CM

## 2024-08-21 ASSESSMENT — ENCOUNTER SYMPTOMS
COUGH: 0
ABDOMINAL DISTENTION: 0
NAUSEA: 0
VOMITING: 0
SHORTNESS OF BREATH: 0

## 2024-08-21 NOTE — PATIENT INSTRUCTIONS
You may receive a survey regarding the care you received during your visit.  Your input is valuable to us.  We encourage you to complete and return your survey.  We hope you will choose us in the future for your healthcare needs.    Your nurses today were Shannan.  Office hours:   Mon-Thurs 8-4:30  Friday 8-12  Phone: [phone]    Continue:  Continue current medications  Daily weights and record  Fluid restriction of 2 Liters per day  Limit sodium in diet to around 9959-4945 mg/day  Monitor BP  Activity as tolerated     Call the Heart Failure Clinic for any of the following symptoms:   Weight gain of -3 pounds in 1 day or 5 pounds in 1 week  Increased shortness of breath  Shortness of breath while laying down  Cough  Chest pain  Swelling in feet, ankles or legs  Bloating in abdomen  Fatigue        Blood work ordered by PCP in September     No medication changes today     Continue diet/fluid adherence  Continue daily wts.  F/U w/ Cardiology  F/U in clinic 1 yr

## 2024-08-21 NOTE — PROGRESS NOTES
Heart Failure Clinic       Visit Date: 8/21/2024  Cardiologist:  Dr. Martinez  Primary Care Physician: Nikita Cha MD    Adal Verde is a 89 y.o. male who presents today for:  Chief Complaint   Patient presents with    Follow-up     3 mo fu        HPI:     TYPE HF: HFrEF 40-45% (60% 2020)  Cause: severe MR - nonischemic   Device:   HX: CABG x5 2011, frequent PVCs  Dry Wt:  149 on 1/12/24, 147 on 2/9/24, 144 on 3/5/34, 135 on 4/4/24, 128 on 5/16/24, 131 on 8/21/24      Adal Verde is a 89 y.o. male who presents to the office for a f/u patient visit in the heart failure clinic.    Concerns today: 3 month f/u. Weight is stable. No hospitalizations.     Activity: limited d/t gait - SOB with steps  Diet: Educated - going to start limiting Na intake    Patient has:  Chest Pain: no  SOB: GAINES with steps - stable  Orthopnea/PND: no  ALIX: no  Edema: yes - resolved   Fatigue: no  Abdominal bloating: no  Cough: no  Appetite: good    Visit on 5/16/24: 6 week f/u. His weight is down 7lbs. Urinating welling on his lasix. Denies leg swelling, bloating, orthopnea, PND, and SOB (does get some with steps). Good appetite but small portions.     Visit on 4/4/24: post armani-clip visit. His weight is down 11lbs. He notes improvement with his GAINES. He denies leg swelling, bloating, and orthopnea. Urinating well on his lasix. Following his low Na/fluid diet.     Visit on 3/5/24: 4 week f/u. Weight is down 3lbs from previous. He was cleared by wound care last week and continues to wear leg wraps. He is s/p heart cath showing that his grafts are patent with elevated pressures. He denies leg swelling, bloating and orthopnea. His SOB continues with steps. He has stopped eating freezer meals and is not adding salt to his food. He is around 2L/fluid a day.     Visit on 2/9/24: 4 week f/u. Has been seeing wound care for his legs. Doing well with it. On his Lasix 20 daily with improvement of his lower leg swelling. Urinating well.

## 2024-09-11 RX ORDER — CARVEDILOL 12.5 MG/1
TABLET ORAL
Qty: 180 TABLET | Refills: 1 | Status: SHIPPED | OUTPATIENT
Start: 2024-09-11

## 2024-09-19 ENCOUNTER — LAB (OUTPATIENT)
Dept: LAB | Age: 89
End: 2024-09-19

## 2024-09-19 DIAGNOSIS — I10 PRIMARY HYPERTENSION: ICD-10-CM

## 2024-09-19 DIAGNOSIS — E78.00 PURE HYPERCHOLESTEROLEMIA: ICD-10-CM

## 2024-09-19 LAB
ALBUMIN SERPL BCG-MCNC: 4 G/DL (ref 3.5–5.1)
ALP SERPL-CCNC: 84 U/L (ref 38–126)
ALT SERPL W/O P-5'-P-CCNC: 25 U/L (ref 11–66)
ANION GAP SERPL CALC-SCNC: 15 MEQ/L (ref 8–16)
AST SERPL-CCNC: 28 U/L (ref 5–40)
BASOPHILS ABSOLUTE: 0 THOU/MM3 (ref 0–0.1)
BASOPHILS NFR BLD AUTO: 0.7 %
BILIRUB SERPL-MCNC: 0.5 MG/DL (ref 0.3–1.2)
BUN SERPL-MCNC: 58 MG/DL (ref 7–22)
CALCIUM SERPL-MCNC: 9 MG/DL (ref 8.5–10.5)
CHLORIDE SERPL-SCNC: 102 MEQ/L (ref 98–111)
CHOLEST SERPL-MCNC: 118 MG/DL (ref 100–199)
CO2 SERPL-SCNC: 23 MEQ/L (ref 23–33)
CREAT SERPL-MCNC: 2 MG/DL (ref 0.4–1.2)
DEPRECATED RDW RBC AUTO: 45.8 FL (ref 35–45)
EOSINOPHIL NFR BLD AUTO: 7.9 %
EOSINOPHILS ABSOLUTE: 0.5 THOU/MM3 (ref 0–0.4)
ERYTHROCYTE [DISTWIDTH] IN BLOOD BY AUTOMATED COUNT: 13.4 % (ref 11.5–14.5)
GFR SERPL CREATININE-BSD FRML MDRD: 31 ML/MIN/1.73M2
GLUCOSE SERPL-MCNC: 128 MG/DL (ref 70–108)
HCT VFR BLD AUTO: 37.9 % (ref 42–52)
HDLC SERPL-MCNC: 40 MG/DL
HGB BLD-MCNC: 12.2 GM/DL (ref 14–18)
IMM GRANULOCYTES # BLD AUTO: 0.04 THOU/MM3 (ref 0–0.07)
IMM GRANULOCYTES NFR BLD AUTO: 0.7 %
LDLC SERPL CALC-MCNC: 54 MG/DL
LYMPHOCYTES ABSOLUTE: 0.5 THOU/MM3 (ref 1–4.8)
LYMPHOCYTES NFR BLD AUTO: 9.3 %
MCH RBC QN AUTO: 30 PG (ref 26–33)
MCHC RBC AUTO-ENTMCNC: 32.2 GM/DL (ref 32.2–35.5)
MCV RBC AUTO: 93.3 FL (ref 80–94)
MONOCYTES ABSOLUTE: 0.4 THOU/MM3 (ref 0.4–1.3)
MONOCYTES NFR BLD AUTO: 6.6 %
NEUTROPHILS ABSOLUTE: 4.3 THOU/MM3 (ref 1.8–7.7)
NEUTROPHILS NFR BLD AUTO: 74.8 %
NRBC BLD AUTO-RTO: 0 /100 WBC
PLATELET # BLD AUTO: 186 THOU/MM3 (ref 130–400)
PMV BLD AUTO: 10.4 FL (ref 9.4–12.4)
POTASSIUM SERPL-SCNC: 4.7 MEQ/L (ref 3.5–5.2)
PROT SERPL-MCNC: 7.5 G/DL (ref 6.1–8)
RBC # BLD AUTO: 4.06 MILL/MM3 (ref 4.7–6.1)
SODIUM SERPL-SCNC: 140 MEQ/L (ref 135–145)
TRIGL SERPL-MCNC: 120 MG/DL (ref 0–199)
TSH SERPL DL<=0.005 MIU/L-ACNC: 1.2 UIU/ML (ref 0.4–4.2)
WBC # BLD AUTO: 5.8 THOU/MM3 (ref 4.8–10.8)

## 2024-09-20 ENCOUNTER — TELEPHONE (OUTPATIENT)
Dept: FAMILY MEDICINE CLINIC | Age: 89
End: 2024-09-20

## 2024-10-07 DIAGNOSIS — I50.22 CHRONIC SYSTOLIC CONGESTIVE HEART FAILURE, NYHA CLASS 2 (HCC): ICD-10-CM

## 2024-10-07 RX ORDER — FUROSEMIDE 20 MG
20 TABLET ORAL DAILY
Qty: 90 TABLET | Refills: 3 | Status: SHIPPED | OUTPATIENT
Start: 2024-10-07

## 2024-11-04 NOTE — TELEPHONE ENCOUNTER
The pharmacy is  requesting a refill of the below medication which has been pended for you:     Requested Prescriptions     Pending Prescriptions Disp Refills    simvastatin (ZOCOR) 40 MG tablet [Pharmacy Med Name: SIMVASTATIN 40MG TABLETS] 90 tablet 1     Sig: TAKE 1 TABLET BY MOUTH EVERY NIGHT       Last Appointment Date: 7/8/2024  Next Appointment Date: Visit date not found    Allergies   Allergen Reactions    Pcn [Penicillins] Anaphylaxis    Ibuprofen     Percocet [Oxycodone-Acetaminophen] Nausea And Vomiting

## 2024-11-05 RX ORDER — SIMVASTATIN 40 MG
TABLET ORAL
Qty: 90 TABLET | Refills: 1 | Status: SHIPPED | OUTPATIENT
Start: 2024-11-05

## 2024-11-20 DIAGNOSIS — N40.1 BENIGN NON-NODULAR PROSTATIC HYPERPLASIA WITH LOWER URINARY TRACT SYMPTOMS: ICD-10-CM

## 2024-11-20 NOTE — TELEPHONE ENCOUNTER
Date of last visit:  7/8/2024  Date of next visit:  12/2/2024    Requested Prescriptions     Pending Prescriptions Disp Refills    tamsulosin (FLOMAX) 0.4 MG capsule [Pharmacy Med Name: TAMSULOSIN 0.4MG CAPSULES] 90 capsule 1     Sig: TAKE 1 CAPSULE BY MOUTH EVERY EVENING

## 2024-11-21 RX ORDER — TAMSULOSIN HYDROCHLORIDE 0.4 MG/1
CAPSULE ORAL
Qty: 90 CAPSULE | Refills: 1 | Status: SHIPPED | OUTPATIENT
Start: 2024-11-21

## 2024-11-29 SDOH — ECONOMIC STABILITY: FOOD INSECURITY: WITHIN THE PAST 12 MONTHS, YOU WORRIED THAT YOUR FOOD WOULD RUN OUT BEFORE YOU GOT MONEY TO BUY MORE.: NEVER TRUE

## 2024-11-29 SDOH — ECONOMIC STABILITY: INCOME INSECURITY: HOW HARD IS IT FOR YOU TO PAY FOR THE VERY BASICS LIKE FOOD, HOUSING, MEDICAL CARE, AND HEATING?: NOT HARD AT ALL

## 2024-11-29 SDOH — ECONOMIC STABILITY: FOOD INSECURITY: WITHIN THE PAST 12 MONTHS, THE FOOD YOU BOUGHT JUST DIDN'T LAST AND YOU DIDN'T HAVE MONEY TO GET MORE.: NEVER TRUE

## 2024-12-02 ENCOUNTER — OFFICE VISIT (OUTPATIENT)
Dept: FAMILY MEDICINE CLINIC | Age: 88
End: 2024-12-02

## 2024-12-02 VITALS
HEIGHT: 65 IN | SYSTOLIC BLOOD PRESSURE: 114 MMHG | WEIGHT: 131.5 LBS | BODY MASS INDEX: 21.91 KG/M2 | HEART RATE: 80 BPM | RESPIRATION RATE: 16 BRPM | DIASTOLIC BLOOD PRESSURE: 64 MMHG

## 2024-12-02 DIAGNOSIS — I13.0 HYPERTENSIVE HEART AND CHRONIC KIDNEY DISEASE WITH HEART FAILURE AND STAGE 1 THROUGH STAGE 4 CHRONIC KIDNEY DISEASE, OR UNSPECIFIED CHRONIC KIDNEY DISEASE (HCC): ICD-10-CM

## 2024-12-02 DIAGNOSIS — Z98.890 S/P MITRAL VALVE CLIP IMPLANTATION: ICD-10-CM

## 2024-12-02 DIAGNOSIS — Z95.818 S/P MITRAL VALVE CLIP IMPLANTATION: ICD-10-CM

## 2024-12-02 DIAGNOSIS — N40.1 BENIGN PROSTATIC HYPERPLASIA WITH URINARY FREQUENCY: ICD-10-CM

## 2024-12-02 DIAGNOSIS — N18.30 CRF (CHRONIC RENAL FAILURE), STAGE 3 (MODERATE) (HCC): ICD-10-CM

## 2024-12-02 DIAGNOSIS — E78.00 PURE HYPERCHOLESTEROLEMIA: ICD-10-CM

## 2024-12-02 DIAGNOSIS — I87.2 CHRONIC VENOUS STASIS DERMATITIS OF BOTH LOWER EXTREMITIES: ICD-10-CM

## 2024-12-02 DIAGNOSIS — R35.0 BENIGN PROSTATIC HYPERPLASIA WITH URINARY FREQUENCY: ICD-10-CM

## 2024-12-02 DIAGNOSIS — I10 PRIMARY HYPERTENSION: ICD-10-CM

## 2024-12-02 DIAGNOSIS — I50.22 CHRONIC SYSTOLIC (CONGESTIVE) HEART FAILURE (HCC): Primary | ICD-10-CM

## 2024-12-02 DIAGNOSIS — Z23 NEED FOR INFLUENZA VACCINATION: ICD-10-CM

## 2024-12-02 PROCEDURE — 99214 OFFICE O/P EST MOD 30 MIN: CPT | Performed by: FAMILY MEDICINE

## 2024-12-02 PROCEDURE — 1036F TOBACCO NON-USER: CPT | Performed by: FAMILY MEDICINE

## 2024-12-02 PROCEDURE — G8427 DOCREV CUR MEDS BY ELIG CLIN: HCPCS | Performed by: FAMILY MEDICINE

## 2024-12-02 PROCEDURE — 90658 IIV3 VACCINE SPLT 0.5 ML IM: CPT | Performed by: FAMILY MEDICINE

## 2024-12-02 PROCEDURE — G0008 ADMIN INFLUENZA VIRUS VAC: HCPCS | Performed by: FAMILY MEDICINE

## 2024-12-02 PROCEDURE — G8420 CALC BMI NORM PARAMETERS: HCPCS | Performed by: FAMILY MEDICINE

## 2024-12-02 ASSESSMENT — ENCOUNTER SYMPTOMS
TROUBLE SWALLOWING: 0
BACK PAIN: 0
CHEST TIGHTNESS: 0
SHORTNESS OF BREATH: 0
COUGH: 0
CONSTIPATION: 0
SORE THROAT: 0
NAUSEA: 0
EYE PAIN: 0
BLOOD IN STOOL: 0
ABDOMINAL PAIN: 0

## 2024-12-02 NOTE — PROGRESS NOTES
Immunizations Administered       Name Date Dose Route    Influenza, AFLURIA, FLUZONE, (age4 y+), IM, Trivalent MDV, 0.5mL 12/2/2024 0.5 mL Intramuscular    Site: Deltoid- Right    Lot: [id number]    NDC: 11740-707-48          
  Physical Activity: Unknown (1/5/2024)    Exercise Vital Sign     Days of Exercise per Week: 0 days     Minutes of Exercise per Session: Not on file   Stress: Not on file   Social Connections: Not on file   Intimate Partner Violence: Not on file   Housing Stability: Unknown (11/29/2024)    Housing Stability Vital Sign     Unable to Pay for Housing in the Last Year: No     Number of Times Moved in the Last Year: Not on file     Homeless in the Last Year: No     Family History   Problem Relation Age of Onset    Other Mother     Cancer Father     Heart Disease Sister          Review of Systems   Constitutional:  Negative for fatigue, fever and malaise/fatigue.   HENT:  Negative for congestion, ear pain, postnasal drip, sore throat and trouble swallowing.    Eyes:  Negative for pain.   Respiratory:  Negative for cough, chest tightness and shortness of breath.    Cardiovascular:  Negative for chest pain, palpitations and leg swelling.   Gastrointestinal:  Negative for abdominal pain, blood in stool, constipation and nausea.   Genitourinary:  Negative for difficulty urinating, frequency and urgency.   Musculoskeletal:  Negative for arthralgias, back pain, joint swelling and neck stiffness.   Skin:  Negative for rash.   Neurological:  Negative for dizziness, weakness and headaches.   Hematological:  Negative for adenopathy. Does not bruise/bleed easily.   Psychiatric/Behavioral:  Negative for behavioral problems, dysphoric mood and sleep disturbance.         /64 (Site: Right Upper Arm, Position: Sitting, Cuff Size: Medium Adult)   Pulse 80   Resp 16   Ht 1.651 m (5' 5\")   Wt 59.6 kg (131 lb 8 oz)   BMI 21.88 kg/m²    Objective   Physical Exam  Vitals and nursing note reviewed.   Constitutional:       Appearance: He is well-developed.   HENT:      Head: Normocephalic and atraumatic.      Right Ear: External ear normal.      Left Ear: External ear normal.      Nose: Nose normal.   Eyes:      Conjunctiva/sclera:

## 2024-12-19 DIAGNOSIS — I50.9 ACUTE CONGESTIVE HEART FAILURE, UNSPECIFIED HEART FAILURE TYPE (HCC): ICD-10-CM

## 2024-12-19 RX ORDER — LISINOPRIL 5 MG/1
5 TABLET ORAL DAILY
Qty: 30 TABLET | Refills: 5 | Status: SHIPPED | OUTPATIENT
Start: 2024-12-19

## 2024-12-19 NOTE — TELEPHONE ENCOUNTER
Date of last visit:  12/2/2024  Date of next visit:  Visit date not found    Requested Prescriptions     Pending Prescriptions Disp Refills    lisinopril (PRINIVIL;ZESTRIL) 5 MG tablet [Pharmacy Med Name: LISINOPRIL 5MG TABLETS] 30 tablet 5     Sig: TAKE 1 TABLET BY MOUTH DAILY

## 2025-03-07 ENCOUNTER — LAB (OUTPATIENT)
Dept: LAB | Age: 89
End: 2025-03-07

## 2025-03-07 DIAGNOSIS — I13.0 HYPERTENSIVE HEART AND CHRONIC KIDNEY DISEASE WITH HEART FAILURE AND STAGE 1 THROUGH STAGE 4 CHRONIC KIDNEY DISEASE, OR UNSPECIFIED CHRONIC KIDNEY DISEASE (HCC): ICD-10-CM

## 2025-03-07 LAB
ALT SERPL W/O P-5'-P-CCNC: 25 U/L (ref 10–50)
ANION GAP SERPL CALC-SCNC: 10 MEQ/L (ref 8–16)
AST SERPL-CCNC: 30 U/L (ref 10–50)
BASOPHILS ABSOLUTE: 0 THOU/MM3 (ref 0–0.1)
BASOPHILS NFR BLD AUTO: 0.6 %
BUN SERPL-MCNC: 51 MG/DL (ref 8–23)
CALCIUM SERPL-MCNC: 9.5 MG/DL (ref 8.2–9.6)
CHLORIDE SERPL-SCNC: 104 MEQ/L (ref 98–111)
CO2 SERPL-SCNC: 23 MEQ/L (ref 22–29)
CREAT SERPL-MCNC: 1.8 MG/DL (ref 0.7–1.2)
DEPRECATED RDW RBC AUTO: 47.8 FL (ref 35–45)
EOSINOPHIL NFR BLD AUTO: 6.6 %
EOSINOPHILS ABSOLUTE: 0.4 THOU/MM3 (ref 0–0.4)
ERYTHROCYTE [DISTWIDTH] IN BLOOD BY AUTOMATED COUNT: 14 % (ref 11.5–14.5)
GFR SERPL CREATININE-BSD FRML MDRD: 35 ML/MIN/1.73M2
GLUCOSE SERPL-MCNC: 135 MG/DL (ref 74–109)
HCT VFR BLD AUTO: 39.8 % (ref 42–52)
HGB BLD-MCNC: 12.9 GM/DL (ref 14–18)
IMM GRANULOCYTES # BLD AUTO: 0.03 THOU/MM3 (ref 0–0.07)
IMM GRANULOCYTES NFR BLD AUTO: 0.5 %
LYMPHOCYTES ABSOLUTE: 0.5 THOU/MM3 (ref 1–4.8)
LYMPHOCYTES NFR BLD AUTO: 8.2 %
MCH RBC QN AUTO: 29.9 PG (ref 26–33)
MCHC RBC AUTO-ENTMCNC: 32.4 GM/DL (ref 32.2–35.5)
MCV RBC AUTO: 92.1 FL (ref 80–94)
MONOCYTES ABSOLUTE: 0.4 THOU/MM3 (ref 0.4–1.3)
MONOCYTES NFR BLD AUTO: 6 %
NEUTROPHILS ABSOLUTE: 4.9 THOU/MM3 (ref 1.8–7.7)
NEUTROPHILS NFR BLD AUTO: 78.1 %
NRBC BLD AUTO-RTO: 0 /100 WBC
PLATELET # BLD AUTO: 210 THOU/MM3 (ref 130–400)
PMV BLD AUTO: 11 FL (ref 9.4–12.4)
POTASSIUM SERPL-SCNC: 4.7 MEQ/L (ref 3.5–5.2)
RBC # BLD AUTO: 4.32 MILL/MM3 (ref 4.7–6.1)
SODIUM SERPL-SCNC: 137 MEQ/L (ref 135–145)
WBC # BLD AUTO: 6.3 THOU/MM3 (ref 4.8–10.8)

## 2025-03-08 ENCOUNTER — RESULTS FOLLOW-UP (OUTPATIENT)
Dept: FAMILY MEDICINE CLINIC | Age: 89
End: 2025-03-08

## 2025-03-10 NOTE — TELEPHONE ENCOUNTER
----- Message from Dr. Nikita Rey MD sent at 3/8/2025  2:52 PM EST -----  Call as labs are remaining stable with hemoglobin stable as well as kidney function and no changes    Please call

## 2025-03-17 RX ORDER — CARVEDILOL 12.5 MG/1
12.5 TABLET ORAL 2 TIMES DAILY
Qty: 180 TABLET | Refills: 1 | Status: SHIPPED | OUTPATIENT
Start: 2025-03-17

## 2025-03-17 NOTE — TELEPHONE ENCOUNTER
Date of last visit:  12/2/2024  Date of next visit:  Visit date not found    Requested Prescriptions     Pending Prescriptions Disp Refills    carvedilol (COREG) 12.5 MG tablet [Pharmacy Med Name: CARVEDILOL 12.5MG TABLETS] 180 tablet 1     Sig: TAKE 1 TABLET BY MOUTH TWICE DAILY

## 2025-03-20 ENCOUNTER — HOSPITAL ENCOUNTER (OUTPATIENT)
Age: 89
Discharge: HOME | End: 2025-03-22
Attending: INTERNAL MEDICINE
Payer: MEDICARE

## 2025-03-20 DIAGNOSIS — Z95.818 S/P MITRAL VALVE CLIP IMPLANTATION: ICD-10-CM

## 2025-03-20 DIAGNOSIS — I34.0 SEVERE MITRAL REGURGITATION: ICD-10-CM

## 2025-03-20 DIAGNOSIS — Z98.890 S/P MITRAL VALVE CLIP IMPLANTATION: ICD-10-CM

## 2025-03-20 LAB
ECHO AO ASC DIAM: 3.1 CM
ECHO AR MAX VEL PISA: 4.2 M/S
ECHO AV AREA PEAK VELOCITY: 1.1 CM2
ECHO AV AREA VTI: 1.1 CM2
ECHO AV CUSP MM: 1.1 CM
ECHO AV MEAN GRADIENT: 9 MMHG
ECHO AV MEAN VELOCITY: 1.4 M/S
ECHO AV PEAK GRADIENT: 17 MMHG
ECHO AV PEAK VELOCITY: 2 M/S
ECHO AV REGURGITANT PHT: 457 MS
ECHO AV VELOCITY RATIO: 0.3
ECHO AV VTI: 45.1 CM
ECHO EST RA PRESSURE: 8 MMHG
ECHO LA AREA 2C: 26 CM2
ECHO LA AREA 4C: 23.6 CM2
ECHO LA DIAMETER: 4.3 CM
ECHO LA MAJOR AXIS: 6.5 CM
ECHO LA MINOR AXIS: 6.3 CM
ECHO LA VOL BP: 80 ML (ref 18–58)
ECHO LA VOL MOD A2C: 88 ML (ref 18–58)
ECHO LA VOL MOD A4C: 71 ML (ref 18–58)
ECHO LV E' LATERAL VELOCITY: 3.7 CM/S
ECHO LV E' SEPTAL VELOCITY: 3.8 CM/S
ECHO LV EDV A2C: 149 ML
ECHO LV EDV A4C: 126 ML
ECHO LV EF PHYSICIAN: 40 %
ECHO LV EJECTION FRACTION A2C: 35 %
ECHO LV EJECTION FRACTION A4C: 43 %
ECHO LV EJECTION FRACTION BIPLANE: 37 % (ref 55–100)
ECHO LV ESV A2C: 97 ML
ECHO LV ESV A4C: 72 ML
ECHO LV FRACTIONAL SHORTENING: 20 % (ref 28–44)
ECHO LV INTERNAL DIMENSION DIASTOLIC: 4.5 CM (ref 4.2–5.9)
ECHO LV INTERNAL DIMENSION SYSTOLIC: 3.6 CM
ECHO LV ISOVOLUMETRIC RELAXATION TIME (IVRT): 88 MS
ECHO LV IVSD: 1.7 CM (ref 0.6–1)
ECHO LV MASS 2D: 261.9 G (ref 88–224)
ECHO LV POSTERIOR WALL DIASTOLIC: 1.2 CM (ref 0.6–1)
ECHO LV RELATIVE WALL THICKNESS RATIO: 0.53
ECHO LVOT AREA: 3.8 CM2
ECHO LVOT AV VTI INDEX: 0.28
ECHO LVOT DIAM: 2.2 CM
ECHO LVOT MEAN GRADIENT: 1 MMHG
ECHO LVOT PEAK GRADIENT: 1 MMHG
ECHO LVOT PEAK VELOCITY: 0.6 M/S
ECHO LVOT SV: 47.9 ML
ECHO LVOT VTI: 12.6 CM
ECHO MV A VELOCITY: 1.92 M/S
ECHO MV AREA VTI: 0.8 CM2
ECHO MV E DECELERATION TIME (DT): 323 MS
ECHO MV E VELOCITY: 2.01 M/S
ECHO MV E/A RATIO: 1.05
ECHO MV E/E' LATERAL: 54.32
ECHO MV E/E' RATIO (AVERAGED): 53.61
ECHO MV E/E' SEPTAL: 52.89
ECHO MV LVOT VTI INDEX: 4.99
ECHO MV MAX VELOCITY: 1.9 M/S
ECHO MV MEAN GRADIENT: 10 MMHG
ECHO MV MEAN VELOCITY: 1.5 M/S
ECHO MV PEAK GRADIENT: 15 MMHG
ECHO MV REGURGITANT PEAK GRADIENT: 125 MMHG
ECHO MV REGURGITANT PEAK VELOCITY: 5.6 M/S
ECHO MV VTI: 62.9 CM
ECHO PULMONARY ARTERY END DIASTOLIC PRESSURE: 4 MMHG
ECHO PV MAX VELOCITY: 0.5 M/S
ECHO PV PEAK GRADIENT: 1 MMHG
ECHO PV REGURGITANT MAX VELOCITY: 1 M/S
ECHO RIGHT VENTRICULAR SYSTOLIC PRESSURE (RVSP): 25 MMHG
ECHO RV INTERNAL DIMENSION: 3.1 CM
ECHO RV TAPSE: 1.4 CM (ref 1.7–?)
ECHO TV E WAVE: 0.5 M/S
ECHO TV REGURGITANT MAX VELOCITY: 2.04 M/S
ECHO TV REGURGITANT PEAK GRADIENT: 17 MMHG

## 2025-03-20 PROCEDURE — 93306 TTE W/DOPPLER COMPLETE: CPT

## 2025-03-21 ENCOUNTER — TELEPHONE (OUTPATIENT)
Dept: CARDIOLOGY CLINIC | Age: 89
End: 2025-03-21

## 2025-04-01 ENCOUNTER — OFFICE VISIT (OUTPATIENT)
Dept: CARDIOLOGY CLINIC | Age: 89
End: 2025-04-01
Payer: MEDICARE

## 2025-04-01 ENCOUNTER — TELEPHONE (OUTPATIENT)
Dept: CARDIOLOGY CLINIC | Age: 89
End: 2025-04-01

## 2025-04-01 VITALS
HEIGHT: 65 IN | WEIGHT: 131.9 LBS | SYSTOLIC BLOOD PRESSURE: 142 MMHG | BODY MASS INDEX: 21.98 KG/M2 | DIASTOLIC BLOOD PRESSURE: 80 MMHG | OXYGEN SATURATION: 98 % | HEART RATE: 68 BPM

## 2025-04-01 DIAGNOSIS — Z95.818 S/P MITRAL VALVE CLIP IMPLANTATION: Primary | ICD-10-CM

## 2025-04-01 DIAGNOSIS — I10 PRIMARY HYPERTENSION: ICD-10-CM

## 2025-04-01 DIAGNOSIS — I25.10 CORONARY ARTERY DISEASE INVOLVING NATIVE CORONARY ARTERY OF NATIVE HEART WITHOUT ANGINA PECTORIS: ICD-10-CM

## 2025-04-01 DIAGNOSIS — Z95.1 HX OF CABG: ICD-10-CM

## 2025-04-01 DIAGNOSIS — E78.5 HYPERLIPIDEMIA, UNSPECIFIED HYPERLIPIDEMIA TYPE: ICD-10-CM

## 2025-04-01 DIAGNOSIS — I34.0 SEVERE MITRAL REGURGITATION: ICD-10-CM

## 2025-04-01 DIAGNOSIS — I50.22 CHRONIC SYSTOLIC CONGESTIVE HEART FAILURE, NYHA CLASS 2 (HCC): ICD-10-CM

## 2025-04-01 DIAGNOSIS — Z98.890 S/P MITRAL VALVE CLIP IMPLANTATION: Primary | ICD-10-CM

## 2025-04-01 PROCEDURE — 1124F ACP DISCUSS-NO DSCNMKR DOCD: CPT | Performed by: NURSE PRACTITIONER

## 2025-04-01 PROCEDURE — 1036F TOBACCO NON-USER: CPT | Performed by: NURSE PRACTITIONER

## 2025-04-01 PROCEDURE — 99214 OFFICE O/P EST MOD 30 MIN: CPT | Performed by: NURSE PRACTITIONER

## 2025-04-01 PROCEDURE — G8427 DOCREV CUR MEDS BY ELIG CLIN: HCPCS | Performed by: NURSE PRACTITIONER

## 2025-04-01 PROCEDURE — 93000 ELECTROCARDIOGRAM COMPLETE: CPT | Performed by: NURSE PRACTITIONER

## 2025-04-01 PROCEDURE — 1159F MED LIST DOCD IN RCRD: CPT | Performed by: NURSE PRACTITIONER

## 2025-04-01 PROCEDURE — G8420 CALC BMI NORM PARAMETERS: HCPCS | Performed by: NURSE PRACTITIONER

## 2025-04-01 NOTE — PATIENT INSTRUCTIONS
Continue current medications as prescribed.    Stay as active as you can.     Eat heart healthy diet.     Follow-up with your PCP as scheduled.    Follow-up with LEATHA Bell CNP in CHF clinic on 7/23/2025 as scheduled or sooner if need.     Follow-up with Eun CARR or Dr. Martinez in 1 year as scheduled or sooner.      X Ray at bedside

## 2025-04-01 NOTE — PROGRESS NOTES
Adal Verde (:  1933) is a 89 y.o. male, Established patient, here for 1 year post Melanie-Clip evaluation.    Primary Cardiologist: Brett Martinez MD    Valvular Heart Disease    HPI:  S/p Melanie Clip  3/2024. Hx HFrEF 40-45% (60% ). Improved GAINES post procedure when evaluated 2024 in CHF clinic. Remained stable at CHF follow-up 2024.     2025: 1 year post Melanie-Clip evaluation  Assessment & Plan  Severe MR s/p Melanie Clip 3/2024  HFmr EF 40 - 45%  3/20/2025; (35 - 40% 2024); (60% )  NYHA II   Mild to mod AS    Reports doing well. Denies chest discomfort or dyspnea. Breathing stable. No lightheadedness or dizziness; no syncope or near syncope. No heart racing or palpitations. No swelling or water retention issues. Tolerating medications. No bleeding on ASA. ADL without issue. No evidence of decompensated HF on exam, euvolemic. HR and BP well controlled.   1 year post Melanie Clip echo notes improved EF of 40 - 45% from 35 - 40% on 2024. Continues to follow with CHF clinic.     PLAN/patient instructions:   Continue current medications as prescribed.    Stay as active as you can.     Eat heart healthy diet.     Follow-up with your PCP as scheduled.    Follow-up with LEATHA Bell CNP in CHF clinic on 2025 as scheduled or sooner if need.     Follow-up with Eun CARR or Dr. Martinez in 1 year as scheduled or sooner.     GDMT:              ACE/ARB/ARNI - lisinopril 5 daily               BB - Coreg 12.5 BID              Diuretic - lasix 20mg daily  AA - hx of hyperkalemia   SGLT2 -  CKD  Vasodilator -   Other - ASA    Results  2025: EKG      3/20/2025: Echo; 1 year post Melanie Clip  [facility]  Performing Staff   Cardiology: Brett Martinez MD     Tech/Nurse: BC         Reason for Exam  Priority: Routine  1 year post Mitraclip   Dx: S/P mitral valve clip implantation [Z98.890, Z95.818 (ICD-10-CM)]; Severe mitral regurgitation [I34.0 (ICD-10-CM)]     PACS Images     Show

## 2025-05-06 RX ORDER — SIMVASTATIN 40 MG
40 TABLET ORAL NIGHTLY
Qty: 90 TABLET | Refills: 1 | Status: SHIPPED | OUTPATIENT
Start: 2025-05-06

## 2025-05-08 NOTE — PATIENT INSTRUCTIONS
Learning About Vision Tests  What are vision tests?     The four most common vision tests are visual acuity tests, refraction, visual field tests, and color vision tests.  Visual acuity (sharpness) tests  These tests are used:  To see if you need glasses or contact lenses.  To monitor an eye problem.  To check an eye injury.  Visual acuity tests are done as part of routine exams. You may also have this test when you get your 's license or apply for some types of jobs.  Visual field tests  These tests are used:  To check for vision loss in any area of your range of vision.  To screen for certain eye diseases.  To look for nerve damage after a stroke, head injury, or other problem that could reduce blood flow to the brain.  Refraction and color tests  A refraction test is done to find the right prescription for glasses and contact lenses.  A color vision test is done to check for color blindness.  Color vision is often tested as part of a routine exam. You may also have this test when you apply for a job where recognizing different colors is important, such as , electronics, or the .  How are vision tests done?  Visual acuity test   You cover one eye at a time.  You read aloud from a wall chart across the room.  You read aloud from a small card that you hold in your hand.  Refraction   You look into a special device.  The device puts lenses of different strengths in front of each eye to see how strong your glasses or contact lenses need to be.  Visual field tests   Your doctor may have you look through special machines.  Or your doctor may simply have you stare straight ahead while they move a finger into and out of your field of vision.  Color vision test   You look at pieces of printed test patterns in various colors. You say what number or symbol you see.  Your doctor may have you trace the number or symbol using a pointer.  How do these tests feel?  There is very little chance of  AY: informed by RN that patient eloped.

## 2025-05-14 SDOH — ECONOMIC STABILITY: TRANSPORTATION INSECURITY
IN THE PAST 12 MONTHS, HAS THE LACK OF TRANSPORTATION KEPT YOU FROM MEDICAL APPOINTMENTS OR FROM GETTING MEDICATIONS?: NO

## 2025-05-14 SDOH — ECONOMIC STABILITY: INCOME INSECURITY: IN THE LAST 12 MONTHS, WAS THERE A TIME WHEN YOU WERE NOT ABLE TO PAY THE MORTGAGE OR RENT ON TIME?: NO

## 2025-05-14 SDOH — ECONOMIC STABILITY: FOOD INSECURITY: WITHIN THE PAST 12 MONTHS, THE FOOD YOU BOUGHT JUST DIDN'T LAST AND YOU DIDN'T HAVE MONEY TO GET MORE.: NEVER TRUE

## 2025-05-14 SDOH — HEALTH STABILITY: PHYSICAL HEALTH
ON AVERAGE, HOW MANY DAYS PER WEEK DO YOU ENGAGE IN MODERATE TO STRENUOUS EXERCISE (LIKE A BRISK WALK)?: PATIENT DECLINED

## 2025-05-14 SDOH — HEALTH STABILITY: PHYSICAL HEALTH: ON AVERAGE, HOW MANY MINUTES DO YOU ENGAGE IN EXERCISE AT THIS LEVEL?: 0 MIN

## 2025-05-14 SDOH — ECONOMIC STABILITY: FOOD INSECURITY: WITHIN THE PAST 12 MONTHS, YOU WORRIED THAT YOUR FOOD WOULD RUN OUT BEFORE YOU GOT MONEY TO BUY MORE.: NEVER TRUE

## 2025-05-14 ASSESSMENT — LIFESTYLE VARIABLES
HOW OFTEN DO YOU HAVE A DRINK CONTAINING ALCOHOL: NEVER
HOW MANY STANDARD DRINKS CONTAINING ALCOHOL DO YOU HAVE ON A TYPICAL DAY: PATIENT DOES NOT DRINK
HOW MANY STANDARD DRINKS CONTAINING ALCOHOL DO YOU HAVE ON A TYPICAL DAY: 0
HOW OFTEN DO YOU HAVE SIX OR MORE DRINKS ON ONE OCCASION: 1
HOW OFTEN DO YOU HAVE A DRINK CONTAINING ALCOHOL: 1

## 2025-05-14 ASSESSMENT — PATIENT HEALTH QUESTIONNAIRE - PHQ9
SUM OF ALL RESPONSES TO PHQ QUESTIONS 1-9: 0
SUM OF ALL RESPONSES TO PHQ QUESTIONS 1-9: 0
2. FEELING DOWN, DEPRESSED OR HOPELESS: NOT AT ALL
SUM OF ALL RESPONSES TO PHQ QUESTIONS 1-9: 0
1. LITTLE INTEREST OR PLEASURE IN DOING THINGS: NOT AT ALL
SUM OF ALL RESPONSES TO PHQ QUESTIONS 1-9: 0

## 2025-05-15 ENCOUNTER — OFFICE VISIT (OUTPATIENT)
Dept: FAMILY MEDICINE CLINIC | Age: 89
End: 2025-05-15

## 2025-05-15 VITALS
HEIGHT: 65 IN | BODY MASS INDEX: 21.99 KG/M2 | HEART RATE: 72 BPM | SYSTOLIC BLOOD PRESSURE: 110 MMHG | RESPIRATION RATE: 16 BRPM | WEIGHT: 132 LBS | DIASTOLIC BLOOD PRESSURE: 64 MMHG

## 2025-05-15 DIAGNOSIS — Z98.890 S/P MITRAL VALVE CLIP IMPLANTATION: ICD-10-CM

## 2025-05-15 DIAGNOSIS — I13.0 HYPERTENSIVE HEART AND CHRONIC KIDNEY DISEASE WITH HEART FAILURE AND STAGE 1 THROUGH STAGE 4 CHRONIC KIDNEY DISEASE, OR UNSPECIFIED CHRONIC KIDNEY DISEASE (HCC): ICD-10-CM

## 2025-05-15 DIAGNOSIS — N18.30 CRF (CHRONIC RENAL FAILURE), STAGE 3 (MODERATE) (HCC): ICD-10-CM

## 2025-05-15 DIAGNOSIS — Z00.00 MEDICARE ANNUAL WELLNESS VISIT, SUBSEQUENT: ICD-10-CM

## 2025-05-15 DIAGNOSIS — E78.00 PURE HYPERCHOLESTEROLEMIA: ICD-10-CM

## 2025-05-15 DIAGNOSIS — I50.22 CHRONIC SYSTOLIC (CONGESTIVE) HEART FAILURE (HCC): ICD-10-CM

## 2025-05-15 DIAGNOSIS — Z91.89 STREPTOCOCCUS PNEUMONIAE VACCINATION INDICATED: ICD-10-CM

## 2025-05-15 DIAGNOSIS — I10 PRIMARY HYPERTENSION: Primary | ICD-10-CM

## 2025-05-15 DIAGNOSIS — Z95.818 S/P MITRAL VALVE CLIP IMPLANTATION: ICD-10-CM

## 2025-05-15 DIAGNOSIS — R35.0 BENIGN PROSTATIC HYPERPLASIA WITH URINARY FREQUENCY: ICD-10-CM

## 2025-05-15 DIAGNOSIS — N40.1 BENIGN PROSTATIC HYPERPLASIA WITH URINARY FREQUENCY: ICD-10-CM

## 2025-05-15 DIAGNOSIS — N18.32 CHRONIC RENAL FAILURE (CRF), STAGE 3B (HCC): ICD-10-CM

## 2025-05-15 ASSESSMENT — ENCOUNTER SYMPTOMS
CONSTIPATION: 0
CHEST TIGHTNESS: 0
TROUBLE SWALLOWING: 0
NAUSEA: 0
COUGH: 0
BLOOD IN STOOL: 0
SHORTNESS OF BREATH: 0
ABDOMINAL PAIN: 0
EYE PAIN: 0
SORE THROAT: 0
BACK PAIN: 0

## 2025-05-15 NOTE — PROGRESS NOTES
Immunizations Administered       Name Date Dose Route    Pneumococcal, PCV20, PREVNAR 20, (age 6w+), IM, 0.5mL 5/15/2025 0.5 mL Intramuscular    Site: Deltoid- Right    Lot: [id number]    NDC: 6533-7593-84

## 2025-05-15 NOTE — PROGRESS NOTES
Subjective   Patient ID: Adla Verde is a 89 y.o. male.      Crf   stable        Chronic  sys  chf  noted       Ashd  stable         Mitral  valve    stable       Bph  stable    Hypertension  This is a chronic problem. The current episode started more than 1 year ago. The problem has been resolved since onset. Pertinent negatives include no chest pain, headaches, palpitations or shortness of breath. The current treatment provides significant improvement. There are no compliance problems.    Hyperlipidemia  This is a chronic problem. The current episode started more than 1 year ago. The problem is controlled. Pertinent negatives include no chest pain, focal weakness, leg pain, myalgias or shortness of breath. Current antihyperlipidemic treatment includes statins. The current treatment provides significant improvement of lipids. There are no compliance problems.      Past Medical History:   Diagnosis Date    ANJANA (acute kidney injury)     ANJANA (acute kidney injury) 02/04/2022    Arrhythmia 01/22/2019    Arthritis     BPH (benign prostatic hypertrophy)     CAD (coronary artery disease)     Cerebral artery occlusion with cerebral infarction (HCC)     Cerebrovascular disease     Gastroenteritis due to norovirus 02/05/2022    Hyperlipidemia     Hypertension     Mitral regurgitation     PVC (premature ventricular contraction) 01/22/2019    Small bowel obstruction (HCC) 08/30/2020    TIA (transient ischemic attack) 2009    post surgery resolved     Past Surgical History:   Procedure Laterality Date    CARDIAC PROCEDURE N/A 03/01/2024    Left and right heart cath / coronary angiography w grafts performed by Brett Conner MD at [facility] CARDIAC CATH LAB    CARDIAC PROCEDURE N/A 3/19/2024    Transcatheter mitral valve repair performed by Brett Conner MD at [facility] CARDIAC CATH LAB    CARDIAC SURGERY  03/20/2024    priya vlave repair  dr conner    CATARACT REMOVAL Right 07/01/2015    Dr Mariano     CORONARY ARTERY BYPASS

## 2025-05-15 NOTE — PROGRESS NOTES
Subjective   Patient ID: Adal Verde is a 89 y.o. male.    HPI    Review of Systems       Objective   Physical Exam       Assessment   ***      Plan   ***        Nikita Rey MD

## 2025-05-15 NOTE — PROGRESS NOTES
Medicare Annual Wellness Visit    Adal Verde is here for Medicare AWV    Assessment & Plan          ICD-10-CM    1. Medicare annual wellness visit, subsequent  Z00.00       2. Streptococcus pneumoniae vaccination indicated  Z91.89 Pneumococcal, PCV20, PREVNAR 20, (age 6w+), IM, PF      3. Pure hypercholesterolemia  E78.00 ESTABLISHED, LOW MDM, 20-29 MIN [63891]      4. Primary hypertension  I10       5. CRF (chronic renal failure), stage 3 (moderate) (HCC)  N18.30 ESTABLISHED, LOW MDM, 20-29 MIN [[id number]]      6. Hypertensive heart and chronic kidney disease with heart failure and stage 1 through stage 4 chronic kidney disease, or unspecified chronic kidney disease (HCC)  I13.0 ESTABLISHED, LOW MDM, 20-29 MIN [62856]      7. Benign prostatic hyperplasia with urinary frequency  N40.1     R35.0       8. S/P mitral valve clip implantation  Z98.890 ESTABLISHED, LOW MDM, 20-29 MIN [25147]    Z95.818       9. Chronic systolic (congestive) heart failure (HCC)  I50.22 Basic Metabolic Panel     CBC with Auto Differential     Magnesium     ESTABLISHED, LOW MDM, 20-29 MIN [[id number]]      10. Chronic renal failure (CRF), stage 3b (HCC)  N18.32 Basic Metabolic Panel     CBC with Auto Differential     Magnesium                PLAN    Current Outpatient Medications   Medication Sig Dispense Refill    simvastatin (ZOCOR) 40 MG tablet TAKE 1 TABLET BY MOUTH EVERY NIGHT 90 tablet 1    carvedilol (COREG) 12.5 MG tablet TAKE 1 TABLET BY MOUTH TWICE DAILY 180 tablet 1    lisinopril (PRINIVIL;ZESTRIL) 5 MG tablet TAKE 1 TABLET BY MOUTH DAILY 30 tablet 5    tamsulosin (FLOMAX) 0.4 MG capsule TAKE 1 CAPSULE BY MOUTH EVERY EVENING 90 capsule 1    furosemide (LASIX) 20 MG tablet Take 1 tablet by mouth daily 90 tablet 3    ammonium lactate (LAC-HYDRIN) 12 % lotion APPLY TOPICALLY TO INTACT SKIN OF BOTH LEGS daily as needed. 900 g 2    acetaminophen (TYLENOL) 325 MG tablet Take 2 tablets by mouth every 6 hours as needed for Pain    Spoke with Carlin Poe, [facility] inpatient wound care, at [facility]. She said patient was admitted for leg wound and also upon admission the NPWT to sacrum was removed and she is reap;lying it today. Carlin Poe states she will let us know if she gets out before her scheduled appt.  Here on Wednesday 7/14/2021

## 2025-05-22 DIAGNOSIS — N40.1 BENIGN NON-NODULAR PROSTATIC HYPERPLASIA WITH LOWER URINARY TRACT SYMPTOMS: ICD-10-CM

## 2025-05-22 RX ORDER — TAMSULOSIN HYDROCHLORIDE 0.4 MG/1
0.4 CAPSULE ORAL EVERY EVENING
Qty: 90 CAPSULE | Refills: 1 | Status: SHIPPED | OUTPATIENT
Start: 2025-05-22

## 2025-05-22 NOTE — TELEPHONE ENCOUNTER
Date of last visit:  5/15/2025  Date of next visit:  Visit date not found    Requested Prescriptions     Pending Prescriptions Disp Refills    tamsulosin (FLOMAX) 0.4 MG capsule [Pharmacy Med Name: TAMSULOSIN 0.4MG CAPSULES] 90 capsule 1     Sig: TAKE 1 CAPSULE BY MOUTH EVERY EVENING

## 2025-06-18 DIAGNOSIS — I50.9 ACUTE CONGESTIVE HEART FAILURE, UNSPECIFIED HEART FAILURE TYPE (HCC): ICD-10-CM

## 2025-06-18 NOTE — TELEPHONE ENCOUNTER
Date of last visit:  5/15/2025  Date of next visit:  Visit date not found    Requested Prescriptions     Pending Prescriptions Disp Refills    lisinopril (PRINIVIL;ZESTRIL) 5 MG tablet [Pharmacy Med Name: LISINOPRIL 5MG TABLETS] 30 tablet 5     Sig: TAKE 1 TABLET BY MOUTH DAILY

## 2025-06-19 RX ORDER — LISINOPRIL 5 MG/1
5 TABLET ORAL DAILY
Qty: 30 TABLET | Refills: 5 | Status: SHIPPED | OUTPATIENT
Start: 2025-06-19

## 2025-06-24 ENCOUNTER — LAB (OUTPATIENT)
Dept: LAB | Age: 89
End: 2025-06-24

## 2025-06-24 DIAGNOSIS — N18.32 CHRONIC RENAL FAILURE (CRF), STAGE 3B (HCC): ICD-10-CM

## 2025-06-24 DIAGNOSIS — I50.22 CHRONIC SYSTOLIC (CONGESTIVE) HEART FAILURE (HCC): ICD-10-CM

## 2025-06-24 LAB
ANION GAP SERPL CALC-SCNC: 14 MEQ/L (ref 8–16)
BASOPHILS ABSOLUTE: 0.1 THOU/MM3 (ref 0–0.1)
BASOPHILS NFR BLD AUTO: 0.8 %
BUN SERPL-MCNC: 41 MG/DL (ref 8–23)
CALCIUM SERPL-MCNC: 9.4 MG/DL (ref 8.2–9.6)
CHLORIDE SERPL-SCNC: 105 MEQ/L (ref 98–111)
CO2 SERPL-SCNC: 22 MEQ/L (ref 22–29)
CREAT SERPL-MCNC: 1.8 MG/DL (ref 0.7–1.2)
DEPRECATED RDW RBC AUTO: 48.9 FL (ref 35–45)
EOSINOPHIL NFR BLD AUTO: 7.1 %
EOSINOPHILS ABSOLUTE: 0.5 THOU/MM3 (ref 0–0.4)
ERYTHROCYTE [DISTWIDTH] IN BLOOD BY AUTOMATED COUNT: 14.1 % (ref 11.5–14.5)
GFR SERPL CREATININE-BSD FRML MDRD: 35 ML/MIN/1.73M2
GLUCOSE SERPL-MCNC: 139 MG/DL (ref 74–109)
HCT VFR BLD AUTO: 38.6 % (ref 42–52)
HGB BLD-MCNC: 12.3 GM/DL (ref 14–18)
IMM GRANULOCYTES # BLD AUTO: 0.04 THOU/MM3 (ref 0–0.07)
IMM GRANULOCYTES NFR BLD AUTO: 0.5 %
LYMPHOCYTES ABSOLUTE: 0.6 THOU/MM3 (ref 1–4.8)
LYMPHOCYTES NFR BLD AUTO: 8.2 %
MAGNESIUM SERPL-MCNC: 2.3 MG/DL (ref 1.6–2.6)
MCH RBC QN AUTO: 30.1 PG (ref 26–33)
MCHC RBC AUTO-ENTMCNC: 31.9 GM/DL (ref 32.2–35.5)
MCV RBC AUTO: 94.6 FL (ref 80–94)
MONOCYTES ABSOLUTE: 0.5 THOU/MM3 (ref 0.4–1.3)
MONOCYTES NFR BLD AUTO: 6 %
NEUTROPHILS ABSOLUTE: 5.8 THOU/MM3 (ref 1.8–7.7)
NEUTROPHILS NFR BLD AUTO: 77.4 %
NRBC BLD AUTO-RTO: 0 /100 WBC
PLATELET # BLD AUTO: 254 THOU/MM3 (ref 130–400)
PMV BLD AUTO: 11 FL (ref 9.4–12.4)
POTASSIUM SERPL-SCNC: 4.7 MEQ/L (ref 3.5–5.2)
RBC # BLD AUTO: 4.08 MILL/MM3 (ref 4.7–6.1)
SODIUM SERPL-SCNC: 141 MEQ/L (ref 135–145)
WBC # BLD AUTO: 7.5 THOU/MM3 (ref 4.8–10.8)

## 2025-06-25 ENCOUNTER — RESULTS FOLLOW-UP (OUTPATIENT)
Dept: FAMILY MEDICINE CLINIC | Age: 89
End: 2025-06-25

## 2025-06-25 NOTE — TELEPHONE ENCOUNTER
----- Message from Dr. Nikita Rey MD sent at 6/25/2025  6:26 AM EDT -----  Call as lab all stable     Keep apt  with chf clinic     Should see in november

## 2025-07-23 DIAGNOSIS — I50.9 ACUTE CONGESTIVE HEART FAILURE, UNSPECIFIED HEART FAILURE TYPE (HCC): ICD-10-CM

## 2025-07-24 ENCOUNTER — OFFICE VISIT (OUTPATIENT)
Dept: CARDIOLOGY CLINIC | Age: 89
End: 2025-07-24
Payer: MEDICARE

## 2025-07-24 VITALS
WEIGHT: 131 LBS | HEART RATE: 76 BPM | OXYGEN SATURATION: 96 % | SYSTOLIC BLOOD PRESSURE: 124 MMHG | DIASTOLIC BLOOD PRESSURE: 78 MMHG | BODY MASS INDEX: 21.83 KG/M2 | HEIGHT: 65 IN

## 2025-07-24 DIAGNOSIS — I42.8 NONISCHEMIC CARDIOMYOPATHY (HCC): ICD-10-CM

## 2025-07-24 DIAGNOSIS — Z95.818 S/P MITRAL VALVE CLIP IMPLANTATION: Primary | ICD-10-CM

## 2025-07-24 DIAGNOSIS — Z98.890 S/P MITRAL VALVE CLIP IMPLANTATION: Primary | ICD-10-CM

## 2025-07-24 DIAGNOSIS — I50.22 CHRONIC SYSTOLIC CONGESTIVE HEART FAILURE, NYHA CLASS 2 (HCC): ICD-10-CM

## 2025-07-24 DIAGNOSIS — Z91.89 AT RISK FOR FLUID VOLUME OVERLOAD: ICD-10-CM

## 2025-07-24 PROCEDURE — 1036F TOBACCO NON-USER: CPT | Performed by: NURSE PRACTITIONER

## 2025-07-24 PROCEDURE — 1160F RVW MEDS BY RX/DR IN RCRD: CPT | Performed by: NURSE PRACTITIONER

## 2025-07-24 PROCEDURE — G8427 DOCREV CUR MEDS BY ELIG CLIN: HCPCS | Performed by: NURSE PRACTITIONER

## 2025-07-24 PROCEDURE — 99214 OFFICE O/P EST MOD 30 MIN: CPT | Performed by: NURSE PRACTITIONER

## 2025-07-24 PROCEDURE — 1159F MED LIST DOCD IN RCRD: CPT | Performed by: NURSE PRACTITIONER

## 2025-07-24 PROCEDURE — 1124F ACP DISCUSS-NO DSCNMKR DOCD: CPT | Performed by: NURSE PRACTITIONER

## 2025-07-24 PROCEDURE — G8420 CALC BMI NORM PARAMETERS: HCPCS | Performed by: NURSE PRACTITIONER

## 2025-07-24 RX ORDER — LISINOPRIL 5 MG/1
5 TABLET ORAL DAILY
Qty: 30 TABLET | Refills: 5 | Status: SHIPPED | OUTPATIENT
Start: 2025-07-24

## 2025-07-24 ASSESSMENT — ENCOUNTER SYMPTOMS
NAUSEA: 0
SHORTNESS OF BREATH: 0
COUGH: 0
VOMITING: 0
ABDOMINAL DISTENTION: 0

## 2025-07-24 NOTE — PROGRESS NOTES
Heart Failure Clinic       Visit Date: 7/24/2025  Cardiologist:  Dr. Martinez  Primary Care Physician: Nikita Cha MD    Adal Verde is a 89 y.o. male who presents today for:  Chief Complaint   Patient presents with    Congestive Heart Failure    Follow-up       HPI:     TYPE HF: HFrEF 40-45% 2025 (60% 2020)  Cause: severe MR - nonischemic   Device:   HX: CABG x5 2011, frequent PVCs. S/p mTEER  Dry Wt:  149 on 1/12/24, 147 on 2/9/24, 144 on 3/5/34, 135 on 4/4/24, 128 on 5/16/24, 131 on 8/21/24, 131 on 7/24/25,      Adal Verde is a 89 y.o. male who presents to the office for a f/u patient visit in the heart failure clinic.    Concerns today: 1 year f/u. Weight is unchanged. No hospitalizations. Functional status remains improved post mTEER. Urinating well on his lasix    Activity: limited d/t gait - SOB with steps  Diet: Educated - going to start limiting Na intake    Patient has:  Chest Pain: no  SOB: GAINES with steps - stable  Orthopnea/PND: no  ALIX: no  Edema: yes - resolved   Fatigue: no  Abdominal bloating: no  Cough: no  Appetite: good    Visit on 8/21/24:  3 month f/u. Weight is stable. No hospitalizations.     Activity: limited d/t gait - SOB with steps  Diet: Educated - going to start limiting Na intake    Patient has:  Chest Pain: no  SOB: GAINES with steps - stable  Orthopnea/PND: no  ALIX: no  Edema: yes - resolved   Fatigue: no  Abdominal bloating: no  Cough: no  Appetite: good    Visit on 5/16/24: 6 week f/u. His weight is down 7lbs. Urinating welling on his lasix. Denies leg swelling, bloating, orthopnea, PND, and SOB (does get some with steps). Good appetite but small portions.     Visit on 4/4/24: post armani-clip visit. His weight is down 11lbs. He notes improvement with his GAINES. He denies leg swelling, bloating, and orthopnea. Urinating well on his lasix. Following his low Na/fluid diet.     Visit on 3/5/24: 4 week f/u. Weight is down 3lbs from previous. He was cleared by wound care

## 2025-07-24 NOTE — PATIENT INSTRUCTIONS
You may receive a survey regarding the care you received during your visit.  Your input is valuable to us.  We encourage you to complete and return your survey.  We hope you will choose us in the future for your healthcare needs.    Your nurses today were Shannan.  Office hours:   Mon-Thurs 8-4:30  Friday 8-12  Phone: [phone]    Continue:  Continue current medications  Daily weights and record  Fluid restriction of 2 Liters per day  Limit sodium in diet to around 6751-8410 mg/day  Monitor BP  Activity as tolerated     Call the Heart Failure Clinic for any of the following symptoms:   Weight gain of -3 pounds in 1 day or 5 pounds in 1 week  Increased shortness of breath  Shortness of breath while laying down  Cough  Chest pain  Swelling in feet, ankles or legs  Bloating in abdomen  Fatigue      No medication changes today     Continue diet/fluid adherence  Continue daily wts.  F/U w/ Cardiology  F/U in clinic in March

## (undated) DEVICE — CATHETER COR DIAG AMPLATZ R 1.0 CRV 5FR 100CM 0 SIDE H

## (undated) DEVICE — DEVICE CLSR 5FR BIOABSRB FULL INTEGR RAP HEMSTAS FOR FEM

## (undated) DEVICE — SWAN-GANZ DOUBLE LUMEN MONITORING CATHETER: Brand: SWAN-GANZ

## (undated) DEVICE — 4F (1.0MM ID) X 9CM STIFF4F (1.0 MICRO-STICK®INTRODUCER SE WITH NITINOL GUIDEWIREWITH NITIN WITH RADIOPAQUE TIPWITH RADIOPAQ: Brand: MICRO-STICK SETMICRO-STICK SET

## (undated) DEVICE — OFF - ST. RITAS VASC: Brand: MEDLINE INDUSTRIES, INC.

## (undated) DEVICE — YANKAUER,BULB TIP,W/O VENT,RIGID,STERILE: Brand: MEDLINE

## (undated) DEVICE — GUIDEWIRE VASC L150CM DIA0.035IN FLX END L7CM J 3MM PTFE

## (undated) DEVICE — ARM DRAPE

## (undated) DEVICE — PINNACLE INTRODUCER SHEATH: Brand: PINNACLE

## (undated) DEVICE — HIWIRE HYDROPHILIC WIRE GUIDE: Brand: HIWIRE

## (undated) DEVICE — STAPLER INT L60MM REG TISS BLU B FRM 8 FIRING 2 ROW AUTO

## (undated) DEVICE — TUBING RIGID ANGIO L10IN 1200PSI CNTRST INJ FIX FEM LUER CONN HI

## (undated) DEVICE — GLOVE ORTHO 8   MSG9480

## (undated) DEVICE — COLUMN DRAPE

## (undated) DEVICE — SET ANGIO ADMIN L108IN BOR 15 GTT SELECTABLE VENT MACRO

## (undated) DEVICE — CARDIAC CATH LAB PACK LF

## (undated) DEVICE — KIT MFLD ISOLATN NACL CNTRST PRT TBNG SPIK W/ PRSS TRNSDUC

## (undated) DEVICE — STAPLER INT L55MM CUT LN L53MM STPL LN L57MM BLU B FRM 8

## (undated) DEVICE — GENERAL LAPAROSCOPY PACK-LF: Brand: MEDLINE INDUSTRIES, INC.

## (undated) DEVICE — RELOAD STPL L55MM OPN H3.8MM CLS H1.5MM WIRE DIA0.2MM REG

## (undated) DEVICE — SOLUTION ANTIFOG VIS SYS CLEARIFY LAPSCP

## (undated) DEVICE — CATHETER DIAG AD 5FR L100CM COR GRY HYDRPHLC NYL MPA 2 W/ 2

## (undated) DEVICE — SUTURE VCRL SZ 3-0 L27IN ABSRB UD FS-2 L19MM 1/2 CIR J423H

## (undated) DEVICE — CANNULA SEAL

## (undated) DEVICE — Device

## (undated) DEVICE — KIT ANGIO W/ AT P65 PREM HND CTRL FOR CNTRST DEL ANGIOTOUCH

## (undated) DEVICE — 1 X VERSACROSS TRANSSEPTAL SHEATH (INCLUDING  1 X J-TIP GUIDEWIRE); 1 X VERSACROSS RF WIRE (INCLUDING 1 X CONNECTOR CABLE (SINGLE USE)); 1 X DISPERSIVE ELECTRODE: Brand: VERSACROSS ACCESS SOLUTION

## (undated) DEVICE — TUBING INSUFFLATOR HEAT HUMIDIFIED SMK EVAC SET PNEUMOCLEAR

## (undated) DEVICE — TROCAR: Brand: KII SHIELDED BLADED ACCESS SYSTEM

## (undated) DEVICE — TUBING, SUCTION, 1/4" X 20', STRAIGHT: Brand: MEDLINE INDUSTRIES, INC.

## (undated) DEVICE — GLOVE ORANGE PI 8   MSG9080

## (undated) DEVICE — BLADELESS OBTURATOR: Brand: WECK VISTA

## (undated) DEVICE — GOWN,SIRUS,NON REINFRCD,LARGE,SET IN SL: Brand: MEDLINE

## (undated) DEVICE — CATHETER DIAG 5FR L100CM AL AL 1.0 CRV SZ DBL BRAID WIRE

## (undated) DEVICE — CATHETER ANGIO NTR 0.045 INX5 FRX100 CM THRULUMEN EXPO

## (undated) DEVICE — CATHETER DIAG 5FR L100CM LUMN ID0.047IN JR4 CRV 0 SIDE H

## (undated) DEVICE — CATHETER COR DIAG PIGTAILS PIG 145 CRV 5FR 110CM 6 SIDE H

## (undated) DEVICE — SUTURE VCRL SZ 0 L18IN ABSRB VLT SUTUPAK PRECUT W/O NDL J106T

## (undated) DEVICE — GLOVE SURG SZ 65 THK91MIL LTX FREE SYN POLYISOPRENE

## (undated) DEVICE — Device: Brand: NOMOLINE™ LH ADULT NASAL CO2 CANNULA WITH O2 4M

## (undated) DEVICE — SPONGE LAP W18XL18IN WHT COT 4 PLY FLD STRUNG RADPQ DISP ST

## (undated) DEVICE — CATHETER DIAG 5FR L100CM LUMN ID0.047IN JL4 CRV 0 SIDE H

## (undated) DEVICE — AGENT HEMSTAT 3GM OXIDIZED REGENERATED CELOS ABSRB FOR CONT (ORDER MULTIPLES OF 5EA)

## (undated) DEVICE — INTENDED FOR TISSUE SEPARATION, AND OTHER PROCEDURES THAT REQUIRE A SHARP SURGICAL BLADE TO PUNCTURE OR CUT.: Brand: BARD-PARKER ® CARBON RIB-BACK BLADES

## (undated) DEVICE — DRAPE KIT RAMAPR RADIATION SHIELD

## (undated) DEVICE — GOWN,AURORA,NON-REINFORCED,2XL: Brand: MEDLINE

## (undated) DEVICE — SYRINGE MED 30ML STD CLR PLAS LUERLOCK TIP N CTRL DISP

## (undated) DEVICE — GLOVE ORANGE PI 7 1/2   MSG9075

## (undated) DEVICE — CHECK-FLO PERFORMER INTRODUCER: Brand: PERFORMER

## (undated) DEVICE — PENCIL ES L3M BTTN SWCH HOLSTER W/ BLDE ELECTRD EDGE

## (undated) DEVICE — TIP COVER ACCESSORY

## (undated) DEVICE — ADHESIVE SKIN CLSR 0.7ML TOP DERMBND ADV

## (undated) DEVICE — SUTURE VCRL SZ 3-0 L27IN ABSRB UD L26MM SH 1/2 CIR J416H

## (undated) DEVICE — ELECTRO LUBE IS A SINGLE PATIENT USE DEVICE THAT IS INTENDED TO BE USED ON ELECTROSURGICAL ELECTRODES TO REDUCE STICKING.: Brand: KEY SURGICAL ELECTRO LUBE

## (undated) DEVICE — PINNACLE R/O II INTRODUCER SHEATH WITH RADIOPAQUE MARKER: Brand: PINNACLE